# Patient Record
Sex: MALE | Race: BLACK OR AFRICAN AMERICAN | NOT HISPANIC OR LATINO | Employment: UNEMPLOYED | URBAN - METROPOLITAN AREA
[De-identification: names, ages, dates, MRNs, and addresses within clinical notes are randomized per-mention and may not be internally consistent; named-entity substitution may affect disease eponyms.]

---

## 2017-06-09 ENCOUNTER — HOSPITAL ENCOUNTER (EMERGENCY)
Facility: HOSPITAL | Age: 67
Discharge: HOME | End: 2017-06-09
Payer: MEDICARE

## 2017-06-09 VITALS
OXYGEN SATURATION: 92 % | SYSTOLIC BLOOD PRESSURE: 156 MMHG | WEIGHT: 190 LBS | BODY MASS INDEX: 28.79 KG/M2 | TEMPERATURE: 98 F | RESPIRATION RATE: 22 BRPM | DIASTOLIC BLOOD PRESSURE: 90 MMHG | HEART RATE: 78 BPM | HEIGHT: 68 IN

## 2017-06-09 DIAGNOSIS — R51.9 HEADACHE: ICD-10-CM

## 2017-06-09 DIAGNOSIS — M54.2 CERVICAL PAIN (NECK): ICD-10-CM

## 2017-06-09 LAB — POCT GLUCOSE: 103 MG/DL (ref 70–110)

## 2017-06-09 PROCEDURE — 99284 EMERGENCY DEPT VISIT MOD MDM: CPT | Mod: 25

## 2017-06-09 PROCEDURE — 82962 GLUCOSE BLOOD TEST: CPT

## 2017-06-09 PROCEDURE — 25000003 PHARM REV CODE 250: Performed by: PHYSICIAN ASSISTANT

## 2017-06-09 RX ORDER — HYDROCODONE BITARTRATE AND ACETAMINOPHEN 10; 325 MG/1; MG/1
1 TABLET ORAL EVERY 8 HOURS PRN
Qty: 8 TABLET | Refills: 0 | Status: SHIPPED | OUTPATIENT
Start: 2017-06-09 | End: 2017-06-09

## 2017-06-09 RX ORDER — BUTALBITAL, ACETAMINOPHEN AND CAFFEINE 50; 325; 40 MG/1; MG/1; MG/1
1 TABLET ORAL
Status: COMPLETED | OUTPATIENT
Start: 2017-06-09 | End: 2017-06-09

## 2017-06-09 RX ORDER — BUTALBITAL, ACETAMINOPHEN AND CAFFEINE 50; 325; 40 MG/1; MG/1; MG/1
1 TABLET ORAL 2 TIMES DAILY
Qty: 12 TABLET | Refills: 0 | Status: SHIPPED | OUTPATIENT
Start: 2017-06-09 | End: 2017-06-09

## 2017-06-09 RX ORDER — BUTALBITAL, ACETAMINOPHEN AND CAFFEINE 50; 325; 40 MG/1; MG/1; MG/1
1 TABLET ORAL 2 TIMES DAILY
Qty: 12 TABLET | Refills: 0 | Status: SHIPPED | OUTPATIENT
Start: 2017-06-09 | End: 2017-07-09

## 2017-06-09 RX ORDER — HYDROCODONE BITARTRATE AND ACETAMINOPHEN 10; 325 MG/1; MG/1
1 TABLET ORAL EVERY 8 HOURS PRN
Qty: 8 TABLET | Refills: 0 | Status: SHIPPED | OUTPATIENT
Start: 2017-06-09 | End: 2019-10-03

## 2017-06-09 RX ADMIN — BUTALBITAL, ACETAMINOPHEN, AND CAFFEINE 1 TABLET: 50; 325; 40 TABLET ORAL at 05:06

## 2017-06-09 NOTE — ED PROVIDER NOTES
SCRIBE #1 NOTE: I, Ricardo Hill, am scribing for, and in the presence of, MYLA Wolff. I have scribed the entire note.      History      Chief Complaint   Patient presents with    Neck Pain     x2 weeks, with pain radiating to toes, had skin removed to back of neck and since then knot around area with increased pain        Review of patient's allergies indicates:   Allergen Reactions    Cephalexin Anaphylaxis        HPI   HPI    6/9/2017, 4:33 PM   History obtained from the patient and fiance      History of Present Illness: Rea Vail is a 66 y.o. male patient who presents to the Emergency Department for headache which onset gradually 2 weeks ago. Sx are constant and moderate in severity. Sx are described as shooting pains into the neck.  Pt states he had a bump on his neck he scratched the scab off.  There are no mitigating or exacerbating factors noted.  Pt denies any otalgia, photophobia, neck stiffness, fever, N/V, cough, focal weakness or numbness, dizziness,  and all other sx at this time. Fiance states pt has tried Excedrin migraine without relief to sx. Pt denies hx of headaches. Pt also requests cbg.   No further complaints or concerns at this time.           Arrival mode: Personal vehicle     PCP: Estiven Oseguera MD       Past Medical History:  Past Medical History:   Diagnosis Date    Anemia 2013    Asthma     CHF (congestive heart failure)     COPD (chronic obstructive pulmonary disease)     COPD (chronic obstructive pulmonary disease) 2012    Diverticular disease     Gout 2012    Hyperlipidemia     Hypertension     Other and unspecified hyperlipidemia     Stroke        Past Surgical History:  No past surgical history on file.      Family History:  Family History   Problem Relation Age of Onset    Stroke Cousin        Social History:  Social History     Social History Main Topics    Smoking status: Former Smoker     Types: Cigarettes     Quit date: 7/9/2006    Smokeless  tobacco: Not on file    Alcohol use No    Drug use: No    Sexual activity: Not on file       ROS   Review of Systems   Constitutional: Negative for chills and fever.   HENT: Negative for ear pain, sore throat and trouble swallowing.    Eyes: Negative for photophobia.   Respiratory: Negative for cough and shortness of breath.    Cardiovascular: Negative for chest pain.   Gastrointestinal: Negative for abdominal pain, diarrhea, nausea and vomiting.   Genitourinary: Negative for dysuria and hematuria.   Musculoskeletal: Positive for neck pain. Negative for back pain and neck stiffness.   Skin: Negative for rash and wound.   Neurological: Positive for headaches. Negative for dizziness, weakness and numbness.   Hematological: Does not bruise/bleed easily.   All other systems reviewed and are negative.      Physical Exam      Initial Vitals [06/09/17 1617]   BP Pulse Resp Temp SpO2   (!) 158/92 65 16 98.4 °F (36.9 °C) (!) 92 %      Physical Exam  Nursing Notes and Vital Signs Reviewed.  Constitutional: Patient is in no acute distress. Awake and alert. Well-developed and well-nourished.  Head: Atraumatic. Normocephalic.  Eyes: PERRL. EOM intact. Conjunctivae are not pale. No scleral icterus.  Ears: Right TM normal. Left TM normal. No erythema. No bulging. No effusion or air-fluid levels. No perforation.  No mastoid ttp  Nose: Patent nares. Turbinates are normal. No drainage.   Throat: Moist mucous membranes. Posterior oropharynx is symmetric without erythema. No trismus. Normal handling of secretions. No stridor.  Neck: Supple. Full ROM. No lymphadenopathy. No abscess, cellulitis, or erythema.  Cardiovascular: Regular rate. Regular rhythm. No murmurs, rubs, or gallops.    Pulmonary/Chest: No respiratory distress. Clear to auscultation bilaterally. No wheezing, rales, or rhonchi.  Abdominal: Soft and non-distended.  There is no tenderness.  No rebound, guarding, or rigidity.     Musculoskeletal: Moves all extremities. No  "obvious deformities. No edema.    Skin: Warm and dry. Small scabbed papule to posterior neck, no abscess or cellulitis.  Neurological: Awake and alert. Appropriate for age. Cranial nerves II-XII intact. No strength deficit; equal and 5/5 in bilateral upper and lower extremities. Finger-to-nose is normal. No light touch sensory deficit. Normal gait. No acute focal neurological deficits noted.       ED Course    Procedures  ED Vital Signs:  Vitals:    06/09/17 1617 06/09/17 1813   BP: (!) 158/92 (!) 156/90   Pulse: 65 78   Resp: 16 (!) 22   Temp: 98.4 °F (36.9 °C)    TempSrc: Oral    SpO2: (!) 92% (!) 92%   Weight: 86.2 kg (190 lb)    Height: 5' 8" (1.727 m)        Abnormal Lab Results:  Labs Reviewed   POCT GLUCOSE        All Lab Results:  Results for orders placed or performed during the hospital encounter of 06/09/17   POCT glucose   Result Value Ref Range    POCT Glucose 103 70 - 110 mg/dL         Imaging Results:  Imaging Results          CT Head Without Contrast (Final result)  Result time 06/09/17 17:10:53    Final result by Moy Garcia III, MD (06/09/17 17:10:53)                 Impression:        1. Atrophy with bilateral white matter changes which may be related to microvascular disease. No bleed or other acute abnormality is suggested.      Electronically signed by: MOY GARCIA MD  Date:     06/09/17  Time:    17:10              Narrative:    CT of the brain without contrast.    Clinical indication: Headaches.    The scan is degraded by motion artifact.    There are mild changes of atrophy, both supra-and infratentorial. There are scattered changes of low-attenuation in the periventricular and subcortical white matter. No hemorrhage, mass lesion, hydrocephalus, or midline shift. No acute/depressed skull fracture.                             X-Ray Cervical Spine AP And Lateral (Final result)  Result time 06/09/17 17:04:43    Final result by Moy Garcia III, MD (06/09/17 17:04:43)     "             Impression:     As above. Multilevel degenerative change. No gross acute cervical abnormality suggested.      Electronically signed by: EVELIN SIMMONS MD  Date:     06/09/17  Time:    17:04              Narrative:    Cervical spine series, AP and lateral views.    Clinical indication: Neck pain.    There is moderate multilevel arthritic change throughout the cervical spine with interspace narrowing from C3-C7 and moderate arthritic lipping. Minimal degrees of subluxation most likely related to facet arthropathy.    There is no significant prevertebral soft tissue swelling. No lytic or blastic change. No acute cervical fracture or significant subluxation.                                 The Emergency Provider reviewed the vital signs and test results, which are outlined above.    ED Discussion     6:14 PM: Reassessed pt at this time. Awake and alert. NAD. Pt states his condition has improved at this time. Discussed with pt all pertinent ED information and results. Discussed pt dx and plan of tx. Gave pt all f/u and return to the ED instructions. All questions and concerns were addressed at this time. Pt expresses understanding of information and instructions, and is comfortable with plan to discharge. Pt is stable for discharge.      Patient's headache is either consistent with previous headache and/or lacks features concerning for emergent or life threatening condition.  I do not suspect SAH, meningitis, increased IC pressure, infectious, toxic, vascular, CNS, or other EMC.  I have discussed this at length with patient and/or family/caretaker.      ED Medication(s):  Medications   butalbital-acetaminophen-caffeine -40 mg per tablet 1 tablet (1 tablet Oral Given 6/9/17 1757)       Discharge Medication List as of 6/9/2017  6:30 PM      START taking these medications    Details   butalbital-acetaminophen-caffeine -40 mg (FIORICET, ESGIC) -40 mg per tablet Take 1 tablet by mouth 2  (two) times daily. Prn headache, Starting Fri 6/9/2017, Until Sun 7/9/2017, Print             Follow-up Information     Estiven Oseguera MD in 2 days.    Specialty:  Family Medicine  Contact information:  [address] [address] [phone]                      Medical Decision Making    Medical Decision Making:   Clinical Tests:   Lab Tests: Reviewed and Ordered  Radiological Study: Reviewed and Ordered           Scribe Attestation:   Scribe #1: I performed the above scribed service and the documentation accurately describes the services I performed. I attest to the accuracy of the note.    APC:   APC Statement for Scribe #1: I, MYLA Wolff, personally performed the services described in this documentation, as scribed by Ricardo Hill in my presence, and it is both accurate and complete.          Clinical Impression       ICD-10-CM ICD-9-CM   1. Headache R51 784.0   2. Cervical pain (neck) M54.2 723.1               Winter Wyman PA-C  06/09/17 2145

## 2018-02-07 ENCOUNTER — HOSPITAL ENCOUNTER (INPATIENT)
Facility: HOSPITAL | Age: 68
LOS: 3 days | Discharge: HOME HEALTH | DRG: 291 | End: 2018-02-10
Attending: EMERGENCY MEDICINE | Admitting: INTERNAL MEDICINE
Payer: MEDICARE

## 2018-02-07 ENCOUNTER — OFFICE VISIT (OUTPATIENT)
Dept: PULMONOLOGY | Facility: CLINIC | Age: 68
DRG: 291 | End: 2018-02-07
Payer: MEDICARE

## 2018-02-07 VITALS
BODY MASS INDEX: 29.82 KG/M2 | RESPIRATION RATE: 20 BRPM | OXYGEN SATURATION: 93 % | HEIGHT: 68 IN | DIASTOLIC BLOOD PRESSURE: 80 MMHG | HEART RATE: 84 BPM | WEIGHT: 196.75 LBS | SYSTOLIC BLOOD PRESSURE: 138 MMHG

## 2018-02-07 DIAGNOSIS — J96.21 ACUTE ON CHRONIC RESPIRATORY FAILURE WITH HYPOXIA AND HYPERCAPNIA: ICD-10-CM

## 2018-02-07 DIAGNOSIS — I50.33 ACUTE ON CHRONIC DIASTOLIC CONGESTIVE HEART FAILURE: ICD-10-CM

## 2018-02-07 DIAGNOSIS — I48.91 NEW ONSET A-FIB: ICD-10-CM

## 2018-02-07 DIAGNOSIS — J96.22 ACUTE ON CHRONIC RESPIRATORY FAILURE WITH HYPOXIA AND HYPERCAPNIA: ICD-10-CM

## 2018-02-07 DIAGNOSIS — I50.9 CHF (CONGESTIVE HEART FAILURE): ICD-10-CM

## 2018-02-07 DIAGNOSIS — I48.91 A-FIB: ICD-10-CM

## 2018-02-07 DIAGNOSIS — R06.89 DYSPNEA AND RESPIRATORY ABNORMALITIES: ICD-10-CM

## 2018-02-07 DIAGNOSIS — I50.43 ACUTE ON CHRONIC COMBINED SYSTOLIC AND DIASTOLIC CHF (CONGESTIVE HEART FAILURE): ICD-10-CM

## 2018-02-07 DIAGNOSIS — I50.9 ACUTE ON CHRONIC CONGESTIVE HEART FAILURE: ICD-10-CM

## 2018-02-07 DIAGNOSIS — I50.9 ACUTE ON CHRONIC CONGESTIVE HEART FAILURE, UNSPECIFIED CONGESTIVE HEART FAILURE TYPE: Primary | ICD-10-CM

## 2018-02-07 DIAGNOSIS — R09.02 HYPOXEMIA: ICD-10-CM

## 2018-02-07 DIAGNOSIS — R06.00 DYSPNEA AND RESPIRATORY ABNORMALITIES: ICD-10-CM

## 2018-02-07 DIAGNOSIS — J96.21 ACUTE ON CHRONIC RESPIRATORY FAILURE WITH HYPOXIA: Primary | ICD-10-CM

## 2018-02-07 PROBLEM — J96.11 CHRONIC RESPIRATORY FAILURE WITH HYPOXIA: Status: ACTIVE | Noted: 2018-02-07

## 2018-02-07 LAB
ALBUMIN SERPL BCP-MCNC: 2.8 G/DL
ALLENS TEST: ABNORMAL
ALP SERPL-CCNC: 94 U/L
ALT SERPL W/O P-5'-P-CCNC: 26 U/L
ANION GAP SERPL CALC-SCNC: 16 MMOL/L
AST SERPL-CCNC: 38 U/L
BASOPHILS # BLD AUTO: 0.02 K/UL
BASOPHILS NFR BLD: 0.3 %
BILIRUB SERPL-MCNC: 0.4 MG/DL
BILIRUB UR QL STRIP: NEGATIVE
BNP SERPL-MCNC: 380 PG/ML
BUN SERPL-MCNC: 24 MG/DL
CALCIUM SERPL-MCNC: 9.7 MG/DL
CHLORIDE SERPL-SCNC: 92 MMOL/L
CK SERPL-CCNC: 60 U/L
CLARITY UR: CLEAR
CO2 SERPL-SCNC: 31 MMOL/L
COLOR UR: YELLOW
CREAT SERPL-MCNC: 1.3 MG/DL
DELSYS: ABNORMAL
DIFFERENTIAL METHOD: ABNORMAL
EOSINOPHIL # BLD AUTO: 0.1 K/UL
EOSINOPHIL NFR BLD: 1.7 %
ERYTHROCYTE [DISTWIDTH] IN BLOOD BY AUTOMATED COUNT: 15.7 %
EST. GFR  (AFRICAN AMERICAN): >60 ML/MIN/1.73 M^2
EST. GFR  (NON AFRICAN AMERICAN): 56 ML/MIN/1.73 M^2
FIO2: 28
FLOW: 2
GLUCOSE SERPL-MCNC: 90 MG/DL
GLUCOSE UR QL STRIP: NEGATIVE
HCO3 UR-SCNC: 43.6 MMOL/L (ref 24–28)
HCT VFR BLD AUTO: 42.9 %
HGB BLD-MCNC: 12.9 G/DL
HGB UR QL STRIP: NEGATIVE
KETONES UR QL STRIP: NEGATIVE
LEUKOCYTE ESTERASE UR QL STRIP: NEGATIVE
LYMPHOCYTES # BLD AUTO: 1.2 K/UL
LYMPHOCYTES NFR BLD: 16 %
MAGNESIUM SERPL-MCNC: 2.4 MG/DL
MCH RBC QN AUTO: 26.5 PG
MCHC RBC AUTO-ENTMCNC: 30.1 G/DL
MCV RBC AUTO: 88 FL
MODE: ABNORMAL
MONOCYTES # BLD AUTO: 0.5 K/UL
MONOCYTES NFR BLD: 6.8 %
NEUTROPHILS # BLD AUTO: 5.7 K/UL
NEUTROPHILS NFR BLD: 75.2 %
NITRITE UR QL STRIP: NEGATIVE
PCO2 BLDA: 69 MMHG (ref 35–45)
PH SMN: 7.41 [PH] (ref 7.35–7.45)
PH UR STRIP: 7 [PH] (ref 5–8)
PHOSPHATE SERPL-MCNC: 4.5 MG/DL
PLATELET # BLD AUTO: 327 K/UL
PMV BLD AUTO: 10.6 FL
PO2 BLDA: 59 MMHG (ref 80–100)
POC BE: 19 MMOL/L
POC SATURATED O2: 89 % (ref 95–100)
POTASSIUM SERPL-SCNC: 4.9 MMOL/L
PROT SERPL-MCNC: 7.5 G/DL
PROT UR QL STRIP: NEGATIVE
RBC # BLD AUTO: 4.86 M/UL
SAMPLE: ABNORMAL
SITE: ABNORMAL
SODIUM SERPL-SCNC: 139 MMOL/L
SP GR UR STRIP: 1.01 (ref 1–1.03)
TROPONIN I SERPL DL<=0.01 NG/ML-MCNC: 0.02 NG/ML
URN SPEC COLLECT METH UR: NORMAL
UROBILINOGEN UR STRIP-ACNC: NEGATIVE EU/DL
WBC # BLD AUTO: 7.63 K/UL

## 2018-02-07 PROCEDURE — 86140 C-REACTIVE PROTEIN: CPT

## 2018-02-07 PROCEDURE — 85025 COMPLETE CBC W/AUTO DIFF WBC: CPT

## 2018-02-07 PROCEDURE — 84439 ASSAY OF FREE THYROXINE: CPT

## 2018-02-07 PROCEDURE — 80053 COMPREHEN METABOLIC PANEL: CPT

## 2018-02-07 PROCEDURE — 93010 ELECTROCARDIOGRAM REPORT: CPT | Mod: ,,, | Performed by: INTERNAL MEDICINE

## 2018-02-07 PROCEDURE — 99900031 HC PATIENT EDUCATION (STAT)

## 2018-02-07 PROCEDURE — 36600 WITHDRAWAL OF ARTERIAL BLOOD: CPT

## 2018-02-07 PROCEDURE — 94640 AIRWAY INHALATION TREATMENT: CPT

## 2018-02-07 PROCEDURE — 85651 RBC SED RATE NONAUTOMATED: CPT

## 2018-02-07 PROCEDURE — 99999 PR PBB SHADOW E&M-EST. PATIENT-LVL III: CPT | Mod: PBBFAC,,, | Performed by: INTERNAL MEDICINE

## 2018-02-07 PROCEDURE — 81003 URINALYSIS AUTO W/O SCOPE: CPT

## 2018-02-07 PROCEDURE — 63600175 PHARM REV CODE 636 W HCPCS: Performed by: NURSE PRACTITIONER

## 2018-02-07 PROCEDURE — 82550 ASSAY OF CK (CPK): CPT

## 2018-02-07 PROCEDURE — 11000001 HC ACUTE MED/SURG PRIVATE ROOM

## 2018-02-07 PROCEDURE — 99900029 HC O2 SETUP (STAT)

## 2018-02-07 PROCEDURE — 86038 ANTINUCLEAR ANTIBODIES: CPT

## 2018-02-07 PROCEDURE — 83880 ASSAY OF NATRIURETIC PEPTIDE: CPT

## 2018-02-07 PROCEDURE — 96374 THER/PROPH/DIAG INJ IV PUSH: CPT

## 2018-02-07 PROCEDURE — 84443 ASSAY THYROID STIM HORMONE: CPT

## 2018-02-07 PROCEDURE — 25000003 PHARM REV CODE 250: Performed by: NURSE PRACTITIONER

## 2018-02-07 PROCEDURE — 36415 COLL VENOUS BLD VENIPUNCTURE: CPT

## 2018-02-07 PROCEDURE — 84100 ASSAY OF PHOSPHORUS: CPT

## 2018-02-07 PROCEDURE — 63600175 PHARM REV CODE 636 W HCPCS: Performed by: EMERGENCY MEDICINE

## 2018-02-07 PROCEDURE — 84484 ASSAY OF TROPONIN QUANT: CPT

## 2018-02-07 PROCEDURE — 25000242 PHARM REV CODE 250 ALT 637 W/ HCPCS: Performed by: NURSE PRACTITIONER

## 2018-02-07 PROCEDURE — 25000242 PHARM REV CODE 250 ALT 637 W/ HCPCS: Performed by: EMERGENCY MEDICINE

## 2018-02-07 PROCEDURE — 84484 ASSAY OF TROPONIN QUANT: CPT | Mod: 91

## 2018-02-07 PROCEDURE — 1159F MED LIST DOCD IN RCRD: CPT | Mod: ,,, | Performed by: INTERNAL MEDICINE

## 2018-02-07 PROCEDURE — 96375 TX/PRO/DX INJ NEW DRUG ADDON: CPT

## 2018-02-07 PROCEDURE — 99900035 HC TECH TIME PER 15 MIN (STAT)

## 2018-02-07 PROCEDURE — 83735 ASSAY OF MAGNESIUM: CPT

## 2018-02-07 PROCEDURE — 96372 THER/PROPH/DIAG INJ SC/IM: CPT

## 2018-02-07 PROCEDURE — 99205 OFFICE O/P NEW HI 60 MIN: CPT | Mod: S$PBB,,, | Performed by: INTERNAL MEDICINE

## 2018-02-07 PROCEDURE — 99285 EMERGENCY DEPT VISIT HI MDM: CPT | Mod: 25,27

## 2018-02-07 PROCEDURE — 99213 OFFICE O/P EST LOW 20 MIN: CPT | Mod: PBBFAC | Performed by: INTERNAL MEDICINE

## 2018-02-07 RX ORDER — LEVALBUTEROL INHALATION SOLUTION 0.63 MG/3ML
0.63 SOLUTION RESPIRATORY (INHALATION) EVERY 12 HOURS PRN
Status: DISCONTINUED | OUTPATIENT
Start: 2018-02-07 | End: 2018-02-08

## 2018-02-07 RX ORDER — BUDESONIDE 0.5 MG/2ML
0.5 INHALANT ORAL EVERY 12 HOURS
Status: DISCONTINUED | OUTPATIENT
Start: 2018-02-07 | End: 2018-02-08

## 2018-02-07 RX ORDER — GABAPENTIN 100 MG/1
200 CAPSULE ORAL 2 TIMES DAILY
Status: DISCONTINUED | OUTPATIENT
Start: 2018-02-07 | End: 2018-02-10 | Stop reason: HOSPADM

## 2018-02-07 RX ORDER — CLOPIDOGREL BISULFATE 75 MG/1
75 TABLET ORAL DAILY
Status: DISCONTINUED | OUTPATIENT
Start: 2018-02-08 | End: 2018-02-10 | Stop reason: HOSPADM

## 2018-02-07 RX ORDER — NEBIVOLOL 10 MG/1
10 TABLET ORAL 2 TIMES DAILY
Status: DISCONTINUED | OUTPATIENT
Start: 2018-02-07 | End: 2018-02-08

## 2018-02-07 RX ORDER — RAMELTEON 8 MG/1
8 TABLET ORAL NIGHTLY PRN
Status: DISCONTINUED | OUTPATIENT
Start: 2018-02-07 | End: 2018-02-10 | Stop reason: HOSPADM

## 2018-02-07 RX ORDER — FUROSEMIDE 10 MG/ML
40 INJECTION INTRAMUSCULAR; INTRAVENOUS
Status: COMPLETED | OUTPATIENT
Start: 2018-02-07 | End: 2018-02-07

## 2018-02-07 RX ORDER — SIMVASTATIN 20 MG/1
40 TABLET, FILM COATED ORAL NIGHTLY
Status: DISCONTINUED | OUTPATIENT
Start: 2018-02-07 | End: 2018-02-10 | Stop reason: HOSPADM

## 2018-02-07 RX ORDER — ENOXAPARIN SODIUM 100 MG/ML
1 INJECTION SUBCUTANEOUS ONCE
Status: COMPLETED | OUTPATIENT
Start: 2018-02-07 | End: 2018-02-07

## 2018-02-07 RX ORDER — CLOPIDOGREL BISULFATE 75 MG/1
75 TABLET ORAL DAILY
Status: ON HOLD | COMMUNITY
End: 2018-02-10 | Stop reason: HOSPADM

## 2018-02-07 RX ORDER — AMLODIPINE BESYLATE 10 MG/1
10 TABLET ORAL DAILY
Status: DISCONTINUED | OUTPATIENT
Start: 2018-02-08 | End: 2018-02-10 | Stop reason: HOSPADM

## 2018-02-07 RX ORDER — IPRATROPIUM BROMIDE AND ALBUTEROL SULFATE 2.5; .5 MG/3ML; MG/3ML
3 SOLUTION RESPIRATORY (INHALATION)
Status: COMPLETED | OUTPATIENT
Start: 2018-02-07 | End: 2018-02-07

## 2018-02-07 RX ORDER — ALLOPURINOL 300 MG/1
300 TABLET ORAL DAILY
Status: DISCONTINUED | OUTPATIENT
Start: 2018-02-08 | End: 2018-02-10 | Stop reason: HOSPADM

## 2018-02-07 RX ORDER — ASPIRIN 325 MG
325 TABLET ORAL ONCE
Status: COMPLETED | OUTPATIENT
Start: 2018-02-07 | End: 2018-02-07

## 2018-02-07 RX ORDER — METHYLPREDNISOLONE SOD SUCC 125 MG
80 VIAL (EA) INJECTION EVERY 8 HOURS
Status: DISCONTINUED | OUTPATIENT
Start: 2018-02-07 | End: 2018-02-07

## 2018-02-07 RX ORDER — AMLODIPINE AND VALSARTAN 10; 320 MG/1; MG/1
1 TABLET ORAL DAILY
Status: DISCONTINUED | OUTPATIENT
Start: 2018-02-08 | End: 2018-02-07 | Stop reason: CLARIF

## 2018-02-07 RX ORDER — LEFLUNOMIDE 10 MG/1
10 TABLET ORAL DAILY
Status: DISCONTINUED | OUTPATIENT
Start: 2018-02-08 | End: 2018-02-10 | Stop reason: HOSPADM

## 2018-02-07 RX ORDER — PRAMIPEXOLE DIHYDROCHLORIDE 0.5 MG/1
0.5 TABLET ORAL 2 TIMES DAILY
Status: DISCONTINUED | OUTPATIENT
Start: 2018-02-07 | End: 2018-02-10 | Stop reason: HOSPADM

## 2018-02-07 RX ORDER — FUROSEMIDE 10 MG/ML
40 INJECTION INTRAMUSCULAR; INTRAVENOUS 2 TIMES DAILY
Status: COMPLETED | OUTPATIENT
Start: 2018-02-08 | End: 2018-02-08

## 2018-02-07 RX ORDER — VALSARTAN 80 MG/1
320 TABLET ORAL DAILY
Status: DISCONTINUED | OUTPATIENT
Start: 2018-02-08 | End: 2018-02-10 | Stop reason: HOSPADM

## 2018-02-07 RX ORDER — PANTOPRAZOLE SODIUM 40 MG/1
40 TABLET, DELAYED RELEASE ORAL DAILY
Status: DISCONTINUED | OUTPATIENT
Start: 2018-02-08 | End: 2018-02-10 | Stop reason: HOSPADM

## 2018-02-07 RX ORDER — METHYLPREDNISOLONE SOD SUCC 125 MG
125 VIAL (EA) INJECTION
Status: DISCONTINUED | OUTPATIENT
Start: 2018-02-07 | End: 2018-02-07

## 2018-02-07 RX ORDER — IPRATROPIUM BROMIDE 0.5 MG/2.5ML
0.5 SOLUTION RESPIRATORY (INHALATION) EVERY 8 HOURS
Status: DISCONTINUED | OUTPATIENT
Start: 2018-02-07 | End: 2018-02-08

## 2018-02-07 RX ORDER — METHYLPREDNISOLONE SOD SUCC 125 MG
80 VIAL (EA) INJECTION EVERY 8 HOURS
Status: COMPLETED | OUTPATIENT
Start: 2018-02-08 | End: 2018-02-08

## 2018-02-07 RX ORDER — ONDANSETRON 2 MG/ML
4 INJECTION INTRAMUSCULAR; INTRAVENOUS EVERY 8 HOURS PRN
Status: DISCONTINUED | OUTPATIENT
Start: 2018-02-07 | End: 2018-02-10 | Stop reason: HOSPADM

## 2018-02-07 RX ADMIN — RAMELTEON 8 MG: 8 TABLET, FILM COATED ORAL at 11:02

## 2018-02-07 RX ADMIN — NITROGLYCERIN 0.5 INCH: 20 OINTMENT TOPICAL at 10:02

## 2018-02-07 RX ADMIN — FUROSEMIDE 40 MG: 10 INJECTION, SOLUTION INTRAMUSCULAR; INTRAVENOUS at 08:02

## 2018-02-07 RX ADMIN — GABAPENTIN 200 MG: 100 CAPSULE ORAL at 10:02

## 2018-02-07 RX ADMIN — IPRATROPIUM BROMIDE AND ALBUTEROL SULFATE 3 ML: .5; 3 SOLUTION RESPIRATORY (INHALATION) at 08:02

## 2018-02-07 RX ADMIN — ENOXAPARIN SODIUM 90 MG: 100 INJECTION SUBCUTANEOUS at 10:02

## 2018-02-07 RX ADMIN — IPRATROPIUM BROMIDE AND ALBUTEROL SULFATE 3 ML: .5; 3 SOLUTION RESPIRATORY (INHALATION) at 09:02

## 2018-02-07 RX ADMIN — SIMVASTATIN 40 MG: 20 TABLET, FILM COATED ORAL at 10:02

## 2018-02-07 RX ADMIN — NEBIVOLOL HYDROCHLORIDE 10 MG: 10 TABLET ORAL at 11:02

## 2018-02-07 RX ADMIN — METHYLPREDNISOLONE SODIUM SUCCINATE 80 MG: 125 INJECTION, POWDER, FOR SOLUTION INTRAMUSCULAR; INTRAVENOUS at 10:02

## 2018-02-07 RX ADMIN — BACLOFEN 5 MG: 10 TABLET ORAL at 11:02

## 2018-02-07 RX ADMIN — PRAMIPEXOLE DIHYDROCHLORIDE 0.5 MG: 0.5 TABLET ORAL at 11:02

## 2018-02-07 RX ADMIN — BUDESONIDE 0.5 MG: 0.5 SUSPENSION RESPIRATORY (INHALATION) at 11:02

## 2018-02-07 RX ADMIN — IPRATROPIUM BROMIDE 0.5 MG: 0.5 SOLUTION RESPIRATORY (INHALATION) at 11:02

## 2018-02-07 RX ADMIN — ASPIRIN 325 MG ORAL TABLET 325 MG: 325 PILL ORAL at 10:02

## 2018-02-08 LAB
ANION GAP SERPL CALC-SCNC: 13 MMOL/L
BASOPHILS # BLD AUTO: 0 K/UL
BASOPHILS NFR BLD: 0 %
BUN SERPL-MCNC: 25 MG/DL
CALCIUM SERPL-MCNC: 9.6 MG/DL
CHLORIDE SERPL-SCNC: 90 MMOL/L
CHOLEST SERPL-MCNC: 133 MG/DL
CHOLEST/HDLC SERPL: 3.3 {RATIO}
CO2 SERPL-SCNC: 36 MMOL/L
CREAT SERPL-MCNC: 1.3 MG/DL
CRP SERPL-MCNC: 30.2 MG/L
DIASTOLIC DYSFUNCTION: NO
DIFFERENTIAL METHOD: ABNORMAL
EOSINOPHIL # BLD AUTO: 0 K/UL
EOSINOPHIL NFR BLD: 0 %
ERYTHROCYTE [DISTWIDTH] IN BLOOD BY AUTOMATED COUNT: 15.7 %
ERYTHROCYTE [SEDIMENTATION RATE] IN BLOOD BY WESTERGREN METHOD: 30 MM/HR
EST. GFR  (AFRICAN AMERICAN): >60 ML/MIN/1.73 M^2
EST. GFR  (NON AFRICAN AMERICAN): 56 ML/MIN/1.73 M^2
ESTIMATED AVG GLUCOSE: 126 MG/DL
ESTIMATED PA SYSTOLIC PRESSURE: 47.94
GLUCOSE SERPL-MCNC: 184 MG/DL
HBA1C MFR BLD HPLC: 6 %
HCT VFR BLD AUTO: 41.4 %
HDLC SERPL-MCNC: 40 MG/DL
HDLC SERPL: 30.1 %
HGB BLD-MCNC: 12.4 G/DL
LDLC SERPL CALC-MCNC: 79.4 MG/DL
LYMPHOCYTES # BLD AUTO: 0.3 K/UL
LYMPHOCYTES NFR BLD: 4.5 %
MCH RBC QN AUTO: 26.4 PG
MCHC RBC AUTO-ENTMCNC: 30 G/DL
MCV RBC AUTO: 88 FL
MITRAL VALVE REGURGITATION: ABNORMAL
MONOCYTES # BLD AUTO: 0 K/UL
MONOCYTES NFR BLD: 0.4 %
NEUTROPHILS # BLD AUTO: 6.8 K/UL
NEUTROPHILS NFR BLD: 95.1 %
NONHDLC SERPL-MCNC: 93 MG/DL
PLATELET # BLD AUTO: 326 K/UL
PMV BLD AUTO: 9.5 FL
POTASSIUM SERPL-SCNC: 3.8 MMOL/L
RBC # BLD AUTO: 4.69 M/UL
RETIRED EF AND QEF - SEE NOTES: 55 (ref 55–65)
SODIUM SERPL-SCNC: 139 MMOL/L
T4 FREE SERPL-MCNC: 1.01 NG/DL
TRICUSPID VALVE REGURGITATION: ABNORMAL
TRIGL SERPL-MCNC: 68 MG/DL
TROPONIN I SERPL DL<=0.01 NG/ML-MCNC: 0.01 NG/ML
TROPONIN I SERPL DL<=0.01 NG/ML-MCNC: 0.02 NG/ML
TSH SERPL DL<=0.005 MIU/L-ACNC: 1.35 UIU/ML
WBC # BLD AUTO: 7.1 K/UL

## 2018-02-08 PROCEDURE — 93306 TTE W/DOPPLER COMPLETE: CPT | Mod: 26,,, | Performed by: INTERNAL MEDICINE

## 2018-02-08 PROCEDURE — 25000003 PHARM REV CODE 250: Performed by: INTERNAL MEDICINE

## 2018-02-08 PROCEDURE — 93306 TTE W/DOPPLER COMPLETE: CPT

## 2018-02-08 PROCEDURE — 93005 ELECTROCARDIOGRAM TRACING: CPT

## 2018-02-08 PROCEDURE — 96376 TX/PRO/DX INJ SAME DRUG ADON: CPT

## 2018-02-08 PROCEDURE — 84484 ASSAY OF TROPONIN QUANT: CPT

## 2018-02-08 PROCEDURE — 27000190 HC CPAP FULL FACE MASK W/VALVE

## 2018-02-08 PROCEDURE — 25000003 PHARM REV CODE 250: Performed by: NURSE PRACTITIONER

## 2018-02-08 PROCEDURE — 94640 AIRWAY INHALATION TREATMENT: CPT

## 2018-02-08 PROCEDURE — 25000242 PHARM REV CODE 250 ALT 637 W/ HCPCS: Performed by: INTERNAL MEDICINE

## 2018-02-08 PROCEDURE — 80048 BASIC METABOLIC PNL TOTAL CA: CPT

## 2018-02-08 PROCEDURE — 63600175 PHARM REV CODE 636 W HCPCS: Performed by: NURSE PRACTITIONER

## 2018-02-08 PROCEDURE — 94660 CPAP INITIATION&MGMT: CPT

## 2018-02-08 PROCEDURE — 99223 1ST HOSP IP/OBS HIGH 75: CPT | Mod: ,,, | Performed by: INTERNAL MEDICINE

## 2018-02-08 PROCEDURE — 25000003 PHARM REV CODE 250: Performed by: EMERGENCY MEDICINE

## 2018-02-08 PROCEDURE — 93010 ELECTROCARDIOGRAM REPORT: CPT | Mod: ,,, | Performed by: INTERNAL MEDICINE

## 2018-02-08 PROCEDURE — 97802 MEDICAL NUTRITION INDIV IN: CPT

## 2018-02-08 PROCEDURE — 25000242 PHARM REV CODE 250 ALT 637 W/ HCPCS: Performed by: NURSE PRACTITIONER

## 2018-02-08 PROCEDURE — 99900035 HC TECH TIME PER 15 MIN (STAT)

## 2018-02-08 PROCEDURE — 83036 HEMOGLOBIN GLYCOSYLATED A1C: CPT

## 2018-02-08 PROCEDURE — 21400001 HC TELEMETRY ROOM

## 2018-02-08 PROCEDURE — 27000221 HC OXYGEN, UP TO 24 HOURS

## 2018-02-08 PROCEDURE — 85025 COMPLETE CBC W/AUTO DIFF WBC: CPT

## 2018-02-08 PROCEDURE — 80061 LIPID PANEL: CPT

## 2018-02-08 RX ORDER — ARFORMOTEROL TARTRATE 15 UG/2ML
15 SOLUTION RESPIRATORY (INHALATION) EVERY 12 HOURS
Status: DISCONTINUED | OUTPATIENT
Start: 2018-02-08 | End: 2018-02-10 | Stop reason: HOSPADM

## 2018-02-08 RX ORDER — METOPROLOL TARTRATE 1 MG/ML
5 INJECTION, SOLUTION INTRAVENOUS EVERY 4 HOURS PRN
Status: DISCONTINUED | OUTPATIENT
Start: 2018-02-08 | End: 2018-02-10 | Stop reason: HOSPADM

## 2018-02-08 RX ORDER — LEVALBUTEROL INHALATION SOLUTION 0.63 MG/3ML
0.63 SOLUTION RESPIRATORY (INHALATION) EVERY 8 HOURS PRN
Status: DISCONTINUED | OUTPATIENT
Start: 2018-02-08 | End: 2018-02-10 | Stop reason: HOSPADM

## 2018-02-08 RX ORDER — METOPROLOL TARTRATE 25 MG/1
25 TABLET, FILM COATED ORAL 2 TIMES DAILY
Status: DISCONTINUED | OUTPATIENT
Start: 2018-02-08 | End: 2018-02-08

## 2018-02-08 RX ORDER — DILTIAZEM HYDROCHLORIDE 5 MG/ML
0.25 INJECTION INTRAVENOUS ONCE
Status: COMPLETED | OUTPATIENT
Start: 2018-02-08 | End: 2018-02-08

## 2018-02-08 RX ORDER — NEBIVOLOL 10 MG/1
10 TABLET ORAL 2 TIMES DAILY
Status: DISCONTINUED | OUTPATIENT
Start: 2018-02-08 | End: 2018-02-09

## 2018-02-08 RX ADMIN — APIXABAN 5 MG: 2.5 TABLET, FILM COATED ORAL at 11:02

## 2018-02-08 RX ADMIN — PRAMIPEXOLE DIHYDROCHLORIDE 0.5 MG: 0.5 TABLET ORAL at 11:02

## 2018-02-08 RX ADMIN — SIMVASTATIN 40 MG: 20 TABLET, FILM COATED ORAL at 08:02

## 2018-02-08 RX ADMIN — PRAMIPEXOLE DIHYDROCHLORIDE 0.5 MG: 0.5 TABLET ORAL at 08:02

## 2018-02-08 RX ADMIN — NEBIVOLOL HYDROCHLORIDE 10 MG: 10 TABLET ORAL at 11:02

## 2018-02-08 RX ADMIN — VALSARTAN 320 MG: 80 TABLET, FILM COATED ORAL at 09:02

## 2018-02-08 RX ADMIN — CLOPIDOGREL BISULFATE 75 MG: 75 TABLET ORAL at 09:02

## 2018-02-08 RX ADMIN — BACLOFEN 5 MG: 10 TABLET ORAL at 01:02

## 2018-02-08 RX ADMIN — GABAPENTIN 200 MG: 100 CAPSULE ORAL at 09:02

## 2018-02-08 RX ADMIN — FUROSEMIDE 40 MG: 10 INJECTION, SOLUTION INTRAMUSCULAR; INTRAVENOUS at 05:02

## 2018-02-08 RX ADMIN — NITROGLYCERIN 0.5 INCH: 20 OINTMENT TOPICAL at 06:02

## 2018-02-08 RX ADMIN — AMLODIPINE BESYLATE 10 MG: 10 TABLET ORAL at 09:02

## 2018-02-08 RX ADMIN — NEBIVOLOL HYDROCHLORIDE 10 MG: 10 TABLET ORAL at 09:02

## 2018-02-08 RX ADMIN — NEBIVOLOL HYDROCHLORIDE 10 MG: 10 TABLET ORAL at 08:02

## 2018-02-08 RX ADMIN — DILTIAZEM HYDROCHLORIDE 23 MG: 5 INJECTION INTRAVENOUS at 09:02

## 2018-02-08 RX ADMIN — METHYLPREDNISOLONE SODIUM SUCCINATE 80 MG: 125 INJECTION, POWDER, FOR SOLUTION INTRAMUSCULAR; INTRAVENOUS at 06:02

## 2018-02-08 RX ADMIN — METHYLPREDNISOLONE SODIUM SUCCINATE 80 MG: 125 INJECTION, POWDER, FOR SOLUTION INTRAMUSCULAR; INTRAVENOUS at 01:02

## 2018-02-08 RX ADMIN — ARFORMOTEROL TARTRATE 15 MCG: 15 SOLUTION RESPIRATORY (INHALATION) at 07:02

## 2018-02-08 RX ADMIN — ALLOPURINOL 300 MG: 300 TABLET ORAL at 09:02

## 2018-02-08 RX ADMIN — GABAPENTIN 200 MG: 100 CAPSULE ORAL at 08:02

## 2018-02-08 RX ADMIN — BACLOFEN 5 MG: 10 TABLET ORAL at 06:02

## 2018-02-08 RX ADMIN — IPRATROPIUM BROMIDE 0.5 MG: 0.5 SOLUTION RESPIRATORY (INHALATION) at 07:02

## 2018-02-08 RX ADMIN — NITROGLYCERIN 0.5 INCH: 20 OINTMENT TOPICAL at 01:02

## 2018-02-08 RX ADMIN — PANTOPRAZOLE SODIUM 40 MG: 40 TABLET, DELAYED RELEASE ORAL at 09:02

## 2018-02-08 RX ADMIN — APIXABAN 5 MG: 2.5 TABLET, FILM COATED ORAL at 08:02

## 2018-02-08 RX ADMIN — LEVALBUTEROL 0.63 MG: 0.63 SOLUTION RESPIRATORY (INHALATION) at 07:02

## 2018-02-08 RX ADMIN — BACLOFEN 5 MG: 10 TABLET ORAL at 10:02

## 2018-02-08 RX ADMIN — BUDESONIDE 0.5 MG: 0.5 SUSPENSION RESPIRATORY (INHALATION) at 07:02

## 2018-02-08 RX ADMIN — FUROSEMIDE 40 MG: 10 INJECTION, SOLUTION INTRAMUSCULAR; INTRAVENOUS at 09:02

## 2018-02-08 RX ADMIN — LEFLUNOMIDE 10 MG: 10 TABLET ORAL at 11:02

## 2018-02-08 NOTE — ASSESSMENT & PLAN NOTE
-Secondary to decompensated CHF and underlying COPD  -Respiratory status improved since admission  -Continue IV diuresis  -Continue nebs

## 2018-02-08 NOTE — ASSESSMENT & PLAN NOTE
Etiology unclear.  Multifactorial etiology suspected.  Likely contributors to etiology (checked)    [x] Pulmonary airway disease    [x]  Pulmonary parenchymal disease  [x] Pulmonary vascular disease   [] Pleural disease  [] Pulmonary vasculitis  [] Hypoventilation ( chest wall deformity, neuromuscular disease, obesity etc)  [] Anemia  [] Thyroid disease.  [x] Cardiac illess  []         Sleep disorder    EVALUATION:  [x]        Complete PFT with bronchodilator.  []        Bronchial challenge with methacholine.   [x]        Stress test, pulmonary.  [x]        PULM - Arterial Blood Gases  [x]        Chest X Ray  []        CT scan of chest.   [x]        DOT  [x]        Sedimentation rate  [x]        C-reactive protein  [x]        Anti-neutrophilic cytoplasmic antibody          PLAN:  Discussed diagnosis, its evaluation, treatment and usual course.  All questions answered.        Call if shortness of breath worsens, blood in sputum, change in character of cough, development of fever or chills, inability to maintain nutrition and hydration.     Re evaluate in four weeks with results.

## 2018-02-08 NOTE — SUBJECTIVE & OBJECTIVE
Interval History: Pt sitting up in chair, feels a little better, orthopnea, SOB, leg edema all improving. He has put out about 700 cc urine. Pulse ox on RA was still low at 86%. No CP or palpitations. Remains in Afib @109.     Review of Systems   Constitutional: Negative for chills, diaphoresis, fatigue and fever.   HENT: Negative for congestion, sore throat and voice change.    Eyes: Negative for photophobia and visual disturbance.   Respiratory: Positive for shortness of breath. Negative for cough, wheezing and stridor.    Cardiovascular: Positive for leg swelling. Negative for chest pain.   Gastrointestinal: Negative for abdominal distention, abdominal pain, constipation, diarrhea, nausea and vomiting.   Endocrine: Negative for polydipsia, polyphagia and polyuria.   Genitourinary: Negative for difficulty urinating, dysuria, flank pain, testicular pain and urgency.   Musculoskeletal: Negative for back pain, joint swelling, neck pain and neck stiffness.   Skin: Negative for color change and rash.   Allergic/Immunologic: Negative for immunocompromised state.   Neurological: Negative for dizziness, syncope, weakness, numbness and headaches.   Hematological: Does not bruise/bleed easily.   Psychiatric/Behavioral: Negative for agitation, behavioral problems and confusion.     Objective:     Vital Signs (Most Recent):  Temp: 98 °F (36.7 °C) (02/08/18 1606)  Pulse: 90 (02/08/18 1606)  Resp: 18 (02/08/18 1606)  BP: 131/78 (02/08/18 1606)  SpO2: 97 % (02/08/18 1606) Vital Signs (24h Range):  Temp:  [98 °F (36.7 °C)-98.2 °F (36.8 °C)] 98 °F (36.7 °C)  Pulse:  [] 90  Resp:  [12-27] 18  SpO2:  [85 %-99 %] 97 %  BP: (127-161)/() 131/78     Weight: 89.6 kg (197 lb 8.5 oz)  Body mass index is 30.03 kg/m².    Intake/Output Summary (Last 24 hours) at 02/08/18 1733  Last data filed at 02/08/18 1600   Gross per 24 hour   Intake              360 ml   Output             1475 ml   Net            -1115 ml      Physical Exam    Constitutional: He is oriented to person, place, and time. He appears well-developed. No distress.   HENT:   Head: Normocephalic and atraumatic.   Nose: Nose normal.   Eyes: Conjunctivae and EOM are normal. Pupils are equal, round, and reactive to light. No scleral icterus.   Neck: Normal range of motion. Neck supple. No tracheal deviation present.   Cardiovascular: Normal rate, regular rhythm, normal heart sounds and intact distal pulses.    No murmur heard.  Bilateral lower ext swelling +1+2     Pulmonary/Chest: Effort normal. No stridor. No respiratory distress. He has no wheezes. He has no rales.   Decreased aeration and Bilateral lower crackles.    Abdominal: Soft. Bowel sounds are normal. He exhibits no distension. There is no tenderness. There is no guarding.   Genitourinary:   Genitourinary Comments: ua neg     Musculoskeletal: Normal range of motion. He exhibits no edema or deformity.   Neurological: He is alert and oriented to person, place, and time. No cranial nerve deficit. Coordination normal.   Left side weakness, chronic deficit from old stroke  Patient with no acute neurological impairments/findings.    Skin: Skin is warm and dry. Capillary refill takes less than 2 seconds. No rash noted. He is not diaphoretic.   Psychiatric: He has a normal mood and affect. His behavior is normal. Judgment and thought content normal.   Nursing note and vitals reviewed.      Significant Labs:   ABGs:   Recent Labs  Lab 02/07/18 2037   PH 7.409   PCO2 69.0*   HCO3 43.6*   POCSATURATED 89*   BE 19     BMP:   Recent Labs  Lab 02/07/18 1956 02/08/18  0450   GLU 90 184*    139   K 4.9 3.8   CL 92* 90*   CO2 31* 36*   BUN 24* 25*   CREATININE 1.3 1.3   CALCIUM 9.7 9.6   MG 2.4  --      CBC:   Recent Labs  Lab 02/07/18 1956 02/08/18  0450   WBC 7.63 7.10   HGB 12.9* 12.4*   HCT 42.9 41.4    326     All pertinent labs within the past 24 hours have been reviewed.    Significant Imaging: I have reviewed all  pertinent imaging results/findings within the past 24 hours.

## 2018-02-08 NOTE — ASSESSMENT & PLAN NOTE
-Decompensated, in setting of recent URI and atrial fibrillation with RVR  -Continue IV diuresis  -Continue Bystolic, ARB, statin  -Strict I's/O's  -Dietary restrictions discussed

## 2018-02-08 NOTE — ASSESSMENT & PLAN NOTE
Unknown if this is acute on chronic systolic or diastolic CHF at this time, will recheck echo. Last echo 4 years ago reports normal EF.   Diuresis, consider dose of albumin  Cardiology consult  ASA, BB, ACE/ARB, Statin   Hold HCTZ at this time, using IV lasix   Check Lipids, A1C, Electrolytes, serial troponin.     2/8- looks better, but still SOB  Continue IV lasix

## 2018-02-08 NOTE — H&P
[facility] Hospital Medicine  History & Physical    Patient Name: Rea Vail  MRN: [id number]  Admission Date: 2/7/2018  Attending Physician: Carter Davila  Primary Care Provider: Estiven Oseguera MD         Patient information was obtained from patient, past medical records and ER records.     Subjective:     Principal Problem:Acute on chronic congestive heart failure    Chief Complaint:   Chief Complaint   Patient presents with    Shortness of Breath     sent from Dr. Bear. BLE edema.        HPI:   Rea Vail is a 67 y.o. male patient who was sent to the Emergency Department by Dr. Roberts (Pulmonology) for chronic SOB which has been worsening gradually  over the last week. Associated symptoms include leg swelling and non productive cough. Exacerbating factors activity and laying flat.Symptoms are constant and moderate in severity. No mitigating or exacerbating factors reported. No associated sxs reported. Patient denies any fever, chills, diaphoresis, CP, N/V, back pain, neck pain, HA, dizziness, and all other sxs at this time. To note: Pt was seen by his PCP who doubled the pt's lasix with no relief. Additionally Pt has Hx including COPD, HTN, CVA and CHF. Patient is former smoker Pt is not on home O2, but does use breathing Txs. No further complaints or concerns at this time. The patient was evaluated in the Er, patient reports some improvement in symptoms since arrival with ED interventions. Patient CBC stable, CMP with low albumin of 2.8. , Troponin neg. ABGs with Hypercapnia and Hypoxemia. Chest Xray Impression: CHF exacerbation. EKG with New Onset Afib, rate 90-110s on assessment. Patient admitted for CHF and COPD exacerbation with new onset afib.       Office Visit From Dr. Roberts  HPI:  Reports progressive dyspnea, wheezing, bilateral leg edema, orthopnea and PND for the past week. PCP doubled his dose of lasix but this did no help. Datient reports cough and difficulty  breathing and denies vomiting, abdominal pain and diarrhea. Patient denies recent sick contacts.   Patient does not have new pets. Patient does not have a history of asthma. Patient does not have a history of environmental allergens. Patient has not traveled recently. Patient does have a history of smoking. He smoked 3 PPD for 20 years. He quit smoking 13 years ago.  Patient has not had a previous chest x-ray. Patient has not had a PPD done.  PPD was Negative       Past Medical History:   Diagnosis Date    Anemia 2013    Asthma     CHF (congestive heart failure)     COPD (chronic obstructive pulmonary disease)     COPD (chronic obstructive pulmonary disease) 2012    Diverticular disease     Gout 2012    Hyperlipidemia     Hypertension     Other and unspecified hyperlipidemia     Stroke        History reviewed. No pertinent surgical history.    Review of patient's allergies indicates:   Allergen Reactions    Cephalexin Anaphylaxis       No current facility-administered medications on file prior to encounter.      Current Outpatient Prescriptions on File Prior to Encounter   Medication Sig    aclidinium bromide (TUDORZA PRESSAIR) 400 mcg/actuation AePB Inhale 1 puff into the lungs 2 (two) times daily.    albuterol (PROVENTIL/VENTOLIN) 90 mcg/actuation inhaler Inhale 2 puffs into the lungs every 6 (six) hours as needed.    allopurinol (ZYLOPRIM) 300 MG tablet Take 300 mg by mouth once daily.    amlodipine-valsartan (EXFORGE)  mg per tablet Take 1 tablet by mouth once daily.    baclofen (LIORESAL) 10 MG tablet Take by mouth. 1 tablet Oral Every 8 hours    fluticasone-salmeterol 250-50 mcg/dose (ADVAIR) 250-50 mcg/dose diskus inhaler Inhale 1 puff into the lungs 2 (two) times daily.    furosemide (LASIX) 20 MG tablet Take 1 tablet (20 mg total) by mouth once daily. (Patient taking differently: Take 40 mg by mouth once daily. )    gabapentin (NEURONTIN) 300 MG capsule Take 1 capsule (300 mg  total) by mouth 2 (two) times daily. 1 capsule Oral Three times a day    hydrocodone-acetaminophen 10-325mg (NORCO)  mg Tab Take 1 tablet by mouth every 8 (eight) hours as needed for Pain. 1 tablet Oral Twice a day    multivitamin capsule Take 1 capsule by mouth once daily.    nebivolol (BYSTOLIC) 10 MG Tab Take 1 tablet (10 mg total) by mouth 2 (two) times daily. 1 tablet Oral Every day    simvastatin (ZOCOR) 40 MG tablet Take 1 tablet (40 mg total) by mouth every evening. 1 tablet Oral Every day    tizanidine (ZANAFLEX) 4 MG tablet Take 4 mg by mouth nightly as needed.    diazepam (VALIUM) 5 MG tablet Take 5 mg by mouth every 12 (twelve) hours as needed for Anxiety.    hydrochlorothiazide (HYDRODIURIL) 25 MG tablet Take 25 mg by mouth 2 (two) times daily.     iron-vitamin C 65 mg iron- 125 mg TbEC Take by mouth 3 (three) times daily.    leflunomide (ARAVA) 10 MG Tab Take 1 tablet (10 mg total) by mouth once daily.    pramipexole (MIRAPEX) 0.5 MG tablet Take 0.5 mg by mouth 2 (two) times daily.    [DISCONTINUED] clopidogrel (PLAVIX) 75 mg tablet Take 1 tablet (75 mg total) by mouth once daily.     Family History     Problem Relation (Age of Onset)    Stroke Cousin        Social History Main Topics    Smoking status: Former Smoker     Types: Cigarettes     Quit date: 7/9/2006    Smokeless tobacco: Not on file    Alcohol use No    Drug use: No    Sexual activity: Not on file     Review of Systems   Constitutional: Negative for chills, diaphoresis, fatigue and fever.   HENT: Negative for congestion, sore throat and voice change.    Eyes: Negative for photophobia and visual disturbance.   Respiratory: Positive for shortness of breath. Negative for cough, wheezing and stridor.    Cardiovascular: Positive for leg swelling. Negative for chest pain.   Gastrointestinal: Negative for abdominal distention, abdominal pain, constipation, diarrhea, nausea and vomiting.   Endocrine: Negative for polydipsia,  polyphagia and polyuria.   Genitourinary: Negative for difficulty urinating, dysuria, flank pain, testicular pain and urgency.   Musculoskeletal: Negative for back pain, joint swelling, neck pain and neck stiffness.   Skin: Negative for color change and rash.   Allergic/Immunologic: Negative for immunocompromised state.   Neurological: Negative for dizziness, syncope, weakness, numbness and headaches.   Hematological: Does not bruise/bleed easily.   Psychiatric/Behavioral: Negative for agitation, behavioral problems and confusion.     Objective:     Vital Signs (Most Recent):  Temp: 98.2 °F (36.8 °C) (02/1950)  Pulse: (!) 163 (02/07/18 2140)  Resp: (!) 30 (02/07/18 2140)  BP: (!) 161/92 (02/07/18 1952)  SpO2: (!) 87 % (02/07/18 2140) Vital Signs (24h Range):  Temp:  [98.2 °F (36.8 °C)] 98.2 °F (36.8 °C)  Pulse:  [] 163  Resp:  [12-30] 30  SpO2:  [85 %-99 %] 87 %  BP: (138-161)/(80-92) 161/92        Body mass index is 29.92 kg/m².    Physical Exam   Constitutional: He is oriented to person, place, and time. He appears well-developed. No distress.   HENT:   Head: Normocephalic and atraumatic.   Nose: Nose normal.   Eyes: Conjunctivae and EOM are normal. Pupils are equal, round, and reactive to light. No scleral icterus.   Neck: Normal range of motion. Neck supple. No tracheal deviation present.   Cardiovascular: Increased rate 90-110s at this time, irregular rhythm, afib on CM, Murmur present and intact distal pulses.    Bilateral lower ext swelling +1+2     Pulmonary/Chest: Effort normal. No stridor. No respiratory distress. He has wheezes. He has no rales.   Decreased aeration and Bilateral lower crackles.    Abdominal: Soft. Bowel sounds are normal. He exhibits no distension. There is no tenderness. There is no guarding.   Genitourinary:   Genitourinary Comments: ua neg     Musculoskeletal: Normal range of motion. He exhibits no edema or deformity.   Neurological: He is alert and oriented to person,  place, and time. No cranial nerve deficit. Coordination normal.   Left side weakness, chronic deficit from old stroke  Patient with no acute neurological impairments/findings.    Skin: Skin is warm and dry. Capillary refill takes less than 2 seconds. No rash noted. He is not diaphoretic.   Psychiatric: He has a normal mood and affect. His behavior is normal. Judgment and thought content normal.   Nursing note and vitals reviewed.        CRANIAL NERVES     CN III, IV, VI   Pupils are equal, round, and reactive to light.  Extraocular motions are normal.        Significant Labs: All pertinent labs within the past 24 hours have been reviewed.  Results for orders placed or performed during the hospital encounter of 02/07/18   CBC auto differential   Result Value Ref Range    WBC 7.63 3.90 - 12.70 K/uL    RBC 4.86 4.60 - 6.20 M/uL    Hemoglobin 12.9 (L) 14.0 - 18.0 g/dL    Hematocrit 42.9 40.0 - 54.0 %    MCV 88 82 - 98 fL    MCH 26.5 (L) 27.0 - 31.0 pg    MCHC 30.1 (L) 32.0 - 36.0 g/dL    RDW 15.7 (H) 11.5 - 14.5 %    Platelets 327 150 - 350 K/uL    MPV 10.6 9.2 - 12.9 fL    Gran # (ANC) 5.7 1.8 - 7.7 K/uL    Lymph # 1.2 1.0 - 4.8 K/uL    Mono # 0.5 0.3 - 1.0 K/uL    Eos # 0.1 0.0 - 0.5 K/uL    Baso # 0.02 0.00 - 0.20 K/uL    Gran% 75.2 (H) 38.0 - 73.0 %    Lymph% 16.0 (L) 18.0 - 48.0 %    Mono% 6.8 4.0 - 15.0 %    Eosinophil% 1.7 0.0 - 8.0 %    Basophil% 0.3 0.0 - 1.9 %    Differential Method Automated    Comprehensive metabolic panel   Result Value Ref Range    Sodium 139 136 - 145 mmol/L    Potassium 4.9 3.5 - 5.1 mmol/L    Chloride 92 (L) 95 - 110 mmol/L    CO2 31 (H) 23 - 29 mmol/L    Glucose 90 70 - 110 mg/dL    BUN, Bld 24 (H) 8 - 23 mg/dL    Creatinine 1.3 0.5 - 1.4 mg/dL    Calcium 9.7 8.7 - 10.5 mg/dL    Total Protein 7.5 6.0 - 8.4 g/dL    Albumin 2.8 (L) 3.5 - 5.2 g/dL    Total Bilirubin 0.4 0.1 - 1.0 mg/dL    Alkaline Phosphatase 94 55 - 135 U/L    AST 38 10 - 40 U/L    ALT 26 10 - 44 U/L    Anion Gap 16 8 -  16 mmol/L    eGFR if African American >60 >60 mL/min/1.73 m^2    eGFR if non African American 56 (A) >60 mL/min/1.73 m^2   Urinalysis   Result Value Ref Range    Specimen UA Urine, Clean Catch     Color, UA Yellow Yellow, Straw, Ninoska    Appearance, UA Clear Clear    pH, UA 7.0 5.0 - 8.0    Specific Gravity, UA 1.010 1.005 - 1.030    Protein, UA Negative Negative    Glucose, UA Negative Negative    Ketones, UA Negative Negative    Bilirubin (UA) Negative Negative    Occult Blood UA Negative Negative    Nitrite, UA Negative Negative    Urobilinogen, UA Negative <2.0 EU/dL    Leukocytes, UA Negative Negative   Brain natriuretic peptide   Result Value Ref Range     (H) 0 - 99 pg/mL   CK   Result Value Ref Range    CPK 60 20 - 200 U/L   Troponin I   Result Value Ref Range    Troponin I 0.024 0.000 - 0.026 ng/mL   ISTAT PROCEDURE   Result Value Ref Range    POC PH 7.409 7.35 - 7.45    POC PCO2 69.0 (HH) 35 - 45 mmHg    POC PO2 59 (LL) 80 - 100 mmHg    POC HCO3 43.6 (H) 24 - 28 mmol/L    POC BE 19 -2 to 2 mmol/L    POC SATURATED O2 89 (L) 95 - 100 %    Sample ARTERIAL     Site RB     Allens Test Pass     DelSys Nasal Can     Mode SPONT     Flow 2     FiO2 28        Significant Imaging: I have reviewed all pertinent imaging results/findings within the past 24 hours.   Imaging Results          X-Ray Chest AP Portable (Final result)  Result time 02/07/18 20:22:03    Final result by Nayeli Knowles Jr., MD (02/07/18 20:22:03)                 Impression:     CHF exacerbation.      Electronically signed by: NAYELI KNOWLES  Date:     02/07/18  Time:    20:22              Narrative:    Exam: Portable chest radiograph    History:    Shortness of breath    Findings: Cardiomegaly in a setting of central vascular congestion and interstitial pulmonary edema.  No effusion or pneumothorax.  Calcified tortuous thoracic aorta.  Osseous structures intact.                              I have personally reviewed the patients  labs, imaging, ekg (afib) and discussed the patient case in detail with the Er provider    Assessment/Plan:     * Acute on chronic congestive heart failure    Unknown if this is acute on chronic systolic or diastolic CHF at this time, will recheck echo. Last echo 4 years ago reports normal EF.   Diuresis, consider dose of albumin  Cardiology consult  ASA, BB, ACE/ARB, Statin   Hold HCTZ at this time, using IV lasix   Check Lipids, A1C, Electrolytes, serial troponin.         Acute on chronic respiratory failure with hypoxia and hypercapnia    Resp consult for assessment of Cpap/Bipap  Atrovent Nebs, ICS, Xopenex PRN  Solumedrol 80mg q8 x 3   Pulmonary consult             New onset a-fib    ASA 325mg and Lovenox 1mg/kg given now  continue Plavix and BB  Try to avoid Beta Nebs if possible, will use nitro to improve aeration of lungs.  Cardiology consult for continued management            HTN (hypertension)    Continue home meds, may need to adjust   Monitor trends.           VTE Risk Mitigation         Ordered     Medium Risk of VTE  Once      02/07/18 2158     Place DANNA hose  Until discontinued      02/07/18 2158     Place sequential compression device  Until discontinued      02/07/18 2158     Place sequential compression device  Until discontinued     Lovenox 1mg/kg given  02/07/18 2158             Moy Fuller NP  Department of Hospital Medicine   [facility]

## 2018-02-08 NOTE — ASSESSMENT & PLAN NOTE
Agree with cardiology consult.  Optimize CHF regimen.  Preload and afterload reduction.  Daily weighing.  Dietary salt restriction.  Alcohol and tobacco avoidance.    2D  ECHO to evaluate LVF.

## 2018-02-08 NOTE — SUBJECTIVE & OBJECTIVE
Past Medical History:   Diagnosis Date    Anemia 2013    Asthma     CHF (congestive heart failure)     COPD (chronic obstructive pulmonary disease)     COPD (chronic obstructive pulmonary disease) 2012    Diverticular disease     Gout 2012    Hyperlipidemia     Hypertension     Other and unspecified hyperlipidemia     Stroke        History reviewed. No pertinent surgical history.    Review of patient's allergies indicates:   Allergen Reactions    Cephalexin Anaphylaxis       No current facility-administered medications on file prior to encounter.      Current Outpatient Prescriptions on File Prior to Encounter   Medication Sig    aclidinium bromide (TUDORZA PRESSAIR) 400 mcg/actuation AePB Inhale 1 puff into the lungs 2 (two) times daily.    albuterol (PROVENTIL/VENTOLIN) 90 mcg/actuation inhaler Inhale 2 puffs into the lungs every 6 (six) hours as needed.    allopurinol (ZYLOPRIM) 300 MG tablet Take 300 mg by mouth once daily.    amlodipine-valsartan (EXFORGE)  mg per tablet Take 1 tablet by mouth once daily.    baclofen (LIORESAL) 10 MG tablet Take by mouth. 1 tablet Oral Every 8 hours    fluticasone-salmeterol 250-50 mcg/dose (ADVAIR) 250-50 mcg/dose diskus inhaler Inhale 1 puff into the lungs 2 (two) times daily.    furosemide (LASIX) 20 MG tablet Take 1 tablet (20 mg total) by mouth once daily. (Patient taking differently: Take 40 mg by mouth once daily. )    gabapentin (NEURONTIN) 300 MG capsule Take 1 capsule (300 mg total) by mouth 2 (two) times daily. 1 capsule Oral Three times a day    hydrocodone-acetaminophen 10-325mg (NORCO)  mg Tab Take 1 tablet by mouth every 8 (eight) hours as needed for Pain. 1 tablet Oral Twice a day    multivitamin capsule Take 1 capsule by mouth once daily.    nebivolol (BYSTOLIC) 10 MG Tab Take 1 tablet (10 mg total) by mouth 2 (two) times daily. 1 tablet Oral Every day    simvastatin (ZOCOR) 40 MG tablet Take 1 tablet (40 mg total) by mouth  every evening. 1 tablet Oral Every day    tizanidine (ZANAFLEX) 4 MG tablet Take 4 mg by mouth nightly as needed.    diazepam (VALIUM) 5 MG tablet Take 5 mg by mouth every 12 (twelve) hours as needed for Anxiety.    hydrochlorothiazide (HYDRODIURIL) 25 MG tablet Take 25 mg by mouth 2 (two) times daily.     iron-vitamin C 65 mg iron- 125 mg TbEC Take by mouth 3 (three) times daily.    leflunomide (ARAVA) 10 MG Tab Take 1 tablet (10 mg total) by mouth once daily.    pramipexole (MIRAPEX) 0.5 MG tablet Take 0.5 mg by mouth 2 (two) times daily.    [DISCONTINUED] clopidogrel (PLAVIX) 75 mg tablet Take 1 tablet (75 mg total) by mouth once daily.     Family History     Problem Relation (Age of Onset)    Stroke Cousin        Social History Main Topics    Smoking status: Former Smoker     Types: Cigarettes     Quit date: 7/9/2006    Smokeless tobacco: Not on file    Alcohol use No    Drug use: No    Sexual activity: Not on file     Review of Systems   Constitutional: Negative for chills, diaphoresis, fatigue and fever.   HENT: Negative for congestion, sore throat and voice change.    Eyes: Negative for photophobia and visual disturbance.   Respiratory: Positive for shortness of breath. Negative for cough, wheezing and stridor.    Cardiovascular: Positive for leg swelling. Negative for chest pain.   Gastrointestinal: Negative for abdominal distention, abdominal pain, constipation, diarrhea, nausea and vomiting.   Endocrine: Negative for polydipsia, polyphagia and polyuria.   Genitourinary: Negative for difficulty urinating, dysuria, flank pain, testicular pain and urgency.   Musculoskeletal: Negative for back pain, joint swelling, neck pain and neck stiffness.   Skin: Negative for color change and rash.   Allergic/Immunologic: Negative for immunocompromised state.   Neurological: Negative for dizziness, syncope, weakness, numbness and headaches.   Hematological: Does not bruise/bleed easily.    Psychiatric/Behavioral: Negative for agitation, behavioral problems and confusion.     Objective:     Vital Signs (Most Recent):  Temp: 98.2 °F (36.8 °C) (02/1948)  Pulse: (!) 163 (02/07/18 2140)  Resp: (!) 30 (02/07/18 2140)  BP: (!) 161/92 (02/07/18 1952)  SpO2: (!) 87 % (02/07/18 2140) Vital Signs (24h Range):  Temp:  [98.2 °F (36.8 °C)] 98.2 °F (36.8 °C)  Pulse:  [] 163  Resp:  [12-30] 30  SpO2:  [85 %-99 %] 87 %  BP: (138-161)/(80-92) 161/92        Body mass index is 29.92 kg/m².    Physical Exam   Constitutional: He is oriented to person, place, and time. He appears well-developed. No distress.   HENT:   Head: Normocephalic and atraumatic.   Nose: Nose normal.   Eyes: Conjunctivae and EOM are normal. Pupils are equal, round, and reactive to light. No scleral icterus.   Neck: Normal range of motion. Neck supple. No tracheal deviation present.   Cardiovascular: Normal rate, regular rhythm, normal heart sounds and intact distal pulses.    No murmur heard.  Bilateral lower ext swelling +1+2     Pulmonary/Chest: Effort normal. No stridor. No respiratory distress. He has wheezes. He has no rales.   Decreased aeration and Bilateral lower crackles.    Abdominal: Soft. Bowel sounds are normal. He exhibits no distension. There is no tenderness. There is no guarding.   Genitourinary:   Genitourinary Comments: ua neg     Musculoskeletal: Normal range of motion. He exhibits no edema or deformity.   Neurological: He is alert and oriented to person, place, and time. No cranial nerve deficit. Coordination normal.   Left side weakness, chronic deficit from old stroke  Patient with no acute neurological impairments/findings.    Skin: Skin is warm and dry. Capillary refill takes less than 2 seconds. No rash noted. He is not diaphoretic.   Psychiatric: He has a normal mood and affect. His behavior is normal. Judgment and thought content normal.   Nursing note and vitals reviewed.        CRANIAL NERVES     CN III,  IV, VI   Pupils are equal, round, and reactive to light.  Extraocular motions are normal.        Significant Labs: All pertinent labs within the past 24 hours have been reviewed.  Results for orders placed or performed during the hospital encounter of 02/07/18   CBC auto differential   Result Value Ref Range    WBC 7.63 3.90 - 12.70 K/uL    RBC 4.86 4.60 - 6.20 M/uL    Hemoglobin 12.9 (L) 14.0 - 18.0 g/dL    Hematocrit 42.9 40.0 - 54.0 %    MCV 88 82 - 98 fL    MCH 26.5 (L) 27.0 - 31.0 pg    MCHC 30.1 (L) 32.0 - 36.0 g/dL    RDW 15.7 (H) 11.5 - 14.5 %    Platelets 327 150 - 350 K/uL    MPV 10.6 9.2 - 12.9 fL    Gran # (ANC) 5.7 1.8 - 7.7 K/uL    Lymph # 1.2 1.0 - 4.8 K/uL    Mono # 0.5 0.3 - 1.0 K/uL    Eos # 0.1 0.0 - 0.5 K/uL    Baso # 0.02 0.00 - 0.20 K/uL    Gran% 75.2 (H) 38.0 - 73.0 %    Lymph% 16.0 (L) 18.0 - 48.0 %    Mono% 6.8 4.0 - 15.0 %    Eosinophil% 1.7 0.0 - 8.0 %    Basophil% 0.3 0.0 - 1.9 %    Differential Method Automated    Comprehensive metabolic panel   Result Value Ref Range    Sodium 139 136 - 145 mmol/L    Potassium 4.9 3.5 - 5.1 mmol/L    Chloride 92 (L) 95 - 110 mmol/L    CO2 31 (H) 23 - 29 mmol/L    Glucose 90 70 - 110 mg/dL    BUN, Bld 24 (H) 8 - 23 mg/dL    Creatinine 1.3 0.5 - 1.4 mg/dL    Calcium 9.7 8.7 - 10.5 mg/dL    Total Protein 7.5 6.0 - 8.4 g/dL    Albumin 2.8 (L) 3.5 - 5.2 g/dL    Total Bilirubin 0.4 0.1 - 1.0 mg/dL    Alkaline Phosphatase 94 55 - 135 U/L    AST 38 10 - 40 U/L    ALT 26 10 - 44 U/L    Anion Gap 16 8 - 16 mmol/L    eGFR if African American >60 >60 mL/min/1.73 m^2    eGFR if non African American 56 (A) >60 mL/min/1.73 m^2   Urinalysis   Result Value Ref Range    Specimen UA Urine, Clean Catch     Color, UA Yellow Yellow, Straw, Ninoska    Appearance, UA Clear Clear    pH, UA 7.0 5.0 - 8.0    Specific Gravity, UA 1.010 1.005 - 1.030    Protein, UA Negative Negative    Glucose, UA Negative Negative    Ketones, UA Negative Negative    Bilirubin (UA) Negative Negative     Occult Blood UA Negative Negative    Nitrite, UA Negative Negative    Urobilinogen, UA Negative <2.0 EU/dL    Leukocytes, UA Negative Negative   Brain natriuretic peptide   Result Value Ref Range     (H) 0 - 99 pg/mL   CK   Result Value Ref Range    CPK 60 20 - 200 U/L   Troponin I   Result Value Ref Range    Troponin I 0.024 0.000 - 0.026 ng/mL   ISTAT PROCEDURE   Result Value Ref Range    POC PH 7.409 7.35 - 7.45    POC PCO2 69.0 (HH) 35 - 45 mmHg    POC PO2 59 (LL) 80 - 100 mmHg    POC HCO3 43.6 (H) 24 - 28 mmol/L    POC BE 19 -2 to 2 mmol/L    POC SATURATED O2 89 (L) 95 - 100 %    Sample ARTERIAL     Site RB     Allens Test Pass     DelSys Nasal Can     Mode SPONT     Flow 2     FiO2 28        Significant Imaging: I have reviewed all pertinent imaging results/findings within the past 24 hours.   Imaging Results          X-Ray Chest AP Portable (Final result)  Result time 02/07/18 20:22:03    Final result by Nayeli Knowles Jr., MD (02/07/18 20:22:03)                 Impression:     CHF exacerbation.      Electronically signed by: NAYELI KNOWLES  Date:     02/07/18  Time:    20:22              Narrative:    Exam: Portable chest radiograph    History:    Shortness of breath    Findings: Cardiomegaly in a setting of central vascular congestion and interstitial pulmonary edema.  No effusion or pneumothorax.  Calcified tortuous thoracic aorta.  Osseous structures intact.

## 2018-02-08 NOTE — PROGRESS NOTES
Patient arrived from [facility], and received report from Paty.  Cardiac monitor on, VS taken, and Hospital medicine notified.  Patient is in afib, is on 2L [place], and has no complaint of pain.  Continue to monitor.

## 2018-02-08 NOTE — CONSULTS
Nutrition Education Note      Reason for Assessment: Diet Education for CHF  Dx:  1. Acute on chronic congestive heart failure, unspecified congestive heart failure type    2. New onset a-fib    3. Hypoxemia    4. CHF (congestive heart failure)    5. A-fib    6. Acute on chronic congestive heart failure      Medical Hx:  Past Medical History:   Diagnosis Date    Anemia 2013    Asthma     CHF (congestive heart failure)     COPD (chronic obstructive pulmonary disease)     COPD (chronic obstructive pulmonary disease) 2012    Diverticular disease     Gout 2012    Hyperlipidemia     Hypertension     Other and unspecified hyperlipidemia     Stroke      General Info: Good appetite and intake. Patient was not following a diet at home.  Diet/Supplement PTA: Regular    Current Diet:  Cardiac 1200ml Fluid Restriction   % intake of meals: %    Diet Education Provided: MNT for CHF  Learners: Patient  All questions and concerns answered. Dietitian's contact information provided.   Nutrition Discharge Plan: Home on Cardiac diet 1200ml  fluid restriction or other per MD

## 2018-02-08 NOTE — ASSESSMENT & PLAN NOTE
Resp consult for assessment of Cpap/Bipap  Atrovent Nebs, ICS, Xopenex PRN  Solumedrol 80mg q8 x 3   Pulmonary consult

## 2018-02-08 NOTE — ASSESSMENT & PLAN NOTE
ASA 325mg and Lovenox 1mg/kg given now  continue Plavix and BB  Try to avoid Beta Nebs if possible, will use nitro to improve aeration of lungs.  Cardiology consult for continued management

## 2018-02-08 NOTE — PROGRESS NOTES
[facility] Hospital Medicine  Progress Note    Patient Name: Rea Vail  MRN: [id number]  Patient Class: IP- Inpatient   Admission Date: 2/7/2018  Length of Stay: 1 days  Attending Physician: Stephen Fajardo MD  Primary Care Provider: Estiven Oseguera MD        Subjective:     Principal Problem:Acute on chronic congestive heart failure    HPI:  Rea Vail is a 67 y.o. male patient who was sent to the Emergency Department by Dr. Roberts (Pulmonology) for chronic SOB which has been worsening gradually  over the last week. Associated symptoms include leg swelling and non productive cough. Exacerbating factors activity and laying flat.Symptoms are constant and moderate in severity. No mitigating or exacerbating factors reported. No associated sxs reported. Patient denies any fever, chills, diaphoresis, CP, N/V, back pain, neck pain, HA, dizziness, and all other sxs at this time. To note: Pt was seen by his PCP who doubled the pt's lasix with no relief. Additionally Pt has Hx including COPD, HTN, CVA and CHF. Patient is former smoker Pt is not on home O2, but does use breathing Txs. No further complaints or concerns at this time. The patient was evaluated in the Er, patient reports some improvement in symptoms since arrival with ED interventions. Patient CBC stable, CMP with low albumin of 2.8. , Troponin neg. ABGs with Hypercapnia and Hypoxemia. Chest Xray Impression: CHF exacerbation. EKG with New Onset Afib, rate 90-110s on assessment. Patient admitted for CHF and COPD exacerbation with new onset afib.       Office Visit From Dr. Roberts  HPI:  Reports progressive dyspnea, wheezing, bilateral leg edema, orthopnea and PND for the past week. PCP doubled his dose of lasix but this did no help. Datient reports cough and difficulty breathing and denies vomiting, abdominal pain and diarrhea. Patient denies recent sick contacts.   Patient does not have new pets. Patient does not have a history  of asthma. Patient does not have a history of environmental allergens. Patient has not traveled recently. Patient does have a history of smoking. He smoked 3 PPD for 20 years. He quit smoking 13 years ago.  Patient has not had a previous chest x-ray. Patient has not had a PPD done.  PPD was Negative       Hospital Course:  Pt sitting up in chair, feels a little better, orthopnea, SOB, leg edema all improving. He has put out about 700 cc urine. Pulse ox on RA was still low at 86%. No CP or palpitations. Remains in Afib @109.     Interval History: Pt sitting up in chair, feels a little better, orthopnea, SOB, leg edema all improving. He has put out about 700 cc urine. Pulse ox on RA was still low at 86%. No CP or palpitations. Remains in Afib @109.     Review of Systems   Constitutional: Negative for chills, diaphoresis, fatigue and fever.   HENT: Negative for congestion, sore throat and voice change.    Eyes: Negative for photophobia and visual disturbance.   Respiratory: Positive for shortness of breath. Negative for cough, wheezing and stridor.    Cardiovascular: Positive for leg swelling. Negative for chest pain.   Gastrointestinal: Negative for abdominal distention, abdominal pain, constipation, diarrhea, nausea and vomiting.   Endocrine: Negative for polydipsia, polyphagia and polyuria.   Genitourinary: Negative for difficulty urinating, dysuria, flank pain, testicular pain and urgency.   Musculoskeletal: Negative for back pain, joint swelling, neck pain and neck stiffness.   Skin: Negative for color change and rash.   Allergic/Immunologic: Negative for immunocompromised state.   Neurological: Negative for dizziness, syncope, weakness, numbness and headaches.   Hematological: Does not bruise/bleed easily.   Psychiatric/Behavioral: Negative for agitation, behavioral problems and confusion.     Objective:     Vital Signs (Most Recent):  Temp: 98 °F (36.7 °C) (02/08/18 1606)  Pulse: 90 (02/08/18 1606)  Resp: 18  (02/08/18 1606)  BP: 131/78 (02/08/18 1606)  SpO2: 97 % (02/08/18 1606) Vital Signs (24h Range):  Temp:  [98 °F (36.7 °C)-98.2 °F (36.8 °C)] 98 °F (36.7 °C)  Pulse:  [] 90  Resp:  [12-27] 18  SpO2:  [85 %-99 %] 97 %  BP: (127-161)/() 131/78     Weight: 89.6 kg (197 lb 8.5 oz)  Body mass index is 30.03 kg/m².    Intake/Output Summary (Last 24 hours) at 02/08/18 1733  Last data filed at 02/08/18 1600   Gross per 24 hour   Intake              360 ml   Output             1475 ml   Net            -1115 ml      Physical Exam   Constitutional: He is oriented to person, place, and time. He appears well-developed. No distress.   HENT:   Head: Normocephalic and atraumatic.   Nose: Nose normal.   Eyes: Conjunctivae and EOM are normal. Pupils are equal, round, and reactive to light. No scleral icterus.   Neck: Normal range of motion. Neck supple. No tracheal deviation present.   Cardiovascular: Normal rate, regular rhythm, normal heart sounds and intact distal pulses.    No murmur heard.  Bilateral lower ext swelling +1+2     Pulmonary/Chest: Effort normal. No stridor. No respiratory distress. He has no wheezes. He has no rales.   Decreased aeration and Bilateral lower crackles.    Abdominal: Soft. Bowel sounds are normal. He exhibits no distension. There is no tenderness. There is no guarding.   Genitourinary:   Genitourinary Comments: ua neg     Musculoskeletal: Normal range of motion. He exhibits no edema or deformity.   Neurological: He is alert and oriented to person, place, and time. No cranial nerve deficit. Coordination normal.   Left side weakness, chronic deficit from old stroke  Patient with no acute neurological impairments/findings.    Skin: Skin is warm and dry. Capillary refill takes less than 2 seconds. No rash noted. He is not diaphoretic.   Psychiatric: He has a normal mood and affect. His behavior is normal. Judgment and thought content normal.   Nursing note and vitals reviewed.      Significant  Labs:   ABGs:   Recent Labs  Lab 02/07/18 2037   PH 7.409   PCO2 69.0*   HCO3 43.6*   POCSATURATED 89*   BE 19     BMP:   Recent Labs  Lab 02/07/18 1956 02/08/18  0450   GLU 90 184*    139   K 4.9 3.8   CL 92* 90*   CO2 31* 36*   BUN 24* 25*   CREATININE 1.3 1.3   CALCIUM 9.7 9.6   MG 2.4  --      CBC:   Recent Labs  Lab 02/07/18 1956 02/08/18  0450   WBC 7.63 7.10   HGB 12.9* 12.4*   HCT 42.9 41.4    326     All pertinent labs within the past 24 hours have been reviewed.    Significant Imaging: I have reviewed all pertinent imaging results/findings within the past 24 hours.    Assessment/Plan:      * Acute on chronic congestive heart failure    Unknown if this is acute on chronic systolic or diastolic CHF at this time, will recheck echo. Last echo 4 years ago reports normal EF.   Diuresis, consider dose of albumin  Cardiology consult  ASA, BB, ACE/ARB, Statin   Hold HCTZ at this time, using IV lasix   Check Lipids, A1C, Electrolytes, serial troponin.     2/8- looks better, but still SOB  Continue IV lasix          Acute on chronic respiratory failure with hypoxia and hypercapnia    Resp consult for assessment of Cpap/Bipap  Atrovent Nebs, ICS, Xopenex PRN  Solumedrol 80mg q8 x 3   Pulmonary consult     2/8- sec to chf, on lasix  Continue iv lasix  Solumedrol d/bud            New onset a-fib    ASA 325mg and Lovenox 1mg/kg given now  continue Plavix and BB  Try to avoid Beta Nebs if possible, will use nitro to improve aeration of lungs.  Cardiology consult for continued management    Appreciate cards  Pt off Cardizem gtt  Now on Bystolic 10 BID and Lovenox switched to Eliquis          HTN (hypertension)    Continue home meds, may need to adjust   Monitor trends.           VTE Risk Mitigation         Ordered     apixaban tablet 5 mg  2 times daily     Route:  Oral        02/08/18 1139     Medium Risk of VTE  Once      02/07/18 2158     Place DANNA hose  Until discontinued      02/07/18 2158     Place  sequential compression device  Until discontinued      02/07/18 2158     Place sequential compression device  Until discontinued      02/07/18 2158              Stephen Fajardo MD  Department of Hospital Medicine   [facility]

## 2018-02-08 NOTE — ASSESSMENT & PLAN NOTE
Resp consult for assessment of Cpap/Bipap  Atrovent Nebs, ICS, Xopenex PRN  Solumedrol 80mg q8 x 3   Pulmonary consult     2/8- sec to chf, on lasix  Continue iv lasix  Solumedrol d/bud

## 2018-02-08 NOTE — HOSPITAL COURSE
Pt admitted with new onset A-fib with RVR, acute pulmonary edema/decompensated diastolic CHF, abd acute hypoxic respiratory failure. Pt was given IV cardizem and HR was controlled. Care discussed with Cardiology who recommended continuing Bystolic and add Eliquis. Pt was admitted on IV lasix. Pt diuresed 1800ml so far. Pt denies complaints and feels improved but continues to have conversational dyspnea. Will continue diuresis and add Bipap-had hypercapnia on ABGs. Repeat CXR still shows some CHF. Pulse ox on RA was still low at 88%. Will set up home oxygen and home health   Will need OP sleep study   2/10/2018   Patient seen and examined today . Breathing improved . Cardiology and pulmonology cleared for discharge. Patient will need OP sleep study  .discharge on lasix 40 mg,coreg 12.5 bid ,plavix and ace for heart failure . For afib  eliquis and switch Plavix to aspirin by dr Hutchison  . Home health was arranged . Patient will benefit from bipao at night but will need sleep study outpatient

## 2018-02-08 NOTE — SUBJECTIVE & OBJECTIVE
Past Medical History:   Diagnosis Date    Anemia 2013    Asthma     CHF (congestive heart failure)     COPD (chronic obstructive pulmonary disease)     COPD (chronic obstructive pulmonary disease) 2012    Diverticular disease     Gout 2012    Hyperlipidemia     Hypertension     Other and unspecified hyperlipidemia     Stroke        History reviewed. No pertinent surgical history.    Review of patient's allergies indicates:   Allergen Reactions    Cephalexin Anaphylaxis     Scheduled Meds:   allopurinol  300 mg Oral Daily    amLODIPine  10 mg Oral Daily    apixaban  5 mg Oral BID    baclofen  5 mg Oral TID    budesonide  0.5 mg Nebulization Q12H    clopidogrel  75 mg Oral Daily    furosemide  40 mg Intravenous BID    gabapentin  200 mg Oral BID    ipratropium  0.5 mg Nebulization Q8H    leflunomide  10 mg Oral Daily    nebivolol  10 mg Oral BID    pantoprazole  40 mg Oral Daily    pramipexole  0.5 mg Oral BID    simvastatin  40 mg Oral QHS    valsartan  320 mg Oral Daily     Continuous Infusions:  PRN Meds:.levalbuterol, metoprolol, ondansetron, ramelteon    Family History     Problem Relation (Age of Onset)    Stroke Cousin        Social History Main Topics    Smoking status: Former Smoker     Types: Cigarettes     Quit date: 7/9/2006    Smokeless tobacco: Not on file    Alcohol use No    Drug use: No    Sexual activity: Not on file         Review of Systems   Constitutional: Negative for chills, diaphoresis, fatigue and fever.   HENT: Negative for congestion, sore throat and voice change.    Eyes: Negative for photophobia and visual disturbance.   Respiratory: Positive for shortness of breath. Negative for cough, wheezing and stridor.    Cardiovascular: Positive for leg swelling. Negative for chest pain.   Gastrointestinal: Negative for abdominal distention, abdominal pain, constipation, diarrhea, nausea and vomiting.   Endocrine: Negative for polydipsia, polyphagia and polyuria.    Genitourinary: Negative for difficulty urinating, dysuria, flank pain, testicular pain and urgency.   Musculoskeletal: Negative for back pain, joint swelling, neck pain and neck stiffness.   Skin: Negative for color change and rash.   Allergic/Immunologic: Negative for immunocompromised state.   Neurological: Negative for dizziness, syncope, weakness, numbness and headaches.   Hematological: Does not bruise/bleed easily.   Psychiatric/Behavioral: Negative for agitation, behavioral problems and confusion.     Objective:     Vital Signs (Most Recent):  Temp: 98.1 °F (36.7 °C) (02/08/18 1125)  Pulse: 86 (02/08/18 1125)  Resp: 17 (02/08/18 1125)  BP: 129/82 (02/08/18 1125)  SpO2: 97 % (02/08/18 1125) Vital Signs (24h Range):  Temp:  [98.1 °F (36.7 °C)-98.2 °F (36.8 °C)] 98.1 °F (36.7 °C)  Pulse:  [] 86  Resp:  [12-27] 17  SpO2:  [85 %-99 %] 97 %  BP: (127-161)/() 129/82     Weight: 89.6 kg (197 lb 8.5 oz)  Body mass index is 30.03 kg/m².      Intake/Output Summary (Last 24 hours) at 02/08/18 1359  Last data filed at 02/08/18 1300   Gross per 24 hour   Intake              360 ml   Output              900 ml   Net             -540 ml       Physical Exam   Constitutional: He is oriented to person, place, and time. He appears well-developed. No distress.   HENT:   Head: Normocephalic and atraumatic.   Nose: Nose normal.   Eyes: Conjunctivae and EOM are normal. Pupils are equal, round, and reactive to light. No scleral icterus.   Neck: Normal range of motion. Neck supple. No tracheal deviation present.   Cardiovascular: Normal rate, regular rhythm, normal heart sounds and intact distal pulses.    No murmur heard.  Bilateral lower ext swelling +1+2     Pulmonary/Chest: Effort normal. No stridor. No respiratory distress. He has no wheezes. He has no rales.   Decreased aeration and Bilateral lower crackles.    Abdominal: Soft. Bowel sounds are normal. He exhibits no distension. There is no tenderness. There is no  guarding.   Genitourinary:   Genitourinary Comments: ua neg     Musculoskeletal: Normal range of motion. He exhibits no edema or deformity.   Neurological: He is alert and oriented to person, place, and time. No cranial nerve deficit. Coordination normal.   Left side weakness, chronic deficit from old stroke  Patient with no acute neurological impairments/findings.    Skin: Skin is warm and dry. Capillary refill takes less than 2 seconds. No rash noted. He is not diaphoretic.   Psychiatric: He has a normal mood and affect. His behavior is normal. Judgment and thought content normal.   Nursing note and vitals reviewed.      Vents:  Oxygen Concentration (%): 28 (02/08/18 0712)    Lines/Drains/Airways     Peripheral Intravenous Line                 Peripheral IV - Single Lumen 02/07/18 2004 Right Hand less than 1 day                Significant Labs:    CBC/Anemia Profile:    Recent Labs  Lab 02/07/18 1956 02/08/18 0450   WBC 7.63 7.10   HGB 12.9* 12.4*   HCT 42.9 41.4    326   MCV 88 88   RDW 15.7* 15.7*        Chemistries:    Recent Labs  Lab 02/07/18 1956 02/08/18  0450    139   K 4.9 3.8   CL 92* 90*   CO2 31* 36*   BUN 24* 25*   CREATININE 1.3 1.3   CALCIUM 9.7 9.6   ALBUMIN 2.8*  --    PROT 7.5  --    BILITOT 0.4  --    ALKPHOS 94  --    ALT 26  --    AST 38  --    MG 2.4  --    PHOS 4.5  --        ABGs:   Recent Labs  Lab 02/07/18 2037   PH 7.409   PCO2 69.0*   HCO3 43.6*   POCSATURATED 89*   BE 19       Troponin:   Recent Labs  Lab 02/07/18 1956 02/07/18  2335 02/08/18  0450   TROPONINI 0.024 0.012 0.017       Urine Studies:   Recent Labs  Lab 02/07/18 2055   COLORU Yellow   APPEARANCEUA Clear   PHUR 7.0   SPECGRAV 1.010   PROTEINUA Negative   GLUCUA Negative   KETONESU Negative   BILIRUBINUA Negative   OCCULTUA Negative   NITRITE Negative   UROBILINOGEN Negative   LEUKOCYTESUR Negative       Significant Imaging:   CXR: Cardiomegaly in a setting of central vascular congestion and interstitial  pulmonary edema.  No effusion or pneumothorax.  Calcified tortuous thoracic aorta.  Osseous structures intact.

## 2018-02-08 NOTE — CONSULTS
[facility] -   Pulmonology  Consult Note    Patient Name: Rea Vail  MRN: [id number]  Admission Date: 2/7/2018  Hospital Length of Stay: 1 days  Code Status: Full Code  Attending Physician: Stephen Fajardo MD  Primary Care Provider: Estvien Oseguera MD   Principal Problem: Acute on chronic congestive heart failure      Subjective:     HPI:  67 year old male who  has a past medical history of Anemia (2013); Asthma; CHF (congestive heart failure); COPD (chronic obstructive pulmonary disease); COPD (chronic obstructive pulmonary disease) (2012); Diverticular disease; Gout (2012); Hyperlipidemia; Hypertension; Other and unspecified hyperlipidemia; and Stroke admitted with acute hypoxemic respiratory failure secondary to decompensated heart failure / Afib with RVR.     Past Medical History:   Diagnosis Date    Anemia 2013    Asthma     CHF (congestive heart failure)     COPD (chronic obstructive pulmonary disease)     COPD (chronic obstructive pulmonary disease) 2012    Diverticular disease     Gout 2012    Hyperlipidemia     Hypertension     Other and unspecified hyperlipidemia     Stroke        History reviewed. No pertinent surgical history.    Review of patient's allergies indicates:   Allergen Reactions    Cephalexin Anaphylaxis     Scheduled Meds:   allopurinol  300 mg Oral Daily    amLODIPine  10 mg Oral Daily    apixaban  5 mg Oral BID    baclofen  5 mg Oral TID    budesonide  0.5 mg Nebulization Q12H    clopidogrel  75 mg Oral Daily    furosemide  40 mg Intravenous BID    gabapentin  200 mg Oral BID    ipratropium  0.5 mg Nebulization Q8H    leflunomide  10 mg Oral Daily    nebivolol  10 mg Oral BID    pantoprazole  40 mg Oral Daily    pramipexole  0.5 mg Oral BID    simvastatin  40 mg Oral QHS    valsartan  320 mg Oral Daily     Continuous Infusions:  PRN Meds:.levalbuterol, metoprolol, ondansetron, ramelteon    Family History     Problem Relation (Age of Onset)     Stroke Cousin        Social History Main Topics    Smoking status: Former Smoker     Types: Cigarettes     Quit date: 7/9/2006    Smokeless tobacco: Not on file    Alcohol use No    Drug use: No    Sexual activity: Not on file         Review of Systems   Constitutional: Negative for chills, diaphoresis, fatigue and fever.   HENT: Negative for congestion, sore throat and voice change.    Eyes: Negative for photophobia and visual disturbance.   Respiratory: Positive for shortness of breath. Negative for cough, wheezing and stridor.    Cardiovascular: Positive for leg swelling. Negative for chest pain.   Gastrointestinal: Negative for abdominal distention, abdominal pain, constipation, diarrhea, nausea and vomiting.   Endocrine: Negative for polydipsia, polyphagia and polyuria.   Genitourinary: Negative for difficulty urinating, dysuria, flank pain, testicular pain and urgency.   Musculoskeletal: Negative for back pain, joint swelling, neck pain and neck stiffness.   Skin: Negative for color change and rash.   Allergic/Immunologic: Negative for immunocompromised state.   Neurological: Negative for dizziness, syncope, weakness, numbness and headaches.   Hematological: Does not bruise/bleed easily.   Psychiatric/Behavioral: Negative for agitation, behavioral problems and confusion.     Objective:     Vital Signs (Most Recent):  Temp: 98.1 °F (36.7 °C) (02/08/18 1125)  Pulse: 86 (02/08/18 1125)  Resp: 17 (02/08/18 1125)  BP: 129/82 (02/08/18 1125)  SpO2: 97 % (02/08/18 1125) Vital Signs (24h Range):  Temp:  [98.1 °F (36.7 °C)-98.2 °F (36.8 °C)] 98.1 °F (36.7 °C)  Pulse:  [] 86  Resp:  [12-27] 17  SpO2:  [85 %-99 %] 97 %  BP: (127-161)/() 129/82     Weight: 89.6 kg (197 lb 8.5 oz)  Body mass index is 30.03 kg/m².      Intake/Output Summary (Last 24 hours) at 02/08/18 1359  Last data filed at 02/08/18 1300   Gross per 24 hour   Intake              360 ml   Output              900 ml   Net             -540  ml       Physical Exam   Constitutional: He is oriented to person, place, and time. He appears well-developed. No distress.   HENT:   Head: Normocephalic and atraumatic.   Nose: Nose normal.   Eyes: Conjunctivae and EOM are normal. Pupils are equal, round, and reactive to light. No scleral icterus.   Neck: Normal range of motion. Neck supple. No tracheal deviation present.   Cardiovascular: Normal rate, regular rhythm, normal heart sounds and intact distal pulses.    No murmur heard.  Bilateral lower ext swelling +1+2     Pulmonary/Chest: Effort normal. No stridor. No respiratory distress. He has no wheezes. He has no rales.   Decreased aeration and Bilateral lower crackles.    Abdominal: Soft. Bowel sounds are normal. He exhibits no distension. There is no tenderness. There is no guarding.   Genitourinary:   Genitourinary Comments: ua neg     Musculoskeletal: Normal range of motion. He exhibits no edema or deformity.   Neurological: He is alert and oriented to person, place, and time. No cranial nerve deficit. Coordination normal.   Left side weakness, chronic deficit from old stroke  Patient with no acute neurological impairments/findings.    Skin: Skin is warm and dry. Capillary refill takes less than 2 seconds. No rash noted. He is not diaphoretic.   Psychiatric: He has a normal mood and affect. His behavior is normal. Judgment and thought content normal.   Nursing note and vitals reviewed.      Vents:  Oxygen Concentration (%): 28 (02/08/18 0712)    Lines/Drains/Airways     Peripheral Intravenous Line                 Peripheral IV - Single Lumen 02/07/18 2004 Right Hand less than 1 day                Significant Labs:    CBC/Anemia Profile:    Recent Labs  Lab 02/07/18 1956 02/08/18  0450   WBC 7.63 7.10   HGB 12.9* 12.4*   HCT 42.9 41.4    326   MCV 88 88   RDW 15.7* 15.7*        Chemistries:    Recent Labs  Lab 02/07/18 1956 02/08/18  0450    139   K 4.9 3.8   CL 92* 90*   CO2 31* 36*   BUN 24*  25*   CREATININE 1.3 1.3   CALCIUM 9.7 9.6   ALBUMIN 2.8*  --    PROT 7.5  --    BILITOT 0.4  --    ALKPHOS 94  --    ALT 26  --    AST 38  --    MG 2.4  --    PHOS 4.5  --        ABGs:   Recent Labs  Lab 02/07/18 2037   PH 7.409   PCO2 69.0*   HCO3 43.6*   POCSATURATED 89*   BE 19       Troponin:   Recent Labs  Lab 02/07/18  1956 02/07/18  2335 02/08/18  0450   TROPONINI 0.024 0.012 0.017       Urine Studies:   Recent Labs  Lab 02/07/18 2055   COLORU Yellow   APPEARANCEUA Clear   PHUR 7.0   SPECGRAV 1.010   PROTEINUA Negative   GLUCUA Negative   KETONESU Negative   BILIRUBINUA Negative   OCCULTUA Negative   NITRITE Negative   UROBILINOGEN Negative   LEUKOCYTESUR Negative       Significant Imaging:   CXR: Cardiomegaly in a setting of central vascular congestion and interstitial pulmonary edema.  No effusion or pneumothorax.  Calcified tortuous thoracic aorta.  Osseous structures intact.      Assessment/Plan:     New onset a-fib    Rate control.  Anticoagulation.        Acute on chronic respiratory failure with hypoxia and hypercapnia    Supplement oxygenation. Keep SAO2 >= 92%. BIPAP 10/5 QHS and PRN. Bronchodilators per orders        Acute on chronic combined systolic and diastolic congestive heart failure    Agree with cardiology consult.  Optimize CHF regimen.  Preload and afterload reduction.  Daily weighing.  Dietary salt restriction.  Alcohol and tobacco avoidance.    2D  ECHO to evaluate LVF.              Thank you for your consult. I will follow-up with patient. Please contact us if you have any additional questions.     Vinnie Roberts MD  Pulmonology  [facility] -

## 2018-02-08 NOTE — ED PROVIDER NOTES
SCRIBE #1 NOTE: I, Corinne Mack, am scribing for, and in the presence of, Catarino Gaona MD. I have scribed the HPI, ROS, and PEx.     SCRIBE #2 NOTE: I, Jacob Reis, am scribing for, and in the presence of,  Frederic Anand MD. I have scribed the remaining portions of the note not scribed by Scribe #1.     History      Chief Complaint   Patient presents with    Shortness of Breath     sent from Dr. Bear. BLE edema.       Review of patient's allergies indicates:   Allergen Reactions    Cephalexin Anaphylaxis        HPI   HPI    2/7/2018, 7:54 PM   History obtained from the patient      History of Present Illness: Rea Vail is a 67 y.o. male patient who was sent to the Emergency Department by Dr. Roberts (Pulmonology) for chronic SOB which worsened gradually a week ago. Pt reports worsening SOB and leg swelling over the last week. Pt was seen by his PCP who doubled the pt's lasix with no relief. Symptoms are constant and moderate in severity. No mitigating or exacerbating factors reported. No associated sxs reported. Patient denies any fever, chills, diaphoresis, CP, N/V, back pain, neck pain, HA, dizziness, and all other sxs at this time. Pt has Hx of COPD, HTN, and CHF. Pt is not on home O2, but does use breathing Txs. No further complaints or concerns at this time.     Arrival mode: Personal vehicle      PCP: Estiven Oseguera MD       Past Medical History:  Past Medical History:   Diagnosis Date    Anemia 2013    Asthma     CHF (congestive heart failure)     COPD (chronic obstructive pulmonary disease)     COPD (chronic obstructive pulmonary disease) 2012    Diverticular disease     Gout 2012    Hyperlipidemia     Hypertension     Other and unspecified hyperlipidemia     Stroke        Past Surgical History:  History reviewed, no relevant surgical history.    Family History:  Family History   Problem Relation Age of Onset    Stroke Cousin        Social History:  Social History     Social  History Main Topics    Smoking status: Former Smoker     Types: Cigarettes     Quit date: 7/9/2006    Smokeless tobacco: Unknown    Alcohol use No    Drug use: No    Sexual activity: Unknown       ROS   Review of Systems   Constitutional: Negative for chills, diaphoresis and fever.   Respiratory: Positive for shortness of breath. Negative for cough.    Cardiovascular: Positive for leg swelling (BLE). Negative for chest pain and palpitations.   Gastrointestinal: Negative for abdominal pain, diarrhea, nausea and vomiting.   Musculoskeletal: Negative for back pain, neck pain and neck stiffness.   Skin: Negative for rash and wound.   Neurological: Negative for dizziness, light-headedness, numbness and headaches.   All other systems reviewed and are negative.    Physical Exam      Initial Vitals   BP Pulse Resp Temp SpO2   02/07/18 1952 02/07/18 1952 02/07/18 1952 02/1948 02/07/18 1952   (!) 161/92 93 20 98.2 °F (36.8 °C) (!) 89 %      MAP       02/07/18 1952       115          Physical Exam  Nursing Notes and Vital Signs Reviewed.  Constitutional: Patient is in no apparent distress. Well-developed and well-nourished.  Head: Atraumatic. Normocephalic.  Eyes: PERRL. EOM intact. Conjunctivae are not pale. No scleral icterus.  ENT: Mucous membranes are moist. Oropharynx is clear and symmetric.    Neck: Supple. Full ROM. No lymphadenopathy.  Cardiovascular: Regular rate. Regular rhythm. No murmurs, rubs, or gallops. Distal pulses are 2+ and symmetric.  Pulmonary/Chest: No respiratory distress. Mild diffuse wheezes. No rales.  Abdominal: Soft and non-distended.  There is no tenderness.  No rebound, guarding, or rigidity.   Musculoskeletal: Moves all extremities. No obvious deformities. 3+ pitting BLE edema. No calf tenderness.  Skin: Warm and dry.  Neurological:  Alert, awake, and appropriate.  Normal speech.  No acute focal neurological deficits are appreciated.  Psychiatric: Normal affect. Good eye contact.  Appropriate in content.    ED Course    Procedures  ED Vital Signs:  Vitals:    02/08/18 1945 02/1948 02/08/18 1950 02/08/18 2100   BP:       Pulse: 82 82 97 90   Resp: 18 18     Temp:       TempSrc:       SpO2: (!) 92% (!) 92%     Weight:       Height:        02/08/18 2300 02/09/18 0000 02/09/18 0100 02/09/18 0300   BP:  132/68     Pulse: 86 93 98 78   Resp:  20     Temp:  97.9 °F (36.6 °C)     TempSrc:  Axillary     SpO2:  (!) 90%     Weight:       Height:        02/09/18 0349 02/09/18 0500 02/09/18 0704 02/09/18 0712   BP: 139/84   132/87   Pulse: 92 84 74 95   Resp: 20  18 18   Temp: 97.3 °F (36.3 °C)   97.5 °F (36.4 °C)   TempSrc: Axillary   Oral   SpO2: (!) 93%  96% (!) 90%   Weight: 88.3 kg (194 lb 10.7 oz)      Height:        02/09/18 1152 02/09/18 1220 02/09/18 1725   BP: 123/75 128/67 134/83   Pulse: 82 92 74   Resp:  18 18   Temp:  98 °F (36.7 °C) 97.9 °F (36.6 °C)   TempSrc:  Oral Oral   SpO2:  (!) 92% (!) 92%   Weight:      Height:          Abnormal Lab Results:  Labs Reviewed   CBC W/ AUTO DIFFERENTIAL - Abnormal; Notable for the following:        Result Value    Hemoglobin 12.9 (*)     MCH 26.5 (*)     MCHC 30.1 (*)     RDW 15.7 (*)     Gran% 75.2 (*)     Lymph% 16.0 (*)     All other components within normal limits   COMPREHENSIVE METABOLIC PANEL - Abnormal; Notable for the following:     Chloride 92 (*)     CO2 31 (*)     BUN, Bld 24 (*)     Albumin 2.8 (*)     eGFR if non  56 (*)     All other components within normal limits   B-TYPE NATRIURETIC PEPTIDE - Abnormal; Notable for the following:      (*)     All other components within normal limits   C-REACTIVE PROTEIN - Abnormal; Notable for the following:     CRP 30.2 (*)     All other components within normal limits   SEDIMENTATION RATE, MANUAL - Abnormal; Notable for the following:     Sed Rate 30 (*)     All other components within normal limits   CBC W/ AUTO DIFFERENTIAL - Abnormal; Notable for the following:      Hemoglobin 12.4 (*)     MCH 26.4 (*)     MCHC 30.0 (*)     RDW 15.7 (*)     Lymph # 0.3 (*)     Mono # 0.0 (*)     Gran% 95.1 (*)     Lymph% 4.5 (*)     Mono% 0.4 (*)     All other components within normal limits   BASIC METABOLIC PANEL - Abnormal; Notable for the following:     Chloride 90 (*)     CO2 36 (*)     Glucose 184 (*)     BUN, Bld 25 (*)     eGFR if non  56 (*)     All other components within normal limits   ISTAT PROCEDURE - Abnormal; Notable for the following:     POC PCO2 69.0 (*)     POC PO2 59 (*)     POC HCO3 43.6 (*)     POC SATURATED O2 89 (*)     All other components within normal limits   URINALYSIS   CK   TROPONIN I   MAGNESIUM   PHOSPHORUS   TSH   T4, FREE   TROPONIN I   MAGNESIUM   PHOSPHORUS   TSH   T4, FREE   TROPONIN I   ANTI-NEUTROPHILIC CYTOPLASMIC ANTIBODY        All Lab Results:  Results for orders placed or performed during the hospital encounter of 02/07/18   CBC auto differential   Result Value Ref Range    WBC 7.63 3.90 - 12.70 K/uL    RBC 4.86 4.60 - 6.20 M/uL    Hemoglobin 12.9 (L) 14.0 - 18.0 g/dL    Hematocrit 42.9 40.0 - 54.0 %    MCV 88 82 - 98 fL    MCH 26.5 (L) 27.0 - 31.0 pg    MCHC 30.1 (L) 32.0 - 36.0 g/dL    RDW 15.7 (H) 11.5 - 14.5 %    Platelets 327 150 - 350 K/uL    MPV 10.6 9.2 - 12.9 fL    Gran # (ANC) 5.7 1.8 - 7.7 K/uL    Lymph # 1.2 1.0 - 4.8 K/uL    Mono # 0.5 0.3 - 1.0 K/uL    Eos # 0.1 0.0 - 0.5 K/uL    Baso # 0.02 0.00 - 0.20 K/uL    Gran% 75.2 (H) 38.0 - 73.0 %    Lymph% 16.0 (L) 18.0 - 48.0 %    Mono% 6.8 4.0 - 15.0 %    Eosinophil% 1.7 0.0 - 8.0 %    Basophil% 0.3 0.0 - 1.9 %    Differential Method Automated    Comprehensive metabolic panel   Result Value Ref Range    Sodium 139 136 - 145 mmol/L    Potassium 4.9 3.5 - 5.1 mmol/L    Chloride 92 (L) 95 - 110 mmol/L    CO2 31 (H) 23 - 29 mmol/L    Glucose 90 70 - 110 mg/dL    BUN, Bld 24 (H) 8 - 23 mg/dL    Creatinine 1.3 0.5 - 1.4 mg/dL    Calcium 9.7 8.7 - 10.5 mg/dL    Total Protein 7.5  6.0 - 8.4 g/dL    Albumin 2.8 (L) 3.5 - 5.2 g/dL    Total Bilirubin 0.4 0.1 - 1.0 mg/dL    Alkaline Phosphatase 94 55 - 135 U/L    AST 38 10 - 40 U/L    ALT 26 10 - 44 U/L    Anion Gap 16 8 - 16 mmol/L    eGFR if African American >60 >60 mL/min/1.73 m^2    eGFR if non African American 56 (A) >60 mL/min/1.73 m^2   Urinalysis   Result Value Ref Range    Specimen UA Urine, Clean Catch     Color, UA Yellow Yellow, Straw, [place]    Appearance, UA Clear Clear    pH, UA 7.0 5.0 - 8.0    Specific Gravity, UA 1.010 1.005 - 1.030    Protein, UA Negative Negative    Glucose, UA Negative Negative    Ketones, UA Negative Negative    Bilirubin (UA) Negative Negative    Occult Blood UA Negative Negative    Nitrite, UA Negative Negative    Urobilinogen, UA Negative <2.0 EU/dL    Leukocytes, UA Negative Negative   Brain natriuretic peptide   Result Value Ref Range     (H) 0 - 99 pg/mL   CK   Result Value Ref Range    CPK 60 20 - 200 U/L   Troponin I   Result Value Ref Range    Troponin I 0.024 0.000 - 0.026 ng/mL   Troponin I   Result Value Ref Range    Troponin I 0.012 0.000 - 0.026 ng/mL   DOT   Result Value Ref Range    DOT Screen Negative <1:160 Negative <1:160   C-reactive protein   Result Value Ref Range    CRP 30.2 (H) 0.0 - 8.2 mg/L   Sedimentation rate, manual   Result Value Ref Range    Sed Rate 30 (H) 0 - 10 mm/Hr   Magnesium   Result Value Ref Range    Magnesium 2.4 1.6 - 2.6 mg/dL   Phosphorus   Result Value Ref Range    Phosphorus 4.5 2.7 - 4.5 mg/dL   TSH   Result Value Ref Range    TSH 1.350 0.400 - 4.000 uIU/mL   T4, free   Result Value Ref Range    Free T4 1.01 0.71 - 1.51 ng/dL   CBC auto differential   Result Value Ref Range    WBC 7.10 3.90 - 12.70 K/uL    RBC 4.69 4.60 - 6.20 M/uL    Hemoglobin 12.4 (L) 14.0 - 18.0 g/dL    Hematocrit 41.4 40.0 - 54.0 %    MCV 88 82 - 98 fL    MCH 26.4 (L) 27.0 - 31.0 pg    MCHC 30.0 (L) 32.0 - 36.0 g/dL    RDW 15.7 (H) 11.5 - 14.5 %    Platelets 326 150 - 350 K/uL    MPV  9.5 9.2 - 12.9 fL    Gran # (ANC) 6.8 1.8 - 7.7 K/uL    Lymph # 0.3 (L) 1.0 - 4.8 K/uL    Mono # 0.0 (L) 0.3 - 1.0 K/uL    Eos # 0.0 0.0 - 0.5 K/uL    Baso # 0.00 0.00 - 0.20 K/uL    Gran% 95.1 (H) 38.0 - 73.0 %    Lymph% 4.5 (L) 18.0 - 48.0 %    Mono% 0.4 (L) 4.0 - 15.0 %    Eosinophil% 0.0 0.0 - 8.0 %    Basophil% 0.0 0.0 - 1.9 %    Differential Method Automated    Basic metabolic panel    Result Value Ref Range    Sodium 139 136 - 145 mmol/L    Potassium 3.8 3.5 - 5.1 mmol/L    Chloride 90 (L) 95 - 110 mmol/L    CO2 36 (H) 23 - 29 mmol/L    Glucose 184 (H) 70 - 110 mg/dL    BUN, Bld 25 (H) 8 - 23 mg/dL    Creatinine 1.3 0.5 - 1.4 mg/dL    Calcium 9.6 8.7 - 10.5 mg/dL    Anion Gap 13 8 - 16 mmol/L    eGFR if African American >60 >60 mL/min/1.73 m^2    eGFR if non African American 56 (A) >60 mL/min/1.73 m^2   Lipid panel - fasting   Result Value Ref Range    Cholesterol 133 120 - 199 mg/dL    Triglycerides 68 30 - 150 mg/dL    HDL 40 40 - 75 mg/dL    LDL Cholesterol 79.4 63.0 - 159.0 mg/dL    HDL/Chol Ratio 30.1 20.0 - 50.0 %    Total Cholesterol/HDL Ratio 3.3 2.0 - 5.0    Non-HDL Cholesterol 93 mg/dL   Hemoglobin A1c - on admit or day 1   Result Value Ref Range    Hemoglobin A1C 6.0 (H) 4.0 - 5.6 %    Estimated Avg Glucose 126 68 - 131 mg/dL   Troponin I   Result Value Ref Range    Troponin I 0.017 0.000 - 0.026 ng/mL   Basic metabolic panel    Result Value Ref Range    Sodium 140 136 - 145 mmol/L    Potassium 3.8 3.5 - 5.1 mmol/L    Chloride 93 (L) 95 - 110 mmol/L    CO2 37 (H) 23 - 29 mmol/L    Glucose 135 (H) 70 - 110 mg/dL    BUN, Bld 34 (H) 8 - 23 mg/dL    Creatinine 1.1 0.5 - 1.4 mg/dL    Calcium 9.5 8.7 - 10.5 mg/dL    Anion Gap 10 8 - 16 mmol/L    eGFR if African American >60 >60 mL/min/1.73 m^2    eGFR if non African American >60 >60 mL/min/1.73 m^2   2D echo with color flow doppler   Result Value Ref Range    EF 55 55 - 65    Mitral Valve Regurgitation MILD     Diastolic Dysfunction No     Est. PA  Systolic Pressure 47.94 (A)     Tricuspid Valve Regurgitation MILD    ISTAT PROCEDURE   Result Value Ref Range    POC PH 7.409 7.35 - 7.45    POC PCO2 69.0 (HH) 35 - 45 mmHg    POC PO2 59 (LL) 80 - 100 mmHg    POC HCO3 43.6 (H) 24 - 28 mmol/L    POC BE 19 -2 to 2 mmol/L    POC SATURATED O2 89 (L) 95 - 100 %    Sample ARTERIAL     Site RB     Allens Test Pass     DelSys Nasal Can     Mode SPONT     Flow 2     FiO2 28        Imaging Results:  Imaging Results          X-Ray Chest 1 View (Final result)  Result time 02/09/18 11:38:43    Final result by KANCHAN Mirza Sr., MD (02/09/18 11:38:43)                 Impression:      The findings are characteristic of mild cardiomegaly with mild bilateral pulmonary edema. This is consistent with the patient's history..      Electronically signed by: KANCHAN MIRZA MD  Date:     02/09/18  Time:    11:38              Narrative:    One-view chest x-ray    Clinical History: Congestive heart failure    Finding: The size of the heart is prominent. There is a mild amount of interstitial and alveolar opacities seen in the medial aspect of both lungs. There is no pneumothorax.  The costophrenic angles are sharp.                             X-Ray Chest AP Portable (Final result)  Result time 02/07/18 20:22:03    Final result by Nayeli Knowles Jr., MD (02/07/18 20:22:03)                 Impression:     CHF exacerbation.      Electronically signed by: NAYELI KNOWLES  Date:     02/07/18  Time:    20:22              Narrative:    Exam: Portable chest radiograph    History:    Shortness of breath    Findings: Cardiomegaly in a setting of central vascular congestion and interstitial pulmonary edema.  No effusion or pneumothorax.  Calcified tortuous thoracic aorta.  Osseous structures intact.                               The EKG was ordered, reviewed, and independently interpreted by the ED provider.  Interpretation time: 1951  Rate: 88 BPM  Rhythm: atrial fibrillation  Interpretation: No  STEMI.           The Emergency Provider reviewed the vital signs and test results, which are outlined above.    ED Discussion     8:00 PM: Dr. Gaona transfers care of pt to Dr. Anand, pending lab results.    9:23 PM: Discussed case with Dr. Davila ([facility]). Dr. Davila agrees with current care and management of pt and accepts admission.   Admitting Service: [facility] medicine   Admitting Physician: Dr. Davila  Admit to: Telemetry    9:23 PM: Re-evaluated pt. I have discussed test results, shared treatment plan, and the need for admission with patient and family at bedside. Pt and family express understanding at this time and agree with all information. All questions answered. Pt and family have no further questions or concerns at this time. Pt is ready for admit.      ED Medication(s):  Medications   clopidogrel tablet 75 mg (75 mg Oral Given 2/9/18 0853)   pramipexole tablet 0.5 mg (0.5 mg Oral Given 2/9/18 0853)   simvastatin tablet 40 mg (40 mg Oral Given 2/8/18 2053)   allopurinol tablet 300 mg (300 mg Oral Given 2/9/18 0853)   gabapentin capsule 200 mg (200 mg Oral Given 2/9/18 0853)   leflunomide tablet 10 mg (10 mg Oral Given 2/9/18 0854)   pantoprazole EC tablet 40 mg (40 mg Oral Given 2/9/18 0853)   ondansetron injection 4 mg (not administered)   ramelteon tablet 8 mg (8 mg Oral Given 2/7/18 2340)   amLODIPine tablet 10 mg (10 mg Oral Given 2/9/18 0853)   valsartan tablet 320 mg (320 mg Oral Given 2/9/18 0853)   apixaban tablet 5 mg (5 mg Oral Given 2/9/18 0853)   metoprolol injection 5 mg (not administered)   arformoterol nebulizer solution 15 mcg (15 mcg Nebulization Given 2/9/18 0704)   levalbuterol nebulizer solution 0.63 mg (not administered)   baclofen split tablet 5 mg (not administered)   carvedilol tablet 12.5 mg (12.5 mg Oral Given 2/9/18 1345)   albuterol-ipratropium 2.5mg-0.5mg/3mL nebulizer solution 3 mL (3 mLs Nebulization Given 2/7/18 2023)   furosemide injection 40 mg (40 mg  Intravenous Given 2/7/18 2014)   albuterol-ipratropium 2.5mg-0.5mg/3mL nebulizer solution 3 mL (3 mLs Nebulization Given 2/7/18 2128)   enoxaparin injection 90 mg (90 mg Subcutaneous Given 2/7/18 2222)   aspirin tablet 325 mg (325 mg Oral Given 2/7/18 2222)   furosemide injection 40 mg (40 mg Intravenous Given 2/8/18 1729)   nitroGLYCERIN 2% TD oint ointment 0.5 inch (0.5 inches Topical (Top) Given 2/8/18 1352)   methylPREDNISolone sodium succinate injection 80 mg (80 mg Intravenous Given 2/8/18 1351)   diltiaZEM injection 23 mg (23 mg Intravenous Given 2/8/18 0942)       Current Discharge Medication List      START taking these medications    Details   apixaban 5 mg Tab Take 1 tablet (5 mg total) by mouth 2 (two) times daily.  Qty: 60 tablet, Refills: 0             Follow-up Information     [facility].    Specialty:  DME Provider  Why:  DME:  02 concentrator and portable  Contact information:  [address] [facility] [id number]  [phone]                     Medical Decision Making    Medical Decision Making:   Clinical Tests:   Lab Tests: Ordered and Reviewed  Radiological Study: Ordered and Reviewed  Medical Tests: Ordered and Reviewed           Scribe Attestation:   Scribe #1: I performed the above scribed service and the documentation accurately describes the services I performed. I attest to the accuracy of the note.    Attending:   Physician Attestation Statement for Scribe #1: I, Catarino Gaona MD, personally performed the services described in this documentation, as scribed by Corinne Mack, in my presence, and it is both accurate and complete.       Scribe Attestation:   Scribe #2: I performed the above scribed service and the documentation accurately describes the services I performed. I attest to the accuracy of the note.    Attending Attestation:           Physician Attestation for Scribe:    Physician Attestation Statement for Scribe #2: I, Frederic Anand MD,  reviewed documentation, as scribed by Jacob Reis in my presence, and it is both accurate and complete. I also acknowledge and confirm the content of the note done by Scribe #1.          Clinical Impression       ICD-10-CM ICD-9-CM   1. Acute on chronic congestive heart failure, unspecified congestive heart failure type I50.9 428.0   2. New onset a-fib I48.91 427.31   3. Hypoxemia R09.02 799.02   4. CHF (congestive heart failure) I50.9 428.0   5. A-fib I48.91 427.31   6. Acute on chronic congestive heart failure I50.9 428.0   7. Acute on chronic respiratory failure with hypoxia and hypercapnia J96.21 518.84    J96.22 786.09     799.02   8. Acute on chronic diastolic congestive heart failure I50.33 428.33     428.0       Disposition:   Disposition: Admitted  Condition: Burton Anand Jr., MD  02/09/18 2603

## 2018-02-08 NOTE — SUBJECTIVE & OBJECTIVE
Past Medical History:   Diagnosis Date    Anemia 2013    Asthma     CHF (congestive heart failure)     COPD (chronic obstructive pulmonary disease)     COPD (chronic obstructive pulmonary disease) 2012    Diverticular disease     Gout 2012    Hyperlipidemia     Hypertension     Other and unspecified hyperlipidemia     Stroke        History reviewed. No pertinent surgical history.    Review of patient's allergies indicates:   Allergen Reactions    Cephalexin Anaphylaxis       No current facility-administered medications on file prior to encounter.      Current Outpatient Prescriptions on File Prior to Encounter   Medication Sig    aclidinium bromide (TUDORZA PRESSAIR) 400 mcg/actuation AePB Inhale 1 puff into the lungs 2 (two) times daily.    albuterol (PROVENTIL/VENTOLIN) 90 mcg/actuation inhaler Inhale 2 puffs into the lungs every 6 (six) hours as needed.    allopurinol (ZYLOPRIM) 300 MG tablet Take 300 mg by mouth once daily.    amlodipine-valsartan (EXFORGE)  mg per tablet Take 1 tablet by mouth once daily.    baclofen (LIORESAL) 10 MG tablet Take by mouth. 1 tablet Oral Every 8 hours    fluticasone-salmeterol 250-50 mcg/dose (ADVAIR) 250-50 mcg/dose diskus inhaler Inhale 1 puff into the lungs 2 (two) times daily.    furosemide (LASIX) 20 MG tablet Take 1 tablet (20 mg total) by mouth once daily. (Patient taking differently: Take 40 mg by mouth once daily. )    gabapentin (NEURONTIN) 300 MG capsule Take 1 capsule (300 mg total) by mouth 2 (two) times daily. 1 capsule Oral Three times a day    hydrocodone-acetaminophen 10-325mg (NORCO)  mg Tab Take 1 tablet by mouth every 8 (eight) hours as needed for Pain. 1 tablet Oral Twice a day    multivitamin capsule Take 1 capsule by mouth once daily.    nebivolol (BYSTOLIC) 10 MG Tab Take 1 tablet (10 mg total) by mouth 2 (two) times daily. 1 tablet Oral Every day    simvastatin (ZOCOR) 40 MG tablet Take 1 tablet (40 mg total) by mouth  every evening. 1 tablet Oral Every day    tizanidine (ZANAFLEX) 4 MG tablet Take 4 mg by mouth nightly as needed.    diazepam (VALIUM) 5 MG tablet Take 5 mg by mouth every 12 (twelve) hours as needed for Anxiety.    hydrochlorothiazide (HYDRODIURIL) 25 MG tablet Take 25 mg by mouth 2 (two) times daily.     iron-vitamin C 65 mg iron- 125 mg TbEC Take by mouth 3 (three) times daily.    leflunomide (ARAVA) 10 MG Tab Take 1 tablet (10 mg total) by mouth once daily.    pramipexole (MIRAPEX) 0.5 MG tablet Take 0.5 mg by mouth 2 (two) times daily.     Family History     Problem Relation (Age of Onset)    Stroke Cousin        Social History Main Topics    Smoking status: Former Smoker     Types: Cigarettes     Quit date: 7/9/2006    Smokeless tobacco: Not on file    Alcohol use No    Drug use: No    Sexual activity: Not on file     Review of Systems   HENT: Negative.    Eyes: Negative.    Cardiovascular: Positive for dyspnea on exertion, leg swelling, orthopnea and palpitations.   Respiratory: Positive for cough and shortness of breath.    Endocrine: Negative.    Skin: Negative.    Musculoskeletal: Negative.    Gastrointestinal: Negative.    Genitourinary: Negative.    Neurological: Negative.    Psychiatric/Behavioral: Negative.    Allergic/Immunologic: Negative.      Objective:     Vital Signs (Most Recent):  Temp: 98.1 °F (36.7 °C) (02/08/18 1125)  Pulse: 86 (02/08/18 1125)  Resp: 17 (02/08/18 1125)  BP: 129/82 (02/08/18 1125)  SpO2: 97 % (02/08/18 1125) Vital Signs (24h Range):  Temp:  [98.1 °F (36.7 °C)-98.2 °F (36.8 °C)] 98.1 °F (36.7 °C)  Pulse:  [] 86  Resp:  [12-27] 17  SpO2:  [85 %-99 %] 97 %  BP: (127-161)/() 129/82     Weight: 91 kg (200 lb 9.6 oz)  Body mass index is 30.5 kg/m².    SpO2: 97 %  O2 Device (Oxygen Therapy): nasal cannula      Intake/Output Summary (Last 24 hours) at 02/08/18 1146  Last data filed at 02/08/18 1000   Gross per 24 hour   Intake              120 ml   Output               900 ml   Net             -780 ml       Lines/Drains/Airways     Peripheral Intravenous Line                 Peripheral IV - Single Lumen 02/07/18 2004 Right Hand less than 1 day                Physical Exam   Constitutional: He appears well-developed and well-nourished. No distress.   HENT:   Head: Normocephalic and atraumatic.   Eyes: Pupils are equal, round, and reactive to light. Right eye exhibits no discharge. Left eye exhibits no discharge.   Neck: Neck supple. JVD present.   Cardiovascular: S1 normal and S2 normal.  An irregularly irregular rhythm present. Exam reveals no S3 and no S4.    No murmur heard.  Pulses:       Radial pulses are 2+ on the right side, and 2+ on the left side.   Pulmonary/Chest: Effort normal. No respiratory distress. He has no wheezes. He has rales (Bibasilar).   Abdominal: Soft. Bowel sounds are normal. He exhibits no distension. There is no tenderness. There is no rebound.   Musculoskeletal: He exhibits edema (2+ pitting BLE).   Skin: Skin is warm and dry. No rash noted. He is not diaphoretic. No erythema.   Psychiatric: He has a normal mood and affect. His behavior is normal. Thought content normal.   Nursing note and vitals reviewed.      Significant Labs:   CMP   Recent Labs  Lab 02/07/18 1956 02/08/18  0450    139   K 4.9 3.8   CL 92* 90*   CO2 31* 36*   GLU 90 184*   BUN 24* 25*   CREATININE 1.3 1.3   CALCIUM 9.7 9.6   PROT 7.5  --    ALBUMIN 2.8*  --    BILITOT 0.4  --    ALKPHOS 94  --    AST 38  --    ALT 26  --    ANIONGAP 16 13   ESTGFRAFRICA >60 >60   EGFRNONAA 56* 56*   , CBC   Recent Labs  Lab 02/07/18 1956 02/08/18  0450   WBC 7.63 7.10   HGB 12.9* 12.4*   HCT 42.9 41.4    326   , Troponin   Recent Labs  Lab 02/07/18 1956 02/07/18  2335 02/08/18  0450   TROPONINI 0.024 0.012 0.017    and All pertinent lab results from the last 24 hours have been reviewed.    Significant Imaging: Echocardiogram:   2D echo with color flow doppler:   Results for  orders placed or performed in visit on 07/09/13   2D echo with color flow doppler   Result Value Ref Range    EF 60     Mitral Valve Regurgitation trivial     Diastolic Dysfunction No     and X-Ray: CXR: X-Ray Chest 1 View (CXR): No results found for this visit on 02/07/18. and X-Ray Chest PA and Lateral (CXR): No results found for this visit on 02/07/18.

## 2018-02-08 NOTE — HPI
Mr. Vail is a 67 year old male patient with a PMHx of COPD, HTN, diastolic CHF, and previous CVA who was sent to [facility] ED from pulmonary clinic due to decompensated CHF. Patient complained of worsening SOB over the past week. Associated symptoms included lower extremity edema, orthopnea, abdominal bloating, and non-productive cough. Patient denied any associated fever, chills, chest pain, or chest tightness. Initial workup in ED revealed BNP of 380 and hypoxemia and hypercarbia. CXR showed interstitial pulmonary edema. EKG showed new onset afib with RVR and patient subsequently admitted for further evaluation and treatment. Cardiology consulted to assist with management. Patient seen and examined today, resting in bed. Feels better since admission, SOB improving. Remains chest pain free. No other complaints. HR in 80's at time of exam. States he had a cold about 2 weeks ago with wheezing and coughing that was treated with antibiotics and steroids. He  reports compliance with his medications and tries to be mindful of his salt intake. Chart reviewed. Troponin x 3 negative. 2D echo pending.

## 2018-02-08 NOTE — ASSESSMENT & PLAN NOTE
ASA 325mg and Lovenox 1mg/kg given now  continue Plavix and BB  Try to avoid Beta Nebs if possible, will use nitro to improve aeration of lungs.  Cardiology consult for continued management    Appreciate cards  Pt off Cardizem gtt  Now on Bystolic 10 BID and Lovenox switched to Eliquis

## 2018-02-08 NOTE — HOSPITAL COURSE
Admitted to telemetry. Diuresed. Rate control. Anticoagulated.  2/9 - Doing well.   2/10 - Shows significant clinical improvement.

## 2018-02-08 NOTE — PLAN OF CARE
Met with pt to complete discharge planning assessment. Pt reports he lives alone and is independent with ADLs. He does not use any HME and is not on HH. He does not anticiapte and d/c needs at this time.   Report given to STEPHON Bryan LCSW.     02/08/18 1312   Discharge Assessment   Assessment Type Discharge Planning Assessment   Confirmed/corrected address and phone number on facesheet? Yes   Assessment information obtained from? Patient;Caregiver   Prior to hospitilization cognitive status: Alert/Oriented   Prior to hospitalization functional status: Independent   Current cognitive status: Alert/Oriented   Current Functional Status: Independent   Lives With alone   Able to Return to Prior Arrangements yes   Is patient able to care for self after discharge? Yes   Who are your caregiver(s) and their phone number(s)? Jay Negatu, relative [phone]   Readmission Within The Last 30 Days no previous admission in last 30 days   Patient currently receives any other outside agency services? No   Equipment Currently Used at Home none   Does the patient have transportation home? Yes   Transportation Available car;family or friend will provide   Discharge Plan A Home   Patient/Family In Agreement With Plan yes

## 2018-02-08 NOTE — PATIENT INSTRUCTIONS
Lung Anatomy  Your lungs take air in to give your body oxygen, which the body needs to work. Your lungs, like all the tissues in your body, are made up of billions of tiny specialized cells. Old lung cells die and are replaced by new, identical lung cells. This natural process helps ensure healthy lungs.    Date Last Reviewed: 11/1/2016  © 1202-0839 [employer]. [address] [place] [address], [place] [address]. All rights reserved. This information is not intended as a substitute for professional medical care. Always follow your healthcare professional's instructions.

## 2018-02-08 NOTE — PROGRESS NOTES
New patient    Subjective:      Patient ID: Rea Vail is a 67 y.o. male.    Patient Active Problem List   Diagnosis    CVA (cerebral vascular accident)    Acute on chronic combined systolic and diastolic congestive heart failure    HTN (hypertension)    Hyperlipidemia    Chronic gout    Multiple joint pain    Dysesthesia    Coagulation defect    Dyslipidemia    Acute on chronic respiratory failure with hypoxia    Dyspnea and respiratory abnormalities       Problem list has been reviewed.    he has been referred by Self, Aaareferral for evaluation and management for   Chief Complaint   Patient presents with    COPD       Chief Complaint: COPD      HPI:    Reports progressive dyspnea, wheezing, bilateral leg edema, orthopnea and PND for the past week. PCP doubled his dose of lasix but this did no help.     Patient reports cough and difficulty breathing and denies vomiting, abdominal pain and diarrhea. Patient denies recent sick contacts.   Patient does not have new pets. Patient does not have a history of asthma. Patient does not have a history of environmental allergens. Patient has not traveled recently. Patient does have a history of smoking. He smoked 3 PPD for 20 years. He quit smoking 13 years ago.  Patient has not had a previous chest x-ray. Patient has not had a PPD done.  PPD was Negative    Previous Report Reviewed: historical medical records, office notes and radiology reports     Past Medical History: The following portions of the patient's history were reviewed and updated as appropriate:   He  has no past surgical history on file.  His family history includes Stroke in his cousin.  He  reports that he quit smoking about 11 years ago. His smoking use included Cigarettes. He does not have any smokeless tobacco history on file. He reports that he does not drink alcohol or use drugs.  He has a current medication list which includes the following prescription(s): aclidinium bromide,  "albuterol, allopurinol, amlodipine-valsartan, baclofen, clopidogrel, diazepam, fluticasone-salmeterol 250-50 mcg/dose, furosemide, gabapentin, hydrochlorothiazide, hydrocodone-acetaminophen 10-325mg, iron,carbonyl-vitamin c, leflunomide, multivitamin, nebivolol, pramipexole, simvastatin, and tizanidine.  He is allergic to cephalexin..    Review of Systems   Constitutional: Negative for fatigue and night sweats.   HENT: Negative for nosebleeds, postnasal drip and hearing loss.    Respiratory: Positive for wheezing and dyspnea on extertion. Negative for apnea.    Cardiovascular: Positive for leg swelling. Negative for chest pain.   Endocrine: Negative for polydipsia and cold intolerance.    Gastrointestinal: Negative for nausea and abdominal pain.   Neurological: Negative for headaches.   Hematological: Does not bruise/bleed easily.   Psychiatric/Behavioral: The patient is nervous/anxious.    All other systems reviewed and are negative.       Objective:     Vitals:    02/07/18 1742   BP: 138/80   Pulse: 84   Resp: 20   SpO2: (!) 93%   Weight: 89.3 kg (196 lb 12.2 oz)   Height: 5' 8" (1.727 m)     Body mass index is 29.92 kg/m².    Physical Exam   Constitutional: He is oriented to person, place, and time. He appears well-developed and well-nourished.   HENT:   Head: Normocephalic and atraumatic.   Eyes: Conjunctivae are normal. Pupils are equal, round, and reactive to light.   Neck: Normal range of motion. Neck supple.   Cardiovascular: An irregularly irregular rhythm present. Exam reveals gallop.    Murmur heard.  Pulmonary/Chest: He has rales.   Abdominal: Soft. Bowel sounds are normal. He exhibits no mass. There is no tenderness. There is no guarding.   Musculoskeletal: He exhibits edema (4 +).   Neurological: He is alert and oriented to person, place, and time.   Skin: Skin is warm. Capillary refill takes less than 2 seconds.   Psychiatric: He has a normal mood and affect. His behavior is normal.   Nursing note and " vitals reviewed.      Personal Diagnostic Review    EXERCISE OXIMETRY:  Oxygen Saturation on room air at rest :78%  Oxygen Saturation with exercise on 3 L/min to obtain an Oxygen Saturation of 91%.        Assessment /Plan:     Discussed diagnosis, its evaluation, treatment and usual course. All questions answered.    Problem List Items Addressed This Visit        Pulmonary    Acute on chronic respiratory failure with hypoxia - Primary    Current Assessment & Plan     Refer to the ED for further E/M         Relevant Orders    Refer to Emergency Dept.       Cardiac/Vascular    Acute on chronic combined systolic and diastolic congestive heart failure    Current Assessment & Plan     Refer to the ED for further E/ M               Other    Dyspnea and respiratory abnormalities    Current Assessment & Plan     Etiology unclear.  Multifactorial etiology suspected.  Likely contributors to etiology (checked)    [x] Pulmonary airway disease    [x]  Pulmonary parenchymal disease  [x] Pulmonary vascular disease   [] Pleural disease  [] Pulmonary vasculitis  [] Hypoventilation ( chest wall deformity, neuromuscular disease, obesity etc)  [] Anemia  [] Thyroid disease.  [x] Cardiac illess  []         Sleep disorder    EVALUATION:  [x]        Complete PFT with bronchodilator.  []        Bronchial challenge with methacholine.   [x]        Stress test, pulmonary.  [x]        PULM - Arterial Blood Gases  [x]        Chest X Ray  []        CT scan of chest.   [x]        DOT  [x]        Sedimentation rate  [x]        C-reactive protein  [x]        Anti-neutrophilic cytoplasmic antibody          PLAN:  Discussed diagnosis, its evaluation, treatment and usual course.  All questions answered.        Call if shortness of breath worsens, blood in sputum, change in character of cough, development of fever or chills, inability to maintain nutrition and hydration.     Re evaluate in four weeks with results.           Relevant Orders    DOT     Anti-neutrophilic cytoplasmic antibody    C-reactive protein    Sedimentation rate, manual    Complete PFT with bronchodilator    PULM - Arterial Blood Gases--in addition to PFT only    Pulmonary stress test    X-Ray Chest PA And Lateral              TIME SPENT WITH PATIENT: Time spent: 60 minutes in face to face  discussion concerning diagnosis, prognosis, review of lab and test results, benefits of treatment as well as management of disease, counseling of patient and coordination of care between various health  care providers . Greater than half the time spent was used for coordination of care and counseling of patient.     Follow-up in about 1 month (around 3/7/2018) for Chronic Respiratory Failure, Shortness of breath, CHF.

## 2018-02-08 NOTE — PROGRESS NOTES
Clinical Pharmacy: Eliquis Education Note    Patient educated on Eliquis indication, side effects, and administration instructions. Patient given educational handout. Patient expressed understanding and had no further questions.     Thank you for allowing us to participate in this patient's care.     Nohemy Morin 2/8/2018 2:10 PM

## 2018-02-08 NOTE — ASSESSMENT & PLAN NOTE
-Rate-controlled at time of exam  -Continue Bystolic  -May use IV lopressor as needed  -Given elevated CHADSVASC score, anticoagulation indicated. -Risks/benefits discussed in detail. Patient denies any recent bleeding issues. Start Eliquis 5 mg BID  -OP EP f/u to discuss additional management/DCCV  -Sleep study as underling GUMARO contributing to arrhythmia

## 2018-02-08 NOTE — ASSESSMENT & PLAN NOTE
Diuresis, consider dose of albumin  Last Echo 4 years ago normal ef, recheck   Cardiology consult  ASA, BB, ACE/ARB, Statin   Hold HCTZ at this time, using IV lasix   Check Lipids, A1C, Electrolytes, serial troponin.

## 2018-02-08 NOTE — CONSULTS
[facility]  Cardiology  Consult Note    Patient Name: Rea Vail  MRN: [id number]  Admission Date: 2/7/2018  Hospital Length of Stay: 1 days  Code Status: Full Code   Attending Provider: Stephen Fajardo MD   Consulting Provider: Ashly Parr PA-C  Primary Care Physician: Estiven Oseguera MD  Principal Problem:Acute on chronic congestive heart failure    Patient information was obtained from patient, past medical records and ER records.     Inpatient consult to Cardiology  Consult performed by: ASHLY PARR  Consult ordered by: EVELIN ZENG        Subjective:     Chief Complaint:  SOB     HPI:   Mr. Vail is a 67 year old male patient with a PMHx of COPD, HTN, diastolic CHF, and previous CVA who was sent to [facility] ED from pulmonary clinic due to decompensated CHF. Patient complained of worsening SOB over the past week. Associated symptoms included lower extremity edema, orthopnea, abdominal bloating, and non-productive cough. Patient denied any associated fever, chills, chest pain, or chest tightness. Initial workup in ED revealed BNP of 380 and hypoxemia and hypercarbia. CXR showed interstitial pulmonary edema. EKG showed new onset afib with RVR and patient subsequently admitted for further evaluation and treatment. Cardiology consulted to assist with management. Patient seen and examined today, resting in bed. Feels better since admission, SOB improving. Remains chest pain free. No other complaints. HR in 80's at time of exam. States he had a cold about 2 weeks ago with wheezing and coughing that was treated with antibiotics and steroids. He  reports compliance with his medications and tries to be mindful of his salt intake. Chart reviewed. Troponin x 3 negative. 2D echo pending.      Past Medical History:   Diagnosis Date    Anemia 2013    Asthma     CHF (congestive heart failure)     COPD (chronic obstructive pulmonary disease)     COPD (chronic obstructive pulmonary  disease) 2012    Diverticular disease     Gout 2012    Hyperlipidemia     Hypertension     Other and unspecified hyperlipidemia     Stroke        History reviewed. No pertinent surgical history.    Review of patient's allergies indicates:   Allergen Reactions    Cephalexin Anaphylaxis       No current facility-administered medications on file prior to encounter.      Current Outpatient Prescriptions on File Prior to Encounter   Medication Sig    aclidinium bromide (TUDORZA PRESSAIR) 400 mcg/actuation AePB Inhale 1 puff into the lungs 2 (two) times daily.    albuterol (PROVENTIL/VENTOLIN) 90 mcg/actuation inhaler Inhale 2 puffs into the lungs every 6 (six) hours as needed.    allopurinol (ZYLOPRIM) 300 MG tablet Take 300 mg by mouth once daily.    amlodipine-valsartan (EXFORGE)  mg per tablet Take 1 tablet by mouth once daily.    baclofen (LIORESAL) 10 MG tablet Take by mouth. 1 tablet Oral Every 8 hours    fluticasone-salmeterol 250-50 mcg/dose (ADVAIR) 250-50 mcg/dose diskus inhaler Inhale 1 puff into the lungs 2 (two) times daily.    furosemide (LASIX) 20 MG tablet Take 1 tablet (20 mg total) by mouth once daily. (Patient taking differently: Take 40 mg by mouth once daily. )    gabapentin (NEURONTIN) 300 MG capsule Take 1 capsule (300 mg total) by mouth 2 (two) times daily. 1 capsule Oral Three times a day    hydrocodone-acetaminophen 10-325mg (NORCO)  mg Tab Take 1 tablet by mouth every 8 (eight) hours as needed for Pain. 1 tablet Oral Twice a day    multivitamin capsule Take 1 capsule by mouth once daily.    nebivolol (BYSTOLIC) 10 MG Tab Take 1 tablet (10 mg total) by mouth 2 (two) times daily. 1 tablet Oral Every day    simvastatin (ZOCOR) 40 MG tablet Take 1 tablet (40 mg total) by mouth every evening. 1 tablet Oral Every day    tizanidine (ZANAFLEX) 4 MG tablet Take 4 mg by mouth nightly as needed.    diazepam (VALIUM) 5 MG tablet Take 5 mg by mouth every 12 (twelve) hours as  needed for Anxiety.    hydrochlorothiazide (HYDRODIURIL) 25 MG tablet Take 25 mg by mouth 2 (two) times daily.     iron-vitamin C 65 mg iron- 125 mg TbEC Take by mouth 3 (three) times daily.    leflunomide (ARAVA) 10 MG Tab Take 1 tablet (10 mg total) by mouth once daily.    pramipexole (MIRAPEX) 0.5 MG tablet Take 0.5 mg by mouth 2 (two) times daily.     Family History     Problem Relation (Age of Onset)    Stroke Cousin        Social History Main Topics    Smoking status: Former Smoker     Types: Cigarettes     Quit date: 7/9/2006    Smokeless tobacco: Not on file    Alcohol use No    Drug use: No    Sexual activity: Not on file     Review of Systems   HENT: Negative.    Eyes: Negative.    Cardiovascular: Positive for dyspnea on exertion, leg swelling, orthopnea and palpitations.   Respiratory: Positive for cough and shortness of breath.    Endocrine: Negative.    Skin: Negative.    Musculoskeletal: Negative.    Gastrointestinal: Negative.    Genitourinary: Negative.    Neurological: Negative.    Psychiatric/Behavioral: Negative.    Allergic/Immunologic: Negative.      Objective:     Vital Signs (Most Recent):  Temp: 98.1 °F (36.7 °C) (02/08/18 1125)  Pulse: 86 (02/08/18 1125)  Resp: 17 (02/08/18 1125)  BP: 129/82 (02/08/18 1125)  SpO2: 97 % (02/08/18 1125) Vital Signs (24h Range):  Temp:  [98.1 °F (36.7 °C)-98.2 °F (36.8 °C)] 98.1 °F (36.7 °C)  Pulse:  [] 86  Resp:  [12-27] 17  SpO2:  [85 %-99 %] 97 %  BP: (127-161)/() 129/82     Weight: 91 kg (200 lb 9.6 oz)  Body mass index is 30.5 kg/m².    SpO2: 97 %  O2 Device (Oxygen Therapy): nasal cannula      Intake/Output Summary (Last 24 hours) at 02/08/18 1146  Last data filed at 02/08/18 1000   Gross per 24 hour   Intake              120 ml   Output              900 ml   Net             -780 ml       Lines/Drains/Airways     Peripheral Intravenous Line                 Peripheral IV - Single Lumen 02/07/18 2004 Right Hand less than 1 day                 Physical Exam   Constitutional: He appears well-developed and well-nourished. No distress.   HENT:   Head: Normocephalic and atraumatic.   Eyes: Pupils are equal, round, and reactive to light. Right eye exhibits no discharge. Left eye exhibits no discharge.   Neck: Neck supple. JVD present.   Cardiovascular: S1 normal and S2 normal.  An irregularly irregular rhythm present. Exam reveals no S3 and no S4.    No murmur heard.  Pulses:       Radial pulses are 2+ on the right side, and 2+ on the left side.   Pulmonary/Chest: Effort normal. No respiratory distress. He has no wheezes. He has rales (Bibasilar).   Abdominal: Soft. Bowel sounds are normal. He exhibits no distension. There is no tenderness. There is no rebound.   Musculoskeletal: He exhibits edema (2+ pitting BLE).   Skin: Skin is warm and dry. No rash noted. He is not diaphoretic. No erythema.   Psychiatric: He has a normal mood and affect. His behavior is normal. Thought content normal.   Nursing note and vitals reviewed.      Significant Labs:   CMP   Recent Labs  Lab 02/07/18 1956 02/08/18  0450    139   K 4.9 3.8   CL 92* 90*   CO2 31* 36*   GLU 90 184*   BUN 24* 25*   CREATININE 1.3 1.3   CALCIUM 9.7 9.6   PROT 7.5  --    ALBUMIN 2.8*  --    BILITOT 0.4  --    ALKPHOS 94  --    AST 38  --    ALT 26  --    ANIONGAP 16 13   ESTGFRAFRICA >60 >60   EGFRNONAA 56* 56*   , CBC   Recent Labs  Lab 02/07/18 1956 02/08/18  0450   WBC 7.63 7.10   HGB 12.9* 12.4*   HCT 42.9 41.4    326   , Troponin   Recent Labs  Lab 02/07/18 1956 02/07/18  2335 02/08/18  0450   TROPONINI 0.024 0.012 0.017    and All pertinent lab results from the last 24 hours have been reviewed.    Significant Imaging: Echocardiogram:   2D echo with color flow doppler:   Results for orders placed or performed in visit on 07/09/13   2D echo with color flow doppler   Result Value Ref Range    EF 60     Mitral Valve Regurgitation trivial     Diastolic Dysfunction No     and  X-Ray: CXR: X-Ray Chest 1 View (CXR): No results found for this visit on 02/07/18. and X-Ray Chest PA and Lateral (CXR): No results found for this visit on 02/07/18.    Assessment and Plan:   Patient who presents with SOB secondary to decompensated CHF, new onset atrial fibrillation, and underlying COPD. Needs IV diuresis and rate control. Check 2D echo. Eliquis started for CVA prophylaxis given elevated CHADsVASC score.     * Acute on chronic congestive heart failure    -Decompensated, in setting of recent URI and atrial fibrillation with RVR  -Continue IV diuresis  -Continue Bystolic, ARB, statin  -Strict I's/O's  -Dietary restrictions discussed        New onset a-fib    -Rate-controlled at time of exam  -Continue Bystolic  -May use IV lopressor as needed  -Given elevated CHADSVASC score, anticoagulation indicated. -Risks/benefits discussed in detail. Patient denies any recent bleeding issues. Start Eliquis 5 mg BID  -OP EP f/u to discuss additional management/DCCV  -Sleep study as underling GUMARO contributing to arrhythmia         Acute on chronic respiratory failure with hypoxia and hypercapnia    -Secondary to decompensated CHF and underlying COPD  -Respiratory status improved since admission  -Continue IV diuresis  -Continue nebs        Dyslipidemia    -Continue statin        HTN (hypertension)    -Continue current meds            VTE Risk Mitigation         Ordered     apixaban tablet 5 mg  2 times daily     Route:  Oral        02/08/18 1139     Medium Risk of VTE  Once      02/07/18 2158     Place DANNA hose  Until discontinued      02/07/18 2158     Place sequential compression device  Until discontinued      02/07/18 2158     Place sequential compression device  Until discontinued      02/07/18 2158          Thank you for your consult. I will follow-up with patient. Please contact us if you have any additional questions.    Ashly Watlon PA-C  Cardiology   [facility]    Chart reviewed.   Edmundo examined patient and agrees with  plan as outlined above.

## 2018-02-09 DIAGNOSIS — J44.9 COPD (CHRONIC OBSTRUCTIVE PULMONARY DISEASE): Primary | ICD-10-CM

## 2018-02-09 PROBLEM — J44.1 CHRONIC OBSTRUCTIVE PULMONARY DISEASE WITH ACUTE EXACERBATION: Status: ACTIVE | Noted: 2018-02-09

## 2018-02-09 PROBLEM — I50.33 ACUTE ON CHRONIC DIASTOLIC CONGESTIVE HEART FAILURE: Status: ACTIVE | Noted: 2018-02-07

## 2018-02-09 PROBLEM — R09.02 HYPOXEMIA: Status: RESOLVED | Noted: 2018-02-07 | Resolved: 2018-02-09

## 2018-02-09 LAB
ANA SER QL IF: NORMAL
ANION GAP SERPL CALC-SCNC: 10 MMOL/L
BUN SERPL-MCNC: 34 MG/DL
CALCIUM SERPL-MCNC: 9.5 MG/DL
CHLORIDE SERPL-SCNC: 93 MMOL/L
CO2 SERPL-SCNC: 37 MMOL/L
CREAT SERPL-MCNC: 1.1 MG/DL
EST. GFR  (AFRICAN AMERICAN): >60 ML/MIN/1.73 M^2
EST. GFR  (NON AFRICAN AMERICAN): >60 ML/MIN/1.73 M^2
GLUCOSE SERPL-MCNC: 135 MG/DL
POTASSIUM SERPL-SCNC: 3.8 MMOL/L
SODIUM SERPL-SCNC: 140 MMOL/L

## 2018-02-09 PROCEDURE — 94640 AIRWAY INHALATION TREATMENT: CPT

## 2018-02-09 PROCEDURE — 27000221 HC OXYGEN, UP TO 24 HOURS

## 2018-02-09 PROCEDURE — 25000003 PHARM REV CODE 250: Performed by: PHYSICIAN ASSISTANT

## 2018-02-09 PROCEDURE — 99900035 HC TECH TIME PER 15 MIN (STAT)

## 2018-02-09 PROCEDURE — 25000003 PHARM REV CODE 250: Performed by: INTERNAL MEDICINE

## 2018-02-09 PROCEDURE — 93005 ELECTROCARDIOGRAM TRACING: CPT

## 2018-02-09 PROCEDURE — 25000003 PHARM REV CODE 250: Performed by: NURSE PRACTITIONER

## 2018-02-09 PROCEDURE — 99232 SBSQ HOSP IP/OBS MODERATE 35: CPT | Mod: ,,, | Performed by: INTERNAL MEDICINE

## 2018-02-09 PROCEDURE — 93010 ELECTROCARDIOGRAM REPORT: CPT | Mod: ,,, | Performed by: INTERNAL MEDICINE

## 2018-02-09 PROCEDURE — 99233 SBSQ HOSP IP/OBS HIGH 50: CPT | Mod: ,,, | Performed by: INTERNAL MEDICINE

## 2018-02-09 PROCEDURE — 94660 CPAP INITIATION&MGMT: CPT

## 2018-02-09 PROCEDURE — 80048 BASIC METABOLIC PNL TOTAL CA: CPT

## 2018-02-09 PROCEDURE — 27000190 HC CPAP FULL FACE MASK W/VALVE

## 2018-02-09 PROCEDURE — 25000242 PHARM REV CODE 250 ALT 637 W/ HCPCS: Performed by: INTERNAL MEDICINE

## 2018-02-09 PROCEDURE — 21400001 HC TELEMETRY ROOM

## 2018-02-09 PROCEDURE — 25000003 PHARM REV CODE 250: Performed by: EMERGENCY MEDICINE

## 2018-02-09 PROCEDURE — 36415 COLL VENOUS BLD VENIPUNCTURE: CPT

## 2018-02-09 RX ORDER — CARVEDILOL 12.5 MG/1
12.5 TABLET ORAL 2 TIMES DAILY
Status: DISCONTINUED | OUTPATIENT
Start: 2018-02-09 | End: 2018-02-10 | Stop reason: HOSPADM

## 2018-02-09 RX ADMIN — ARFORMOTEROL TARTRATE 15 MCG: 15 SOLUTION RESPIRATORY (INHALATION) at 07:02

## 2018-02-09 RX ADMIN — APIXABAN 5 MG: 2.5 TABLET, FILM COATED ORAL at 08:02

## 2018-02-09 RX ADMIN — CARVEDILOL 12.5 MG: 12.5 TABLET, FILM COATED ORAL at 01:02

## 2018-02-09 RX ADMIN — VALSARTAN 320 MG: 80 TABLET, FILM COATED ORAL at 08:02

## 2018-02-09 RX ADMIN — ALLOPURINOL 300 MG: 300 TABLET ORAL at 08:02

## 2018-02-09 RX ADMIN — PRAMIPEXOLE DIHYDROCHLORIDE 0.5 MG: 0.5 TABLET ORAL at 08:02

## 2018-02-09 RX ADMIN — GABAPENTIN 200 MG: 100 CAPSULE ORAL at 08:02

## 2018-02-09 RX ADMIN — CARVEDILOL 12.5 MG: 12.5 TABLET, FILM COATED ORAL at 10:02

## 2018-02-09 RX ADMIN — NEBIVOLOL HYDROCHLORIDE 10 MG: 10 TABLET ORAL at 08:02

## 2018-02-09 RX ADMIN — GABAPENTIN 200 MG: 100 CAPSULE ORAL at 10:02

## 2018-02-09 RX ADMIN — AMLODIPINE BESYLATE 10 MG: 10 TABLET ORAL at 08:02

## 2018-02-09 RX ADMIN — BACLOFEN 5 MG: 10 TABLET ORAL at 05:02

## 2018-02-09 RX ADMIN — CLOPIDOGREL BISULFATE 75 MG: 75 TABLET ORAL at 08:02

## 2018-02-09 RX ADMIN — SIMVASTATIN 40 MG: 20 TABLET, FILM COATED ORAL at 10:02

## 2018-02-09 RX ADMIN — PRAMIPEXOLE DIHYDROCHLORIDE 0.5 MG: 0.5 TABLET ORAL at 10:02

## 2018-02-09 RX ADMIN — APIXABAN 5 MG: 2.5 TABLET, FILM COATED ORAL at 10:02

## 2018-02-09 RX ADMIN — PANTOPRAZOLE SODIUM 40 MG: 40 TABLET, DELAYED RELEASE ORAL at 08:02

## 2018-02-09 RX ADMIN — LEFLUNOMIDE 10 MG: 10 TABLET ORAL at 08:02

## 2018-02-09 NOTE — ASSESSMENT & PLAN NOTE
With Acute pulmonary edema   Cont IV lasix, I/O, daily weights  Bipap  Care discussed with Dr. Roberts

## 2018-02-09 NOTE — PROGRESS NOTES
Pulmonary Disease Management Inpatient Evaluation    Admitting Diagnosis: COPD    Current Pulmonologist: Dr. Roberts    Pulse:   85 bpm    SpO2:   96 %    Breath Sounds: coarse    Inpatient Respiratory Treatment:Smoking History: former      Education on Respiratory Issues:    The patient was visited and informed of Pulmonary Disease Management Program. He and attending family member are interested in attending Pulmonary Disease Management Program. An active  patient of Dr Roberts's,  Mr. Vail has been placed on the  Pulmonary Disease Management Referral Order. Patient will be scheduled into program once discharged from hospital.

## 2018-02-09 NOTE — SUBJECTIVE & OBJECTIVE
Review of Systems   Constitution: Negative.   HENT: Negative.    Cardiovascular: Negative.    Respiratory: Positive for shortness of breath.    Endocrine: Negative.    Hematologic/Lymphatic: Negative.    Skin: Negative.    Musculoskeletal: Negative.    Gastrointestinal: Negative.    Genitourinary: Negative.    Neurological: Negative.    Psychiatric/Behavioral: Negative.    Allergic/Immunologic: Negative.      Objective:     Vital Signs (Most Recent):  Temp: 98 °F (36.7 °C) (02/09/18 1220)  Pulse: 92 (02/09/18 1220)  Resp: 18 (02/09/18 1220)  BP: 128/67 (02/09/18 1220)  SpO2: (!) 92 % (02/09/18 1220) Vital Signs (24h Range):  Temp:  [97.3 °F (36.3 °C)-98.1 °F (36.7 °C)] 98 °F (36.7 °C)  Pulse:  [74-98] 92  Resp:  [16-20] 18  SpO2:  [90 %-97 %] 92 %  BP: (123-139)/(67-87) 128/67     Weight: 88.3 kg (194 lb 10.7 oz)  Body mass index is 29.6 kg/m².     SpO2: (!) 92 %  O2 Device (Oxygen Therapy): nasal cannula      Intake/Output Summary (Last 24 hours) at 02/09/18 1515  Last data filed at 02/09/18 0144   Gross per 24 hour   Intake              120 ml   Output             1200 ml   Net            -1080 ml       Lines/Drains/Airways     Peripheral Intravenous Line                 Peripheral IV - Single Lumen 02/07/18 2004 Right Hand 1 day                Physical Exam   Constitutional: He is oriented to person, place, and time. He appears well-developed and well-nourished. No distress.   HENT:   Head: Normocephalic and atraumatic.   Eyes: Pupils are equal, round, and reactive to light. Right eye exhibits no discharge. Left eye exhibits no discharge.   Neck: Neck supple. No JVD present.   Cardiovascular: S1 normal and S2 normal.  An irregularly irregular rhythm present. Tachycardia present.    Pulses:       Radial pulses are 2+ on the right side, and 2+ on the left side.   Pulmonary/Chest: Effort normal and breath sounds normal. No respiratory distress. He has no wheezes.   Decreased BS at bases   Abdominal: Soft. Bowel  sounds are normal. He exhibits no distension. There is no tenderness. There is no rebound.   Musculoskeletal: He exhibits no edema.   Neurological: He is alert and oriented to person, place, and time.   Skin: Skin is warm and dry. He is not diaphoretic.   Psychiatric: He has a normal mood and affect. His behavior is normal. Thought content normal.   Nursing note and vitals reviewed.      Significant Labs:   CMP   Recent Labs  Lab 02/07/18 1956 02/08/18  0450 02/09/18  0550    139 140   K 4.9 3.8 3.8   CL 92* 90* 93*   CO2 31* 36* 37*   GLU 90 184* 135*   BUN 24* 25* 34*   CREATININE 1.3 1.3 1.1   CALCIUM 9.7 9.6 9.5   PROT 7.5  --   --    ALBUMIN 2.8*  --   --    BILITOT 0.4  --   --    ALKPHOS 94  --   --    AST 38  --   --    ALT 26  --   --    ANIONGAP 16 13 10   ESTGFRAFRICA >60 >60 >60   EGFRNONAA 56* 56* >60   , CBC   Recent Labs  Lab 02/07/18 1956 02/08/18  0450   WBC 7.63 7.10   HGB 12.9* 12.4*   HCT 42.9 41.4    326   , Troponin   Recent Labs  Lab 02/07/18 1956 02/07/18  2335 02/08/18  0450   TROPONINI 0.024 0.012 0.017    and All pertinent lab results from the last 24 hours have been reviewed.    Significant Imaging: Echocardiogram:   2D echo with color flow doppler:   Results for orders placed or performed during the hospital encounter of 02/07/18   2D echo with color flow doppler   Result Value Ref Range    EF 55 55 - 65    Mitral Valve Regurgitation MILD     Diastolic Dysfunction No     Est. PA Systolic Pressure 47.94 (A)     Tricuspid Valve Regurgitation MILD     and X-Ray: CXR: X-Ray Chest 1 View (CXR):   Results for orders placed or performed during the hospital encounter of 02/07/18   X-Ray Chest 1 View    Narrative    One-view chest x-ray    Clinical History: Congestive heart failure    Finding: The size of the heart is prominent. There is a mild amount of interstitial and alveolar opacities seen in the medial aspect of both lungs. There is no pneumothorax.  The costophrenic angles  are sharp.    Impression      The findings are characteristic of mild cardiomegaly with mild bilateral pulmonary edema. This is consistent with the patient's history..      Electronically signed by: KANCHAN BERNARD MD  Date:     02/09/18  Time:    11:38     and X-Ray Chest PA and Lateral (CXR): No results found for this visit on 02/07/18.

## 2018-02-09 NOTE — CONSULTS
Cultural food preferences noted. Patient does not eat Pork. Reviewed with Food and Nutrition staff.

## 2018-02-09 NOTE — PROGRESS NOTES
[facility]   Pulmonology  Progress Note    Patient Name: Rea Vail  MRN: [id number]  Admission Date: 2/7/2018  Hospital Length of Stay: 2 days  Code Status: Full Code  Attending Provider: Stephen Fajardo MD  Primary Care Provider: Estiven Oseguera MD   Principal Problem: Acute on chronic congestive heart failure    Subjective:     Interval History: Seen and examined at bedside. Hospital chart reviewed. No acute interval detrimental events noted. He reports that he has significantly improved.      Review of Systems   Constitutional: Negative for chills and weight loss.   HENT: Negative for ear discharge.    Eyes: Negative for double vision and photophobia.   Respiratory: Negative for cough and hemoptysis.    Cardiovascular: Negative for palpitations and orthopnea.   Gastrointestinal: Negative for nausea and vomiting.   Genitourinary: Negative for frequency and urgency.   Musculoskeletal: Negative for back pain and neck pain.   Skin: Negative for rash.   Neurological: Negative for tingling and headaches.   Psychiatric/Behavioral: Negative for substance abuse and suicidal ideas.     Objective:     Vital Signs (Most Recent):  Temp: 98 °F (36.7 °C) (02/09/18 1220)  Pulse: 92 (02/09/18 1220)  Resp: 18 (02/09/18 1220)  BP: 128/67 (02/09/18 1220)  SpO2: (!) 92 % (02/09/18 1220) Vital Signs (24h Range):  Temp:  [97.3 °F (36.3 °C)-98.1 °F (36.7 °C)] 98 °F (36.7 °C)  Pulse:  [74-98] 92  Resp:  [16-20] 18  SpO2:  [90 %-97 %] 92 %  BP: (123-139)/(67-87) 128/67     Weight: 88.3 kg (194 lb 10.7 oz)  Body mass index is 29.6 kg/m².      Intake/Output Summary (Last 24 hours) at 02/09/18 1257  Last data filed at 02/09/18 0144   Gross per 24 hour   Intake              360 ml   Output             1400 ml   Net            -1040 ml       Physical Exam   Constitutional: He is oriented to person, place, and time. He appears well-developed. No distress.   HENT:   Head: Normocephalic and atraumatic.   Nose: Nose normal.    Eyes: Conjunctivae and EOM are normal. Pupils are equal, round, and reactive to light. No scleral icterus.   Neck: Normal range of motion. Neck supple. No tracheal deviation present.   Cardiovascular: Normal rate, regular rhythm, normal heart sounds and intact distal pulses.    No murmur heard.  Bilateral lower ext swelling +1+2     Pulmonary/Chest: Effort normal. No stridor. No respiratory distress. He has no wheezes. He has no rales.   Decreased aeration and Bilateral lower crackles.    Abdominal: Soft. Bowel sounds are normal. He exhibits no distension. There is no tenderness. There is no guarding.   Genitourinary:   Genitourinary Comments: ua neg     Musculoskeletal: Normal range of motion. He exhibits no edema or deformity.   Neurological: He is alert and oriented to person, place, and time. No cranial nerve deficit. Coordination normal.   Left side weakness, chronic deficit from old stroke  Patient with no acute neurological impairments/findings.    Skin: Skin is warm and dry. Capillary refill takes less than 2 seconds. No rash noted. He is not diaphoretic.   Psychiatric: He has a normal mood and affect. His behavior is normal. Judgment and thought content normal.   Nursing note and vitals reviewed.      Vents:  Oxygen Concentration (%): 28 (02/1950)    Lines/Drains/Airways     Peripheral Intravenous Line                 Peripheral IV - Single Lumen 02/07/18 2004 Right Hand 1 day                Significant Labs:      Laboratory Data:  Reviewed recent labs. Appear stable other than abnormalities addressed in plan.     Radiology:  Reviewed recent imaging studies. Appear stable other than abnormalities addressed in plan.     Assessment/Plan:     New onset a-fib    Rate control.  Anticoagulation.        Acute on chronic respiratory failure with hypoxia and hypercapnia    Supplement oxygenation. Keep SAO2 >= 92%. BIPAP 10/5 QHS and PRN. Bronchodilators per orders        Acute on chronic combined systolic  and diastolic congestive heart failure    Agree with cardiology consult.  Optimize CHF regimen.  Preload and afterload reduction.  Daily weighing.  Dietary salt restriction.  Alcohol and tobacco avoidance.                   Vinnie Roberts MD  Pulmonology  [facility]

## 2018-02-09 NOTE — HOSPITAL COURSE
2/9/18-Patient seen and examined today, resting in bed. States he feels better SOB improved. Remains in afib, HR still elevated. Labs reviewed. Creatinine 1.1.     2/10/18-Patient seen and examined today, lying in bed. Continues to feel better. SOB much improved. No chest pain. HR controlled with med adjustments. Labs reviewed, creatinine stable.

## 2018-02-09 NOTE — SUBJECTIVE & OBJECTIVE
Interval History: Seen and examined at bedside. Hospital chart reviewed. No acute interval detrimental events noted. He reports that he has significantly improved.      Review of Systems   Constitutional: Negative for chills and weight loss.   HENT: Negative for ear discharge.    Eyes: Negative for double vision and photophobia.   Respiratory: Negative for cough and hemoptysis.    Cardiovascular: Negative for palpitations and orthopnea.   Gastrointestinal: Negative for nausea and vomiting.   Genitourinary: Negative for frequency and urgency.   Musculoskeletal: Negative for back pain and neck pain.   Skin: Negative for rash.   Neurological: Negative for tingling and headaches.   Psychiatric/Behavioral: Negative for substance abuse and suicidal ideas.     Objective:     Vital Signs (Most Recent):  Temp: 98 °F (36.7 °C) (02/09/18 1220)  Pulse: 92 (02/09/18 1220)  Resp: 18 (02/09/18 1220)  BP: 128/67 (02/09/18 1220)  SpO2: (!) 92 % (02/09/18 1220) Vital Signs (24h Range):  Temp:  [97.3 °F (36.3 °C)-98.1 °F (36.7 °C)] 98 °F (36.7 °C)  Pulse:  [74-98] 92  Resp:  [16-20] 18  SpO2:  [90 %-97 %] 92 %  BP: (123-139)/(67-87) 128/67     Weight: 88.3 kg (194 lb 10.7 oz)  Body mass index is 29.6 kg/m².      Intake/Output Summary (Last 24 hours) at 02/09/18 1257  Last data filed at 02/09/18 0144   Gross per 24 hour   Intake              360 ml   Output             1400 ml   Net            -1040 ml       Physical Exam   Constitutional: He is oriented to person, place, and time. He appears well-developed. No distress.   HENT:   Head: Normocephalic and atraumatic.   Nose: Nose normal.   Eyes: Conjunctivae and EOM are normal. Pupils are equal, round, and reactive to light. No scleral icterus.   Neck: Normal range of motion. Neck supple. No tracheal deviation present.   Cardiovascular: Normal rate, regular rhythm, normal heart sounds and intact distal pulses.    No murmur heard.  Bilateral lower ext swelling +1+2     Pulmonary/Chest:  Effort normal. No stridor. No respiratory distress. He has no wheezes. He has no rales.   Decreased aeration and Bilateral lower crackles.    Abdominal: Soft. Bowel sounds are normal. He exhibits no distension. There is no tenderness. There is no guarding.   Genitourinary:   Genitourinary Comments: ua neg     Musculoskeletal: Normal range of motion. He exhibits no edema or deformity.   Neurological: He is alert and oriented to person, place, and time. No cranial nerve deficit. Coordination normal.   Left side weakness, chronic deficit from old stroke  Patient with no acute neurological impairments/findings.    Skin: Skin is warm and dry. Capillary refill takes less than 2 seconds. No rash noted. He is not diaphoretic.   Psychiatric: He has a normal mood and affect. His behavior is normal. Judgment and thought content normal.   Nursing note and vitals reviewed.      Vents:  Oxygen Concentration (%): 28 (02/1950)    Lines/Drains/Airways     Peripheral Intravenous Line                 Peripheral IV - Single Lumen 02/07/18 2004 Right Hand 1 day                Significant Labs:      Laboratory Data:  Reviewed recent labs. Appear stable other than abnormalities addressed in plan.     Radiology:  Reviewed recent imaging studies. Appear stable other than abnormalities addressed in plan.

## 2018-02-09 NOTE — ASSESSMENT & PLAN NOTE
-Secondary to decompensated CHF and underlying COPD  -Respiratory status continues to improve  -Continue IV diuresis  -Continue nebs

## 2018-02-09 NOTE — SUBJECTIVE & OBJECTIVE
Interval History: Pt sitting up in chair, feels a little better, orthopnea, SOB, leg edema all improving. He has put out about 700 cc urine. Pulse ox on RA was still low at 86%. No CP or palpitations. Remains in Afib @109.     Review of Systems   Constitutional: Negative for chills, diaphoresis, fatigue and fever.   HENT: Negative for congestion, sore throat and voice change.    Eyes: Negative for photophobia and visual disturbance.   Respiratory: Positive for shortness of breath. Negative for cough, wheezing and stridor.    Cardiovascular: Positive for leg swelling. Negative for chest pain.   Gastrointestinal: Negative for abdominal distention, abdominal pain, constipation, diarrhea, nausea and vomiting.   Endocrine: Negative for polydipsia, polyphagia and polyuria.   Genitourinary: Negative for difficulty urinating, dysuria, flank pain, testicular pain and urgency.   Musculoskeletal: Negative for back pain, joint swelling, neck pain and neck stiffness.   Skin: Negative for color change and rash.   Allergic/Immunologic: Negative for immunocompromised state.   Neurological: Positive for speech difficulty. Negative for dizziness, syncope, weakness, numbness and headaches.   Hematological: Does not bruise/bleed easily.   Psychiatric/Behavioral: Negative for agitation, behavioral problems and confusion.     Objective:     Vital Signs (Most Recent):  Temp: 98 °F (36.7 °C) (02/09/18 1220)  Pulse: 92 (02/09/18 1220)  Resp: 18 (02/09/18 1220)  BP: 128/67 (02/09/18 1220)  SpO2: (!) 92 % (02/09/18 1220) Vital Signs (24h Range):  Temp:  [97.3 °F (36.3 °C)-98.1 °F (36.7 °C)] 98 °F (36.7 °C)  Pulse:  [74-98] 92  Resp:  [16-20] 18  SpO2:  [90 %-97 %] 92 %  BP: (123-139)/(67-87) 128/67     Weight: 88.3 kg (194 lb 10.7 oz)  Body mass index is 29.6 kg/m².    Intake/Output Summary (Last 24 hours) at 02/09/18 1300  Last data filed at 02/09/18 0144   Gross per 24 hour   Intake              120 ml   Output             1200 ml   Net             -1080 ml      Physical Exam   Constitutional: He is oriented to person, place, and time. He appears well-developed. No distress.   HENT:   Head: Normocephalic and atraumatic.   Nose: Nose normal.   Eyes: Conjunctivae and EOM are normal. Pupils are equal, round, and reactive to light. No scleral icterus.   Neck: Normal range of motion. Neck supple. No tracheal deviation present.   Cardiovascular: Normal rate, regular rhythm, normal heart sounds and intact distal pulses.    No murmur heard.  Bilateral lower ext swelling +1+2     Pulmonary/Chest: Effort normal. No stridor. No respiratory distress. He has no wheezes. He has no rales.   Decreased aeration and Bilateral lower crackles.    Abdominal: Soft. Bowel sounds are normal. He exhibits no distension. There is no tenderness. There is no guarding.   Genitourinary:   Genitourinary Comments: ua neg     Musculoskeletal: Normal range of motion. He exhibits no edema or deformity.   Neurological: He is alert and oriented to person, place, and time. No cranial nerve deficit. Coordination normal.   Left side weakness, chronic deficit from old stroke  Patient with no acute neurological impairments/findings.    Skin: Skin is warm and dry. Capillary refill takes less than 2 seconds. No rash noted. He is not diaphoretic.   Psychiatric: He has a normal mood and affect. His behavior is normal. Judgment and thought content normal.   Nursing note and vitals reviewed.      Significant Labs:   ABGs:     Recent Labs  Lab 02/07/18 2037   PH 7.409   PCO2 69.0*   HCO3 43.6*   POCSATURATED 89*   BE 19     BMP:   Recent Labs  Lab 02/07/18 1956 02/09/18  0550   GLU 90  < > 135*     < > 140   K 4.9  < > 3.8   CL 92*  < > 93*   CO2 31*  < > 37*   BUN 24*  < > 34*   CREATININE 1.3  < > 1.1   CALCIUM 9.7  < > 9.5   MG 2.4  --   --    < > = values in this interval not displayed.  CBC:     Recent Labs  Lab 02/07/18 1956 02/08/18  0450   WBC 7.63 7.10   HGB 12.9* 12.4*   HCT 42.9  41.4    326     All pertinent labs within the past 24 hours have been reviewed.    Significant Imaging: I have reviewed all pertinent imaging results/findings within the past 24 hours.

## 2018-02-09 NOTE — PLAN OF CARE
Consult to arrange Home 02 portable and concentrator noted.  Contacted Danette at [facility] and faxed Home 02 order, Home 02 Eval, and other needed patient information to [facility] ([phone]) to complete Home 02 referral.  Danette stating that portable 02 tank will be delivered to patient's hospital room this evening and they will coordinate with patient delivery of 02 concentrator to patient's home.

## 2018-02-09 NOTE — ASSESSMENT & PLAN NOTE
-HR still needs better control  -Discontinue Bystolic  -Start Coreg 12.5 mg BID  -May use IV lopressor as needed  -Continue Eliquis 5 mg BID  -OP EP f/u to discuss additional management/DCCV  -Sleep study as underling GUMARO contributing to arrhythmia

## 2018-02-09 NOTE — PLAN OF CARE
Problem: Patient Care Overview  Goal: Plan of Care Review  Outcome: Ongoing (interventions implemented as appropriate)  Plan of care discussed with patient, and patient verbalized understanding.  Patient remained Aox4, 2L Nc, and a-fib on the monitor.  Patient had a Cardizem bolus this Am.  Patient has had no complaint of pain. Family has remained at the bedside.   Patient remained free of falls, accidents, and trama during the day shift.  Bed is in the lowest position and call light is within reach - Continue to monitor.

## 2018-02-09 NOTE — PROGRESS NOTES
[facility]  Cardiology  Progress Note    Patient Name: Rea Vail  MRN: [id number]  Admission Date: 2/7/2018  Hospital Length of Stay: 2 days  Code Status: Full Code   Attending Physician: Stephen Fajardo MD   Primary Care Physician: Estiven Oseguera MD  Expected Discharge Date:   Principal Problem:Acute on chronic diastolic congestive heart failure    Subjective:   HPI:  Mr. Vail is a 67 year old male patient with a PMHx of COPD, HTN, diastolic CHF, and previous CVA who was sent to [facility] ED from pulmonary clinic due to decompensated CHF. Patient complained of worsening SOB over the past week. Associated symptoms included lower extremity edema, orthopnea, abdominal bloating, and non-productive cough. Patient denied any associated fever, chills, chest pain, or chest tightness. Initial workup in ED revealed BNP of 380 and hypoxemia and hypercarbia. CXR showed interstitial pulmonary edema. EKG showed new onset afib with RVR and patient subsequently admitted for further evaluation and treatment. Cardiology consulted to assist with management. Patient seen and examined today, resting in bed. Feels better since admission, SOB improving. Remains chest pain free. No other complaints. HR in 80's at time of exam. States he had a cold about 2 weeks ago with wheezing and coughing that was treated with antibiotics and steroids. He  reports compliance with his medications and tries to be mindful of his salt intake. Chart reviewed. Troponin x 3 negative. 2D echo pending.        Hospital Course:   2/9/18-Patient seen and examined today, resting in bed. States he feels better SOB improved. Remains in afib, HR still elevated. Labs reviewed. Creatinine 1.1.         Review of Systems   Constitution: Negative.   HENT: Negative.    Cardiovascular: Negative.    Respiratory: Positive for shortness of breath.    Endocrine: Negative.    Hematologic/Lymphatic: Negative.    Skin: Negative.    Musculoskeletal: Negative.     Gastrointestinal: Negative.    Genitourinary: Negative.    Neurological: Negative.    Psychiatric/Behavioral: Negative.    Allergic/Immunologic: Negative.      Objective:     Vital Signs (Most Recent):  Temp: 98 °F (36.7 °C) (02/09/18 1220)  Pulse: 92 (02/09/18 1220)  Resp: 18 (02/09/18 1220)  BP: 128/67 (02/09/18 1220)  SpO2: (!) 92 % (02/09/18 1220) Vital Signs (24h Range):  Temp:  [97.3 °F (36.3 °C)-98.1 °F (36.7 °C)] 98 °F (36.7 °C)  Pulse:  [74-98] 92  Resp:  [16-20] 18  SpO2:  [90 %-97 %] 92 %  BP: (123-139)/(67-87) 128/67     Weight: 88.3 kg (194 lb 10.7 oz)  Body mass index is 29.6 kg/m².     SpO2: (!) 92 %  O2 Device (Oxygen Therapy): nasal cannula      Intake/Output Summary (Last 24 hours) at 02/09/18 1515  Last data filed at 02/09/18 0144   Gross per 24 hour   Intake              120 ml   Output             1200 ml   Net            -1080 ml       Lines/Drains/Airways     Peripheral Intravenous Line                 Peripheral IV - Single Lumen 02/07/18 2004 Right Hand 1 day                Physical Exam   Constitutional: He is oriented to person, place, and time. He appears well-developed and well-nourished. No distress.   HENT:   Head: Normocephalic and atraumatic.   Eyes: Pupils are equal, round, and reactive to light. Right eye exhibits no discharge. Left eye exhibits no discharge.   Neck: Neck supple. No JVD present.   Cardiovascular: S1 normal and S2 normal.  An irregularly irregular rhythm present. Tachycardia present.    Pulses:       Radial pulses are 2+ on the right side, and 2+ on the left side.   Pulmonary/Chest: Effort normal and breath sounds normal. No respiratory distress. He has no wheezes.   Decreased BS at bases   Abdominal: Soft. Bowel sounds are normal. He exhibits no distension. There is no tenderness. There is no rebound.   Musculoskeletal: He exhibits no edema.   Neurological: He is alert and oriented to person, place, and time.   Skin: Skin is warm and dry. He is not diaphoretic.    Psychiatric: He has a normal mood and affect. His behavior is normal. Thought content normal.   Nursing note and vitals reviewed.      Significant Labs:   CMP   Recent Labs  Lab 02/07/18 1956 02/08/18  0450 02/09/18  0550    139 140   K 4.9 3.8 3.8   CL 92* 90* 93*   CO2 31* 36* 37*   GLU 90 184* 135*   BUN 24* 25* 34*   CREATININE 1.3 1.3 1.1   CALCIUM 9.7 9.6 9.5   PROT 7.5  --   --    ALBUMIN 2.8*  --   --    BILITOT 0.4  --   --    ALKPHOS 94  --   --    AST 38  --   --    ALT 26  --   --    ANIONGAP 16 13 10   ESTGFRAFRICA >60 >60 >60   EGFRNONAA 56* 56* >60   , CBC   Recent Labs  Lab 02/07/18 1956 02/08/18  0450   WBC 7.63 7.10   HGB 12.9* 12.4*   HCT 42.9 41.4    326   , Troponin   Recent Labs  Lab 02/07/18 1956 02/07/18  2335 02/08/18  0450   TROPONINI 0.024 0.012 0.017    and All pertinent lab results from the last 24 hours have been reviewed.    Significant Imaging: Echocardiogram:   2D echo with color flow doppler:   Results for orders placed or performed during the hospital encounter of 02/07/18   2D echo with color flow doppler   Result Value Ref Range    EF 55 55 - 65    Mitral Valve Regurgitation MILD     Diastolic Dysfunction No     Est. PA Systolic Pressure 47.94 (A)     Tricuspid Valve Regurgitation MILD     and X-Ray: CXR: X-Ray Chest 1 View (CXR):   Results for orders placed or performed during the hospital encounter of 02/07/18   X-Ray Chest 1 View    Narrative    One-view chest x-ray    Clinical History: Congestive heart failure    Finding: The size of the heart is prominent. There is a mild amount of interstitial and alveolar opacities seen in the medial aspect of both lungs. There is no pneumothorax.  The costophrenic angles are sharp.    Impression      The findings are characteristic of mild cardiomegaly with mild bilateral pulmonary edema. This is consistent with the patient's history..      Electronically signed by: KANCHAN BERNARD MD  Date:     02/09/18  Time:    11:38      and X-Ray Chest PA and Lateral (CXR): No results found for this visit on 02/07/18.    Assessment and Plan:   Patient who presents with SOB secondary to decompensated CHF and underlying COPD. Volume status improving, continue IV diuresis. Remains in afib, needs better rate control. Switch Bystolic to Coreg. Continue other meds, including Eliquis.     * Acute on chronic diastolic congestive heart failure    -Decompensated, in setting of recent URI and atrial fibrillation with RVR  -Continue IV diuresis, volume status improving  -D/C bystolic and switch to Coreg for better rate control  -Continue other meds  -Strict I's/O's  -Dietary restrictions discussed        Chronic obstructive pulmonary disease with acute exacerbation    -Mgmt as per primary team        New onset a-fib    -HR still needs better control  -Discontinue Bystolic  -Start Coreg 12.5 mg BID  -May use IV lopressor as needed  -Continue Eliquis 5 mg BID  -OP EP f/u to discuss additional management/DCCV  -Sleep study as underling GUMARO contributing to arrhythmia         Acute on chronic respiratory failure with hypoxia and hypercapnia    -Secondary to decompensated CHF and underlying COPD  -Respiratory status continues to improve  -Continue IV diuresis  -Continue nebs        Dyslipidemia    -Continue statin        HTN (hypertension)    -Continue current meds            VTE Risk Mitigation         Ordered     apixaban tablet 5 mg  2 times daily     Route:  Oral        02/08/18 1139     Medium Risk of VTE  Once      02/07/18 2158     Place DANNA hose  Until discontinued      02/07/18 2158     Place sequential compression device  Until discontinued      02/07/18 2158     Place sequential compression device  Until discontinued      02/07/18 2158          Ashly Walton PA-C  Cardiology  [facility]     Chart reviewed. Dr. Wyatt examined patient and agrees with plan as outlined above.

## 2018-02-09 NOTE — PLAN OF CARE
Problem: Patient Care Overview  Goal: Plan of Care Review  Outcome: Ongoing (interventions implemented as appropriate)  Patient awake and alert denies any c/o pain at this time. Plan of care reviewed with patient. Verbalize understanding, Afib  on tele monitor. Fall risk precaution maintained. Bed in the lowest position with bed alarm on. Call light within reach. Patient educated on calling for assistance before transferring from bed. Patient verbalize understanding. Will continue to monitor.

## 2018-02-09 NOTE — ASSESSMENT & PLAN NOTE
-Decompensated, in setting of recent URI and atrial fibrillation with RVR  -Continue IV diuresis, volume status improving  -D/C bystolic and switch to Coreg for better rate control  -Continue other meds  -Strict I's/O's  -Dietary restrictions discussed

## 2018-02-09 NOTE — PLAN OF CARE
02/09/18 0955   Medicare Message   Important Message from Medicare regarding Discharge Appeal Rights Given to patient/caregiver;Explained to patient/caregiver;Signed/date by patient/caregiver   Date IMM was signed 02/09/18   Time IMM was signed 0955

## 2018-02-09 NOTE — ASSESSMENT & PLAN NOTE
Agree with cardiology consult.  Optimize CHF regimen.  Preload and afterload reduction.  Daily weighing.  Dietary salt restriction.  Alcohol and tobacco avoidance.

## 2018-02-09 NOTE — PROGRESS NOTES
[facility] Hospital Medicine  Progress Note    Patient Name: Rea Vail  MRN: [id number]  Patient Class: IP- Inpatient   Admission Date: 2/7/2018  Length of Stay: 2 days  Attending Physician: Stephen Fajardo MD  Primary Care Provider: Estiven Oseguera MD        Subjective:     Principal Problem:Acute on chronic diastolic congestive heart failure    HPI:  Rea Vail is a 67 y.o. male patient who was sent to the Emergency Department by Dr. Roberts (Pulmonology) for chronic SOB which has been worsening gradually  over the last week. Associated symptoms include leg swelling and non productive cough. Exacerbating factors activity and laying flat.Symptoms are constant and moderate in severity. No mitigating or exacerbating factors reported. No associated sxs reported. Patient denies any fever, chills, diaphoresis, CP, N/V, back pain, neck pain, HA, dizziness, and all other sxs at this time. To note: Pt was seen by his PCP who doubled the pt's lasix with no relief. Additionally Pt has Hx including COPD, HTN, CVA and CHF. Patient is former smoker Pt is not on home O2, but does use breathing Txs. No further complaints or concerns at this time. The patient was evaluated in the Er, patient reports some improvement in symptoms since arrival with ED interventions. Patient CBC stable, CMP with low albumin of 2.8. , Troponin neg. ABGs with Hypercapnia and Hypoxemia. Chest Xray Impression: CHF exacerbation. EKG with New Onset Afib, rate 90-110s on assessment. Patient admitted for CHF and COPD exacerbation with new onset afib.         Hospital Course:  Pt admitted with new onset A-fib with RVR, acute pulmonary edema/decompensated diastolic CHF, abd acute hypoxic respiratory failure. Pt was given IV cardizem and HR was controlled. Care discussed with Cardiology who recommended continuing Bystolic and add Eliquis. Pt was admitted on IV lasix. Pt diuresed 1800ml so far. Pt denies complaints and feels  improved but continues to have conversational dyspnea. Will continue diuresis and add Bipap-had hypercapnia on ABGs. Repeat CXR still shows some CHF. Pulse ox on RA was still low at 88%. Will set up home oxygen and home health   Will need OP sleep study       Review of Systems   Constitutional: Negative for chills, diaphoresis, fatigue and fever.   HENT: Negative for congestion, sore throat and voice change.    Eyes: Negative for photophobia and visual disturbance.   Respiratory: Positive for shortness of breath. Negative for cough, wheezing and stridor.    Cardiovascular: Positive for leg swelling. Negative for chest pain.   Gastrointestinal: Negative for abdominal distention, abdominal pain, constipation, diarrhea, nausea and vomiting.   Endocrine: Negative for polydipsia, polyphagia and polyuria.   Genitourinary: Negative for difficulty urinating, dysuria, flank pain, testicular pain and urgency.   Musculoskeletal: Negative for back pain, joint swelling, neck pain and neck stiffness.   Skin: Negative for color change and rash.   Allergic/Immunologic: Negative for immunocompromised state.   Neurological: Positive for speech difficulty. Negative for dizziness, syncope, weakness, numbness and headaches.   Hematological: Does not bruise/bleed easily.   Psychiatric/Behavioral: Negative for agitation, behavioral problems and confusion.     Objective:     Vital Signs (Most Recent):  Temp: 98 °F (36.7 °C) (02/09/18 1220)  Pulse: 92 (02/09/18 1220)  Resp: 18 (02/09/18 1220)  BP: 128/67 (02/09/18 1220)  SpO2: (!) 92 % (02/09/18 1220) Vital Signs (24h Range):  Temp:  [97.3 °F (36.3 °C)-98.1 °F (36.7 °C)] 98 °F (36.7 °C)  Pulse:  [74-98] 92  Resp:  [16-20] 18  SpO2:  [90 %-97 %] 92 %  BP: (123-139)/(67-87) 128/67     Weight: 88.3 kg (194 lb 10.7 oz)  Body mass index is 29.6 kg/m².    Intake/Output Summary (Last 24 hours) at 02/09/18 1300  Last data filed at 02/09/18 0144   Gross per 24 hour   Intake              120 ml    Output             1200 ml   Net            -1080 ml      Physical Exam   Constitutional: He is oriented to person, place, and time. He appears well-developed. No distress.   HENT:   Head: Normocephalic and atraumatic.   Nose: Nose normal.   Eyes: Conjunctivae and EOM are normal. Pupils are equal, round, and reactive to light. No scleral icterus.   Neck: Normal range of motion. Neck supple. No tracheal deviation present.   Cardiovascular: Normal rate, regular rhythm, normal heart sounds and intact distal pulses.    No murmur heard.  Bilateral lower ext swelling +1+2     Pulmonary/Chest: Effort normal. No stridor. No respiratory distress. He has no wheezes. He has no rales.   Decreased aeration and Bilateral lower crackles.    Abdominal: Soft. Bowel sounds are normal. He exhibits no distension. There is no tenderness. There is no guarding.   Genitourinary:   Genitourinary Comments: ua neg     Musculoskeletal: Normal range of motion. He exhibits no edema or deformity.   Neurological: He is alert and oriented to person, place, and time. No cranial nerve deficit. Coordination normal.   Left side weakness, chronic deficit from old stroke  Patient with no acute neurological impairments/findings.    Skin: Skin is warm and dry. Capillary refill takes less than 2 seconds. No rash noted. He is not diaphoretic.   Psychiatric: He has a normal mood and affect. His behavior is normal. Judgment and thought content normal.   Nursing note and vitals reviewed.      Significant Labs:   ABGs:     Recent Labs  Lab 02/07/18 2037   PH 7.409   PCO2 69.0*   HCO3 43.6*   POCSATURATED 89*   BE 19     BMP:   Recent Labs  Lab 02/07/18 1956 02/09/18  0550   GLU 90  < > 135*     < > 140   K 4.9  < > 3.8   CL 92*  < > 93*   CO2 31*  < > 37*   BUN 24*  < > 34*   CREATININE 1.3  < > 1.1   CALCIUM 9.7  < > 9.5   MG 2.4  --   --    < > = values in this interval not displayed.  CBC:     Recent Labs  Lab 02/07/18 1956 02/08/18  0450   WBC  7.63 7.10   HGB 12.9* 12.4*   HCT 42.9 41.4    326     All pertinent labs within the past 24 hours have been reviewed.    Significant Imaging: I have reviewed all pertinent imaging results/findings within the past 24 hours.    Assessment/Plan:      * Acute on chronic diastolic congestive heart failure    With Acute pulmonary edema   Cont IV lasix, I/O, daily weights  Bipap  Care discussed with Dr. Roberts         Acute on chronic respiratory failure with hypoxia and hypercapnia    Bipap  Arrange Home oxygen  OP sleep study         New onset a-fib    Care discussed with cardiology  Cont Bystolic and Eliquis  Rate controlled         Chronic obstructive pulmonary disease with acute exacerbation    Cont neb txs          Dyslipidemia    Cont statin           HTN (hypertension)    Cont Valsartan, Coreg, amlodipine, lasix         CVA (cerebral vascular accident)    Cont Eliquis , plavix, Statin             VTE Risk Mitigation         Ordered     apixaban tablet 5 mg  2 times daily     Route:  Oral        02/08/18 1139     Medium Risk of VTE  Once      02/07/18 2158     Place DANNA hose  Until discontinued      02/07/18 2158     Place sequential compression device  Until discontinued      02/07/18 2158     Place sequential compression device  Until discontinued      02/07/18 2158              Margarita Shane NP  Department of Hospital Medicine   [facility]

## 2018-02-09 NOTE — PROGRESS NOTES
Home Oxygen Evaluation    Date Performed: 2/9/2018    1) Patient's Home O2 Sat on room air, while at rest: 87        If O2 sats on room air at rest are 88% or below, patient qualifies. No additional testing needed. Document N/A in steps 2 and 3. If 89% or above, complete steps 2.      2) Patient's O2 Sat on room air while exercising: na      If O2 sats on room air while exercising remain 89% or above patient does not qualify, no further testing needed Document N/A in step 3. If O2 sats on room air while exercising are 88% or below, continue to step 3.      3) Patient's O2 Sat while exercising on O2: na at  na LPM         (Must show improvement from #2 for patients to qualify)    If O2 sats improve on oxygen, patient qualifies for portable oxygen. If not, the patient does not qualify.

## 2018-02-10 VITALS
WEIGHT: 193.81 LBS | TEMPERATURE: 98 F | DIASTOLIC BLOOD PRESSURE: 89 MMHG | OXYGEN SATURATION: 91 % | BODY MASS INDEX: 29.37 KG/M2 | HEIGHT: 68 IN | HEART RATE: 83 BPM | SYSTOLIC BLOOD PRESSURE: 137 MMHG | RESPIRATION RATE: 18 BRPM

## 2018-02-10 LAB
ANION GAP SERPL CALC-SCNC: 10 MMOL/L
BASOPHILS # BLD AUTO: 0.01 K/UL
BASOPHILS NFR BLD: 0.1 %
BUN SERPL-MCNC: 33 MG/DL
CALCIUM SERPL-MCNC: 9.2 MG/DL
CHLORIDE SERPL-SCNC: 95 MMOL/L
CO2 SERPL-SCNC: 36 MMOL/L
CREAT SERPL-MCNC: 1.1 MG/DL
DIFFERENTIAL METHOD: ABNORMAL
EOSINOPHIL # BLD AUTO: 0.1 K/UL
EOSINOPHIL NFR BLD: 1.3 %
ERYTHROCYTE [DISTWIDTH] IN BLOOD BY AUTOMATED COUNT: 15.6 %
EST. GFR  (AFRICAN AMERICAN): >60 ML/MIN/1.73 M^2
EST. GFR  (NON AFRICAN AMERICAN): >60 ML/MIN/1.73 M^2
GLUCOSE SERPL-MCNC: 141 MG/DL
HCT VFR BLD AUTO: 41.2 %
HGB BLD-MCNC: 11.8 G/DL
LYMPHOCYTES # BLD AUTO: 0.8 K/UL
LYMPHOCYTES NFR BLD: 10.2 %
MCH RBC QN AUTO: 26 PG
MCHC RBC AUTO-ENTMCNC: 28.6 G/DL
MCV RBC AUTO: 91 FL
MONOCYTES # BLD AUTO: 0.5 K/UL
MONOCYTES NFR BLD: 5.8 %
NEUTROPHILS # BLD AUTO: 6.8 K/UL
NEUTROPHILS NFR BLD: 82.6 %
PLATELET # BLD AUTO: 327 K/UL
PMV BLD AUTO: 9.5 FL
POTASSIUM SERPL-SCNC: 3.7 MMOL/L
RBC # BLD AUTO: 4.54 M/UL
SODIUM SERPL-SCNC: 141 MMOL/L
WBC # BLD AUTO: 8.25 K/UL

## 2018-02-10 PROCEDURE — 25000003 PHARM REV CODE 250: Performed by: PHYSICIAN ASSISTANT

## 2018-02-10 PROCEDURE — 27000221 HC OXYGEN, UP TO 24 HOURS

## 2018-02-10 PROCEDURE — 25000003 PHARM REV CODE 250: Performed by: INTERNAL MEDICINE

## 2018-02-10 PROCEDURE — 85025 COMPLETE CBC W/AUTO DIFF WBC: CPT

## 2018-02-10 PROCEDURE — 25000003 PHARM REV CODE 250: Performed by: NURSE PRACTITIONER

## 2018-02-10 PROCEDURE — 94640 AIRWAY INHALATION TREATMENT: CPT

## 2018-02-10 PROCEDURE — 25000003 PHARM REV CODE 250: Performed by: EMERGENCY MEDICINE

## 2018-02-10 PROCEDURE — 94761 N-INVAS EAR/PLS OXIMETRY MLT: CPT

## 2018-02-10 PROCEDURE — 99233 SBSQ HOSP IP/OBS HIGH 50: CPT | Mod: ,,, | Performed by: INTERNAL MEDICINE

## 2018-02-10 PROCEDURE — 36415 COLL VENOUS BLD VENIPUNCTURE: CPT

## 2018-02-10 PROCEDURE — 25000242 PHARM REV CODE 250 ALT 637 W/ HCPCS: Performed by: INTERNAL MEDICINE

## 2018-02-10 PROCEDURE — 99232 SBSQ HOSP IP/OBS MODERATE 35: CPT | Mod: ,,, | Performed by: INTERNAL MEDICINE

## 2018-02-10 PROCEDURE — 80048 BASIC METABOLIC PNL TOTAL CA: CPT

## 2018-02-10 RX ORDER — FUROSEMIDE 40 MG/1
40 TABLET ORAL DAILY
Qty: 30 TABLET | Refills: 11 | Status: ON HOLD | OUTPATIENT
Start: 2018-02-10 | End: 2018-02-14 | Stop reason: HOSPADM

## 2018-02-10 RX ORDER — CARVEDILOL 12.5 MG/1
12.5 TABLET ORAL 2 TIMES DAILY
Qty: 60 TABLET | Refills: 1 | Status: SHIPPED | OUTPATIENT
Start: 2018-02-10 | End: 2018-03-16 | Stop reason: SDUPTHER

## 2018-02-10 RX ORDER — ASPIRIN 81 MG/1
81 TABLET ORAL DAILY
Status: DISCONTINUED | OUTPATIENT
Start: 2018-02-10 | End: 2018-02-10 | Stop reason: HOSPADM

## 2018-02-10 RX ORDER — POTASSIUM CHLORIDE 20 MEQ/1
20 TABLET, EXTENDED RELEASE ORAL ONCE
Status: DISCONTINUED | OUTPATIENT
Start: 2018-02-10 | End: 2018-02-10

## 2018-02-10 RX ORDER — FUROSEMIDE 10 MG/ML
40 INJECTION INTRAMUSCULAR; INTRAVENOUS ONCE
Status: DISCONTINUED | OUTPATIENT
Start: 2018-02-10 | End: 2018-02-10

## 2018-02-10 RX ORDER — FUROSEMIDE 40 MG/1
40 TABLET ORAL DAILY
Status: DISCONTINUED | OUTPATIENT
Start: 2018-02-10 | End: 2018-02-10 | Stop reason: HOSPADM

## 2018-02-10 RX ADMIN — ASPIRIN 81 MG: 81 TABLET, COATED ORAL at 03:02

## 2018-02-10 RX ADMIN — PRAMIPEXOLE DIHYDROCHLORIDE 0.5 MG: 0.5 TABLET ORAL at 10:02

## 2018-02-10 RX ADMIN — AMLODIPINE BESYLATE 10 MG: 10 TABLET ORAL at 10:02

## 2018-02-10 RX ADMIN — ARFORMOTEROL TARTRATE 15 MCG: 15 SOLUTION RESPIRATORY (INHALATION) at 07:02

## 2018-02-10 RX ADMIN — LEFLUNOMIDE 10 MG: 10 TABLET ORAL at 10:02

## 2018-02-10 RX ADMIN — CLOPIDOGREL BISULFATE 75 MG: 75 TABLET ORAL at 10:02

## 2018-02-10 RX ADMIN — APIXABAN 5 MG: 2.5 TABLET, FILM COATED ORAL at 10:02

## 2018-02-10 RX ADMIN — ALLOPURINOL 300 MG: 300 TABLET ORAL at 10:02

## 2018-02-10 RX ADMIN — GABAPENTIN 200 MG: 100 CAPSULE ORAL at 10:02

## 2018-02-10 RX ADMIN — VALSARTAN 320 MG: 80 TABLET, FILM COATED ORAL at 10:02

## 2018-02-10 RX ADMIN — FUROSEMIDE 40 MG: 40 TABLET ORAL at 12:02

## 2018-02-10 RX ADMIN — PANTOPRAZOLE SODIUM 40 MG: 40 TABLET, DELAYED RELEASE ORAL at 10:02

## 2018-02-10 RX ADMIN — CARVEDILOL 12.5 MG: 12.5 TABLET, FILM COATED ORAL at 10:02

## 2018-02-10 NOTE — PLAN OF CARE
02/10/18 1214   Final Note   Assessment Type Final Discharge Note   Discharge Disposition Home-Health   Discharge plans and expectations educations in teach back method with documentation complete? Yes   Right Care Referral Info   Post Acute Recommendation Home-care

## 2018-02-10 NOTE — PROGRESS NOTES
[facility] -   Pulmonology  Progress Note    Patient Name: Rea Vail  MRN: [id number]  Admission Date: 2/7/2018  Hospital Length of Stay: 3 days  Code Status: Full Code  Attending Provider: Kai Escamilla MD  Primary Care Provider: Estiven Oseguera MD   Principal Problem: Acute on chronic diastolic congestive heart failure    Subjective:     Interval History: Seen and examined at bedside. Hospital chart reviewed. No acute interval detrimental events noted. He reports that he has significantly improved.      Review of Systems   Constitutional: Negative for chills and weight loss.   HENT: Negative for ear discharge.    Eyes: Negative for double vision and photophobia.   Respiratory: Negative for cough and hemoptysis.    Cardiovascular: Negative for palpitations and orthopnea.   Gastrointestinal: Negative for nausea and vomiting.   Genitourinary: Negative for frequency and urgency.   Musculoskeletal: Negative for back pain and neck pain.   Skin: Negative for rash.   Neurological: Negative for tingling and headaches.   Psychiatric/Behavioral: Negative for substance abuse and suicidal ideas.     Objective:     Vital Signs (Most Recent):  Temp: 97.9 °F (36.6 °C) (02/10/18 1112)  Pulse: 85 (02/10/18 1112)  Resp: 18 (02/10/18 1112)  BP: (!) 164/93 (02/10/18 1112)  SpO2: (!) 94 % (02/10/18 1112) Vital Signs (24h Range):  Temp:  [96.2 °F (35.7 °C)-98 °F (36.7 °C)] 97.9 °F (36.6 °C)  Pulse:  [74-92] 85  Resp:  [18-20] 18  SpO2:  [90 %-95 %] 94 %  BP: (128-164)/(67-93) 164/93     Weight: 87.9 kg (193 lb 12.7 oz)  Body mass index is 29.47 kg/m².      Intake/Output Summary (Last 24 hours) at 02/10/18 1203  Last data filed at 02/10/18 0300   Gross per 24 hour   Intake              483 ml   Output              825 ml   Net             -342 ml       Physical Exam   Constitutional: He is oriented to person, place, and time. He appears well-developed. No distress.   HENT:   Head: Normocephalic and  atraumatic.   Nose: Nose normal.   Eyes: Conjunctivae and EOM are normal. Pupils are equal, round, and reactive to light. No scleral icterus.   Neck: Normal range of motion. Neck supple. No tracheal deviation present.   Cardiovascular: Normal rate and regular rhythm.    Pulmonary/Chest: Effort normal. No stridor. No respiratory distress. He has no wheezes. He has no rales.   Abdominal: Soft. Bowel sounds are normal. He exhibits no distension. There is no tenderness. There is no guarding.   Musculoskeletal: Normal range of motion. He exhibits no edema or deformity.   Neurological: He is alert and oriented to person, place, and time. No cranial nerve deficit. Coordination normal.   Skin: Skin is warm and dry. Capillary refill takes less than 2 seconds. No rash noted. He is not diaphoretic.   Psychiatric: He has a normal mood and affect. His behavior is normal. Judgment and thought content normal.   Nursing note and vitals reviewed.      Vents:  Oxygen Concentration (%): 32 (02/10/18 0718)    Lines/Drains/Airways     Peripheral Intravenous Line                 Peripheral IV - Single Lumen 02/09/18 2100 Right Forearm less than 1 day                Significant Labs:      Laboratory Data:  Reviewed recent labs. Appear stable other than abnormalities addressed in plan.     Radiology:  Reviewed recent imaging studies. Appear stable other than abnormalities addressed in plan.     Assessment/Plan:     Chronic obstructive pulmonary disease with acute exacerbation                            Continue with LABA, SAUNDRA and ICS.        Continue nighttime BiPAP 16/8.                                                   New onset a-fib    Rate control.  Anticoagulation.        Acute on chronic respiratory failure with hypoxia and hypercapnia    Supplement oxygenation. Keep SAO2 >= 92%. BIPAP 16/8 QHS and PRN. Bronchodilators per orders. Home O2 Eval => Start home O2 supplementation on discharge.        Acute on chronic combined systolic  and diastolic congestive heart failure    E/M per cardiology  Preload and afterload reduction.  Daily weighing.  Dietary salt restriction.                   OK to discharge from pulmonary stand point. Out patient pulmonary follow up. Will sign off for now. Do not hesitate to re consult if further pulmonary issues arise.      Vinnie Roberts MD  Pulmonology  [facility] -

## 2018-02-10 NOTE — ASSESSMENT & PLAN NOTE
-Decompensated, in setting of recent URI and atrial fibrillation with RVR  -Ok to switch to po Lasix  -Continue other meds  -Strict I's/O's  -Dietary restrictions discussed

## 2018-02-10 NOTE — SUBJECTIVE & OBJECTIVE
Review of Systems   Constitution: Negative.   HENT: Negative.    Eyes: Negative.    Cardiovascular: Positive for leg swelling (improved).   Respiratory: Positive for shortness of breath (improved).    Endocrine: Negative.    Hematologic/Lymphatic: Negative.    Skin: Negative.    Musculoskeletal: Negative.    Gastrointestinal: Negative.    Genitourinary: Negative.    Neurological: Negative.    Psychiatric/Behavioral: Negative.    Allergic/Immunologic: Negative.      Objective:     Vital Signs (Most Recent):  Temp: 97.9 °F (36.6 °C) (02/10/18 1112)  Pulse: 85 (02/10/18 1112)  Resp: 18 (02/10/18 1112)  BP: (!) 164/93 (02/10/18 1112)  SpO2: (!) 94 % (02/10/18 1112) Vital Signs (24h Range):  Temp:  [96.2 °F (35.7 °C)-98 °F (36.7 °C)] 97.9 °F (36.6 °C)  Pulse:  [74-92] 85  Resp:  [18-20] 18  SpO2:  [90 %-95 %] 94 %  BP: (123-164)/(67-93) 164/93     Weight: 87.9 kg (193 lb 12.7 oz)  Body mass index is 29.47 kg/m².     SpO2: (!) 94 %  O2 Device (Oxygen Therapy): room air      Intake/Output Summary (Last 24 hours) at 02/10/18 1118  Last data filed at 02/10/18 0300   Gross per 24 hour   Intake              483 ml   Output              825 ml   Net             -342 ml       Lines/Drains/Airways     Peripheral Intravenous Line                 Peripheral IV - Single Lumen 02/09/18 2100 Right Forearm less than 1 day                Physical Exam   Constitutional: He is oriented to person, place, and time. He appears well-developed and well-nourished. No distress.   HENT:   Head: Normocephalic and atraumatic.   Eyes: Pupils are equal, round, and reactive to light. Right eye exhibits no discharge. Left eye exhibits no discharge.   Neck: Neck supple. No JVD present. No thyromegaly present.   Cardiovascular: Normal rate, S1 normal, S2 normal and normal heart sounds.  An irregularly irregular rhythm present. PMI is not displaced.  Exam reveals no gallop, no S3, no S4 and no friction rub.    No murmur heard.  Pulmonary/Chest: Effort  normal and breath sounds normal. No respiratory distress. He has no wheezes. He has no rales.   Abdominal: Soft. He exhibits no distension. There is no tenderness. There is no rebound.   Musculoskeletal: He exhibits edema (Ble (much improved)).   Neurological: He is alert and oriented to person, place, and time.   Skin: Skin is warm and dry. He is not diaphoretic. No erythema.   Psychiatric: He has a normal mood and affect. His behavior is normal. Thought content normal.   Nursing note and vitals reviewed.      Significant Labs:   BMP:   Recent Labs  Lab 02/09/18  0550 02/10/18  0627   * 141*    141   K 3.8 3.7   CL 93* 95   CO2 37* 36*   BUN 34* 33*   CREATININE 1.1 1.1   CALCIUM 9.5 9.2   , CMP   Recent Labs  Lab 02/09/18  0550 02/10/18  0627    141   K 3.8 3.7   CL 93* 95   CO2 37* 36*   * 141*   BUN 34* 33*   CREATININE 1.1 1.1   CALCIUM 9.5 9.2   ANIONGAP 10 10   ESTGFRAFRICA >60 >60   EGFRNONAA >60 >60   , Troponin No results for input(s): TROPONINI in the last 48 hours. and All pertinent lab results from the last 24 hours have been reviewed.    Significant Imaging: Echocardiogram:   2D echo with color flow doppler:   Results for orders placed or performed during the hospital encounter of 02/07/18   2D echo with color flow doppler   Result Value Ref Range    EF 55 55 - 65    Mitral Valve Regurgitation MILD     Diastolic Dysfunction No     Est. PA Systolic Pressure 47.94 (A)     Tricuspid Valve Regurgitation MILD

## 2018-02-10 NOTE — PLAN OF CARE
Problem: Patient Care Overview  Goal: Plan of Care Review  Outcome: Ongoing (interventions implemented as appropriate)  Discharge. Remained free from injury. Discharge instructions given. Verbalized understanding. IV removed. Cath tip intact. Off floor via wheelchair accompanied by staff and family.

## 2018-02-10 NOTE — PROGRESS NOTES
Pt not tolerating bipap, and refusing to wear it. Moy Fuller, ASHLEY, notified. Pt now on 2L NC. Pt in no apparent distress and is now resting. Will continue to monitor.

## 2018-02-10 NOTE — ASSESSMENT & PLAN NOTE
Supplement oxygenation. Keep SAO2 >= 92%. BIPAP 16/8 QHS and PRN. Bronchodilators per orders. Home O2 Eval => Start home O2 supplementation on discharge.

## 2018-02-10 NOTE — ASSESSMENT & PLAN NOTE
E/M per cardiology  Preload and afterload reduction.  Daily weighing.  Dietary salt restriction.

## 2018-02-10 NOTE — ASSESSMENT & PLAN NOTE
-HR controlled on Coreg  -Continue Eliquis 5 mg BID for CVA prophylaxis  -OP EP f/u to discuss additional management/DCCV  -Sleep study as underling GUMARO contributing to arrhythmia

## 2018-02-10 NOTE — PROGRESS NOTES
Pt did not tolerate bipap. Pt refused to wear patient stated he felt like he couldn't breathe. rn lizbeth vega notified. Placed patient back on 2lnc. No distress noted

## 2018-02-10 NOTE — PLAN OF CARE
Problem: Patient Care Overview  Goal: Plan of Care Review  Outcome: Ongoing (interventions implemented as appropriate)  Pt refused Bipap during shift. VSS. A-fib on the monitor in the 70's and 80's during shift. Family at bedside. Bed alarm activated.   Fall precautions in place. Side rails up. Bed locked and low in position. Call light and personal items within reach. 24 hour order chart check completed. Pt educated on medication's side effects. Pt verbalized understanding.   Will continue to monitor.

## 2018-02-10 NOTE — PLAN OF CARE
Problem: Patient Care Overview  Goal: Plan of Care Review  Outcome: Ongoing (interventions implemented as appropriate)  Plan of care discussed with patient, and patient verbalized understanding.  Patient remained Aox4, 2L Nc, and a-fib on the monitor. Patient's edema in BLE (trace) has improved.  Patient has had no pain during they day shift.   Patient remained free of falls, accidents, and trama during the day shift.  Bed is in the lowest position and call light is within reach - Continue to monitor.

## 2018-02-10 NOTE — ASSESSMENT & PLAN NOTE
Continue with LABA, SAUNDRA and ICS.        Continue nighttime BiPAP 16/8.

## 2018-02-10 NOTE — ASSESSMENT & PLAN NOTE
-Secondary to decompensated CHF and underlying COPD  -Respiratory status continues to improve  -Ok to switch to po Lasix  -Continue nebs

## 2018-02-10 NOTE — SUBJECTIVE & OBJECTIVE
Interval History: Seen and examined at bedside. Hospital chart reviewed. No acute interval detrimental events noted. He reports that he has significantly improved.      Review of Systems   Constitutional: Negative for chills and weight loss.   HENT: Negative for ear discharge.    Eyes: Negative for double vision and photophobia.   Respiratory: Negative for cough and hemoptysis.    Cardiovascular: Negative for palpitations and orthopnea.   Gastrointestinal: Negative for nausea and vomiting.   Genitourinary: Negative for frequency and urgency.   Musculoskeletal: Negative for back pain and neck pain.   Skin: Negative for rash.   Neurological: Negative for tingling and headaches.   Psychiatric/Behavioral: Negative for substance abuse and suicidal ideas.     Objective:     Vital Signs (Most Recent):  Temp: 97.9 °F (36.6 °C) (02/10/18 1112)  Pulse: 85 (02/10/18 1112)  Resp: 18 (02/10/18 1112)  BP: (!) 164/93 (02/10/18 1112)  SpO2: (!) 94 % (02/10/18 1112) Vital Signs (24h Range):  Temp:  [96.2 °F (35.7 °C)-98 °F (36.7 °C)] 97.9 °F (36.6 °C)  Pulse:  [74-92] 85  Resp:  [18-20] 18  SpO2:  [90 %-95 %] 94 %  BP: (128-164)/(67-93) 164/93     Weight: 87.9 kg (193 lb 12.7 oz)  Body mass index is 29.47 kg/m².      Intake/Output Summary (Last 24 hours) at 02/10/18 1203  Last data filed at 02/10/18 0300   Gross per 24 hour   Intake              483 ml   Output              825 ml   Net             -342 ml       Physical Exam   Constitutional: He is oriented to person, place, and time. He appears well-developed. No distress.   HENT:   Head: Normocephalic and atraumatic.   Nose: Nose normal.   Eyes: Conjunctivae and EOM are normal. Pupils are equal, round, and reactive to light. No scleral icterus.   Neck: Normal range of motion. Neck supple. No tracheal deviation present.   Cardiovascular: Normal rate and regular rhythm.    Pulmonary/Chest: Effort normal. No stridor. No respiratory distress. He has no wheezes. He has no rales.    Abdominal: Soft. Bowel sounds are normal. He exhibits no distension. There is no tenderness. There is no guarding.   Musculoskeletal: Normal range of motion. He exhibits no edema or deformity.   Neurological: He is alert and oriented to person, place, and time. No cranial nerve deficit. Coordination normal.   Skin: Skin is warm and dry. Capillary refill takes less than 2 seconds. No rash noted. He is not diaphoretic.   Psychiatric: He has a normal mood and affect. His behavior is normal. Judgment and thought content normal.   Nursing note and vitals reviewed.      Vents:  Oxygen Concentration (%): 32 (02/10/18 0718)    Lines/Drains/Airways     Peripheral Intravenous Line                 Peripheral IV - Single Lumen 02/09/18 2100 Right Forearm less than 1 day                Significant Labs:      Laboratory Data:  Reviewed recent labs. Appear stable other than abnormalities addressed in plan.     Radiology:  Reviewed recent imaging studies. Appear stable other than abnormalities addressed in plan.

## 2018-02-10 NOTE — DISCHARGE SUMMARY
[facility] Hospital Medicine  Discharge Summary      Patient Name: Rea Vail  MRN: [id number]  Admission Date: 2/7/2018  Hospital Length of Stay: 3 days  Discharge Date and Time:  02/10/2018 12:50 PM  Attending Physician: Kai Escamilla MD   Discharging Provider: Kai Escamilla MD  Primary Care Provider: Estiven Oseguera MD      HPI:   Rea Vail is a 67 y.o. male patient who was sent to the Emergency Department by Dr. Roberts (Pulmonology) for chronic SOB which has been worsening gradually  over the last week. Associated symptoms include leg swelling and non productive cough. Exacerbating factors activity and laying flat.Symptoms are constant and moderate in severity. No mitigating or exacerbating factors reported. No associated sxs reported. Patient denies any fever, chills, diaphoresis, CP, N/V, back pain, neck pain, HA, dizziness, and all other sxs at this time. To note: Pt was seen by his PCP who doubled the pt's lasix with no relief. Additionally Pt has Hx including COPD, HTN, CVA and CHF. Patient is former smoker Pt is not on home O2, but does use breathing Txs. No further complaints or concerns at this time. The patient was evaluated in the Er, patient reports some improvement in symptoms since arrival with ED interventions. Patient CBC stable, CMP with low albumin of 2.8. , Troponin neg. ABGs with Hypercapnia and Hypoxemia. Chest Xray Impression: CHF exacerbation. EKG with New Onset Afib, rate 90-110s on assessment. Patient admitted for CHF and COPD exacerbation with new onset afib.       Office Visit From Dr. Roberts  HPI:  Reports progressive dyspnea, wheezing, bilateral leg edema, orthopnea and PND for the past week. PCP doubled his dose of lasix but this did no help. Datient reports cough and difficulty breathing and denies vomiting, abdominal pain and diarrhea. Patient denies recent sick contacts.   Patient does not have new pets. Patient does not have a  history of asthma. Patient does not have a history of environmental allergens. Patient has not traveled recently. Patient does have a history of smoking. He smoked 3 PPD for 20 years. He quit smoking 13 years ago.  Patient has not had a previous chest x-ray. Patient has not had a PPD done.  PPD was Negative       * No surgery found *      Hospital Course:   Pt admitted with new onset A-fib with RVR, acute pulmonary edema/decompensated diastolic CHF, abd acute hypoxic respiratory failure. Pt was given IV cardizem and HR was controlled. Care discussed with Cardiology who recommended continuing Bystolic and add Eliquis. Pt was admitted on IV lasix. Pt diuresed 1800ml so far. Pt denies complaints and feels improved but continues to have conversational dyspnea. Will continue diuresis and add Bipap-had hypercapnia on ABGs. Repeat CXR still shows some CHF. Pulse ox on RA was still low at 88%. Will set up home oxygen and home health   Will need OP sleep study   2/10/2018   Patient seen and examined today . Breathing improved . Cardiology and pulmonology cleared for discharge. Patient will need OP sleep study  .discharge on lasix 40 mg,coreg 12.5 bid ,plavix and ace for heart failure . For afib eliquis was started by cardiology recommendation . Home health was arranged . Patient will benefit from bipao at night but will need sleep study outpatient      Consults:   Consults         Status Ordering Provider     Inpatient consult to Cardiology  Once     Provider:  Ramiro Wyatt MD    Completed EVELIN ZENG     Inpatient consult to Dietary  Once     Provider:  (Not yet assigned)    Completed EVELIN ZENG     Inpatient consult to Pulmonology  Once     Provider:  Vinnie Roberts MD    Acknowledged EVELIN ZENG     Inpatient consult to Registered Dietitian/Nutritionist  Once     Provider:  (Not yet assigned)    Completed EVELIN ZENG     Inpatient consult to Respiratory Care  Once     Provider:  (Not yet assigned)     Acknowledged EVELIN ZENG     Inpatient consult to Social Work  Once     Provider:  (Not yet assigned)    Completed EVELIN ZENG     Inpatient consult to Social Work  Once     Provider:  (Not yet assigned)    Completed KATYA TRUJILLO     Inpatient consult to Social Work  Once     Provider:  (Not yet assigned)    Completed KATYA TRUJILLO          No new Assessment & Plan notes have been filed under this hospital service since the last note was generated.  Service: Hospital Medicine    Final Active Diagnoses:    Diagnosis Date Noted POA    PRINCIPAL PROBLEM:  Acute on chronic diastolic congestive heart failure [I50.33] 02/07/2018 Yes    Chronic obstructive pulmonary disease with acute exacerbation [J44.1] 02/09/2018 Yes    New onset a-fib [I48.91] 02/07/2018 Yes    Acute on chronic respiratory failure with hypoxia and hypercapnia [J96.21, J96.22] 02/07/2018 Yes    Dyslipidemia [E78.5] 10/31/2013 Yes    HTN (hypertension) [I10] 07/09/2013 Yes    CVA (cerebral vascular accident) [I63.9] 07/09/2013 Yes      Problems Resolved During this Admission:    Diagnosis Date Noted Date Resolved POA       Discharged Condition: stable    Disposition: Home-Health Care Svc    Follow Up:  Follow-up Information     [facility].    Specialty:  DME Provider  Why:  DME:  02 concentrator and portable  Contact information:  [address] [facility]  [place] [id number] [phone]             Estiven Oseguera MD In 1 week.    Specialty:  Family Medicine  Contact information:  [address] [place] [address]  [phone]             Jazmin Wyatt MD In 1 week.    Specialty:  Cardiology  Contact information:  [address] [facility] [address] [facility] [place] [id number] [phone]             Simba Pineda MD In 1 week.    Specialty:  Pulmonary Disease  Contact information:  [address]  [place] [address] [phone]                 Patient Instructions:     OXYGEN FOR HOME USE   Order Specific  "Question Answer Comments   Liter Flow 2    Duration Continuous    Qualifying SpO2: 88    Testing done at: Rest    Device home concentrator with portable unit    Height: 5' 8" (1.727 m)    Weight: 88.3 kg (194 lb 10.7 oz)    Does patient have medical equipment at home? none    Alternative treatment measures have been tried or considered and deemed clinically ineffective. Yes          Significant Diagnostic Studies: Labs:   BMP:   Recent Labs  Lab 02/09/18  0550 02/10/18  0627   * 141*    141   K 3.8 3.7   CL 93* 95   CO2 37* 36*   BUN 34* 33*   CREATININE 1.1 1.1   CALCIUM 9.5 9.2   , CMP   Recent Labs  Lab 02/09/18  0550 02/10/18  0627    141   K 3.8 3.7   CL 93* 95   CO2 37* 36*   * 141*   BUN 34* 33*   CREATININE 1.1 1.1   CALCIUM 9.5 9.2   ANIONGAP 10 10   ESTGFRAFRICA >60 >60   EGFRNONAA >60 >60    and CBC   Recent Labs  Lab 02/10/18  0627   WBC 8.25   HGB 11.8*   HCT 41.2        Radiology: X-Ray: CXR: X-Ray Chest 1 View (CXR):   Results for orders placed or performed during the hospital encounter of 02/07/18   X-Ray Chest 1 View    Narrative    One-view chest x-ray    Clinical History: Congestive heart failure    Finding: The size of the heart is prominent. There is a mild amount of interstitial and alveolar opacities seen in the medial aspect of both lungs. There is no pneumothorax.  The costophrenic angles are sharp.    Impression      The findings are characteristic of mild cardiomegaly with mild bilateral pulmonary edema. This is consistent with the patient's history..      Electronically signed by: KANCHAN BERNARD MD  Date:     02/09/18  Time:    11:38        Pending Diagnostic Studies:     Procedure Component Value Units Date/Time    Anti-neutrophilic cytoplasmic antibody [[id number]] Collected:  02/07/18 9760    Order Status:  Sent Lab Status:  In process Updated:  02/07/18 2338    Specimen:  Blood from Blood          Medications:  Reconciled Home Medications:   Current " Discharge Medication List      START taking these medications    Details   apixaban 5 mg Tab Take 1 tablet (5 mg total) by mouth 2 (two) times daily.  Qty: 60 tablet, Refills: 0      carvedilol (COREG) 12.5 MG tablet Take 1 tablet (12.5 mg total) by mouth 2 (two) times daily.  Qty: 60 tablet, Refills: 1         CONTINUE these medications which have CHANGED    Details   furosemide (LASIX) 40 MG tablet Take 1 tablet (40 mg total) by mouth once daily.  Qty: 30 tablet, Refills: 11         CONTINUE these medications which have NOT CHANGED    Details   aclidinium bromide (TUDORZA PRESSAIR) 400 mcg/actuation AePB Inhale 1 puff into the lungs 2 (two) times daily.      albuterol (PROVENTIL/VENTOLIN) 90 mcg/actuation inhaler Inhale 2 puffs into the lungs every 6 (six) hours as needed.      allopurinol (ZYLOPRIM) 300 MG tablet Take 300 mg by mouth once daily.      amlodipine-valsartan (EXFORGE)  mg per tablet Take 1 tablet by mouth once daily.      baclofen (LIORESAL) 10 MG tablet Take by mouth. 1 tablet Oral Every 8 hours      clopidogrel (PLAVIX) 75 mg tablet Take 75 mg by mouth once daily.      fluticasone-salmeterol 250-50 mcg/dose (ADVAIR) 250-50 mcg/dose diskus inhaler Inhale 1 puff into the lungs 2 (two) times daily.      gabapentin (NEURONTIN) 300 MG capsule Take 1 capsule (300 mg total) by mouth 2 (two) times daily. 1 capsule Oral Three times a day  Qty: 60 capsule, Refills: 5    Associated Diagnoses: Coagulation defect; Dyslipidemia      hydrocodone-acetaminophen 10-325mg (NORCO)  mg Tab Take 1 tablet by mouth every 8 (eight) hours as needed for Pain. 1 tablet Oral Twice a day  Qty: 8 tablet, Refills: 0      multivitamin capsule Take 1 capsule by mouth once daily.      simvastatin (ZOCOR) 40 MG tablet Take 1 tablet (40 mg total) by mouth every evening. 1 tablet Oral Every day  Qty: 90 tablet, Refills: 4      tizanidine (ZANAFLEX) 4 MG tablet Take 4 mg by mouth nightly as needed.      diazepam (VALIUM) 5  MG tablet Take 5 mg by mouth every 12 (twelve) hours as needed for Anxiety.      hydrochlorothiazide (HYDRODIURIL) 25 MG tablet Take 25 mg by mouth 2 (two) times daily.       iron-vitamin C 65 mg iron- 125 mg TbEC Take by mouth 3 (three) times daily.      leflunomide (ARAVA) 10 MG Tab Take 1 tablet (10 mg total) by mouth once daily.  Qty: 30 tablet, Refills: 4      pramipexole (MIRAPEX) 0.5 MG tablet Take 0.5 mg by mouth 2 (two) times daily.         STOP taking these medications       nebivolol (BYSTOLIC) 10 MG Tab Comments:   Reason for Stopping:               Indwelling Lines/Drains at time of discharge:   Lines/Drains/Airways          No matching active lines, drains, or airways          Time spent on the discharge of patient: 40 minutes  Patient was seen and examined on the date of discharge and determined to be suitable for discharge.         Kai Escamilla MD  Department of Hospital Medicine  [facility]

## 2018-02-10 NOTE — PLAN OF CARE
Per patient request and preference form signed for [facility], copy given to the patient and the original to the blue folder.  CM place referral in [facility] . Home O2 delivered per Sol CARDOZO and CM gave info and 30 day free trail for [employer].      02/10/18 1210   Discharge Reassessment   Assessment Type Final Discharge Note   Provided patient/caregiver education on the expected discharge date and the discharge plan Yes  (TODAY)   Do you have any problems affording any of your prescribed medications? No   Discharge Plan A Home with family;Home Health   Discharge Plan B Home with family;Home Health   Patient choice form signed by patient/caregiver Yes   Can the patient answer the patient profile reliably? Yes, cognitively intact   How does the patient rate their overall health at the present time? Good   Describe the patient's ability to walk at the present time. No restrictions   How often would a person be available to care for the patient? Whenever needed   Number of comorbid conditions (as recorded on the chart) Two   During the past month, has the patient often been bothered by feeling down, depressed or hopeless? No   During the past month, has the patient often been bothered by little interest or pleasure in doing things? No

## 2018-02-11 NOTE — NURSING
Patient AAOx4, denies any complaints.Patient given discharge instruction, explained in detail to patient and brother. Patient verbalized understanding. All belongs given to patient. Patient brought to awaiting car via wheelchair.

## 2018-02-12 ENCOUNTER — HOSPITAL ENCOUNTER (INPATIENT)
Facility: HOSPITAL | Age: 68
LOS: 2 days | Discharge: NURSING, REHAB OR LONG-TERM CARE | DRG: 189 | End: 2018-02-14
Attending: EMERGENCY MEDICINE | Admitting: INTERNAL MEDICINE
Payer: MEDICARE

## 2018-02-12 DIAGNOSIS — J18.9 PNEUMONIA OF RIGHT LOWER LOBE DUE TO INFECTIOUS ORGANISM: ICD-10-CM

## 2018-02-12 DIAGNOSIS — R41.0 CONFUSION: ICD-10-CM

## 2018-02-12 DIAGNOSIS — J44.1 COPD EXACERBATION: ICD-10-CM

## 2018-02-12 DIAGNOSIS — E78.5 DYSLIPIDEMIA: ICD-10-CM

## 2018-02-12 DIAGNOSIS — R41.82 ALTERED MENTAL STATE: ICD-10-CM

## 2018-02-12 DIAGNOSIS — R06.89 HYPERCAPNIA: Primary | ICD-10-CM

## 2018-02-12 DIAGNOSIS — J96.22 ACUTE ON CHRONIC RESPIRATORY FAILURE WITH HYPOXIA AND HYPERCAPNIA: ICD-10-CM

## 2018-02-12 DIAGNOSIS — R06.02 SHORTNESS OF BREATH: ICD-10-CM

## 2018-02-12 DIAGNOSIS — J96.01 ACUTE RESPIRATORY FAILURE WITH HYPOXIA: Primary | ICD-10-CM

## 2018-02-12 DIAGNOSIS — J96.02 ACUTE HYPERCAPNIC RESPIRATORY FAILURE: ICD-10-CM

## 2018-02-12 DIAGNOSIS — J96.21 ACUTE ON CHRONIC RESPIRATORY FAILURE WITH HYPOXIA AND HYPERCAPNIA: ICD-10-CM

## 2018-02-12 DIAGNOSIS — D68.9 COAGULATION DEFECT: ICD-10-CM

## 2018-02-12 PROBLEM — G93.41 ENCEPHALOPATHY, METABOLIC: Status: ACTIVE | Noted: 2018-02-12

## 2018-02-12 PROBLEM — N17.9 AKI (ACUTE KIDNEY INJURY): Status: ACTIVE | Noted: 2018-02-12

## 2018-02-12 PROBLEM — R93.89 ABNORMAL CXR: Status: ACTIVE | Noted: 2018-02-12

## 2018-02-12 LAB
ALBUMIN SERPL BCP-MCNC: 2.6 G/DL
ALLENS TEST: ABNORMAL
ALP SERPL-CCNC: 80 U/L
ALT SERPL W/O P-5'-P-CCNC: 28 U/L
ANION GAP SERPL CALC-SCNC: 11 MMOL/L
AST SERPL-CCNC: 21 U/L
BASOPHILS # BLD AUTO: 0.01 K/UL
BASOPHILS NFR BLD: 0.2 %
BILIRUB SERPL-MCNC: 0.6 MG/DL
BNP SERPL-MCNC: 230 PG/ML
BUN SERPL-MCNC: 41 MG/DL
CALCIUM SERPL-MCNC: 8.9 MG/DL
CHLORIDE SERPL-SCNC: 96 MMOL/L
CO2 SERPL-SCNC: 34 MMOL/L
CREAT SERPL-MCNC: 1.6 MG/DL
DELSYS: ABNORMAL
DIFFERENTIAL METHOD: ABNORMAL
EOSINOPHIL # BLD AUTO: 0.1 K/UL
EOSINOPHIL NFR BLD: 2.3 %
ERYTHROCYTE [DISTWIDTH] IN BLOOD BY AUTOMATED COUNT: 15.4 %
EST. GFR  (AFRICAN AMERICAN): 50 ML/MIN/1.73 M^2
EST. GFR  (NON AFRICAN AMERICAN): 43 ML/MIN/1.73 M^2
FIO2: 28
GLUCOSE SERPL-MCNC: 106 MG/DL
HCO3 UR-SCNC: 42.8 MMOL/L (ref 24–28)
HCT VFR BLD AUTO: 44.5 %
HGB BLD-MCNC: 12.8 G/DL
INR PPP: 1
LACTATE SERPL-SCNC: 0.9 MMOL/L
LYMPHOCYTES # BLD AUTO: 1 K/UL
LYMPHOCYTES NFR BLD: 15.4 %
MCH RBC QN AUTO: 25.8 PG
MCHC RBC AUTO-ENTMCNC: 28.8 G/DL
MCV RBC AUTO: 90 FL
MODE: ABNORMAL
MONOCYTES # BLD AUTO: 0.5 K/UL
MONOCYTES NFR BLD: 7.8 %
NEUTROPHILS # BLD AUTO: 4.6 K/UL
NEUTROPHILS NFR BLD: 74.5 %
PCO2 BLDA: 70.8 MMHG (ref 35–45)
PH SMN: 7.39 [PH] (ref 7.35–7.45)
PLATELET # BLD AUTO: 284 K/UL
PMV BLD AUTO: 9.1 FL
PO2 BLDA: 84 MMHG (ref 80–100)
POC BE: 18 MMOL/L
POC SATURATED O2: 95 % (ref 95–100)
POTASSIUM SERPL-SCNC: 3.8 MMOL/L
PROT SERPL-MCNC: 6 G/DL
PROTHROMBIN TIME: 10.8 SEC
RBC # BLD AUTO: 4.96 M/UL
SAMPLE: ABNORMAL
SITE: ABNORMAL
SODIUM SERPL-SCNC: 141 MMOL/L
TROPONIN I SERPL DL<=0.01 NG/ML-MCNC: 0.02 NG/ML
WBC # BLD AUTO: 6.18 K/UL

## 2018-02-12 PROCEDURE — 20000000 HC ICU ROOM

## 2018-02-12 PROCEDURE — 94640 AIRWAY INHALATION TREATMENT: CPT

## 2018-02-12 PROCEDURE — 96365 THER/PROPH/DIAG IV INF INIT: CPT

## 2018-02-12 PROCEDURE — 85610 PROTHROMBIN TIME: CPT

## 2018-02-12 PROCEDURE — 25000003 PHARM REV CODE 250: Performed by: INTERNAL MEDICINE

## 2018-02-12 PROCEDURE — 63600175 PHARM REV CODE 636 W HCPCS: Performed by: INTERNAL MEDICINE

## 2018-02-12 PROCEDURE — 96375 TX/PRO/DX INJ NEW DRUG ADDON: CPT

## 2018-02-12 PROCEDURE — 99285 EMERGENCY DEPT VISIT HI MDM: CPT | Mod: 25

## 2018-02-12 PROCEDURE — 80053 COMPREHEN METABOLIC PANEL: CPT

## 2018-02-12 PROCEDURE — 63600175 PHARM REV CODE 636 W HCPCS: Performed by: EMERGENCY MEDICINE

## 2018-02-12 PROCEDURE — 87040 BLOOD CULTURE FOR BACTERIA: CPT

## 2018-02-12 PROCEDURE — 94660 CPAP INITIATION&MGMT: CPT

## 2018-02-12 PROCEDURE — 25000242 PHARM REV CODE 250 ALT 637 W/ HCPCS: Performed by: INTERNAL MEDICINE

## 2018-02-12 PROCEDURE — 93005 ELECTROCARDIOGRAM TRACING: CPT

## 2018-02-12 PROCEDURE — 82803 BLOOD GASES ANY COMBINATION: CPT

## 2018-02-12 PROCEDURE — 83605 ASSAY OF LACTIC ACID: CPT

## 2018-02-12 PROCEDURE — 94761 N-INVAS EAR/PLS OXIMETRY MLT: CPT

## 2018-02-12 PROCEDURE — 93010 ELECTROCARDIOGRAM REPORT: CPT | Mod: ,,, | Performed by: INTERNAL MEDICINE

## 2018-02-12 PROCEDURE — 99291 CRITICAL CARE FIRST HOUR: CPT | Mod: ,,, | Performed by: INTERNAL MEDICINE

## 2018-02-12 PROCEDURE — 27000190 HC CPAP FULL FACE MASK W/VALVE

## 2018-02-12 PROCEDURE — 84484 ASSAY OF TROPONIN QUANT: CPT

## 2018-02-12 PROCEDURE — 99900035 HC TECH TIME PER 15 MIN (STAT)

## 2018-02-12 PROCEDURE — 99292 CRITICAL CARE ADDL 30 MIN: CPT | Mod: ,,, | Performed by: INTERNAL MEDICINE

## 2018-02-12 PROCEDURE — 25000003 PHARM REV CODE 250: Performed by: NURSE PRACTITIONER

## 2018-02-12 PROCEDURE — 96361 HYDRATE IV INFUSION ADD-ON: CPT

## 2018-02-12 PROCEDURE — 83880 ASSAY OF NATRIURETIC PEPTIDE: CPT

## 2018-02-12 PROCEDURE — 36600 WITHDRAWAL OF ARTERIAL BLOOD: CPT

## 2018-02-12 PROCEDURE — 85025 COMPLETE CBC W/AUTO DIFF WBC: CPT

## 2018-02-12 RX ORDER — SIMVASTATIN 20 MG/1
40 TABLET, FILM COATED ORAL NIGHTLY
Status: DISCONTINUED | OUTPATIENT
Start: 2018-02-12 | End: 2018-02-14 | Stop reason: HOSPADM

## 2018-02-12 RX ORDER — SODIUM CHLORIDE 9 MG/ML
INJECTION, SOLUTION INTRAVENOUS CONTINUOUS
Status: DISCONTINUED | OUTPATIENT
Start: 2018-02-12 | End: 2018-02-13

## 2018-02-12 RX ORDER — CARVEDILOL 12.5 MG/1
12.5 TABLET ORAL 2 TIMES DAILY
Status: DISCONTINUED | OUTPATIENT
Start: 2018-02-12 | End: 2018-02-14 | Stop reason: HOSPADM

## 2018-02-12 RX ORDER — IPRATROPIUM BROMIDE AND ALBUTEROL SULFATE 2.5; .5 MG/3ML; MG/3ML
3 SOLUTION RESPIRATORY (INHALATION) EVERY 6 HOURS
Status: DISCONTINUED | OUTPATIENT
Start: 2018-02-12 | End: 2018-02-14 | Stop reason: HOSPADM

## 2018-02-12 RX ORDER — NAPROXEN SODIUM 220 MG/1
81 TABLET, FILM COATED ORAL DAILY
Status: DISCONTINUED | OUTPATIENT
Start: 2018-02-12 | End: 2018-02-14 | Stop reason: HOSPADM

## 2018-02-12 RX ADMIN — METHYLPREDNISOLONE SODIUM SUCCINATE 80 MG: 40 INJECTION, POWDER, FOR SOLUTION INTRAMUSCULAR; INTRAVENOUS at 06:02

## 2018-02-12 RX ADMIN — CARVEDILOL 12.5 MG: 12.5 TABLET, FILM COATED ORAL at 09:02

## 2018-02-12 RX ADMIN — MOXIFLOXACIN HYDROCHLORIDE 400 MG: 400 INJECTION, SOLUTION INTRAVENOUS at 04:02

## 2018-02-12 RX ADMIN — SIMVASTATIN 40 MG: 20 TABLET, FILM COATED ORAL at 09:02

## 2018-02-12 RX ADMIN — APIXABAN 5 MG: 2.5 TABLET, FILM COATED ORAL at 09:02

## 2018-02-12 RX ADMIN — ASPIRIN 81 MG 81 MG: 81 TABLET ORAL at 06:02

## 2018-02-12 RX ADMIN — SODIUM CHLORIDE: 0.9 INJECTION, SOLUTION INTRAVENOUS at 07:02

## 2018-02-12 RX ADMIN — IPRATROPIUM BROMIDE AND ALBUTEROL SULFATE 3 ML: .5; 3 SOLUTION RESPIRATORY (INHALATION) at 07:02

## 2018-02-12 NOTE — HOSPITAL COURSE
2/12 Confused and disorientated in Emergency Room with elevated pCO2  2/13 Oriented and appropriate today

## 2018-02-12 NOTE — H&P
[facility] Hospital Medicine  History & Physical    Patient Name: Rea Vail  MRN: [id number]  Admission Date: 2/12/2018  Attending Physician: Kai Escamilla MD   Primary Care Provider: Estiven Oseguera MD         Patient information was obtained from caregiver / friend and ER records.     Subjective:     Principal Problem:Acute hypercapnic respiratory failure    Chief Complaint:   Chief Complaint   Patient presents with    Fatigue     near syncope        HPI: Mr Vail is a 67 year old male with PMHx multiple  CVA, A Fib on eliquis, HTN, former smoker and HLD. He presented to [facility] Emergency with complaints of increase fatigue. Family member reports finding him about 11 am this morning, confused and not wearing oxygen. He was recently discharged from [facility] on [facility]/10/2018 after being admitted with new onset A-fib with RVR, acute pulmonary edema/decompensated diastolic CHF. Cardiology and Pulmonary were consulted. He was diuresis well and started on Eliquis. He did not wear his Bipap during that admission. Patient was recommend for sleep study at discharge.  PCO2 70.8. Creatinine elevated 1.6. CT of head showed CT evidence of acute intracranial abnormality.    Past Medical History:   Diagnosis Date    Anemia 2013    Asthma     Atrial fibrillation     CHF (congestive heart failure)     COPD (chronic obstructive pulmonary disease)     COPD (chronic obstructive pulmonary disease) 2012    Diverticular disease     Gout 2012    Hyperlipidemia     Hypertension     Other and unspecified hyperlipidemia     Stroke        History reviewed. No pertinent surgical history.    Review of patient's allergies indicates:   Allergen Reactions    Cephalexin Anaphylaxis       No current facility-administered medications on file prior to encounter.      Current Outpatient Prescriptions on File Prior to Encounter   Medication Sig    aclidinium bromide (TUDORZA PRESSAIR) 400 mcg/actuation AePB  Inhale 1 puff into the lungs 2 (two) times daily.    albuterol (PROVENTIL/VENTOLIN) 90 mcg/actuation inhaler Inhale 2 puffs into the lungs every 6 (six) hours as needed.    allopurinol (ZYLOPRIM) 300 MG tablet Take 300 mg by mouth once daily.    amlodipine-valsartan (EXFORGE)  mg per tablet Take 1 tablet by mouth once daily.    apixaban 5 mg Tab Take 1 tablet (5 mg total) by mouth 2 (two) times daily.    baclofen (LIORESAL) 10 MG tablet Take by mouth. 1 tablet Oral Every 8 hours    carvedilol (COREG) 12.5 MG tablet Take 1 tablet (12.5 mg total) by mouth 2 (two) times daily.    diazepam (VALIUM) 5 MG tablet Take 5 mg by mouth every 12 (twelve) hours as needed for Anxiety.    furosemide (LASIX) 40 MG tablet Take 1 tablet (40 mg total) by mouth once daily.    gabapentin (NEURONTIN) 300 MG capsule Take 1 capsule (300 mg total) by mouth 2 (two) times daily. 1 capsule Oral Three times a day    hydrochlorothiazide (HYDRODIURIL) 25 MG tablet Take 25 mg by mouth 2 (two) times daily.     hydrocodone-acetaminophen 10-325mg (NORCO)  mg Tab Take 1 tablet by mouth every 8 (eight) hours as needed for Pain. 1 tablet Oral Twice a day    iron-vitamin C 65 mg iron- 125 mg TbEC Take by mouth 3 (three) times daily.    leflunomide (ARAVA) 10 MG Tab Take 1 tablet (10 mg total) by mouth once daily.    multivitamin capsule Take 1 capsule by mouth once daily.    pramipexole (MIRAPEX) 0.5 MG tablet Take 0.5 mg by mouth 2 (two) times daily.    simvastatin (ZOCOR) 40 MG tablet Take 1 tablet (40 mg total) by mouth every evening. 1 tablet Oral Every day    tizanidine (ZANAFLEX) 4 MG tablet Take 4 mg by mouth nightly as needed.    fluticasone-salmeterol 250-50 mcg/dose (ADVAIR) 250-50 mcg/dose diskus inhaler Inhale 1 puff into the lungs 2 (two) times daily.     Family History     Problem Relation (Age of Onset)    Stroke Cousin        Social History Main Topics    Smoking status: Former Smoker     Types: Cigarettes      Quit date: 7/9/2006    Smokeless tobacco: Not on file    Alcohol use No    Drug use: No    Sexual activity: Not on file     Review of Systems   Constitutional: Positive for fatigue. Negative for chills, diaphoresis and fever.   HENT: Negative for congestion, ear discharge, rhinorrhea, sinus pressure, sore throat and trouble swallowing.    Eyes: Negative for discharge and visual disturbance.   Respiratory: Negative for apnea, cough, choking, chest tightness, shortness of breath, wheezing and stridor.    Cardiovascular: Negative for chest pain, palpitations and leg swelling.   Gastrointestinal: Negative for abdominal distention, abdominal pain, diarrhea, nausea and vomiting.   Endocrine: Negative for cold intolerance and heat intolerance.   Genitourinary: Negative for dysuria, frequency and hematuria.   Musculoskeletal: Negative for arthralgias, back pain, myalgias and neck pain.   Skin: Negative for pallor and rash.   Neurological: Positive for weakness. Negative for dizziness, seizures, syncope and headaches.   Psychiatric/Behavioral: Negative for agitation, confusion and sleep disturbance.     Objective:     Vital Signs (Most Recent):  Temp: 97.5 °F (36.4 °C) (02/12/18 1454)  Pulse: 81 (02/12/18 1541)  Resp: 18 (02/12/18 1501)  BP: 115/75 (02/12/18 1541)  SpO2: 96 % (02/12/18 1501) Vital Signs (24h Range):  Temp:  [97.5 °F (36.4 °C)] 97.5 °F (36.4 °C)  Pulse:  [77-81] 81  Resp:  [16-18] 18  SpO2:  [95 %-97 %] 96 %  BP: (110-129)/(68-82) 115/75        There is no height or weight on file to calculate BMI.    Physical Exam   Constitutional: He appears distressed.   HENT:   Head: Normocephalic and atraumatic.   Eyes: Right eye exhibits no discharge. Left eye exhibits no discharge.   Neck: No JVD present.   Cardiovascular: An irregular rhythm present. Exam reveals no gallop and no friction rub.    No murmur heard.  Pulmonary/Chest: No respiratory distress. He has no wheezes. He has no rales. He exhibits no  tenderness.   Abdominal: Soft. Bowel sounds are normal. He exhibits no distension and no mass. There is no tenderness. There is no rebound and no guarding. No hernia.   Musculoskeletal: He exhibits no edema or deformity.   Neurological: He is alert.   Skin: Skin is warm and dry. Capillary refill takes 2 to 3 seconds. He is not diaphoretic.   Nursing note and vitals reviewed.          Significant Labs:   CBC:   Recent Labs  Lab 02/12/18  1441   WBC 6.18   HGB 12.8*   HCT 44.5        CMP:   Recent Labs  Lab 02/12/18  1441      K 3.8   CL 96   CO2 34*      BUN 41*   CREATININE 1.6*   CALCIUM 8.9   PROT 6.0   ALBUMIN 2.6*   BILITOT 0.6   ALKPHOS 80   AST 21   ALT 28   ANIONGAP 11   EGFRNONAA 43*     Cardiac Markers:   Recent Labs  Lab 02/12/18  1441   *     Troponin:   Recent Labs  Lab 02/12/18  1441   TROPONINI 0.020       Significant Imaging:     Imaging Results          CT Head Without Contrast (Final result)  Result time 02/12/18 15:34:16    Final result by Sohail Velázquez MD (02/12/18 15:34:16)                 Impression:         No CT evidence of acute intracranial abnormality.        All CT scans at this facility use dose modulation, iterative reconstruction and/or weight based dosing when appropriate to reduce radiation dose to as low as reasonably achievable.      Electronically signed by: SOHAIL VELÁZQUEZ MD  Date:     02/12/18  Time:    15:34              Narrative:    Exam: CT scan of the head without contrast    Clinical History:  Decreased alertness . Altered mental status     Technique: Axial CT imaging was performed through the head without intravenous contrast.     Comparison: Head CT, 6/9/17    Findings:    No hydrocephalus, midline shift, mass effect, or acute intracranial hemorrhage. There is mild chronic small vessel ischemic change of alignment. There is mild cerebral atrophy. The visualized paranasal sinuses and mastoid air cells are clear. The skull is intact.                              X-Ray Chest AP Portable (Final result)  Result time 02/12/18 14:11:49    Final result by KANCHAN Mirza Sr., MD (02/12/18 14:11:49)                 Impression:        1. There has been interval development of a mild amount of alveolar consolidation in the base of the right lung. This is characteristic of atelectasis or subtle pneumonia.  2. The size of the heart is prominent. This may be secondary to magnification.      Electronically signed by: KANCHAN MIRZA MD  Date:     02/12/18  Time:    14:11              Narrative:    One-view chest x-ray    Clinical History: Congestive heart failure    Finding: Comparison was made to prior examination performed on 2/9/2018. The size of the heart is prominent. There has been interval development of a mild amount of alveolar consolidation in the base of the right lung. The left lung is clear. There is no pneumothorax.  The costophrenic angles are sharp.                            Assessment/Plan:     * Acute hypercapnic respiratory failure    Consult Pulmonary   BiPaP 10/5  Avelox 400 mg daily  Duo nebs   IV steroids   CT of chest without contrast             ANTHONY (acute kidney injury)      Creatinine 1.6 today, up from previous of 1.1  Gentle hydration   Normal saline 75 ml/hr   Hold Lasix and HCTZ  Hold Exforge         Atrial fibrillation    A Fib rate well controled   Continue Eliquis 5 mg BiD   ASA 81 mg daily           CVA (cerebral vascular accident)      History of CVA   Continue Eliquis and ASA  CT of head negative for acute findings           HTN (hypertension)    Continue Coreg   Hold Exforage for now due to ANTHONY   Monitor             Hyperlipidemia    Continue Statin            VTE Risk Mitigation         Ordered     apixaban tablet 5 mg  2 times daily     Route:  Oral        02/12/18 1630             Catrachito Botello NP  Department of Hospital Medicine   [facility]

## 2018-02-12 NOTE — ED NOTES
Pt noted to keep falling asleep, kerline at bedside reports pt has sleep apnea and is tired and sleepy during the day.

## 2018-02-12 NOTE — HPI
Mr Vail is a 67 year old male with PMHx multiple  CVA, A Fib on eliquis, HTN, former smoker and HLD. He presented to [facility] Emergency with complaints of increase fatigue. Family member reports finding him about 11 am this morning, confused and not wearing oxygen. He was recently discharged from [facility] on 2/10/2018 after being admitted with new onset A-fib with RVR, acute pulmonary edema/decompensated diastolic CHF. Cardiology and Pulmonary were consulted. He was diuresis well and started on Eliquis. He did not wear his Bipap during that admission. Patient was recommend for sleep study at discharge.  PCO2 70.8. Creatinine elevated 1.6. CT of head showed CT evidence of acute intracranial abnormality.

## 2018-02-12 NOTE — ED PROVIDER NOTES
SCRIBE #1 NOTE: I, Jacob Reis, am scribing for, and in the presence of, Zaid Park Jr., MD. I have scribed the entire note.      History      Chief Complaint   Patient presents with    Fatigue     near syncope       Review of patient's allergies indicates:   Allergen Reactions    Cephalexin Anaphylaxis        HPI   HPI    2/12/2018, 12:52 PM   History obtained from the patient    History of Present Illness: Rea Vail is a 67 y.o. male patient with a hx of COPD and past CVA presents to the Emergency Department for an evaluation of fatigue which onset gradually today. Pt was reportedly brought in for an evaluation after he was found slightly confused and weak by his family. Pt was reportedly recently discharged from the hospital for fluid retention. Upon being discharged pt's lasix were increased to 40mg and he was put on apixaban 5mg and COREG as well as instructed to continue his Exforge. Pt denies any pain stating he is just feeling weak. Symptoms are constant and moderate in severity. Exacerbated by nothing and relieved by nothing. Associated sxs include hypotension, increased confusion, and generalized weakness. Patient denies any fever, chills, N/V, head trauma, cough, congestion, CP, abd pain, SOB, and all other sxs at this time. Pt denies tx PTA No further complaints or concerns at this time.         Arrival mode: EMS     PCP: Estiven Oseguera MD       Past Medical History:  Past Medical History:   Diagnosis Date    Anemia 2013    Asthma     CHF (congestive heart failure)     COPD (chronic obstructive pulmonary disease)     COPD (chronic obstructive pulmonary disease) 2012    Diverticular disease     Gout 2012    Hyperlipidemia     Hypertension     Other and unspecified hyperlipidemia     Stroke        Past Surgical History:  History reviewed, no relevant surgical history.,     Family History:  Family History   Problem Relation Age of Onset    Stroke Cousin        Social  History:  Social History     Social History Main Topics    Smoking status: Former Smoker     Types: Cigarettes     Quit date: 7/9/2006    Smokeless tobacco: Unknown    Alcohol use No    Drug use: No    Sexual activity: Unknown       ROS   Review of Systems   Constitutional: Positive for fatigue. Negative for appetite change, chills and fever.        (+) Hypotension  (+) Generalized weakness  (-) Fall  (-) Head trauma   HENT: Negative for congestion, sore throat and trouble swallowing.    Respiratory: Negative for cough and shortness of breath.    Cardiovascular: Negative for chest pain.   Gastrointestinal: Negative for abdominal pain, diarrhea, nausea and vomiting.   Genitourinary: Negative for dysuria and hematuria.   Musculoskeletal: Negative for back pain.   Skin: Negative for rash.   Neurological: Negative for dizziness, weakness, light-headedness, numbness and headaches.   Hematological: Does not bruise/bleed easily.   Psychiatric/Behavioral: Positive for confusion. Negative for hallucinations and sleep disturbance.     Physical Exam      Initial Vitals [02/12/18 1247]   BP Pulse Resp Temp SpO2   110/68 80 18 -- 95 %      MAP       82          Physical Exam  Nursing Notes and Vital Signs Reviewed.  Constitutional: Patient is in no acute distress. Well-developed and well-nourished.  Head: Atraumatic. Normocephalic.  Eyes: PERRL. EOM intact. Conjunctivae are not pale. No scleral icterus.  ENT: Mucous membranes are moist. Oropharynx is clear and symmetric.    Neck: Supple. Full ROM. No lymphadenopathy.  Cardiovascular: Regular rate. Regular rhythm.  Pulmonary/Chest: No respiratory distress. Clear to auscultation bilaterally. No wheezing or rales.  Abdominal: Soft and non-distended.  There is no tenderness.  Musculoskeletal: Moves all extremities. No obvious deformities.  Skin: Warm and dry.  Neurological: Patient is alert and oriented to person, place and time. Pupils ERRL and EOM normal. Light touch sense is  intact. Speech is clear and normal. Noticeable left sided facial droop and weakness secondary to past CVA noted. Pt is at baseline as per family in ED during physical examination.   Psychiatric: Normal affect. Good eye contact. Appropriate in content.    ED Course    Procedures  ED Vital Signs:  Vitals:    02/12/18 1247 02/12/18 1441 02/12/18 1454 02/12/18 1501   BP: 110/68 126/82  115/71   Pulse: 80 77  77   Resp: 18 16 18   Temp:   97.5 °F (36.4 °C)    TempSrc:   Oral    SpO2: 95% 97%  96%    02/12/18 1540 02/12/18 1541   BP: 129/78 115/75   Pulse: 77 81   Resp:     Temp:     TempSrc:     SpO2:         Abnormal Lab Results:  Labs Reviewed   CBC W/ AUTO DIFFERENTIAL - Abnormal; Notable for the following:        Result Value    Hemoglobin 12.8 (*)     MCH 25.8 (*)     MCHC 28.8 (*)     RDW 15.4 (*)     MPV 9.1 (*)     Gran% 74.5 (*)     Lymph% 15.4 (*)     All other components within normal limits   COMPREHENSIVE METABOLIC PANEL - Abnormal; Notable for the following:     CO2 34 (*)     BUN, Bld 41 (*)     Creatinine 1.6 (*)     Albumin 2.6 (*)     eGFR if  50 (*)     eGFR if non  43 (*)     All other components within normal limits   B-TYPE NATRIURETIC PEPTIDE - Abnormal; Notable for the following:      (*)     All other components within normal limits   ISTAT PROCEDURE - Abnormal; Notable for the following:     POC PCO2 70.8 (*)     POC HCO3 42.8 (*)     All other components within normal limits   CULTURE, BLOOD   CULTURE, BLOOD   TROPONIN I   PROTIME-INR   LACTIC ACID, PLASMA        All Lab Results:  Results for orders placed or performed during the hospital encounter of 02/12/18   CBC auto differential   Result Value Ref Range    WBC 6.18 3.90 - 12.70 K/uL    RBC 4.96 4.60 - 6.20 M/uL    Hemoglobin 12.8 (L) 14.0 - 18.0 g/dL    Hematocrit 44.5 40.0 - 54.0 %    MCV 90 82 - 98 fL    MCH 25.8 (L) 27.0 - 31.0 pg    MCHC 28.8 (L) 32.0 - 36.0 g/dL    RDW 15.4 (H) 11.5 - 14.5 %     Platelets 284 150 - 350 K/uL    MPV 9.1 (L) 9.2 - 12.9 fL    Gran # (ANC) 4.6 1.8 - 7.7 K/uL    Lymph # 1.0 1.0 - 4.8 K/uL    Mono # 0.5 0.3 - 1.0 K/uL    Eos # 0.1 0.0 - 0.5 K/uL    Baso # 0.01 0.00 - 0.20 K/uL    Gran% 74.5 (H) 38.0 - 73.0 %    Lymph% 15.4 (L) 18.0 - 48.0 %    Mono% 7.8 4.0 - 15.0 %    Eosinophil% 2.3 0.0 - 8.0 %    Basophil% 0.2 0.0 - 1.9 %    Differential Method Automated    Comprehensive metabolic panel   Result Value Ref Range    Sodium 141 136 - 145 mmol/L    Potassium 3.8 3.5 - 5.1 mmol/L    Chloride 96 95 - 110 mmol/L    CO2 34 (H) 23 - 29 mmol/L    Glucose 106 70 - 110 mg/dL    BUN, Bld 41 (H) 8 - 23 mg/dL    Creatinine 1.6 (H) 0.5 - 1.4 mg/dL    Calcium 8.9 8.7 - 10.5 mg/dL    Total Protein 6.0 6.0 - 8.4 g/dL    Albumin 2.6 (L) 3.5 - 5.2 g/dL    Total Bilirubin 0.6 0.1 - 1.0 mg/dL    Alkaline Phosphatase 80 55 - 135 U/L    AST 21 10 - 40 U/L    ALT 28 10 - 44 U/L    Anion Gap 11 8 - 16 mmol/L    eGFR if African American 50 (A) >60 mL/min/1.73 m^2    eGFR if non African American 43 (A) >60 mL/min/1.73 m^2   Troponin I   Result Value Ref Range    Troponin I 0.020 0.000 - 0.026 ng/mL   Brain natriuretic peptide   Result Value Ref Range     (H) 0 - 99 pg/mL   Protime-INR   Result Value Ref Range    Prothrombin Time 10.8 9.0 - 12.5 sec    INR 1.0 0.8 - 1.2   ISTAT PROCEDURE   Result Value Ref Range    POC PH 7.389 7.35 - 7.45    POC PCO2 70.8 (HH) 35 - 45 mmHg    POC PO2 84 80 - 100 mmHg    POC HCO3 42.8 (H) 24 - 28 mmol/L    POC BE 18 -2 to 2 mmol/L    POC SATURATED O2 95 95 - 100 %    Sample ARTERIAL     Site LR     Allens Test Pass     DelSys Nasal Can     Mode SPONT     FiO2 28          Imaging Results:  Imaging Results          CT Head Without Contrast (Final result)  Result time 02/12/18 15:34:16    Final result by Sohail Velázquez MD (02/12/18 15:34:16)                 Impression:         No CT evidence of acute intracranial abnormality.        All CT scans at this facility use  dose modulation, iterative reconstruction and/or weight based dosing when appropriate to reduce radiation dose to as low as reasonably achievable.      Electronically signed by: MARIELLE KNIGHT MD  Date:     02/12/18  Time:    15:34              Narrative:    Exam: CT scan of the head without contrast    Clinical History:  Decreased alertness . Altered mental status     Technique: Axial CT imaging was performed through the head without intravenous contrast.     Comparison: Head CT, 6/9/17    Findings:    No hydrocephalus, midline shift, mass effect, or acute intracranial hemorrhage. There is mild chronic small vessel ischemic change of alignment. There is mild cerebral atrophy. The visualized paranasal sinuses and mastoid air cells are clear. The skull is intact.                             X-Ray Chest AP Portable (Final result)  Result time 02/12/18 14:11:49    Final result by KANCHAN Mirza Sr., MD (02/12/18 14:11:49)                 Impression:        1. There has been interval development of a mild amount of alveolar consolidation in the base of the right lung. This is characteristic of atelectasis or subtle pneumonia.  2. The size of the heart is prominent. This may be secondary to magnification.      Electronically signed by: KANCHAN MIRZA MD  Date:     02/12/18  Time:    14:11              Narrative:    One-view chest x-ray    Clinical History: Congestive heart failure    Finding: Comparison was made to prior examination performed on 2/9/2018. The size of the heart is prominent. There has been interval development of a mild amount of alveolar consolidation in the base of the right lung. The left lung is clear. There is no pneumothorax.  The costophrenic angles are sharp.                                  The EKG was ordered, reviewed, and independently interpreted by the ED provider.  Interpretation time: 13:17  Rate: 87 BPM  Rhythm: atrial fibrillation  Interpretation: Prolonged QT. No STEMI.        The Emergency  Provider reviewed the vital signs and test results, which are outlined above.    ED Discussion     3:37 PM: Discussed case with ASHLEY Colindres ([facility]). Dr. Escamilla agrees with current care and management of pt and accepts admission.   Admitting Service: Hospital medicine   Admitting Physician: Dr. Escamilla  Admit to: Observation    3:37 PM: Re-evaluated pt. I have discussed test results, shared treatment plan, and the need for admission with patient and family at bedside. Pt and family express understanding at this time and agree with all information. All questions answered. Pt and family have no further questions or concerns at this time. Pt is ready for admit.      ED Medication(s):  Medications   moxifloxacin 400 mg/250 mL IVPB 400 mg (not administered)       New Prescriptions    No medications on file             Medical Decision Making    Medical Decision Making:   Clinical Tests:   Lab Tests: Ordered and Reviewed  Radiological Study: Ordered and Reviewed  Medical Tests: Ordered and Reviewed           Scribe Attestation:   Scribe #1: I performed the above scribed service and the documentation accurately describes the services I performed. I attest to the accuracy of the note.    Attending:   Physician Attestation Statement for Scribe #1: I, Zaid Park Jr., MD, personally performed the services described in this documentation, as scribed by Jacob Reis, in my presence, and it is both accurate and complete.          Clinical Impression       ICD-10-CM ICD-9-CM   1. Hypercapnia R06.89 786.09   2. Shortness of breath R06.02 786.05   3. Altered mental state R41.82 780.97   4. Pneumonia of right lower lobe due to infectious organism J18.1 486   5. Confusion R41.0 298.9       Disposition:   Disposition: Placed in Observation  Condition: Stable         Zaid Park Jr., MD  02/12/18 2652

## 2018-02-12 NOTE — ASSESSMENT & PLAN NOTE
Creatinine 1.6 today, up from previous of 1.1  Gentle hydration   Normal saline 75 ml/hr   Hold Lasix and HCTZ  Hold Exforge

## 2018-02-12 NOTE — SUBJECTIVE & OBJECTIVE
Past Medical History:   Diagnosis Date    Anemia 2013    Asthma     CHF (congestive heart failure)     COPD (chronic obstructive pulmonary disease)     COPD (chronic obstructive pulmonary disease) 2012    Diverticular disease     Gout 2012    Hyperlipidemia     Hypertension     Other and unspecified hyperlipidemia     Stroke        No past surgical history on file.    Review of patient's allergies indicates:   Allergen Reactions    Cephalexin Anaphylaxis       Family History     Problem Relation (Age of Onset)    Stroke Cousin        Social History Main Topics    Smoking status: Former Smoker     Types: Cigarettes     Quit date: 7/9/2006    Smokeless tobacco: Not on file    Alcohol use No    Drug use: No    Sexual activity: Not on file         Review of Systems   Constitutional: Positive for diaphoresis and fatigue. Negative for fever and unexpected weight change.   HENT: Positive for congestion. Negative for postnasal drip, rhinorrhea, sinus pressure and sneezing.    Eyes: Negative.    Respiratory: Positive for cough, choking, shortness of breath, wheezing and stridor.    Cardiovascular: Positive for palpitations and leg swelling. Negative for chest pain.   Gastrointestinal: Positive for nausea. Negative for abdominal pain.   Endocrine: Negative.    Genitourinary: Negative.    Musculoskeletal: Positive for arthralgias. Negative for back pain.   Skin: Negative for rash.   Allergic/Immunologic: Negative.    Neurological: Positive for dizziness, weakness and light-headedness. Negative for syncope.   Hematological: Negative.  Negative for adenopathy. Does not bruise/bleed easily.   Psychiatric/Behavioral: Positive for confusion. Negative for dysphoric mood and sleep disturbance. The patient is not nervous/anxious.      Objective:     Vital Signs (Most Recent):  Temp: 97.5 °F (36.4 °C) (02/12/18 1454)  Pulse: 81 (02/12/18 1541)  Resp: 18 (02/12/18 1501)  BP: 115/75 (02/12/18 1541)  SpO2: 96 % (02/12/18  1501) Vital Signs (24h Range):  Temp:  [97.5 °F (36.4 °C)] 97.5 °F (36.4 °C)  Pulse:  [77-81] 81  Resp:  [16-18] 18  SpO2:  [95 %-97 %] 96 %  BP: (110-129)/(68-82) 115/75        There is no height or weight on file to calculate BMI.    No intake or output data in the 24 hours ending 02/12/18 1613    Physical Exam   Constitutional: He appears well-developed and well-nourished.   HENT:   Head: Normocephalic and atraumatic.   Mouth/Throat: Oropharyngeal exudate present.   Eyes: Conjunctivae are normal. Pupils are equal, round, and reactive to light.   Neck: Neck supple. No JVD present. No tracheal deviation present. No thyromegaly present.   Cardiovascular: Normal rate, regular rhythm and normal heart sounds.    No murmur heard.  Pulmonary/Chest: Effort normal. He has decreased breath sounds. He has wheezes in the right lower field and the left lower field. He has no rhonchi. He has no rales.   Abdominal: Soft. Bowel sounds are normal.   Musculoskeletal: Normal range of motion. He exhibits no edema or tenderness.   Lymphadenopathy:     He has no cervical adenopathy.   Neurological: He displays abnormal reflex. A sensory deficit is present. He exhibits abnormal muscle tone.   Right hemiparesis   Skin: Skin is warm and dry.   Nursing note and vitals reviewed.      Vents:       Lines/Drains/Airways     Peripheral Intravenous Line                 Peripheral IV - Single Lumen 02/09/18 2100 Right Forearm 2 days         Peripheral IV - Single Lumen 02/12/18 1355 Left Forearm less than 1 day                Significant Labs:    CBC/Anemia Profile:    Recent Labs  Lab 02/12/18  1441   WBC 6.18   HGB 12.8*   HCT 44.5      MCV 90   RDW 15.4*        Chemistries:    Recent Labs  Lab 02/12/18  1441      K 3.8   CL 96   CO2 34*   BUN 41*   CREATININE 1.6*   CALCIUM 8.9   ALBUMIN 2.6*   PROT 6.0   BILITOT 0.6   ALKPHOS 80   ALT 28   AST 21       BMP:   Recent Labs  Lab 02/12/18  1441         K 3.8   CL 96    CO2 34*   BUN 41*   CREATININE 1.6*   CALCIUM 8.9     CMP:   Recent Labs  Lab 02/12/18  1441      K 3.8   CL 96   CO2 34*      BUN 41*   CREATININE 1.6*   CALCIUM 8.9   PROT 6.0   ALBUMIN 2.6*   BILITOT 0.6   ALKPHOS 80   AST 21   ALT 28   ANIONGAP 11   EGFRNONAA 43*     All pertinent labs within the past 24 hours have been reviewed.    Significant Imaging:   CXR: I have reviewed all pertinent results/findings within the past 24 hours and my personal findings are:  RLL infitlrate, COPD

## 2018-02-12 NOTE — ASSESSMENT & PLAN NOTE
Consult Pulmonary   BiPaP 10/5  Avelox 400 mg daily  Duo nebs   IV steroids   CT of chest without contrast

## 2018-02-12 NOTE — PROGRESS NOTES
Pulmonary / Critical Care Progress Note    Called to patient bedside for evaluation of BiPAP  Not tolerating very well  Will switch to AVAPS  Patient evaluated at the bedside. Chart reviewed and patient examined. Progress  discussed with critical care nurse and nursing update given.  While at the patient's bedside hemodynamic effects of medications monitored. .   Family updated.    Critical care time was exclusive of separately billable procedures and treating other patients.     Critical care was time spent personally by me on the following activities: development of treatment plan with patient or surrogate, discussions with consultants, evaluation of patient's response to treatment, examination of patient, obtaining history from patient or surrogate, ordering and performing treatments and interventions, ordering and review of laboratory studies, ordering and personal review of radiographic studies, pulse oximetry, Cardiac rhythm strips reviewed, re-evaluation of patient's condition, review of old charts    Critical Care time spent at bedside: 30 min    Bob Ziegler MD  Pulmonary / Critical Care Medicine

## 2018-02-12 NOTE — SUBJECTIVE & OBJECTIVE
Past Medical History:   Diagnosis Date    Anemia 2013    Asthma     Atrial fibrillation     CHF (congestive heart failure)     COPD (chronic obstructive pulmonary disease)     COPD (chronic obstructive pulmonary disease) 2012    Diverticular disease     Gout 2012    Hyperlipidemia     Hypertension     Other and unspecified hyperlipidemia     Stroke        History reviewed. No pertinent surgical history.    Review of patient's allergies indicates:   Allergen Reactions    Cephalexin Anaphylaxis       No current facility-administered medications on file prior to encounter.      Current Outpatient Prescriptions on File Prior to Encounter   Medication Sig    aclidinium bromide (TUDORZA PRESSAIR) 400 mcg/actuation AePB Inhale 1 puff into the lungs 2 (two) times daily.    albuterol (PROVENTIL/VENTOLIN) 90 mcg/actuation inhaler Inhale 2 puffs into the lungs every 6 (six) hours as needed.    allopurinol (ZYLOPRIM) 300 MG tablet Take 300 mg by mouth once daily.    amlodipine-valsartan (EXFORGE)  mg per tablet Take 1 tablet by mouth once daily.    apixaban 5 mg Tab Take 1 tablet (5 mg total) by mouth 2 (two) times daily.    baclofen (LIORESAL) 10 MG tablet Take by mouth. 1 tablet Oral Every 8 hours    carvedilol (COREG) 12.5 MG tablet Take 1 tablet (12.5 mg total) by mouth 2 (two) times daily.    diazepam (VALIUM) 5 MG tablet Take 5 mg by mouth every 12 (twelve) hours as needed for Anxiety.    furosemide (LASIX) 40 MG tablet Take 1 tablet (40 mg total) by mouth once daily.    gabapentin (NEURONTIN) 300 MG capsule Take 1 capsule (300 mg total) by mouth 2 (two) times daily. 1 capsule Oral Three times a day    hydrochlorothiazide (HYDRODIURIL) 25 MG tablet Take 25 mg by mouth 2 (two) times daily.     hydrocodone-acetaminophen 10-325mg (NORCO)  mg Tab Take 1 tablet by mouth every 8 (eight) hours as needed for Pain. 1 tablet Oral Twice a day    iron-vitamin C 65 mg iron- 125 mg TbEC Take by  mouth 3 (three) times daily.    leflunomide (ARAVA) 10 MG Tab Take 1 tablet (10 mg total) by mouth once daily.    multivitamin capsule Take 1 capsule by mouth once daily.    pramipexole (MIRAPEX) 0.5 MG tablet Take 0.5 mg by mouth 2 (two) times daily.    simvastatin (ZOCOR) 40 MG tablet Take 1 tablet (40 mg total) by mouth every evening. 1 tablet Oral Every day    tizanidine (ZANAFLEX) 4 MG tablet Take 4 mg by mouth nightly as needed.    fluticasone-salmeterol 250-50 mcg/dose (ADVAIR) 250-50 mcg/dose diskus inhaler Inhale 1 puff into the lungs 2 (two) times daily.     Family History     Problem Relation (Age of Onset)    Stroke Cousin        Social History Main Topics    Smoking status: Former Smoker     Types: Cigarettes     Quit date: 7/9/2006    Smokeless tobacco: Not on file    Alcohol use No    Drug use: No    Sexual activity: Not on file     Review of Systems   Constitutional: Positive for fatigue. Negative for chills, diaphoresis and fever.   HENT: Negative for congestion, ear discharge, rhinorrhea, sinus pressure, sore throat and trouble swallowing.    Eyes: Negative for discharge and visual disturbance.   Respiratory: Negative for apnea, cough, choking, chest tightness, shortness of breath, wheezing and stridor.    Cardiovascular: Negative for chest pain, palpitations and leg swelling.   Gastrointestinal: Negative for abdominal distention, abdominal pain, diarrhea, nausea and vomiting.   Endocrine: Negative for cold intolerance and heat intolerance.   Genitourinary: Negative for dysuria, frequency and hematuria.   Musculoskeletal: Negative for arthralgias, back pain, myalgias and neck pain.   Skin: Negative for pallor and rash.   Neurological: Positive for weakness. Negative for dizziness, seizures, syncope and headaches.   Psychiatric/Behavioral: Negative for agitation, confusion and sleep disturbance.     Objective:     Vital Signs (Most Recent):  Temp: 97.5 °F (36.4 °C) (02/12/18 1454)  Pulse:  81 (02/12/18 1541)  Resp: 18 (02/12/18 1501)  BP: 115/75 (02/12/18 1541)  SpO2: 96 % (02/12/18 1501) Vital Signs (24h Range):  Temp:  [97.5 °F (36.4 °C)] 97.5 °F (36.4 °C)  Pulse:  [77-81] 81  Resp:  [16-18] 18  SpO2:  [95 %-97 %] 96 %  BP: (110-129)/(68-82) 115/75        There is no height or weight on file to calculate BMI.    Physical Exam   Constitutional: He appears distressed.   HENT:   Head: Normocephalic and atraumatic.   Eyes: Right eye exhibits no discharge. Left eye exhibits no discharge.   Neck: No JVD present.   Cardiovascular: An irregular rhythm present. Exam reveals no gallop and no friction rub.    No murmur heard.  Pulmonary/Chest: No respiratory distress. He has no wheezes. He has no rales. He exhibits no tenderness.   Abdominal: Soft. Bowel sounds are normal. He exhibits no distension and no mass. There is no tenderness. There is no rebound and no guarding. No hernia.   Musculoskeletal: He exhibits no edema or deformity.   Neurological: He is alert.   Skin: Skin is warm and dry. Capillary refill takes 2 to 3 seconds. He is not diaphoretic.   Nursing note and vitals reviewed.          Significant Labs:   CBC:   Recent Labs  Lab 02/12/18  1441   WBC 6.18   HGB 12.8*   HCT 44.5        CMP:   Recent Labs  Lab 02/12/18  1441      K 3.8   CL 96   CO2 34*      BUN 41*   CREATININE 1.6*   CALCIUM 8.9   PROT 6.0   ALBUMIN 2.6*   BILITOT 0.6   ALKPHOS 80   AST 21   ALT 28   ANIONGAP 11   EGFRNONAA 43*     Cardiac Markers:   Recent Labs  Lab 02/12/18  1441   *     Troponin:   Recent Labs  Lab 02/12/18  1441   TROPONINI 0.020       Significant Imaging:     Imaging Results          CT Head Without Contrast (Final result)  Result time 02/12/18 15:34:16    Final result by Sohail Velázquez MD (02/12/18 15:34:16)                 Impression:         No CT evidence of acute intracranial abnormality.        All CT scans at this facility use dose modulation, iterative reconstruction and/or  weight based dosing when appropriate to reduce radiation dose to as low as reasonably achievable.      Electronically signed by: MARIELLE KNIGHT MD  Date:     02/12/18  Time:    15:34              Narrative:    Exam: CT scan of the head without contrast    Clinical History:  Decreased alertness . Altered mental status     Technique: Axial CT imaging was performed through the head without intravenous contrast.     Comparison: Head CT, 6/9/17    Findings:    No hydrocephalus, midline shift, mass effect, or acute intracranial hemorrhage. There is mild chronic small vessel ischemic change of alignment. There is mild cerebral atrophy. The visualized paranasal sinuses and mastoid air cells are clear. The skull is intact.                             X-Ray Chest AP Portable (Final result)  Result time 02/12/18 14:11:49    Final result by KANCHAN Mirza Sr., MD (02/12/18 14:11:49)                 Impression:        1. There has been interval development of a mild amount of alveolar consolidation in the base of the right lung. This is characteristic of atelectasis or subtle pneumonia.  2. The size of the heart is prominent. This may be secondary to magnification.      Electronically signed by: KANCHAN MIRZA MD  Date:     02/12/18  Time:    14:11              Narrative:    One-view chest x-ray    Clinical History: Congestive heart failure    Finding: Comparison was made to prior examination performed on 2/9/2018. The size of the heart is prominent. There has been interval development of a mild amount of alveolar consolidation in the base of the right lung. The left lung is clear. There is no pneumothorax.  The costophrenic angles are sharp.

## 2018-02-12 NOTE — ED NOTES
Pt had near syncope episode this morning. Denies pain, feels much better now. Pt family stated pt found slouched over, disoriented, blood pressure was low 70/55. When EMS arrived BP was elevated. Pt has hx of 2 strokes in the past and left side weakness.

## 2018-02-12 NOTE — HPI
68 y/o hospitalized from 2/7-2/9/2018 for Hypercarbic respiratory failure and new onset Atrial Fibrillation  presnets to Emergency Room with Altered Mental Status x 1 day. He reports progressive dyspnea, wheezing, bilateral leg edema, orthopnea and PND for the past two weeks.Patient reports cough and difficulty breathing and denies vomiting, abdominal pain and diarrhea. Patient denies recent sick contacts.   Patient does not have new pets. Patient does not have a history of asthma. Patient does not have a history of environmental allergens. Patient has not traveled recently. Patient does have a history of smoking. He smoked 3 PPD for 20 years ( 60 pack years). He quit smoking 13 years ago.  PPD was Negative. Brought in by brother

## 2018-02-13 PROBLEM — J44.1 COPD EXACERBATION: Status: ACTIVE | Noted: 2018-02-12

## 2018-02-13 LAB
ALBUMIN SERPL BCP-MCNC: 2.5 G/DL
ALP SERPL-CCNC: 89 U/L
ALT SERPL W/O P-5'-P-CCNC: 25 U/L
ANION GAP SERPL CALC-SCNC: 11 MMOL/L
AST SERPL-CCNC: 19 U/L
BACTERIA #/AREA URNS HPF: ABNORMAL /HPF
BASOPHILS # BLD AUTO: 0 K/UL
BASOPHILS NFR BLD: 0 %
BILIRUB SERPL-MCNC: 0.6 MG/DL
BILIRUB UR QL STRIP: NEGATIVE
BUN SERPL-MCNC: 39 MG/DL
CALCIUM SERPL-MCNC: 9.2 MG/DL
CHLORIDE SERPL-SCNC: 97 MMOL/L
CLARITY UR: CLEAR
CO2 SERPL-SCNC: 34 MMOL/L
COLOR UR: YELLOW
CREAT SERPL-MCNC: 1.4 MG/DL
DIFFERENTIAL METHOD: ABNORMAL
EOSINOPHIL # BLD AUTO: 0 K/UL
EOSINOPHIL NFR BLD: 0 %
ERYTHROCYTE [DISTWIDTH] IN BLOOD BY AUTOMATED COUNT: 15.2 %
EST. GFR  (AFRICAN AMERICAN): 59 ML/MIN/1.73 M^2
EST. GFR  (NON AFRICAN AMERICAN): 51 ML/MIN/1.73 M^2
GLUCOSE SERPL-MCNC: 138 MG/DL
GLUCOSE UR QL STRIP: NEGATIVE
HCT VFR BLD AUTO: 46.8 %
HGB BLD-MCNC: 13.5 G/DL
HGB UR QL STRIP: ABNORMAL
HYALINE CASTS #/AREA URNS LPF: 1 /LPF
KETONES UR QL STRIP: NEGATIVE
LEUKOCYTE ESTERASE UR QL STRIP: NEGATIVE
LYMPHOCYTES # BLD AUTO: 0.5 K/UL
LYMPHOCYTES NFR BLD: 10.2 %
MCH RBC QN AUTO: 26.2 PG
MCHC RBC AUTO-ENTMCNC: 28.8 G/DL
MCV RBC AUTO: 91 FL
MICROSCOPIC COMMENT: ABNORMAL
MONOCYTES # BLD AUTO: 0 K/UL
MONOCYTES NFR BLD: 0.9 %
NEUTROPHILS # BLD AUTO: 4 K/UL
NEUTROPHILS NFR BLD: 89.3 %
NITRITE UR QL STRIP: NEGATIVE
PH UR STRIP: 6 [PH] (ref 5–8)
PLATELET # BLD AUTO: 332 K/UL
PMV BLD AUTO: 9.8 FL
POTASSIUM SERPL-SCNC: 4.9 MMOL/L
PROT SERPL-MCNC: 6.2 G/DL
PROT UR QL STRIP: NEGATIVE
RBC # BLD AUTO: 5.16 M/UL
RBC #/AREA URNS HPF: 75 /HPF (ref 0–4)
SODIUM SERPL-SCNC: 142 MMOL/L
SP GR UR STRIP: 1.02 (ref 1–1.03)
SQUAMOUS #/AREA URNS HPF: 1 /HPF
URN SPEC COLLECT METH UR: ABNORMAL
UROBILINOGEN UR STRIP-ACNC: NEGATIVE EU/DL
WBC # BLD AUTO: 4.5 K/UL
WBC #/AREA URNS HPF: 1 /HPF (ref 0–5)

## 2018-02-13 PROCEDURE — G8979 MOBILITY GOAL STATUS: HCPCS | Mod: CJ

## 2018-02-13 PROCEDURE — G8978 MOBILITY CURRENT STATUS: HCPCS | Mod: CK

## 2018-02-13 PROCEDURE — 25000003 PHARM REV CODE 250: Performed by: INTERNAL MEDICINE

## 2018-02-13 PROCEDURE — 36415 COLL VENOUS BLD VENIPUNCTURE: CPT

## 2018-02-13 PROCEDURE — 94660 CPAP INITIATION&MGMT: CPT

## 2018-02-13 PROCEDURE — 63600175 PHARM REV CODE 636 W HCPCS: Performed by: NURSE PRACTITIONER

## 2018-02-13 PROCEDURE — 81000 URINALYSIS NONAUTO W/SCOPE: CPT

## 2018-02-13 PROCEDURE — 99900035 HC TECH TIME PER 15 MIN (STAT)

## 2018-02-13 PROCEDURE — 25000003 PHARM REV CODE 250: Performed by: NURSE PRACTITIONER

## 2018-02-13 PROCEDURE — 51702 INSERT TEMP BLADDER CATH: CPT

## 2018-02-13 PROCEDURE — 97530 THERAPEUTIC ACTIVITIES: CPT

## 2018-02-13 PROCEDURE — 97162 PT EVAL MOD COMPLEX 30 MIN: CPT

## 2018-02-13 PROCEDURE — 97166 OT EVAL MOD COMPLEX 45 MIN: CPT

## 2018-02-13 PROCEDURE — 21400001 HC TELEMETRY ROOM

## 2018-02-13 PROCEDURE — 27000221 HC OXYGEN, UP TO 24 HOURS

## 2018-02-13 PROCEDURE — 80053 COMPREHEN METABOLIC PANEL: CPT

## 2018-02-13 PROCEDURE — G8987 SELF CARE CURRENT STATUS: HCPCS | Mod: CL

## 2018-02-13 PROCEDURE — 25000242 PHARM REV CODE 250 ALT 637 W/ HCPCS: Performed by: INTERNAL MEDICINE

## 2018-02-13 PROCEDURE — 99291 CRITICAL CARE FIRST HOUR: CPT | Mod: ,,, | Performed by: INTERNAL MEDICINE

## 2018-02-13 PROCEDURE — G8988 SELF CARE GOAL STATUS: HCPCS | Mod: CJ

## 2018-02-13 PROCEDURE — 94640 AIRWAY INHALATION TREATMENT: CPT

## 2018-02-13 PROCEDURE — 97535 SELF CARE MNGMENT TRAINING: CPT

## 2018-02-13 PROCEDURE — 85025 COMPLETE CBC W/AUTO DIFF WBC: CPT

## 2018-02-13 PROCEDURE — 63600175 PHARM REV CODE 636 W HCPCS: Performed by: INTERNAL MEDICINE

## 2018-02-13 PROCEDURE — G8980 MOBILITY D/C STATUS: HCPCS | Mod: CI

## 2018-02-13 RX ORDER — MOXIFLOXACIN HYDROCHLORIDE 400 MG/1
400 TABLET ORAL DAILY
Status: DISCONTINUED | OUTPATIENT
Start: 2018-02-13 | End: 2018-02-14 | Stop reason: HOSPADM

## 2018-02-13 RX ORDER — FAMOTIDINE 20 MG/1
20 TABLET, FILM COATED ORAL 2 TIMES DAILY
Status: DISCONTINUED | OUTPATIENT
Start: 2018-02-13 | End: 2018-02-14 | Stop reason: HOSPADM

## 2018-02-13 RX ADMIN — IPRATROPIUM BROMIDE AND ALBUTEROL SULFATE 3 ML: .5; 3 SOLUTION RESPIRATORY (INHALATION) at 01:02

## 2018-02-13 RX ADMIN — APIXABAN 5 MG: 2.5 TABLET, FILM COATED ORAL at 10:02

## 2018-02-13 RX ADMIN — IPRATROPIUM BROMIDE AND ALBUTEROL SULFATE 3 ML: .5; 3 SOLUTION RESPIRATORY (INHALATION) at 07:02

## 2018-02-13 RX ADMIN — METHYLPREDNISOLONE SODIUM SUCCINATE 80 MG: 40 INJECTION, POWDER, FOR SOLUTION INTRAMUSCULAR; INTRAVENOUS at 06:02

## 2018-02-13 RX ADMIN — ASPIRIN 81 MG 81 MG: 81 TABLET ORAL at 08:02

## 2018-02-13 RX ADMIN — APIXABAN 5 MG: 2.5 TABLET, FILM COATED ORAL at 08:02

## 2018-02-13 RX ADMIN — FAMOTIDINE 20 MG: 20 TABLET, FILM COATED ORAL at 11:02

## 2018-02-13 RX ADMIN — MOXIFLOXACIN HYDROCHLORIDE 400 MG: 400 TABLET, FILM COATED ORAL at 11:02

## 2018-02-13 RX ADMIN — FAMOTIDINE 20 MG: 20 TABLET, FILM COATED ORAL at 10:02

## 2018-02-13 RX ADMIN — METHYLPREDNISOLONE SODIUM SUCCINATE 40 MG: 40 INJECTION, POWDER, FOR SOLUTION INTRAMUSCULAR; INTRAVENOUS at 11:02

## 2018-02-13 RX ADMIN — SIMVASTATIN 40 MG: 20 TABLET, FILM COATED ORAL at 10:02

## 2018-02-13 RX ADMIN — CARVEDILOL 12.5 MG: 12.5 TABLET, FILM COATED ORAL at 08:02

## 2018-02-13 RX ADMIN — CARVEDILOL 12.5 MG: 12.5 TABLET, FILM COATED ORAL at 10:02

## 2018-02-13 RX ADMIN — SODIUM CHLORIDE: 0.9 INJECTION, SOLUTION INTRAVENOUS at 07:02

## 2018-02-13 RX ADMIN — METHYLPREDNISOLONE SODIUM SUCCINATE 40 MG: 40 INJECTION, POWDER, FOR SOLUTION INTRAMUSCULAR; INTRAVENOUS at 05:02

## 2018-02-13 NOTE — ED NOTES
No change in pt condition. Remains sleepy, CM in place. Side rails up x 2, bed in low position, call bell in reach.

## 2018-02-13 NOTE — NURSING TRANSFER
Nursing Transfer Note      2/13/2018     Transfer To: 237    Transfer via wheelchair    Transfer with NC to 4L O2 and cardiac monitor    Transported by AILIN Nation RN    Medicines sent: none    Chart send with patient: Yes    Notified: kerline at bedside    Report given to Vesta at bedside.

## 2018-02-13 NOTE — PT/OT/SLP EVAL
Occupational Therapy   Evaluation    Name: Rea Vail  MRN: [id number]  Admitting Diagnosis:  COPD exacerbation      Recommendations:     Discharge Recommendations:    Discharge Equipment Recommendations:  none  Barriers to discharge:  Decreased caregiver support    History:     Occupational Profile:  Living Environment: lives alone in 1 stor  Previous level of function: (i) with adl and functional mobility and t/f's  Roles and Routines: occupational therapy  Equipment Owned:  none  Assistance upon Discharge:     Past Medical History:   Diagnosis Date    Anemia 2013    Asthma     Atrial fibrillation     CHF (congestive heart failure)     COPD (chronic obstructive pulmonary disease)     COPD (chronic obstructive pulmonary disease) 2012    Diverticular disease     Gout 2012    Hyperlipidemia     Hypertension     Other and unspecified hyperlipidemia     Stroke        History reviewed. No pertinent surgical history.    Subjective     Chief Complaint: debility and generalized weakness  Patient/Family stated goals:  Communicated with: nurse Hart and epic chart review prior to session.  Pain/Comfort:  · Pain Rating 1: 0/10    Patients cultural, spiritual, Jainism conflicts given the current situation:      Objective:     Patient found with: oxygen, telemetry, peripheral IV, blood pressure cuff    General Precautions: Standard, fall   Orthopedic Precautions:N/A   Braces: N/A     Occupational Performance:    Bed Mobility:    · Patient completed Rolling/Turning to Right with minimum assistance  · Patient completed Scooting/Bridging with minimum assistance  · Patient completed Supine to Sit with minimum assistance    Functional Mobility/Transfers:  · Patient completed Sit <> Stand Transfer with minimum assistance  with  no assistive device and hand held assist   · Patient completed Bed <> Chair Transfer using Stand Pivot technique with minimum assistance with rolling walker  · Functional Mobility: MIN A  "WITH FUNCTIONAL MOBIKITY    Activities of Daily Living:  · Feeding:  supervision X1  · UB Dressing: minimum assistance x1  · LB Dressing: moderate assistance x1  · Toileting: maximal assistance x1, montoya placement         Cognitive/Visual Perceptual:  Cognitive/Psychosocial Skills:     -       Oriented to: Person, Place, Time and Situation   -       Follows Commands/attention:Follows multistep  commands  -       Communication: clear/fluent  -       Memory: No Deficits noted  -       Safety awareness/insight to disability: intact   Visual/Perceptual:      -Intact    Physical Exam:  Upper Extremity Range of Motion:     -       Right Upper Extremity: WFL  -       Left Upper Extremity: WFL  Upper Extremity Strength:    -       Right Upper Extremity: Deficits: mmt: 3/5 grossly  -       Left Upper Extremity: Deficits: mmt: 3/5 grossly   Strength:    -       Right Upper Extremity: mmt: 3/5 grossly  -       Left Upper Extremity: mmt: 3/5 grossly    Patient left up in chair with all lines intact, call button in reach and nurse notified    AMPA 6 Click:  AMPAC Total Score: 17    Treatment & Education:    Education:    Assessment:     Rea Vail is a 67 y.o. male with a medical diagnosis of COPD exacerbation.  He presents with Performance deficits affecting function are weakness, impaired functional mobilty, decreased safety awareness, impaired endurance, gait instability, impaired balance, impaired self care skills.      Rehab Prognosis:  Fair+; patient would benefit from acute skilled OT services to address these deficits and reach maximum level of function.         Clinical Decision Makin.  OT Mod:  "Pt evaluation falls under moderate complexity for evaluation coding due to identification of 3-5 performance deficits noted as stated above. Eval required Min/Mod assistance to complete on this date and detailed assessment(s) were utilized. Moreover, an expanded review of history and occupational profile " "obtained with additional review of cognitive, physical and psychosocial hx."     Plan:     Patient to be seen 3 x/week to address the above listed problems via self-care/home management, therapeutic exercises, therapeutic activities  · Plan of Care Expires:    · Plan of Care Reviewed with: patient    This Plan of care has been discussed with the patient who was involved in its development and understands and is in agreement with the identified goals and treatment plan    GOALS:    Occupational Therapy Goals        Problem: Occupational Therapy Goal    Goal Priority Disciplines Outcome Interventions   Occupational Therapy Goal     OT, PT/OT     Description:  ot goals to be met 2-20-18  1. Pt will tolerATE 1 SET X 15 REPS B UE ROM EXERCISE WITH MIN RESISTANCE  2. S WITH UE DRESSING  3. S WITH LE DRESSING  4. S WITH TOILET T/F'S                      Time Tracking:     OT Date of Treatment: 02/13/18  OT Start Time: 1230  OT Stop Time: 1300  OT Total Time (min): 30 min    Billable Minutes:Evaluation 15 minutes  Self Care/Home Management 15 minutes    Jolene Toney OT  2/13/2018    "

## 2018-02-13 NOTE — SUBJECTIVE & OBJECTIVE
Review of Systems   Constitutional: Negative for diaphoresis, fatigue, fever and unexpected weight change.   HENT: Negative for congestion, postnasal drip, rhinorrhea, sinus pressure and sneezing.    Eyes: Negative.    Respiratory: Negative for cough, choking, shortness of breath, wheezing and stridor.    Cardiovascular: Negative for chest pain, palpitations and leg swelling.   Gastrointestinal: Negative for abdominal pain and nausea.   Endocrine: Negative.    Genitourinary: Negative.    Musculoskeletal: Negative for arthralgias and back pain.   Skin: Negative for rash.   Allergic/Immunologic: Negative.    Neurological: Positive for dizziness. Negative for syncope, weakness and light-headedness.   Hematological: Negative.  Negative for adenopathy. Does not bruise/bleed easily.   Psychiatric/Behavioral: Negative for dysphoric mood and sleep disturbance. The patient is not nervous/anxious.      Objective:     Vital Signs (Most Recent):  Temp: 98.7 °F (37.1 °C) (02/13/18 1145)  Pulse: 69 (02/13/18 1145)  Resp: 18 (02/13/18 1145)  BP: 128/78 (02/13/18 1145)  SpO2: 97 % (02/13/18 1145) Vital Signs (24h Range):  Temp:  [97.5 °F (36.4 °C)-98.9 °F (37.2 °C)] 98.7 °F (37.1 °C)  Pulse:  [] 69  Resp:  [12-31] 18  SpO2:  [73 %-99 %] 97 %  BP: ()/(65-98) 128/78     Weight: 84.6 kg (186 lb 8.2 oz)  Body mass index is 28.36 kg/m².    Intake/Output Summary (Last 24 hours) at 02/13/18 1249  Last data filed at 02/13/18 1146   Gross per 24 hour   Intake             1170 ml   Output              715 ml   Net              455 ml      Physical Exam   Constitutional: He is oriented to person, place, and time. He appears well-developed and well-nourished.   HENT:   Head: Normocephalic and atraumatic.   Mouth/Throat: No oropharyngeal exudate.   Eyes: Conjunctivae are normal. Pupils are equal, round, and reactive to light.   Neck: Neck supple. No JVD present. No tracheal deviation present. No thyromegaly present.    Cardiovascular: Normal rate, regular rhythm and normal heart sounds.    No murmur heard.  Pulmonary/Chest: Effort normal. He has decreased breath sounds. He has wheezes. He has no rhonchi. He has no rales.   Abdominal: Soft. Bowel sounds are normal.   Musculoskeletal: Normal range of motion. He exhibits no edema or tenderness.   Lymphadenopathy:     He has no cervical adenopathy.   Neurological: He is alert and oriented to person, place, and time. He displays normal reflexes. No sensory deficit. He exhibits normal muscle tone.   Right hemiparesis   Skin: Skin is warm and dry.   Nursing note and vitals reviewed.      Significant Labs: All pertinent labs within the past 24 hours have been reviewed.    Significant Imaging: I have reviewed all pertinent imaging results/findings within the past 24 hours.

## 2018-02-13 NOTE — PLAN OF CARE
Assessment completed. Met with the patient. Patient acknwoledges and verbalized Advance Directives Living Will, pamphlet on d/c planning begins on admission and pharm bedside delivery. Patient stated he just became SOB and he came back to hospital. Patient lives alone but has nieces to assist in his care. CM discussed Skilled care. He said he was in agreement with Skilled care for d/c plan.  CM to f/u with PT/OT recommendations      02/13/18 8793   Discharge Assessment   Assessment Type Discharge Planning Assessment   Confirmed/corrected address and phone number on facesheet? Yes   Assessment information obtained from? Patient;Medical Record   Expected Length of Stay (days) (TBD)   Communicated expected length of stay with patient/caregiver no   Prior to hospitilization cognitive status: Alert/Oriented   Prior to hospitalization functional status: Independent   Current cognitive status: Alert/Oriented   Current Functional Status: Independent   Lives With alone   Patient's perception of discharge disposition home or selfcare   Readmission Within The Last 30 Days no previous admission in last 30 days   Patient currently being followed by outpatient case management? No   Patient currently receives any other outside agency services? No   Equipment Currently Used at Home none   Do you have any problems affording any of your prescribed medications? No   Is the patient taking medications as prescribed? yes   Does the patient have transportation home? Yes   Transportation Available family or friend will provide;car   Discharge Plan B Skilled Nursing Facility   Patient/Family In Agreement With Plan yes

## 2018-02-13 NOTE — NURSING
Patient transferred from ICU. VS stable, cardiac monitor in place. Bedside report received from GIANLUCA Hart. Plan of care reviewed with patient, communication board updated. Patient has no questions or complaints at this time. Will continue to monitor.

## 2018-02-13 NOTE — PROGRESS NOTES
[facility]  Critical Care Medicine  Progress Note    Patient Name: Rea Vail  MRN: [id number]  Admission Date: 2/12/2018  Hospital Length of Stay: 1 days  Code Status: Full Code  Attending Provider: Kai Escamilla MD  Primary Care Provider: Estiven Oseguera MD   Principal Problem: Acute hypercapnic respiratory failure    Subjective:     HPI:  66 y/o hospitalized from 2/7-2/9/2018 for Hypercarbic respiratory failure and new onset Atrial Fibrillation  presnets to Emergency Room with Altered Mental Status x 1 day. He reports progressive dyspnea, wheezing, bilateral leg edema, orthopnea and PND for the past two weeks.Patient reports cough and difficulty breathing and denies vomiting, abdominal pain and diarrhea. Patient denies recent sick contacts.   Patient does not have new pets. Patient does not have a history of asthma. Patient does not have a history of environmental allergens. Patient has not traveled recently. Patient does have a history of smoking. He smoked 3 PPD for 20 years ( 60 pack years). He quit smoking 13 years ago.  PPD was Negative. Brought in by [facility]/ICU Course:  2/12 Confused and disorientated in Emergency Room with elevated pCO2  2/13 Oriented and appropriate today    Past Medical History:   Diagnosis Date    Anemia 2013    Asthma     Atrial fibrillation     CHF (congestive heart failure)     COPD (chronic obstructive pulmonary disease)     COPD (chronic obstructive pulmonary disease) 2012    Diverticular disease     Gout 2012    Hyperlipidemia     Hypertension     Other and unspecified hyperlipidemia     Stroke        History reviewed. No pertinent surgical history.    Review of patient's allergies indicates:   Allergen Reactions    Cephalexin Anaphylaxis       Family History     Problem Relation (Age of Onset)    Stroke Cousin        Social History Main Topics    Smoking status: Former Smoker     Types: Cigarettes     Quit date: 7/9/2006     Smokeless tobacco: Not on file    Alcohol use No    Drug use: No    Sexual activity: Not on file         Review of Systems   Constitutional: Positive for diaphoresis and fatigue. Negative for fever and unexpected weight change.   HENT: Positive for congestion. Negative for postnasal drip, rhinorrhea, sinus pressure and sneezing.    Eyes: Negative.    Respiratory: Positive for cough, choking, shortness of breath, wheezing and stridor.    Cardiovascular: Positive for palpitations and leg swelling. Negative for chest pain.   Gastrointestinal: Positive for nausea. Negative for abdominal pain.   Endocrine: Negative.    Genitourinary: Negative.    Musculoskeletal: Positive for arthralgias. Negative for back pain.   Skin: Negative for rash.   Allergic/Immunologic: Negative.    Neurological: Positive for dizziness, weakness and light-headedness. Negative for syncope.   Hematological: Negative.  Negative for adenopathy. Does not bruise/bleed easily.   Psychiatric/Behavioral: Negative for dysphoric mood and sleep disturbance. The patient is not nervous/anxious.      Objective:     Vital Signs (Most Recent):  Temp: 97.7 °F (36.5 °C) (02/13/18 0730)  Pulse: (!) 148 (02/13/18 0900)  Resp: (!) 21 (02/13/18 0900)  BP: 125/75 (02/13/18 0900)  SpO2: (!) 94 % (02/13/18 0914) Vital Signs (24h Range):  Temp:  [97.5 °F (36.4 °C)-98.9 °F (37.2 °C)] 97.7 °F (36.5 °C)  Pulse:  [] 148  Resp:  [12-31] 21  SpO2:  [73 %-99 %] 94 %  BP: ()/(65-98) 125/75     Weight: 84.6 kg (186 lb 8.2 oz)  Body mass index is 28.36 kg/m².      Intake/Output Summary (Last 24 hours) at 02/13/18 1055  Last data filed at 02/13/18 0904   Gross per 24 hour   Intake             1035 ml   Output              670 ml   Net              365 ml       Physical Exam   Constitutional: He is oriented to person, place, and time. He appears well-developed and well-nourished.   HENT:   Head: Normocephalic and atraumatic.   Mouth/Throat: Oropharyngeal exudate  present.   Eyes: Conjunctivae are normal. Pupils are equal, round, and reactive to light.   Neck: Neck supple. No JVD present. No tracheal deviation present. No thyromegaly present.   Cardiovascular: Normal rate, regular rhythm and normal heart sounds.    No murmur heard.  Pulmonary/Chest: Effort normal. He has decreased breath sounds. He has wheezes in the right lower field and the left lower field. He has no rhonchi. He has no rales.   Abdominal: Soft. Bowel sounds are normal.   Musculoskeletal: Normal range of motion. He exhibits no edema or tenderness.   Lymphadenopathy:     He has no cervical adenopathy.   Neurological: He is alert and oriented to person, place, and time. He displays abnormal reflex. A sensory deficit is present. He exhibits abnormal muscle tone.   Right hemiparesis   Skin: Skin is warm and dry.   Nursing note and vitals reviewed.      Vents:  Oxygen Concentration (%): (S) 36 (02/13/18 0914)    Lines/Drains/Airways     Drain                 Urethral Catheter 02/13/18 0305 Latex less than 1 day          Peripheral Intravenous Line                 Peripheral IV - Single Lumen 02/12/18 1355 Left Forearm less than 1 day                Significant Labs:    CBC/Anemia Profile:    Recent Labs  Lab 02/12/18  1441 02/13/18  0445   WBC 6.18 4.50   HGB 12.8* 13.5*   HCT 44.5 46.8    332   MCV 90 91   RDW 15.4* 15.2*        Chemistries:    Recent Labs  Lab 02/12/18  1441 02/13/18  0445    142   K 3.8 4.9   CL 96 97   CO2 34* 34*   BUN 41* 39*   CREATININE 1.6* 1.4   CALCIUM 8.9 9.2   ALBUMIN 2.6* 2.5*   PROT 6.0 6.2   BILITOT 0.6 0.6   ALKPHOS 80 89   ALT 28 25   AST 21 19       BMP:     Recent Labs  Lab 02/13/18  0445   *      K 4.9   CL 97   CO2 34*   BUN 39*   CREATININE 1.4   CALCIUM 9.2     CMP:     Recent Labs  Lab 02/12/18  1441 02/13/18  0445    142   K 3.8 4.9   CL 96 97   CO2 34* 34*    138*   BUN 41* 39*   CREATININE 1.6* 1.4   CALCIUM 8.9 9.2   PROT 6.0  6.2   ALBUMIN 2.6* 2.5*   BILITOT 0.6 0.6   ALKPHOS 80 89   AST 21 19   ALT 28 25   ANIONGAP 11 11   EGFRNONAA 43* 51*     All pertinent labs within the past 24 hours have been reviewed.    Significant Imaging:   CXR: I have reviewed all pertinent results/findings within the past 24 hours and my personal findings are:  RLL infitlrate, COPD     Assessment/Plan:     Neuro   Encephalopathy, metabolic - hypercarbia    2/13 improved        Pulmonary   Acute on chronic respiratory failure with hypoxia and hypercapnia    Bronchodilators, low flow oxygen , BiPAP  2/13 improved , BiPAP at night        Renal/   ANTHONY (acute kidney injury)    2/13 improved        Other   Abnormal CXR    CT of chest           Critical Care Daily Checklist:    A: Awake: RASS Goal/Actual Goal:    Actual: Ceron Agitation Sedation Scale (RASS): Alert and calm   B: Spontaneous Breathing Trial Performed?     C: SAT & SBT Coordinated?  na                      D: Delirium: CAM-ICU Overall CAM-ICU: Negative   E: Early Mobility Performed? Yes   F: Feeding Goal:    Status:     Current Diet Order   Procedures    Diet Cardiac Fluid - 1500mL     Order Specific Question:   Fluid restriction:     Answer:   Fluid - 1500mL      AS: Analgesia/Sedation adequate   T: Thromboembolic Prophylaxis Yea    H: HOB > 300 Yes   U: Stress Ulcer Prophylaxis (if needed) yes   G: Glucose Control adequate   B: Bowel Function Stool Occurrence: 0   I: Indwelling Catheter (Lines & [place]) Necessity Will d/c   D: De-escalation of Antimicrobials/Pharmacotherapies na    Plan for the day/ETD transfer    Code Status:  Family/Goals of Care: Full Code  discuseed     Critical Care Time: 40 minutes  Critical secondary to Patient has a condition that poses threat to life and bodily function: Severe Respiratory Distress      Critical care was time spent personally by me on the following activities: development of treatment plan with patient or surrogate and bedside caregivers, discussions  with consultants, evaluation of patient's response to treatment, examination of patient, ordering and performing treatments and interventions, ordering and review of laboratory studies, ordering and review of radiographic studies, pulse oximetry, re-evaluation of patient's condition. This critical care time did not overlap with that of any other provider or involve time for any procedures.     Bob Ziegler MD  Critical Care Medicine  [facility]

## 2018-02-13 NOTE — ASSESSMENT & PLAN NOTE
Copd/michael in setting hypoventilation syndrome   Will need bipap or trilogy on discharge to use at night

## 2018-02-13 NOTE — PLAN OF CARE
02/13/18 1325   Readmission Questionnaire   At the time of your discharge, did someone talk to you about what your health problems were? Yes   At the time of discharge, did someone talk to you about what to watch out for regarding worsening of your health problem? Yes   At the time of discharge, did someone talk to you about what to do if you experienced worsening of your health problem? Yes   At the time of discharge, did someone talk to you about which medication to take when you left the hospital and which ones to stop taking? Yes   At the time of discharge, did someone talk to you about when and where to follow up with a doctor after you left the hospital? Yes   How often do you need to have someone help you when you read instructions, pamphlets, or other written material from your doctor or pharmacy? Often   Do you have problems taking your medications as prescribed? No   Do you have any problems affording any of  your prescribed medications? No   Do you have problems obtaining/receiving your medications? No   Does the patient have transportation to healthcare appointments? Yes   Living Arrangements house   Does the patient have family/friends to help with healtcare needs after discharge? yes   Who are your caregiver(s) and their phone number(s)? Jay Zheng [phone]   Does your caregiver provide all the help you need? Yes   Are you currently feeling confused? No   Are you currently having problems thinking? No   Are you currently having memory problems? No   Have you felt down, depressed, or hopeless? 0   Have you felt little interest or pleasure in doing things? 0   In the last 7 days, my sleep quality was: fair

## 2018-02-13 NOTE — PT/OT/SLP EVAL
Physical Therapy Evaluation    Patient Name:  Rea Vail   MRN:  [id number]    Recommendations:     Discharge Recommendations:  nursing facility, skilled, home health PT (SNF VS HHPT)   Discharge Equipment Recommendations: walker, rolling (BUT WILL FURTHER ASSESS RW NEED)   Barriers to discharge: None    Assessment:     Rea Vail is a 67 y.o. male admitted with a medical diagnosis of COPD exacerbation.  He presents with the following impairments/functional limitations:  weakness, impaired endurance, gait instability, impaired functional mobilty, impaired balance, impaired cardiopulmonary response to activity, decreased safety awareness .    Rehab Prognosis:  GOOD; patient would benefit from acute skilled PT services to address these deficits and reach maximum level of function.      Recent Surgery: * No surgery found *      Plan:     During this hospitalization, patient to be seen 5 x/week to address the above listed problems via gait training, therapeutic activities, therapeutic exercises  · Plan of Care Expires:  02/20/18   Plan of Care Reviewed with: patient    Subjective     Communicated with MERLYN AND FIFI prior to session.  Patient found IN SUPINE upon PT entry to room, agreeable to evaluation.      Chief Complaint: NONE  Patient comments/goals: TO GET BETTER  Pain/Comfort:  · Pain Rating 1: 0/10    Patients cultural, spiritual, Nondenominational conflicts given the current situation:      Living Environment:  PT LIVES ALONE IN A SINGLE DONG HOME, AND WAS DRIVING TOO  Prior to admission, patients level of function was INDEPENDENT.  Patient has the following equipment: none.  DME owned (not currently used): none.  Upon discharge, patient will have assistance from FAMILY.    Objective:     Patient found with: telemetry, montoya catheter, pulse ox (continuous), oxygen, blood pressure cuff     General Precautions: Standard, fall   Orthopedic Precautions:N/A   Braces: N/A     Exams:  · Cognitive Exam:   Patient is oriented to Person, Place, Time and Situation and follows 100% of COMPLEX commands   · RLE ROM: WFL  · RLE Strength: WFL  · LLE ROM: WFL  · LLE Strength: WFL    Functional Mobility:  · Bed Mobility:     · Supine to Sit: minimum assistance  · Transfers:     · Sit to Stand:  minimum assistance with no AD  · Bed to Chair: minimum assistance with  no AD  using  Step Transfer  · Gait: PT AMBULATED 5 FEET HHA LUISA WITH NO LOB    AM-PAC 6 CLICK MOBILITY  Total Score:16       Therapeutic Activities and Exercises:   PT EDUCATION AND SAFETY INSTRUCTION, SAT ON SIDE OF BED 6 MINS    Patient left up in chair with all lines intact, call button in reach and NURSE notified.    GOALS:    Physical Therapy Goals        Problem: Physical Therapy Goal    Goal Priority Disciplines Outcome Goal Variances Interventions   Physical Therapy Goal     PT/OT, PT      Description:  Goals to be met by: 18     Patient will increase functional independence with mobility by performin. Supine to sit with Stand-by Assistance  2. Bed to chair transfer with Stand-by Assistance using Rolling Walker  3. Gait  x 150 feet with Stand-by Assistance using Rolling Walker.   4. Lower extremity exercise program x20 reps per handout, with supervision                      History:     Past Medical History:   Diagnosis Date    Anemia     Asthma     Atrial fibrillation     CHF (congestive heart failure)     COPD (chronic obstructive pulmonary disease)     COPD (chronic obstructive pulmonary disease) 2012    Diverticular disease     Gout 2012    Hyperlipidemia     Hypertension     Other and unspecified hyperlipidemia     Stroke        History reviewed. No pertinent surgical history.    Clinical Decision Making:     History  Co-morbidities and personal factors that may impact the plan of care Examination  Body Structures and Functions, activity limitations and participation restrictions that may impact the plan of care Clinical  Presentation   Decision Making/ Complexity Score   Co-morbidities:   [] Time since onset of injury / illness / exacerbation  [] Status of current condition  []Patient's cognitive status and safety concerns    [] Multiple Medical Problems (see med hx)  Personal Factors:   [] Patient's age  [] Prior Level of function   [] Patient's home situation (environment and family support)  [] Patient's level of motivation  [] Expected progression of patient      HISTORY:(criteria)    [] 54025 - no personal factors/history    [] 20568 - has 1-2 personal factor/comorbidity     [x] 84707 - has >3 personal factor/comorbidity     Body Regions:  [] Objective examination findings  [] Head     []  Neck  [] Trunk   [] Upper Extremity  [] Lower Extremity    Body Systems:  [] For communication ability, affect, cognition, language, and learning style: the assessment of the ability to make needs known, consciousness, orientation (person, place, and time), expected emotional /behavioral responses, and learning preferences (eg, learning barriers, education  needs)  [] For the neuromuscular system: a general assessment of gross coordinated movement (eg, balance, gait, locomotion, transfers, and transitions) and motor function  (motor control and motor learning)  [] For the musculoskeletal system: the assessment of gross symmetry, gross range of motion, gross strength, height, and weight  [] For the integumentary system: the assessment of pliability(texture), presence of scar formation, skin color, and skin integrity  [] For cardiovascular/pulmonary system: the assessment of heart rate, respiratory rate, blood pressure, and edema     Activity limitations:    [] Patient's cognitive status and saf ety concerns          [] Status of current condition      [] Weight bearing restriction  [] Cardiopulmunary Restriction    Participation Restrictions:   [x] Goals and goal agreement with the patient     [x] Rehab potential (prognosis) and probable  outcome      Examination of Body System: (criteria)    [] 89130 - addressing 1-2 elements    [x] 11150 - addressing a total of 3 or more elements     [] 86816 -  Addressing a total of 4 or more elements         Clinical Presentation: (criteria)  Stable - 30157     On examination of body system using standardized tests and measures patient presents with 3 or more elements from any of the following: body structures and functions, activity limitations, and/or participation restrictions.  Leading to a clinical presentation that is considered stable and/or uncomplicated                              Clinical Decision Making  (Eval Complexity):  Choose One     Time Tracking:     PT Received On: 02/13/18  PT Start Time: 1230     PT Stop Time: 1300  PT Total Time (min): 30 min     Billable Minutes: Evaluation 15 and Therapeutic Activity 15      Florencia Morrison PT  02/13/2018

## 2018-02-13 NOTE — ED NOTES
Pt condition remains unchanged. Pt continues to sleep. Wakes to loud verbal and answers questions appropriately. Pt's brother speaking to pt in native language and pt answering him. Pt's brother reports pt is hungry and asking for food. Skin warm and dry, respirations even non-labored, diminished breath sounds, 02 sat ranges from 90 - 96. Nasal cannula in place at 3 lpm. CM in place with A-fib, rate of 82. Side rails up x 2, call bell in reach, bed in low position. Brother at bedside.

## 2018-02-13 NOTE — PLAN OF CARE
Problem: Patient Care Overview  Goal: Plan of Care Review  Pt arrived from ER last hs for AVAPs therapy and close monitoring, VSS.  Pt tolerating AVAPS therapy, sats mid 90s on 30%, sats dropped to 83% off AVAP therapy.  POC discussed, all questions answered.

## 2018-02-13 NOTE — SUBJECTIVE & OBJECTIVE
Past Medical History:   Diagnosis Date    Anemia 2013    Asthma     Atrial fibrillation     CHF (congestive heart failure)     COPD (chronic obstructive pulmonary disease)     COPD (chronic obstructive pulmonary disease) 2012    Diverticular disease     Gout 2012    Hyperlipidemia     Hypertension     Other and unspecified hyperlipidemia     Stroke        History reviewed. No pertinent surgical history.    Review of patient's allergies indicates:   Allergen Reactions    Cephalexin Anaphylaxis       Family History     Problem Relation (Age of Onset)    Stroke Cousin        Social History Main Topics    Smoking status: Former Smoker     Types: Cigarettes     Quit date: 7/9/2006    Smokeless tobacco: Not on file    Alcohol use No    Drug use: No    Sexual activity: Not on file         Review of Systems   Constitutional: Positive for diaphoresis and fatigue. Negative for fever and unexpected weight change.   HENT: Positive for congestion. Negative for postnasal drip, rhinorrhea, sinus pressure and sneezing.    Eyes: Negative.    Respiratory: Positive for cough, choking, shortness of breath, wheezing and stridor.    Cardiovascular: Positive for palpitations and leg swelling. Negative for chest pain.   Gastrointestinal: Positive for nausea. Negative for abdominal pain.   Endocrine: Negative.    Genitourinary: Negative.    Musculoskeletal: Positive for arthralgias. Negative for back pain.   Skin: Negative for rash.   Allergic/Immunologic: Negative.    Neurological: Positive for dizziness, weakness and light-headedness. Negative for syncope.   Hematological: Negative.  Negative for adenopathy. Does not bruise/bleed easily.   Psychiatric/Behavioral: Negative for dysphoric mood and sleep disturbance. The patient is not nervous/anxious.      Objective:     Vital Signs (Most Recent):  Temp: 97.7 °F (36.5 °C) (02/13/18 0730)  Pulse: (!) 148 (02/13/18 0900)  Resp: (!) 21 (02/13/18 0900)  BP: 125/75 (02/13/18  0900)  SpO2: (!) 94 % (02/13/18 0914) Vital Signs (24h Range):  Temp:  [97.5 °F (36.4 °C)-98.9 °F (37.2 °C)] 97.7 °F (36.5 °C)  Pulse:  [] 148  Resp:  [12-31] 21  SpO2:  [73 %-99 %] 94 %  BP: ()/(65-98) 125/75     Weight: 84.6 kg (186 lb 8.2 oz)  Body mass index is 28.36 kg/m².      Intake/Output Summary (Last 24 hours) at 02/13/18 1055  Last data filed at 02/13/18 0904   Gross per 24 hour   Intake             1035 ml   Output              670 ml   Net              365 ml       Physical Exam   Constitutional: He is oriented to person, place, and time. He appears well-developed and well-nourished.   HENT:   Head: Normocephalic and atraumatic.   Mouth/Throat: Oropharyngeal exudate present.   Eyes: Conjunctivae are normal. Pupils are equal, round, and reactive to light.   Neck: Neck supple. No JVD present. No tracheal deviation present. No thyromegaly present.   Cardiovascular: Normal rate, regular rhythm and normal heart sounds.    No murmur heard.  Pulmonary/Chest: Effort normal. He has decreased breath sounds. He has wheezes in the right lower field and the left lower field. He has no rhonchi. He has no rales.   Abdominal: Soft. Bowel sounds are normal.   Musculoskeletal: Normal range of motion. He exhibits no edema or tenderness.   Lymphadenopathy:     He has no cervical adenopathy.   Neurological: He is alert and oriented to person, place, and time. He displays abnormal reflex. A sensory deficit is present. He exhibits abnormal muscle tone.   Right hemiparesis   Skin: Skin is warm and dry.   Nursing note and vitals reviewed.      Vents:  Oxygen Concentration (%): (S) 36 (02/13/18 0914)    Lines/Drains/Airways     Drain                 Urethral Catheter 02/13/18 0305 Latex less than 1 day          Peripheral Intravenous Line                 Peripheral IV - Single Lumen 02/12/18 1355 Left Forearm less than 1 day                Significant Labs:    CBC/Anemia Profile:    Recent Labs  Lab 02/12/18  1441  02/13/18  0445   WBC 6.18 4.50   HGB 12.8* 13.5*   HCT 44.5 46.8    332   MCV 90 91   RDW 15.4* 15.2*        Chemistries:    Recent Labs  Lab 02/12/18  1441 02/13/18  0445    142   K 3.8 4.9   CL 96 97   CO2 34* 34*   BUN 41* 39*   CREATININE 1.6* 1.4   CALCIUM 8.9 9.2   ALBUMIN 2.6* 2.5*   PROT 6.0 6.2   BILITOT 0.6 0.6   ALKPHOS 80 89   ALT 28 25   AST 21 19       BMP:     Recent Labs  Lab 02/13/18  0445   *      K 4.9   CL 97   CO2 34*   BUN 39*   CREATININE 1.4   CALCIUM 9.2     CMP:     Recent Labs  Lab 02/12/18  1441 02/13/18  0445    142   K 3.8 4.9   CL 96 97   CO2 34* 34*    138*   BUN 41* 39*   CREATININE 1.6* 1.4   CALCIUM 8.9 9.2   PROT 6.0 6.2   ALBUMIN 2.6* 2.5*   BILITOT 0.6 0.6   ALKPHOS 80 89   AST 21 19   ALT 28 25   ANIONGAP 11 11   EGFRNONAA 43* 51*     All pertinent labs within the past 24 hours have been reviewed.    Significant Imaging:   CXR: I have reviewed all pertinent results/findings within the past 24 hours and my personal findings are:  RLL infitlrate, COPD

## 2018-02-13 NOTE — ASSESSMENT & PLAN NOTE
Consult Pulmonary   avaps   Avelox 400 mg daily  Duo nebs   IV steroids   CT of chest without contrast

## 2018-02-13 NOTE — PROGRESS NOTES
[facility] Hospital Medicine  Progress Note    Patient Name: Rea Vail  MRN: [id number]  Patient Class: IP- Inpatient   Admission Date: 2/12/2018  Length of Stay: 1 days  Attending Physician: Kai Escamilla MD  Primary Care Provider: Estiven Oseguera MD        Subjective:     Principal Problem:COPD exacerbation    HPI:  Mr Vail is a 67 year old male with PMHx multiple  CVA, A Fib on eliquis, HTN, former smoker and HLD. He presented to [facility] with complaints of increase fatigue. Family member reports finding him about 11 am this morning, confused and not wearing oxygen. He was recently discharged from [facility] on 2/10/2018 after being admitted with new onset A-fib with RVR, acute pulmonary edema/decompensated diastolic CHF. Cardiology and Pulmonary were consulted. He was diuresis well and started on Eliquis. He did not wear his Bipap during that admission. Patient was recommend for sleep study at discharge.  PCO2 70.8. Creatinine elevated 1.6. CT of head showed CT evidence of acute intracranial abnormality.    Hospital Course:  2/13/2018   Patient more alert and oriented .with gentle hydration kidney function improved . Overnight avaps helped with lower down Co2. Pt/ot and slp for snf placement .           Review of Systems   Constitutional: Negative for diaphoresis, fatigue, fever and unexpected weight change.   HENT: Negative for congestion, postnasal drip, rhinorrhea, sinus pressure and sneezing.    Eyes: Negative.    Respiratory: Negative for cough, choking, shortness of breath, wheezing and stridor.    Cardiovascular: Negative for chest pain, palpitations and leg swelling.   Gastrointestinal: Negative for abdominal pain and nausea.   Endocrine: Negative.    Genitourinary: Negative.    Musculoskeletal: Negative for arthralgias and back pain.   Skin: Negative for rash.   Allergic/Immunologic: Negative.    Neurological: Positive for dizziness. Negative for syncope, weakness  and light-headedness.   Hematological: Negative.  Negative for adenopathy. Does not bruise/bleed easily.   Psychiatric/Behavioral: Negative for dysphoric mood and sleep disturbance. The patient is not nervous/anxious.      Objective:     Vital Signs (Most Recent):  Temp: 98.7 °F (37.1 °C) (02/13/18 1145)  Pulse: 69 (02/13/18 1145)  Resp: 18 (02/13/18 1145)  BP: 128/78 (02/13/18 1145)  SpO2: 97 % (02/13/18 1145) Vital Signs (24h Range):  Temp:  [97.5 °F (36.4 °C)-98.9 °F (37.2 °C)] 98.7 °F (37.1 °C)  Pulse:  [] 69  Resp:  [12-31] 18  SpO2:  [73 %-99 %] 97 %  BP: ()/(65-98) 128/78     Weight: 84.6 kg (186 lb 8.2 oz)  Body mass index is 28.36 kg/m².    Intake/Output Summary (Last 24 hours) at 02/13/18 1249  Last data filed at 02/13/18 1146   Gross per 24 hour   Intake             1170 ml   Output              715 ml   Net              455 ml      Physical Exam   Constitutional: He is oriented to person, place, and time. He appears well-developed and well-nourished.   HENT:   Head: Normocephalic and atraumatic.   Mouth/Throat: No oropharyngeal exudate.   Eyes: Conjunctivae are normal. Pupils are equal, round, and reactive to light.   Neck: Neck supple. No JVD present. No tracheal deviation present. No thyromegaly present.   Cardiovascular: Normal rate, regular rhythm and normal heart sounds.    No murmur heard.  Pulmonary/Chest: Effort normal. He has decreased breath sounds. He has wheezes. He has no rhonchi. He has no rales.   Abdominal: Soft. Bowel sounds are normal.   Musculoskeletal: Normal range of motion. He exhibits no edema or tenderness.   Lymphadenopathy:     He has no cervical adenopathy.   Neurological: He is alert and oriented to person, place, and time. He displays normal reflexes. No sensory deficit. He exhibits normal muscle tone.   Right hemiparesis   Skin: Skin is warm and dry.   Nursing note and vitals reviewed.      Significant Labs: All pertinent labs within the past 24 hours have been  reviewed.    Significant Imaging: I have reviewed all pertinent imaging results/findings within the past 24 hours.    Assessment/Plan:      * COPD exacerbation    Consult Pulmonary   avaps   Avelox 400 mg daily  Duo nebs   IV steroids   CT of chest without contrast             ANTHONY (acute kidney injury)      Creatinine 1.6 today, up from previous of 1.1  Gentle hydration   Normal saline 75 ml/hr   Hold Lasix and HCTZ  Hold Exforge         Encephalopathy, metabolic - hypercarbia    Hypercapnia and dehydration   Improved with fluids         Atrial fibrillation    A Fib rate well controled   Continue Eliquis 5 mg BiD   ASA 81 mg daily           Acute on chronic respiratory failure with hypoxia and hypercapnia    Copd/michael in setting hypoventilation syndrome   Will need bipap or trilogy on discharge to use at night         Hyperlipidemia    Continue Statin          HTN (hypertension)    Continue Coreg   Hold Exforage for now due to ANTHONY   Monitor             CVA (cerebral vascular accident)    History of CVA   Continue Eliquis and ASA  CT of head negative for acute findings             VTE Risk Mitigation         Ordered     apixaban tablet 5 mg  2 times daily     Route:  Oral        02/12/18 8195              Kai Escamilla MD  Department of Hospital Medicine   [facility]

## 2018-02-13 NOTE — ED NOTES
Pt remains on Bipap, RT at bedside, pt awake, alert, denies any complaints except hunger. Family members at bedside. Continue to monitor.

## 2018-02-13 NOTE — HOSPITAL COURSE
"2/13/2018   Patient was discharge home last time  10 feb after he refused bipap and triology at night . Comes back in two with co2 retention and change in mental status. He is now agreeable to wear oxygen and avaps at night . He mention "he realized he needs it to live after this episode ."   Patient more alert and oriented .with gentle hydration kidney function improved . Overnight avaps helped with lower down Co2. Pt/ot and slp for ltac  placement .   2/14/2018   Patient was seen and examined . Family agreed on ltac . [facility] . Patient will need avaps at night and also as needed . Steroid taper for copd exacerbation , breathing treatment . Monitor for afib rate . Hold lasix due to kidney damage . Continue moxifloxcin for 5 days for copd exacerbation . He will also need  Follow up with pulmonology to assess for severity of copd and sleep apnea .   "

## 2018-02-14 VITALS
RESPIRATION RATE: 20 BRPM | HEART RATE: 104 BPM | TEMPERATURE: 98 F | HEIGHT: 68 IN | OXYGEN SATURATION: 96 % | WEIGHT: 190.94 LBS | SYSTOLIC BLOOD PRESSURE: 139 MMHG | DIASTOLIC BLOOD PRESSURE: 80 MMHG | BODY MASS INDEX: 28.94 KG/M2

## 2018-02-14 PROBLEM — G93.41 ENCEPHALOPATHY, METABOLIC: Status: RESOLVED | Noted: 2018-02-12 | Resolved: 2018-02-14

## 2018-02-14 LAB
ALBUMIN SERPL BCP-MCNC: 2.9 G/DL
ALLENS TEST: ABNORMAL
ALP SERPL-CCNC: 87 U/L
ALT SERPL W/O P-5'-P-CCNC: 23 U/L
ANION GAP SERPL CALC-SCNC: 12 MMOL/L
AST SERPL-CCNC: 19 U/L
BASOPHILS # BLD AUTO: 0 K/UL
BASOPHILS NFR BLD: 0 %
BILIRUB SERPL-MCNC: 0.5 MG/DL
BUN SERPL-MCNC: 57 MG/DL
CALCIUM SERPL-MCNC: 10 MG/DL
CHLORIDE SERPL-SCNC: 96 MMOL/L
CO2 SERPL-SCNC: 34 MMOL/L
CREAT SERPL-MCNC: 1.5 MG/DL
DELSYS: ABNORMAL
DIFFERENTIAL METHOD: ABNORMAL
EOSINOPHIL # BLD AUTO: 0 K/UL
EOSINOPHIL NFR BLD: 0 %
ERYTHROCYTE [DISTWIDTH] IN BLOOD BY AUTOMATED COUNT: 15.2 %
EST. GFR  (AFRICAN AMERICAN): 54 ML/MIN/1.73 M^2
EST. GFR  (NON AFRICAN AMERICAN): 47 ML/MIN/1.73 M^2
FIO2: 36
FLOW: 4
GLUCOSE SERPL-MCNC: 142 MG/DL
HCO3 UR-SCNC: 34.2 MMOL/L (ref 24–28)
HCT VFR BLD AUTO: 45.6 %
HGB BLD-MCNC: 13.7 G/DL
LYMPHOCYTES # BLD AUTO: 0.5 K/UL
LYMPHOCYTES NFR BLD: 5.1 %
MCH RBC QN AUTO: 26.4 PG
MCHC RBC AUTO-ENTMCNC: 30 G/DL
MCV RBC AUTO: 88 FL
MODE: ABNORMAL
MONOCYTES # BLD AUTO: 0.1 K/UL
MONOCYTES NFR BLD: 0.9 %
NEUTROPHILS # BLD AUTO: 9 K/UL
NEUTROPHILS NFR BLD: 94 %
PCO2 BLDA: 45.3 MMHG (ref 35–45)
PH SMN: 7.49 [PH] (ref 7.35–7.45)
PLATELET # BLD AUTO: 371 K/UL
PMV BLD AUTO: 10.3 FL
PO2 BLDA: 70 MMHG (ref 80–100)
POC BE: 11 MMOL/L
POC SATURATED O2: 95 % (ref 95–100)
POTASSIUM SERPL-SCNC: 4.4 MMOL/L
PROT SERPL-MCNC: 7 G/DL
RBC # BLD AUTO: 5.18 M/UL
SAMPLE: ABNORMAL
SITE: ABNORMAL
SODIUM SERPL-SCNC: 142 MMOL/L
WBC # BLD AUTO: 9.53 K/UL

## 2018-02-14 PROCEDURE — 80053 COMPREHEN METABOLIC PANEL: CPT

## 2018-02-14 PROCEDURE — 97110 THERAPEUTIC EXERCISES: CPT

## 2018-02-14 PROCEDURE — 25000242 PHARM REV CODE 250 ALT 637 W/ HCPCS: Performed by: INTERNAL MEDICINE

## 2018-02-14 PROCEDURE — 82803 BLOOD GASES ANY COMBINATION: CPT

## 2018-02-14 PROCEDURE — 99900035 HC TECH TIME PER 15 MIN (STAT)

## 2018-02-14 PROCEDURE — 25000003 PHARM REV CODE 250: Performed by: NURSE PRACTITIONER

## 2018-02-14 PROCEDURE — 94640 AIRWAY INHALATION TREATMENT: CPT

## 2018-02-14 PROCEDURE — 94660 CPAP INITIATION&MGMT: CPT

## 2018-02-14 PROCEDURE — 85025 COMPLETE CBC W/AUTO DIFF WBC: CPT

## 2018-02-14 PROCEDURE — 94761 N-INVAS EAR/PLS OXIMETRY MLT: CPT

## 2018-02-14 PROCEDURE — 63600175 PHARM REV CODE 636 W HCPCS: Performed by: NURSE PRACTITIONER

## 2018-02-14 PROCEDURE — 25000003 PHARM REV CODE 250: Performed by: INTERNAL MEDICINE

## 2018-02-14 PROCEDURE — 97530 THERAPEUTIC ACTIVITIES: CPT

## 2018-02-14 PROCEDURE — 36600 WITHDRAWAL OF ARTERIAL BLOOD: CPT

## 2018-02-14 PROCEDURE — 27000221 HC OXYGEN, UP TO 24 HOURS

## 2018-02-14 PROCEDURE — 36415 COLL VENOUS BLD VENIPUNCTURE: CPT

## 2018-02-14 PROCEDURE — 97535 SELF CARE MNGMENT TRAINING: CPT

## 2018-02-14 PROCEDURE — 97116 GAIT TRAINING THERAPY: CPT

## 2018-02-14 RX ORDER — FAMOTIDINE 20 MG/1
20 TABLET, FILM COATED ORAL 2 TIMES DAILY
Qty: 60 TABLET | Refills: 11
Start: 2018-02-14 | End: 2018-03-16

## 2018-02-14 RX ORDER — PREDNISONE 20 MG/1
20 TABLET ORAL DAILY
Qty: 10 TABLET | Refills: 0
Start: 2018-02-14 | End: 2018-02-24

## 2018-02-14 RX ORDER — NAPROXEN SODIUM 220 MG/1
81 TABLET, FILM COATED ORAL DAILY
Refills: 0
Start: 2018-02-15 | End: 2019-10-03

## 2018-02-14 RX ORDER — MOXIFLOXACIN HYDROCHLORIDE 400 MG/1
400 TABLET ORAL DAILY
Start: 2018-02-15 | End: 2018-02-20

## 2018-02-14 RX ADMIN — IPRATROPIUM BROMIDE AND ALBUTEROL SULFATE 3 ML: .5; 3 SOLUTION RESPIRATORY (INHALATION) at 08:02

## 2018-02-14 RX ADMIN — METHYLPREDNISOLONE SODIUM SUCCINATE 40 MG: 40 INJECTION, POWDER, FOR SOLUTION INTRAMUSCULAR; INTRAVENOUS at 11:02

## 2018-02-14 RX ADMIN — IPRATROPIUM BROMIDE AND ALBUTEROL SULFATE 3 ML: .5; 3 SOLUTION RESPIRATORY (INHALATION) at 01:02

## 2018-02-14 RX ADMIN — MOXIFLOXACIN HYDROCHLORIDE 400 MG: 400 TABLET, FILM COATED ORAL at 08:02

## 2018-02-14 RX ADMIN — ASPIRIN 81 MG 81 MG: 81 TABLET ORAL at 08:02

## 2018-02-14 RX ADMIN — FAMOTIDINE 20 MG: 20 TABLET, FILM COATED ORAL at 08:02

## 2018-02-14 RX ADMIN — CARVEDILOL 12.5 MG: 12.5 TABLET, FILM COATED ORAL at 08:02

## 2018-02-14 RX ADMIN — APIXABAN 5 MG: 2.5 TABLET, FILM COATED ORAL at 08:02

## 2018-02-14 RX ADMIN — IPRATROPIUM BROMIDE AND ALBUTEROL SULFATE 3 ML: .5; 3 SOLUTION RESPIRATORY (INHALATION) at 12:02

## 2018-02-14 RX ADMIN — METHYLPREDNISOLONE SODIUM SUCCINATE 40 MG: 40 INJECTION, POWDER, FOR SOLUTION INTRAMUSCULAR; INTRAVENOUS at 06:02

## 2018-02-14 NOTE — DISCHARGE SUMMARY
"[facility]  Hospital Medicine  Discharge Summary      Patient Name: Rea Vail  MRN: [id number]  Admission Date: 2/12/2018  Hospital Length of Stay: 2 days  Discharge Date and Time:  02/14/2018 2:45 PM  Attending Physician: Kai Escamilla MD   Discharging Provider: Kai Escamilla MD  Primary Care Provider: Estiven Oseguera MD      HPI:   Mr Vail is a 67 year old male with PMHx multiple  CVA, A Fib on eliquis, HTN, former smoker and HLD. He presented to [facility] with complaints of increase fatigue. Family member reports finding him about 11 am this morning, confused and not wearing oxygen. He was recently discharged from [facility] on 2/10/2018 after being admitted with new onset A-fib with RVR, acute pulmonary edema/decompensated diastolic CHF. Cardiology and Pulmonary were consulted. He was diuresis well and started on Eliquis. He did not wear his Bipap during that admission. Patient was recommend for sleep study at discharge.  PCO2 70.8. Creatinine elevated 1.6. CT of head showed CT evidence of acute intracranial abnormality.    * No surgery found *      Hospital Course:   2/13/2018   Patient was discharge home last time  10 feb after he refused bipap and triology at night . Comes back in two with co2 retention and change in mental status. He is now agreeable to wear oxygen and avaps at night . He mention "he realized he needs it to live after this episode ."   Patient more alert and oriented .with gentle hydration kidney function improved . Overnight avaps helped with lower down Co2. Pt/ot and slp for ltac  placement .   2/14/2018   Patient was seen and examined . Family agreed on ltac . [facility] . Patient will need avaps at night and also as needed . Steroid taper for copd exacerbation , breathing treatment . Monitor for afib rate . Hold lasix due to kidney damage . Continue moxifloxcin for 5 days for copd exacerbation . He will also need  Follow up with " pulmonology to assess for severity of copd and sleep apnea .      Consults:   Consults         Status Ordering Provider     Inpatient consult to Pulmonology  Once     Provider:  (Not yet assigned)    Completed DANIA BIANCHI     Inpatient consult to Social Work  Once     Provider:  (Not yet assigned)    Completed ALAN PRINCE     Inpatient consult to Social Work  Once     Provider:  (Not yet assigned)    Completed DANIA BIANCHI     Inpatient consult to Social Work  Once     Provider:  (Not yet assigned)    Ordered DANIA BIANCHI.          No new Assessment & Plan notes have been filed under this hospital service since the last note was generated.  Service: Hospital Medicine    Final Active Diagnoses:    Diagnosis Date Noted POA    PRINCIPAL PROBLEM:  COPD exacerbation [J44.1] 02/12/2018 Yes    Abnormal CXR [R93.8] 02/12/2018 Yes    ANTHONY (acute kidney injury) [N17.9] 02/12/2018 Yes    Hypercapnia [R06.89] 02/12/2018 Yes    Acute on chronic respiratory failure with hypoxia and hypercapnia [J96.21, J96.22] 02/07/2018 Yes    Atrial fibrillation [I48.91] 02/07/2018 Yes    CVA (cerebral vascular accident) [I63.9] 07/09/2013 Yes    Hyperlipidemia [E78.5] 07/09/2013 Yes    HTN (hypertension) [I10] 07/09/2013 Yes      Problems Resolved During this Admission:    Diagnosis Date Noted Date Resolved POA    Encephalopathy, metabolic - hypercarbia [G93.41] 02/12/2018 02/14/2018 Yes       Discharged Condition: stable    Disposition: Long Term Care    Follow Up:    Patient Instructions:     Diet Cardiac   Scheduling Instructions: 1500 ml fluid restriction     Vital signs per facility protocol     Intake and output per facility protocol     Skin assessment every shift      Notify Physician   Order Specific Question Answer Comments   Temperature (F) greater than 101    Systolic Blood Pressure SBP greater than or equal to 160    Systolic Blood Pressure SBP less than or equal to 90    Diastolic Blood  Pressure DBP greater than or equal to 110    Diastolic Blood Pressure DBP less than or equal to 60    Pulse greater than or equal to 120    Pulse less than or equal to 50    Respirations Rate RR greater than or equal to 30    Respirations Rate RR less than or equal to 6    Urine output less than 60 over 2 hours   SPO2% less than 90      Basic metabolic panel   Standing Status: Future  Standing Exp. Date: 03/14/18     Pulse Oximetry     Oxygen   Order Comments: 2-3 litres  spo2 greater  Than 89     Mechanical ventilation   Standing Status: Future  Standing Exp. Date: 04/15/19   Scheduling Instructions: avaps at night and as needed  Setting already faxed to [facility]   Order Specific Question Answer Comments   PEEP 5.0 CM/H2O          Significant Diagnostic Studies: Labs:   BMP:   Recent Labs  Lab 02/13/18  0445 02/14/18  0514   * 142*    142   K 4.9 4.4   CL 97 96   CO2 34* 34*   BUN 39* 57*   CREATININE 1.4 1.5*   CALCIUM 9.2 10.0    and CMP   Recent Labs  Lab 02/13/18  0445 02/14/18  0514    142   K 4.9 4.4   CL 97 96   CO2 34* 34*   * 142*   BUN 39* 57*   CREATININE 1.4 1.5*   CALCIUM 9.2 10.0   PROT 6.2 7.0   ALBUMIN 2.5* 2.9*   BILITOT 0.6 0.5   ALKPHOS 89 87   AST 19 19   ALT 25 23   ANIONGAP 11 12   ESTGFRAFRICA 59* 54*   EGFRNONAA 51* 47*     Microbiology:   Blood Culture   Lab Results   Component Value Date    LABBLOO No Growth to date 02/12/2018    LABBLOO No Growth to date 02/12/2018    and Sputum Culture No results found for: GSRESP, RESPIRATORYC    Pending Diagnostic Studies:     None         Medications:  Reconciled Home Medications:   Current Discharge Medication List      START taking these medications    Details   aspirin 81 MG Chew Take 1 tablet (81 mg total) by mouth once daily.  Refills: 0      famotidine (PEPCID) 20 MG tablet Take 1 tablet (20 mg total) by mouth 2 (two) times daily.  Qty: 60 tablet, Refills: 11      moxifloxacin (AVELOX) 400 mg tablet Take 1 tablet (400 mg  total) by mouth once daily.      predniSONE (DELTASONE) 20 MG tablet Take 1 tablet (20 mg total) by mouth once daily. 60 mg for 4 days, 40 mg for 3 days, 20 mg for 2 days .  Qty: 10 tablet, Refills: 0         CONTINUE these medications which have NOT CHANGED    Details   aclidinium bromide (TUDORZA PRESSAIR) 400 mcg/actuation AePB Inhale 1 puff into the lungs 2 (two) times daily.      albuterol (PROVENTIL/VENTOLIN) 90 mcg/actuation inhaler Inhale 2 puffs into the lungs every 6 (six) hours as needed.      allopurinol (ZYLOPRIM) 300 MG tablet Take 300 mg by mouth once daily.      apixaban 5 mg Tab Take 1 tablet (5 mg total) by mouth 2 (two) times daily.  Qty: 60 tablet, Refills: 0      carvedilol (COREG) 12.5 MG tablet Take 1 tablet (12.5 mg total) by mouth 2 (two) times daily.  Qty: 60 tablet, Refills: 1      gabapentin (NEURONTIN) 300 MG capsule Take 1 capsule (300 mg total) by mouth 2 (two) times daily. 1 capsule Oral Three times a day  Qty: 60 capsule, Refills: 5    Associated Diagnoses: Coagulation defect; Dyslipidemia      hydrocodone-acetaminophen 10-325mg (NORCO)  mg Tab Take 1 tablet by mouth every 8 (eight) hours as needed for Pain. 1 tablet Oral Twice a day  Qty: 8 tablet, Refills: 0      iron-vitamin C 65 mg iron- 125 mg TbEC Take by mouth 3 (three) times daily.      leflunomide (ARAVA) 10 MG Tab Take 1 tablet (10 mg total) by mouth once daily.  Qty: 30 tablet, Refills: 4      multivitamin capsule Take 1 capsule by mouth once daily.      pramipexole (MIRAPEX) 0.5 MG tablet Take 0.5 mg by mouth 2 (two) times daily.      simvastatin (ZOCOR) 40 MG tablet Take 1 tablet (40 mg total) by mouth every evening. 1 tablet Oral Every day  Qty: 90 tablet, Refills: 4      tizanidine (ZANAFLEX) 4 MG tablet Take 4 mg by mouth nightly as needed.      fluticasone-salmeterol 250-50 mcg/dose (ADVAIR) 250-50 mcg/dose diskus inhaler Inhale 1 puff into the lungs 2 (two) times daily.         STOP taking these medications        amlodipine-valsartan (EXFORGE)  mg per tablet Comments:   Reason for Stopping:         baclofen (LIORESAL) 10 MG tablet Comments:   Reason for Stopping:         diazepam (VALIUM) 5 MG tablet Comments:   Reason for Stopping:         furosemide (LASIX) 40 MG tablet Comments:   Reason for Stopping:         hydrochlorothiazide (HYDRODIURIL) 25 MG tablet Comments:   Reason for Stopping:               Indwelling Lines/Drains at time of discharge:   Lines/Drains/Airways          No matching active lines, drains, or airways          Time spent on the discharge of patient: 40 minutes  Patient was seen and examined on the date of discharge and determined to be suitable for discharge.         Kai Escamilla MD  Department of Hospital Medicine  [facility]

## 2018-02-14 NOTE — PT/OT/SLP PROGRESS
Physical Therapy  Treatment    Rea Vail   MRN: [id number]   Admitting Diagnosis: COPD exacerbation    PT Received On: 02/14/18  PT Start Time: 0955     PT Stop Time: 1020    PT Total Time (min): 25 min       Billable Minutes:  Gait Training 15 and Therapeutic Activity 10    Treatment Type: Treatment  PT/PTA: PT             General Precautions: Standard, fall  Orthopedic Precautions: N/A   Braces: N/A         Subjective:  Communicated with EPIC AND NURSE ANNELIESE prior to session.  PT AGREES TO THERAPY    Pain/Comfort  Pain Rating 1: 0/10    Objective:   Patient found with: telemetry, oxygen    Functional Mobility:  Bed Mobility: CGA     Transfers: LUISA FROM BED TO RW    Gait: PT AMBULATED 2 X 55 FEET WITH RW, SLOW PACE, ENCOURAGEMENT FOR DBE'S, SLIGHTLY UNSTEADY, NO LOB    Therapeutic Activities and Exercises:  PT EDUCATION AND SAFETY INSTRUCTION, AND PT TOLERATED 20 REPS OF AROM BLE'S IN SITTING WITH REST BREAKS.     AM-PAC 6 CLICK MOBILITY  How much help from another person does this patient currently need?   1 = Unable, Total/Dependent Assistance  2 = A lot, Maximum/Moderate Assistance  3 = A little, Minimum/Contact Guard/Supervision  4 = None, Modified [place]/Independent    Turning over in bed (including adjusting bedclothes, sheets and blankets)?: 4  Sitting down on and standing up from a chair with arms (e.g., wheelchair, bedside commode, etc.): 3  Moving from lying on back to sitting on the side of the bed?: 3  Moving to and from a bed to a chair (including a wheelchair)?: 3  Need to walk in hospital room?: 3  Climbing 3-5 steps with a railing?: 1  Total Score: 17    AM-PAC Raw Score CMS G-Code Modifier Level of Impairment Assistance   6 % Total / Unable   7 - 9 CM 80 - 100% Maximal Assist   10 - 14 CL 60 - 80% Moderate Assist   15 - 19 CK 40 - 60% Moderate Assist   20 - 22 CJ 20 - 40% Minimal Assist   23 CI 1-20% SBA / CGA   24 CH 0% Independent/ Mod I     Patient left up in chair with all  lines intact, call button in reach and NURSE notified.    Assessment:  Rea Vail is a 67 y.o. male with a medical diagnosis of COPD exacerbation and presents with IMPAIRED MOBILITY AND WILL NEED CONT P.T.    Rehab identified problem list/impairments: Rehab identified problem list/impairments: weakness, impaired endurance, impaired functional mobilty, gait instability, impaired balance, impaired coordination, impaired cardiopulmonary response to activity, decreased safety awareness    Rehab potential is good.    Activity tolerance: Good    Discharge recommendations: Discharge Facility/Level Of Care Needs: nursing facility, skilled, home health PT (PT PREFERS TO GO HOME)     Barriers to discharge:  NONE    Equipment recommendations: Equipment Needed After Discharge: walker, rolling     GOALS:    Physical Therapy Goals        Problem: Physical Therapy Goal    Goal Priority Disciplines Outcome Goal Variances Interventions   Physical Therapy Goal     PT/OT, PT Ongoing (interventions implemented as appropriate)     Description:  Goals to be met by: 18     Patient will increase functional independence with mobility by performin. Supine to sit with Stand-by Assistance  2. Bed to chair transfer with Stand-by Assistance using Rolling Walker  3. Gait  x 150 feet with Stand-by Assistance using Rolling Walker.   4. Lower extremity exercise program x20 reps per handout, with supervision                      PLAN:    Patient to be seen 5 x/week  to address the above listed problems via gait training, therapeutic activities, therapeutic exercises  Plan of Care expires: 18  Plan of Care reviewed with: patient    PT G-Codes  Score: 17    Florencia Morrison, PT  2018

## 2018-02-14 NOTE — PLAN OF CARE
Problem: Occupational Therapy Goal  Goal: Occupational Therapy Goal  ot goals to be met 2-20-18  1. Pt will tolerATE 1 SET X 15 REPS B UE ROM EXERCISE WITH MIN RESISTANCE  2. S WITH UE DRESSING  3. S WITH LE DRESSING  4. S WITH TOILET T/F'S     Outcome: Ongoing (interventions implemented as appropriate)  Pt has currently a s with all adl's  And ue exercises. Pt discharged from skilled OT and placed on people 's program

## 2018-02-14 NOTE — PLAN OF CARE
"Problem: Patient Care Overview  Goal: Plan of Care Review  Outcome: Ongoing (interventions implemented as appropriate)  POC reviewed with patient. Indications for medications and possible side effects explained. Pt verbalized understanding. Pt states he "feels fine" and would like "to go home soon." No SOB reported; lung fields clear but diminished. Methylprednisolone given per order. A-fib/NSR on monitor. VS stable. BiPap in place. Will continue to monitor.       "

## 2018-02-14 NOTE — PLAN OF CARE
Problem: Patient Care Overview  Goal: Plan of Care Review  Outcome: Outcome(s) achieved Date Met: 02/14/18  Pt remains free of falls and free of injury. Moves about freely in bed and in chair so as to protect skin. Antibiotics administered as directed. Pt being discharged this afternoon to SNF; verbalizes readiness to be discharged.   Bed in low position, side rails up x2, call light in reach.

## 2018-02-14 NOTE — PT/OT/SLP PROGRESS
Occupational Therapy  Treatment    Rea Vail   MRN: [id number]   Admitting Diagnosis: COPD exacerbation    OT Date of Treatment: 02/14/18   OT Start Time: 1000  OT Stop Time: 1040  OT Total Time (min): 40 min    Billable Minutes:  Self Care/Home Management 15 minutes, Therapeutic Activity 15 minutes and Therapeutic Exercise 10 minutes    General Precautions: Standard, fall  Orthopedic Precautions: N/A  Braces: N/A         Subjective:  Communicated with nurse Matthias and epic chart review prior to session.    Pain/Comfort  Pain Rating 1: 0/10    Objective:  Patient found with: telemetry, oxygen     Functional Mobility:  Bed Mobility:       Transfers:        Functional Ambulation: pt ambulated 50 feet x 2     Activities of Daily Living:     Feeding adaptive equipment: na     UE adaptive equipment: s     LE adaptive equipment: s                    Bathing adaptive equipment: na    Balance:   Static Sit: GOOD: Takes MODERATE challenges from all directions  Dynamic Sit: GOOD: Maintains balance through MODERATE excursions of active trunk movement  Static Stand: FAIR+: Takes MINIMAL challenges from all directions  Dynamic stand: FAIR+: Needs CLOSE SUPERVISION during gait and is able to right self with minor LOB    Therapeutic Activities and Exercises:  Pt performed 2 set x 10 reps b ue rom exercise seated in bedside chair in all available planes and ranges with 1.5 dowel    AM-PAC 6 CLICK ADL   How much help from another person does this patient currently need?   1 = Unable, Total/Dependent Assistance  2 = A lot, Maximum/Moderate Assistance  3 = A little, Minimum/Contact Guard/Supervision  4 = None, Modified [place]/Independent    Putting on and taking off regular lower body clothing? : 4  Bathing (including washing, rinsing, drying)?: 4  Toileting, which includes using toilet, bedpan, or urinal? : 4  Putting on and taking off regular upper body clothing?: 4  Taking care of personal grooming such as brushing teeth?:  "4  Eating meals?: 4  Total Score: 24     AM-PAC Raw Score CMS "G-Code Modifier Level of Impairment Assistance   6 % Total / Unable   7 - 8 CM 80 - 100% Maximal Assist   9-13 CL 60 - 80% Moderate Assist   14 - 19 CK 40 - 60% Moderate Assist   20 - 22 CJ 20 - 40% Minimal Assist   23 CI 1-20% SBA / CGA   24 CH 0% Independent/ Mod I       Patient left up in chair with all lines intact, call button in reach and nurse notified    ASSESSMENT:  Rea Vail is a 67 y.o. male with a medical diagnosis of COPD exacerbation and presents with s with adl's, functional t/f's amd cga/s with functional mobility. Pt discharged from skilled o.t and placed on people 's program, pt referred to P.T. For continue gait training.    Rehab identified problem list/impairments: Rehab identified problem list/impairments: weakness, impaired functional mobilty, decreased safety awareness, impaired endurance, gait instability, impaired balance, impaired self care skills    Rehab potential is good.    Activity tolerance: Good    Discharge recommendations: Discharge Facility/Level Of Care Needs: home health OT     Barriers to discharge: Barriers to Discharge: Decreased caregiver support    Equipment recommendations: walker, rolling     GOALS:    Occupational Therapy Goals        Problem: Occupational Therapy Goal    Goal Priority Disciplines Outcome Interventions   Occupational Therapy Goal     OT, PT/OT Ongoing (interventions implemented as appropriate)    Description:  ot goals to be met 2-20-18  1. Pt will tolerATE 1 SET X 15 REPS B UE ROM EXERCISE WITH MIN RESISTANCE  2. S WITH UE DRESSING  3. S WITH LE DRESSING  4. S WITH TOILET T/F'S                      Plan:  Patient to be seen 3 x/week to address the above listed problems via self-care/home management, therapeutic exercises, therapeutic activities  Plan of Care expires: 02/14/18  Plan of Care reviewed with: patient         Jolene Toney, OT  02/14/2018  "

## 2018-02-14 NOTE — PLAN OF CARE
Problem: Physical Therapy Goal  Goal: Physical Therapy Goal  Goals to be met by: 18     Patient will increase functional independence with mobility by performin. Supine to sit with Stand-by Assistance  2. Bed to chair transfer with Stand-by Assistance using Rolling Walker  3. Gait  x 150 feet with Stand-by Assistance using Rolling Walker.   4. Lower extremity exercise program x20 reps per handout, with supervision     Outcome: Ongoing (interventions implemented as appropriate)  PT AMBULATED 2 X 55 FEET WITH RW IN HALLWAY AT CGA X 1

## 2018-02-14 NOTE — NURSING
Right FA saline lock discontinued with canula intact; tolerated well.  Discharge instructions, new home medication list, and post discharge follow up appointments discussed with verbalized understanding.   Discharged to family for transport to SNF; all personal belongings taken with pt and family. Encouraged continued compliance with MD directives.

## 2018-02-14 NOTE — PROGRESS NOTES
RT called for a breathing tx on patient. Patient had a scheduled one this morning. They are ordered Q6H. No PRN txs ordered. Spoke to patient and family member. Patient states he does not need a tx now. He said he can just take his scheduled one around [id number]. Informed them to call if they need anything else.

## 2018-02-14 NOTE — PLAN OF CARE
CM spoke to patient and niece regarding d/c planning to LTAC. Patient and niece wanted to talk ABOUT IT.   1430 -  PATIENT SIGNED PREFERENCE FORM FOR [facility]. ORIGINAL FILE TO BLUE FOLDER AND THE COPY GIVEN TO THE PATIENT. CM UPLOADED D/C PAPERWORK IN [email or web address] AND PROMISED RECEIVED IT. NIECE WANTS TO TRANSPORT THE PATIENT TO [facility]. CM WAS TOLD SHE CAN BRING THE PATIENT TO [facility] BETWEEN 4-5P.

## 2018-02-15 NOTE — PLAN OF CARE
02/15/18 1324   Final Note   Assessment Type Final Discharge Note   Discharge Disposition LTAC   Hospital Follow Up  Appt(s) scheduled? No   Right Care Referral Info   Post Acute Recommendation Other

## 2018-02-17 LAB — BACTERIA BLD CULT: NORMAL

## 2018-02-26 NOTE — PHYSICIAN QUERY
"PT Name: Rea Vail  MR #: [id number]    Physician Query Form - Heart  Condition Clarification     CDS/: Starr Ji               Contact information: [email or web address]    This form is a permanent document in the medical record.     Query Date: February 26, 2018    By submitting this query, we are merely seeking further clarification of documentation. Please utilize your independent clinical judgment when addressing the question(s) below.    The medical record contains the following   Indicators     Supporting Clinical Findings Location in Medical Record    -230 Labs 2/7-2/12    EF EF 55-60% 2 D Echo 2/8    Radiology findings      Echo Results   1 - Concentric remodeling.     2 - No wall motion abnormalities.     3 - Normal left ventricular systolic function (EF 55-60%).     4 - Normal left ventricular diastolic function.     5 - Normal right ventricular systolic function .     6 - Pulmonary hypertension. The estimated PA systolic pressure is 48 mmHg.     7 - Mild mitral regurgitation.     8 - Mild tricuspid regurgitation.     9 - Intermediate central venous pressure.  2 D Echo 2/8    "Ascites" documented      "SOB" or "TORO" documented      "Hypoxia" documented      Heart Failure documented decompensated diastolic CHF  Acute on chronic diastolic congestive heart failure    Acute on chronic combined systolic and diastolic congestive heart failure     Discharge summary   Discharge summary     Pulmonary progress note       "Edema" documented      Diuretics/Meds      Treatment:      Other:          Provider, please specify diagnosis or diagnoses associated with above clinical findings.                                 [ * ] Acute on Chronic Diastolic Heart Failure( EF > 40)*  [  ] Acute on Chronic Combined Systolic and Diastolic Heart Failure  [  ] Other Type of Heart Failure (please specify type): _________________________  [  ] Heart Failure Ruled Out  [  ] Other (please specify): " ___________________________________  [  ] Clinically Undetermined            *American Heart Association                                                                                                          Please document in your progress notes daily for the duration of treatment until resolved and include in your discharge summary.

## 2018-02-26 NOTE — PHYSICIAN QUERY
PT Name: Rea Vail  MR #: [id number]    Physician Query Form - Atrial Fibrillation Specificity     CDS/: Starr Ji               Contact information: [email or web address]       This form is a permanent document in the medical record.     Query Date: February 26, 2018    By submitting this query, we are merely seeking further clarification of documentation. Please utilize your independent clinical judgment when addressing the question(s) below.    The medical record contains the following:   Indicators     Supporting Clinical Findings Location in Medical Record   X Atrial Fibrillation   New onset a-fib    -HR controlled on Coreg  -Continue Eliquis 5 mg BID for CVA prophylaxis  -OP EP f/u to discuss additional management/DCCV  -Sleep study as underling GUMARO contributing to arrhythmia        Cardiology progress note 2/10   X EKG results Atrial fibrillation with rapid ventricular response  Low voltage QRS  Septal infarct ,age undetermined EKG 2/8   X Medication diltiaZEM injection 23 mg  Dose: 0.25 mg/kg  Weight Dosing Info: 91 kg  Freq: Once Route: IV  Start: 02/08/18 0925 End: 02/08/18 0942    amLODIPine tablet 10 mg  Dose: 10 mg  Freq: Daily Route: Oral  Start: 02/08/18 0900 End: 02/10/18 2007 MAR             MAR     Treatment      Other         Provider, please further specify the Atrial Fibrillation diagnosis.    [  ] Chronic  [  *] Paroxysmal  [  ] Permanent  [  ] Persistent  [  ] Other (please specify): ____________________________  [  ] Clinically Undetermined    Please document in your progress notes daily for the duration of treatment until resolved, and include in your discharge summary.

## 2018-03-15 ENCOUNTER — TELEPHONE (OUTPATIENT)
Dept: CARDIOLOGY | Facility: CLINIC | Age: 68
End: 2018-03-15

## 2018-03-15 NOTE — TELEPHONE ENCOUNTER
Notified patient's daughter that we received patient's sleep study.  Verified patient's appts for echo, EKG, and appt with Dr. Wyatt.  She voiced understanding.

## 2018-03-15 NOTE — TELEPHONE ENCOUNTER
----- Message from Phil Austin sent at 3/15/2018  2:12 PM CDT -----  Contact: Stephenie, pt's niece  She's calling in regard to the pt's scheduled appt, and if report from [facility] has been received, pls advise, [phone] (home)

## 2018-03-16 ENCOUNTER — DOCUMENTATION ONLY (OUTPATIENT)
Dept: CARDIOLOGY | Facility: CLINIC | Age: 68
End: 2018-03-16

## 2018-03-16 ENCOUNTER — OFFICE VISIT (OUTPATIENT)
Dept: CARDIOLOGY | Facility: CLINIC | Age: 68
End: 2018-03-16
Payer: MEDICARE

## 2018-03-16 ENCOUNTER — CLINICAL SUPPORT (OUTPATIENT)
Dept: CARDIOLOGY | Facility: CLINIC | Age: 68
End: 2018-03-16
Attending: INTERNAL MEDICINE
Payer: MEDICARE

## 2018-03-16 ENCOUNTER — TELEPHONE (OUTPATIENT)
Dept: CARDIOLOGY | Facility: CLINIC | Age: 68
End: 2018-03-16

## 2018-03-16 VITALS
WEIGHT: 185.44 LBS | SYSTOLIC BLOOD PRESSURE: 116 MMHG | HEIGHT: 68 IN | BODY MASS INDEX: 28.1 KG/M2 | HEART RATE: 78 BPM | DIASTOLIC BLOOD PRESSURE: 70 MMHG

## 2018-03-16 DIAGNOSIS — I50.33 ACUTE ON CHRONIC DIASTOLIC CONGESTIVE HEART FAILURE: Primary | ICD-10-CM

## 2018-03-16 DIAGNOSIS — E78.5 DYSLIPIDEMIA: ICD-10-CM

## 2018-03-16 DIAGNOSIS — R06.00 DYSPNEA AND RESPIRATORY ABNORMALITIES: ICD-10-CM

## 2018-03-16 DIAGNOSIS — I48.91 ATRIAL FIBRILLATION, UNSPECIFIED TYPE: ICD-10-CM

## 2018-03-16 DIAGNOSIS — R06.89 HYPERCAPNIA: ICD-10-CM

## 2018-03-16 DIAGNOSIS — I50.43 ACUTE ON CHRONIC COMBINED SYSTOLIC AND DIASTOLIC CONGESTIVE HEART FAILURE: ICD-10-CM

## 2018-03-16 DIAGNOSIS — I10 ESSENTIAL HYPERTENSION: ICD-10-CM

## 2018-03-16 DIAGNOSIS — J44.1 CHRONIC OBSTRUCTIVE PULMONARY DISEASE WITH ACUTE EXACERBATION: ICD-10-CM

## 2018-03-16 DIAGNOSIS — I50.43 ACUTE ON CHRONIC COMBINED SYSTOLIC AND DIASTOLIC CHF (CONGESTIVE HEART FAILURE): ICD-10-CM

## 2018-03-16 DIAGNOSIS — R06.89 DYSPNEA AND RESPIRATORY ABNORMALITIES: ICD-10-CM

## 2018-03-16 DIAGNOSIS — J44.1 COPD EXACERBATION: ICD-10-CM

## 2018-03-16 LAB
ESTIMATED PA SYSTOLIC PRESSURE: 50.05
MITRAL VALVE MOBILITY: NORMAL
RETIRED EF AND QEF - SEE NOTES: 60 (ref 55–65)
TRICUSPID VALVE REGURGITATION: ABNORMAL

## 2018-03-16 PROCEDURE — 99999 PR PBB SHADOW E&M-EST. PATIENT-LVL III: CPT | Mod: PBBFAC,,, | Performed by: INTERNAL MEDICINE

## 2018-03-16 PROCEDURE — 99213 OFFICE O/P EST LOW 20 MIN: CPT | Mod: PBBFAC,PO | Performed by: INTERNAL MEDICINE

## 2018-03-16 PROCEDURE — 93306 TTE W/DOPPLER COMPLETE: CPT | Mod: PBBFAC,PO | Performed by: INTERNAL MEDICINE

## 2018-03-16 PROCEDURE — 99214 OFFICE O/P EST MOD 30 MIN: CPT | Mod: S$PBB,,, | Performed by: INTERNAL MEDICINE

## 2018-03-16 RX ORDER — FUROSEMIDE 20 MG/1
TABLET ORAL
COMMUNITY
Start: 2018-02-01 | End: 2018-03-16

## 2018-03-16 RX ORDER — FUROSEMIDE 40 MG/1
40 TABLET ORAL DAILY
COMMUNITY
Start: 2018-02-01 | End: 2018-04-27 | Stop reason: SDUPTHER

## 2018-03-16 RX ORDER — GABAPENTIN 400 MG/1
CAPSULE ORAL
COMMUNITY
Start: 2018-02-16 | End: 2018-03-16

## 2018-03-16 RX ORDER — LOSARTAN POTASSIUM 25 MG/1
25 TABLET ORAL 2 TIMES DAILY
COMMUNITY
Start: 2018-03-05 | End: 2018-04-18 | Stop reason: ALTCHOICE

## 2018-03-16 RX ORDER — BACLOFEN 10 MG/1
TABLET ORAL
COMMUNITY
Start: 2018-02-16 | End: 2018-03-16

## 2018-03-16 RX ORDER — OMEPRAZOLE 40 MG/1
40 CAPSULE, DELAYED RELEASE ORAL EVERY OTHER DAY
COMMUNITY
End: 2018-03-16

## 2018-03-16 RX ORDER — BUDESONIDE AND FORMOTEROL FUMARATE DIHYDRATE 160; 4.5 UG/1; UG/1
AEROSOL RESPIRATORY (INHALATION)
COMMUNITY
Start: 2017-12-01 | End: 2018-03-16

## 2018-03-16 RX ORDER — PANTOPRAZOLE SODIUM 40 MG/1
40 TABLET, DELAYED RELEASE ORAL EVERY OTHER DAY
COMMUNITY
End: 2018-11-01

## 2018-03-16 RX ORDER — CARVEDILOL 12.5 MG/1
12.5 TABLET ORAL 2 TIMES DAILY
Qty: 60 TABLET | Refills: 6 | Status: SHIPPED | OUTPATIENT
Start: 2018-03-16 | End: 2018-05-04

## 2018-03-16 RX ORDER — AMLODIPINE BESYLATE 5 MG/1
TABLET ORAL
COMMUNITY
Start: 2018-02-16 | End: 2018-03-16

## 2018-03-16 RX ORDER — THEOPHYLLINE ANHYDROUS 200 MG/1
CAPSULE, EXTENDED RELEASE ORAL
COMMUNITY
Start: 2018-02-08 | End: 2018-03-16

## 2018-03-16 RX ORDER — ALBUTEROL SULFATE 0.83 MG/ML
2.5 SOLUTION RESPIRATORY (INHALATION) 2 TIMES DAILY
COMMUNITY
End: 2018-12-03 | Stop reason: SDUPTHER

## 2018-03-16 RX ORDER — INHALER, ASSIST DEVICES
SPACER (EA) MISCELLANEOUS
COMMUNITY
Start: 2018-01-25 | End: 2018-04-27

## 2018-03-16 RX ORDER — ACETAMINOPHEN 500 MG
1 TABLET ORAL DAILY
COMMUNITY
Start: 2016-12-01 | End: 2020-11-19

## 2018-03-16 RX ORDER — BUDESONIDE AND FORMOTEROL FUMARATE DIHYDRATE 160; 4.5 UG/1; UG/1
2 AEROSOL RESPIRATORY (INHALATION) EVERY 12 HOURS
COMMUNITY
Start: 2018-01-21 | End: 2018-11-01

## 2018-03-16 NOTE — TELEPHONE ENCOUNTER
----- Message from Katie Wallace sent at 3/16/2018 11:59 AM CDT -----  Contact: pt niece  Caller is requesting a call back from nurse in regards to pt med list for his appt today.          [phone] (home)

## 2018-03-16 NOTE — PROGRESS NOTES
Pt presented for an echocardiogram with bubble study per Dr. Roberts.  Procedure was explained to the patient, He verbalized understanding.  IV, 24ga x 1 attempt, was started in the LT arm using aseptic technique.  Patient tolerated the procedure well.  IV discontinued, pressure dressing applied.

## 2018-03-16 NOTE — PROGRESS NOTES
Subjective:   Patient ID:  Rea Vail is a 67 y.o. male who presents for follow up of Establish Care and Results      HPI  A 68 yo male with h/o copd diastolic chf htn hlp is here for f/u he has felt much better has no leg swelling his shortness of breath is improved. Has been sleeping on 2 pillows at nite. Has no pnd no chest pain. His weight is steady he is going to bathroom at nite three to four times. He had a cva has residual left sided weakness and has still numbness  He has unsteady gait  He has knee pain and arthritis has gout . He si anticoagulated.  Past Medical History:   Diagnosis Date    Anemia 2013    Asthma     Atrial fibrillation     CHF (congestive heart failure)     COPD (chronic obstructive pulmonary disease)     COPD (chronic obstructive pulmonary disease) 2012    Diverticular disease     Gout 2012    Hyperlipidemia     Hypertension     Other and unspecified hyperlipidemia     Stroke        History reviewed. No pertinent surgical history.    Social History   Substance Use Topics    Smoking status: Former Smoker     Types: Cigarettes     Quit date: 7/9/2006    Smokeless tobacco: Not on file    Alcohol use No       Family History   Problem Relation Age of Onset    Stroke Cousin        Current Outpatient Prescriptions   Medication Sig    AEROCHAMBER PLUS FLOW-VU     albuterol (PROVENTIL/VENTOLIN) 90 mcg/actuation inhaler Inhale 2 puffs into the lungs once daily.     allopurinol (ZYLOPRIM) 300 MG tablet Take 300 mg by mouth once daily.    apixaban 5 mg Tab Take 1 tablet (5 mg total) by mouth 2 (two) times daily.    aspirin 81 MG Chew Take 1 tablet (81 mg total) by mouth once daily.    carvedilol (COREG) 12.5 MG tablet Take 1 tablet (12.5 mg total) by mouth 2 (two) times daily.    cholecalciferol, vitamin D3, (VITAMIN D3) 2,000 unit Cap     furosemide (LASIX) 40 MG tablet Take 40 mg by mouth every other day.    gabapentin (NEURONTIN) 300 MG capsule Take 1 capsule (300 mg  total) by mouth 2 (two) times daily. 1 capsule Oral Three times a day    hydrocodone-acetaminophen 10-325mg (NORCO)  mg Tab Take 1 tablet by mouth every 8 (eight) hours as needed for Pain. 1 tablet Oral Twice a day    losartan (COZAAR) 25 MG tablet Take 25 mg by mouth 2 (two) times daily.    multivitamin capsule Take 1 capsule by mouth once daily.    simvastatin (ZOCOR) 40 MG tablet Take 1 tablet (40 mg total) by mouth every evening. 1 tablet Oral Every day    SYMBICORT 160-4.5 mcg/actuation HFAA     tizanidine (ZANAFLEX) 4 MG tablet Take 4 mg by mouth 2 (two) times daily.     albuterol (PROVENTIL) 2.5 mg /3 mL (0.083 %) nebulizer solution Take 2.5 mg by nebulization 2 (two) times daily. Rescue    Lactobac 41-B.bifid,lactis- mg (25 billion cell) Cap Take by mouth.    OXYGEN-AIR DELIVERY SYSTEMS MISC by [facility].(Non-Drug; Combo Route) route.    pantoprazole (PROTONIX) 40 MG tablet Take 40 mg by mouth every other day.     No current facility-administered medications for this visit.      Current Outpatient Prescriptions on File Prior to Visit   Medication Sig    albuterol (PROVENTIL/VENTOLIN) 90 mcg/actuation inhaler Inhale 2 puffs into the lungs once daily.     allopurinol (ZYLOPRIM) 300 MG tablet Take 300 mg by mouth once daily.    apixaban 5 mg Tab Take 1 tablet (5 mg total) by mouth 2 (two) times daily.    aspirin 81 MG Chew Take 1 tablet (81 mg total) by mouth once daily.    carvedilol (COREG) 12.5 MG tablet Take 1 tablet (12.5 mg total) by mouth 2 (two) times daily.    gabapentin (NEURONTIN) 300 MG capsule Take 1 capsule (300 mg total) by mouth 2 (two) times daily. 1 capsule Oral Three times a day    hydrocodone-acetaminophen 10-325mg (NORCO)  mg Tab Take 1 tablet by mouth every 8 (eight) hours as needed for Pain. 1 tablet Oral Twice a day    multivitamin capsule Take 1 capsule by mouth once daily.    simvastatin (ZOCOR) 40 MG tablet Take 1 tablet (40 mg total) by mouth every  evening. 1 tablet Oral Every day    tizanidine (ZANAFLEX) 4 MG tablet Take 4 mg by mouth 2 (two) times daily.     [DISCONTINUED] aclidinium bromide (TUDORZA PRESSAIR) 400 mcg/actuation AePB Inhale 1 puff into the lungs 2 (two) times daily.    [DISCONTINUED] famotidine (PEPCID) 20 MG tablet Take 1 tablet (20 mg total) by mouth 2 (two) times daily.    [DISCONTINUED] fluticasone-salmeterol 250-50 mcg/dose (ADVAIR) 250-50 mcg/dose diskus inhaler Inhale 1 puff into the lungs 2 (two) times daily.    [DISCONTINUED] iron-vitamin C 65 mg iron- 125 mg TbEC Take by mouth 3 (three) times daily.    [DISCONTINUED] leflunomide (ARAVA) 10 MG Tab Take 1 tablet (10 mg total) by mouth once daily.    [DISCONTINUED] pramipexole (MIRAPEX) 0.5 MG tablet Take 0.5 mg by mouth 2 (two) times daily.     No current facility-administered medications on file prior to visit.        Review of Systems   Constitution: Negative for weakness and malaise/fatigue.   Eyes: Negative for blurred vision.   Cardiovascular: Positive for dyspnea on exertion and irregular heartbeat. Negative for chest pain, claudication, cyanosis, leg swelling, near-syncope, orthopnea, palpitations and paroxysmal nocturnal dyspnea.   Respiratory: Positive for shortness of breath and snoring. Negative for cough and hemoptysis.    Hematologic/Lymphatic: Negative for bleeding problem. Does not bruise/bleed easily.   Skin: Negative for dry skin and itching.   Musculoskeletal: Positive for arthritis and joint pain. Negative for falls, muscle weakness and myalgias.   Gastrointestinal: Negative for abdominal pain, diarrhea, heartburn, hematemesis, hematochezia and melena.   Genitourinary: Negative for flank pain and hematuria.   Neurological: Positive for disturbances in coordination, focal weakness and loss of balance. Negative for dizziness, headaches, light-headedness, numbness, paresthesias and seizures.   Psychiatric/Behavioral: Negative for altered mental status and memory  loss. The patient is not nervous/anxious.    Allergic/Immunologic: Negative for hives.       Objective:   Physical Exam   Constitutional: He is oriented to person, place, and time. He appears well-developed and well-nourished. No distress.   HENT:   Head: Normocephalic and atraumatic.   Eyes: EOM are normal. Pupils are equal, round, and reactive to light. Right eye exhibits no discharge. Left eye exhibits no discharge.   Neck: Neck supple. JVD present. No thyromegaly present.   Cardiovascular: Normal rate and intact distal pulses.  An irregularly irregular rhythm present. Exam reveals no gallop and no friction rub.    Murmur heard.   Medium-pitched mid to late systolic murmur is present  at the lower left sternal border  Pulses:       Carotid pulses are 2+ on the right side, and 2+ on the left side.       Radial pulses are 2+ on the right side, and 2+ on the left side.        Femoral pulses are 2+ on the right side, and 2+ on the left side.       Popliteal pulses are 2+ on the right side, and 2+ on the left side.        Dorsalis pedis pulses are 2+ on the right side, and 2+ on the left side.        Posterior tibial pulses are 2+ on the right side, and 2+ on the left side.   Pulmonary/Chest: Effort normal and breath sounds normal. No respiratory distress. He has no wheezes. He has no rales. He exhibits no tenderness.   Abdominal: Soft. Bowel sounds are normal. He exhibits no distension. There is no tenderness.   obese   Musculoskeletal: Normal range of motion. He exhibits no edema.   Neurological: He is alert and oriented to person, place, and time. No cranial nerve deficit.   Skin: Skin is warm and dry. No rash noted. He is not diaphoretic. No erythema.   Psychiatric: He has a normal mood and affect. His behavior is normal.   Nursing note and vitals reviewed.    Vitals:    03/16/18 1449 03/16/18 1453   BP: 120/70 116/70   BP Location: Right arm Left arm   Patient Position: Sitting Sitting   BP Method: Medium  "(Manual) Medium (Manual)   Pulse: 78    Weight: 84.1 kg (185 lb 6.5 oz)    Height: 5' 8" (1.727 m)      Lab Results   Component Value Date    CHOL 133 02/08/2018    CHOL 177 01/15/2015    CHOL 151 10/22/2013     Lab Results   Component Value Date    HDL 40 02/08/2018    HDL 34 (L) 01/15/2015    HDL 32 (L) 10/22/2013     Lab Results   Component Value Date    LDLCALC 79.4 02/08/2018    LDLCALC 98.8 01/15/2015    LDLCALC 87.8 10/22/2013     Lab Results   Component Value Date    TRIG 68 02/08/2018    TRIG 221 (H) 01/15/2015    TRIG 156 (H) 10/22/2013     Lab Results   Component Value Date    CHOLHDL 30.1 02/08/2018    CHOLHDL 19.2 (L) 01/15/2015    CHOLHDL 21.2 10/22/2013       Chemistry        Component Value Date/Time     02/14/2018 0514    K 4.4 02/14/2018 0514    CL 96 02/14/2018 0514    CO2 34 (H) 02/14/2018 0514    BUN 57 (H) 02/14/2018 0514    CREATININE 1.5 (H) 02/14/2018 0514     (H) 02/14/2018 0514        Component Value Date/Time    CALCIUM 10.0 02/14/2018 0514    ALKPHOS 87 02/14/2018 0514    AST 19 02/14/2018 0514    ALT 23 02/14/2018 0514    BILITOT 0.5 02/14/2018 0514    ESTGFRAFRICA 54 (A) 02/14/2018 0514    EGFRNONAA 47 (A) 02/14/2018 0514        Lab Results   Component Value Date    HGBA1C 6.0 (H) 02/08/2018       Lab Results   Component Value Date    TSH 1.350 02/07/2018     Lab Results   Component Value Date    INR 1.0 02/12/2018     Lab Results   Component Value Date    WBC 9.53 02/14/2018    HGB 13.7 (L) 02/14/2018    HCT 45.6 02/14/2018    MCV 88 02/14/2018     (H) 02/14/2018     BMP  Sodium   Date Value Ref Range Status   02/14/2018 142 136 - 145 mmol/L Final     Potassium   Date Value Ref Range Status   02/14/2018 4.4 3.5 - 5.1 mmol/L Final     Chloride   Date Value Ref Range Status   02/14/2018 96 95 - 110 mmol/L Final     CO2   Date Value Ref Range Status   02/14/2018 34 (H) 23 - 29 mmol/L Final     BUN, Bld   Date Value Ref Range Status   02/14/2018 57 (H) 8 - 23 mg/dL " Final     Creatinine   Date Value Ref Range Status   02/14/2018 1.5 (H) 0.5 - 1.4 mg/dL Final     Calcium   Date Value Ref Range Status   02/14/2018 10.0 8.7 - 10.5 mg/dL Final     Anion Gap   Date Value Ref Range Status   02/14/2018 12 8 - 16 mmol/L Final     eGFR if    Date Value Ref Range Status   02/14/2018 54 (A) >60 mL/min/1.73 m^2 Final     eGFR if non    Date Value Ref Range Status   02/14/2018 47 (A) >60 mL/min/1.73 m^2 Final     Comment:     Calculation used to obtain the estimated glomerular filtration  rate (eGFR) is the CKD-EPI equation.        CrCl cannot be calculated (Patient's most recent lab result is older than the maximum 7 days allowed.).    Assessment:     1. Acute on chronic diastolic congestive heart failure    2. Dyspnea and respiratory abnormalities    3. Atrial fibrillation, unspecified type    4. Chronic obstructive pulmonary disease with acute exacerbation    5. COPD exacerbation    6. Hypercapnia    7. Acute on chronic combined systolic and diastolic congestive heart failure    8. Essential hypertension    9. Dyslipidemia      Appears euvolemic . Instructed aboutt compliance with diet exercise weight loss. counseled about fluid intake  And restriction to 1.5 liter daily weight f/u daily avoid salt intake.  Plan:   Continue current therapy  Cardiac low salt diet.  Risk factor modification and excercise program.  F/u in 6 months with lipid cmp   F/u with angrielle in chf in 1 month.

## 2018-03-19 ENCOUNTER — OFFICE VISIT (OUTPATIENT)
Dept: PULMONOLOGY | Facility: CLINIC | Age: 68
End: 2018-03-19
Payer: MEDICARE

## 2018-03-19 VITALS
BODY MASS INDEX: 28.44 KG/M2 | DIASTOLIC BLOOD PRESSURE: 62 MMHG | OXYGEN SATURATION: 92 % | HEART RATE: 60 BPM | WEIGHT: 187.63 LBS | SYSTOLIC BLOOD PRESSURE: 126 MMHG | HEIGHT: 68 IN | RESPIRATION RATE: 18 BRPM

## 2018-03-19 DIAGNOSIS — J44.9 COPD, SEVERITY TO BE DETERMINED: Primary | Chronic | ICD-10-CM

## 2018-03-19 DIAGNOSIS — I48.20 CHRONIC ATRIAL FIBRILLATION: Chronic | ICD-10-CM

## 2018-03-19 DIAGNOSIS — J96.12 CHRONIC RESPIRATORY FAILURE WITH HYPOXIA AND HYPERCAPNIA: Chronic | ICD-10-CM

## 2018-03-19 DIAGNOSIS — I50.32 CHRONIC DIASTOLIC CONGESTIVE HEART FAILURE: Chronic | ICD-10-CM

## 2018-03-19 DIAGNOSIS — J96.11 CHRONIC RESPIRATORY FAILURE WITH HYPOXIA AND HYPERCAPNIA: Chronic | ICD-10-CM

## 2018-03-19 PROBLEM — R06.00 DYSPNEA AND RESPIRATORY ABNORMALITIES: Status: RESOLVED | Noted: 2018-02-07 | Resolved: 2018-03-19

## 2018-03-19 PROBLEM — J44.1 CHRONIC OBSTRUCTIVE PULMONARY DISEASE WITH ACUTE EXACERBATION: Status: RESOLVED | Noted: 2018-02-09 | Resolved: 2018-03-19

## 2018-03-19 PROBLEM — R06.89 DYSPNEA AND RESPIRATORY ABNORMALITIES: Status: RESOLVED | Noted: 2018-02-07 | Resolved: 2018-03-19

## 2018-03-19 PROCEDURE — 99215 OFFICE O/P EST HI 40 MIN: CPT | Mod: S$PBB,,, | Performed by: INTERNAL MEDICINE

## 2018-03-19 PROCEDURE — 99999 PR PBB SHADOW E&M-EST. PATIENT-LVL III: CPT | Mod: PBBFAC,,, | Performed by: INTERNAL MEDICINE

## 2018-03-19 PROCEDURE — 99213 OFFICE O/P EST LOW 20 MIN: CPT | Mod: PBBFAC | Performed by: INTERNAL MEDICINE

## 2018-03-19 NOTE — PROGRESS NOTES
Subjective:      Patient ID: Rea Vail is a 67 y.o. male.  Patient Active Problem List   Diagnosis    CVA (cerebral vascular accident)    HTN (hypertension)    Hyperlipidemia    Chronic gout    Multiple joint pain    Dysesthesia    Coagulation defect    Dyslipidemia    Chronic respiratory failure with hypoxia and hypercapnia    Atrial fibrillation    Chronic diastolic congestive heart failure    Hypercapnia    Abnormal CXR    COPD, severity to be determined    ANTHONY (acute kidney injury)     Problem list has been reviewed.    Chief Complaint: Dyspnea and respiratory abnormalities    HPI        Patients reports NYHA II dyspnea    The patient does not have currently have symptoms / an exacerbation.       No cough, sputum, or hemoptysis, No wheezing and No recent change in breathing.       COPD QUESTIONNAIRE  How often do you cough?: A little of the time  How often do you have phlegm (mucus) in your chest?: A little of the time  How often does your chest feel tight?: Never  When you walk up a hill or one flight of stairs, how often are you breathless?: Some of the time  How often are you limited doing any activities at home?: Some of the time  How often are you confident leaving the house despite your lung condition?: All of the time  How often do you sleep soundly?: All of the time  How often do you have energy?: Some of the time  Total score: 12     His current respiratory therapy regimen is PROVENTIL, SYMBICORT, PROVENTIL HFA which provides relief. He is adherent with his regimen.      He is on TRIOLOGY NIV.    He is on oxygen supplementation at 2 L /min    A full  review of systems, past , family  and social histories was performed except as mentioned in the note above, these are non contributory to the main issues discussed today.       Previous Report Reviewed: office notes and radiology reports     The following portions of the patient's history were reviewed and updated as appropriate: He   "has a past medical history of Anemia (2013); Asthma; Atrial fibrillation; CHF (congestive heart failure); COPD (chronic obstructive pulmonary disease); COPD (chronic obstructive pulmonary disease) (2012); Diverticular disease; Gout (2012); Hyperlipidemia; Hypertension; Other and unspecified hyperlipidemia; and Stroke.  He  has no past surgical history on file.  His family history includes Stroke in his cousin.  He  reports that he quit smoking about 11 years ago. His smoking use included Cigarettes. He does not have any smokeless tobacco history on file. He reports that he does not drink alcohol or use drugs.  He has a current medication list which includes the following prescription(s): aerochamber plus flow-vu, albuterol, albuterol, allopurinol, apixaban, carvedilol, cholecalciferol (vitamin d3), furosemide, gabapentin, hydrocodone-acetaminophen 10-325mg, lactobac 41-b.bifid,lactis-fos, losartan, multivitamin, oxygen-air delivery systems, pantoprazole, simvastatin, symbicort, tizanidine, and aspirin.  He is allergic to cephalexin..    Review of Systems   Constitutional: Negative for fatigue and night sweats.   HENT: Negative for nosebleeds, postnasal drip and hearing loss.    Respiratory: Positive for dyspnea on extertion. Negative for apnea.    Cardiovascular: Positive for leg swelling. Negative for chest pain.   Endocrine: Negative for polydipsia and cold intolerance.    Gastrointestinal: Negative for nausea and abdominal pain.   Neurological: Negative for headaches.   Hematological: Does not bruise/bleed easily.   Psychiatric/Behavioral: The patient is nervous/anxious.    All other systems reviewed and are negative.       Objective:   /62   Pulse 60   Resp 18   Ht 5' 8" (1.727 m)   Wt 85.1 kg (187 lb 9.8 oz)   SpO2 (!) 92% Comment: 1.5  at night  BMI 28.53 kg/m²   Body mass index is 28.53 kg/m².    Physical Exam   Constitutional: He is oriented to person, place, and time. He appears well-developed " and well-nourished.   HENT:   Head: Normocephalic and atraumatic.   Eyes: Conjunctivae are normal. Pupils are equal, round, and reactive to light.   Neck: Normal range of motion. Neck supple.   Cardiovascular: An irregularly irregular rhythm present. Exam reveals gallop.    Murmur heard.  Pulmonary/Chest: He has rales.   Abdominal: Soft. Bowel sounds are normal. He exhibits no mass. There is no tenderness. There is no guarding.   Musculoskeletal: He exhibits edema (4 +).   Neurological: He is alert and oriented to person, place, and time.   Skin: Skin is warm. Capillary refill takes less than 2 seconds.   Psychiatric: He has a normal mood and affect. His behavior is normal.   Nursing note and vitals reviewed.      Personal Diagnostic Review  Chest x-ray: 02/14/18: Mild cardiomegaly.  Aortic atherosclerotic type calcification.  Calcified AP window lymph nodes. Mild bibasilar atelectasis.    Assessment / plan:         Discussed diagnosis, its evaluation, treatment and usual course. All questions answered.    Problem List Items Addressed This Visit        Pulmonary    COPD, severity to be determined - Primary (Chronic)    Current Assessment & Plan     COPD ROS: taking medications as instructed, no medication side effects noted, no significant ongoing wheezing or shortness of breath, using bronchodilator MDI less than twice a week.   New concerns: NONE .   Exam: appears well, vitals normal, no respiratory distress, acyanotic, normal RR.   Assessment:  COPD stable.   Plan: SYMBICORT, PROVENTIL, PROVENTIL HFA. Current treatment plan is effective, no change in therapy. PFT, 6MWT, ABG, CXR in 1 month.          Chronic respiratory failure with hypoxia and hypercapnia    Current Assessment & Plan     Continue TRIOLOGY NIPPV.   Continue oxygen supplementation at 2 L / min.            Cardiac/Vascular    Atrial fibrillation    Current Assessment & Plan     Continue cardiology follow up.   Rate controlled.  Continue Eliquis 5mg  PO BID.           Chronic diastolic congestive heart failure    Current Assessment & Plan     Recommend cardiology follow up as requested..   Preload and afterload reduction.  Daily weighing.  Dietary salt restriction.  Alcohol and tobacco avoidance.               TIME SPENT WITH PATIENT: Time spent: 40 minutes in face to face  discussion concerning diagnosis, prognosis, review of lab and test results, benefits of treatment as well as management of disease, counseling of patient and coordination of care between various health  care providers . Greater than half the time spent was used for coordination of care and counseling of patient.     Follow-up in about 1 month (around 4/19/2018) for COPD, Chronic Respiratory Failure, AFIB, , CHF.

## 2018-03-19 NOTE — ASSESSMENT & PLAN NOTE
COPD ROS: taking medications as instructed, no medication side effects noted, no significant ongoing wheezing or shortness of breath, using bronchodilator MDI less than twice a week.   New concerns: NONE .   Exam: appears well, vitals normal, no respiratory distress, acyanotic, normal RR.   Assessment:  COPD stable.   Plan: SYMBICORT, PROVENTIL, PROVENTIL HFA. Current treatment plan is effective, no change in therapy. PFT, 6MWT, ABG, CXR in 1 month.

## 2018-03-19 NOTE — ASSESSMENT & PLAN NOTE
Recommend cardiology follow up as requested..   Preload and afterload reduction.  Daily weighing.  Dietary salt restriction.  Alcohol and tobacco avoidance.

## 2018-03-19 NOTE — PATIENT INSTRUCTIONS
Lung Anatomy  Your lungs take air in to give your body oxygen, which the body needs to work. Your lungs, like all the tissues in your body, are made up of billions of tiny specialized cells. Old lung cells die and are replaced by new, identical lung cells. This natural process helps ensure healthy lungs.    Date Last Reviewed: 11/1/2016  © 1135-8187 [employer] [address] [place] [address], [place] [address]. All rights reserved. This information is not intended as a substitute for professional medical care. Always follow your healthcare professional's instructions.

## 2018-03-20 ENCOUNTER — TELEPHONE (OUTPATIENT)
Dept: PULMONOLOGY | Facility: CLINIC | Age: 68
End: 2018-03-20

## 2018-03-20 NOTE — TELEPHONE ENCOUNTER
----- Message from Finesse Barrios sent at 3/20/2018  9:35 AM CDT -----  Contact: Pt-daughter    Pt-daughter called to verify if a fax was received regarding pt sleep study..[phone] ([place])

## 2018-03-21 ENCOUNTER — LAB VISIT (OUTPATIENT)
Dept: LAB | Facility: HOSPITAL | Age: 68
End: 2018-03-21
Attending: PSYCHIATRY & NEUROLOGY
Payer: MEDICARE

## 2018-03-21 DIAGNOSIS — M60.80 OTHER MYOSITIS, UNSPECIFIED SITE: ICD-10-CM

## 2018-03-21 DIAGNOSIS — E83.10 DISORDER OF IRON METABOLISM: ICD-10-CM

## 2018-03-21 DIAGNOSIS — I63.9 CEREBROVASCULAR ACCIDENT (CVA), UNSPECIFIED MECHANISM: ICD-10-CM

## 2018-03-21 LAB
25(OH)D3+25(OH)D2 SERPL-MCNC: 40 NG/ML
ALBUMIN SERPL BCP-MCNC: 3.2 G/DL
ALP SERPL-CCNC: 100 U/L
ALT SERPL W/O P-5'-P-CCNC: 13 U/L
ANION GAP SERPL CALC-SCNC: 7 MMOL/L
APTT BLDCRRT: 33.8 SEC
AST SERPL-CCNC: 18 U/L
BASOPHILS # BLD AUTO: 0.01 K/UL
BASOPHILS NFR BLD: 0.2 %
BILIRUB SERPL-MCNC: 0.5 MG/DL
BUN SERPL-MCNC: 12 MG/DL
CALCIUM SERPL-MCNC: 9.2 MG/DL
CHLORIDE SERPL-SCNC: 100 MMOL/L
CHOLEST SERPL-MCNC: 149 MG/DL
CHOLEST/HDLC SERPL: 4.3 {RATIO}
CO2 SERPL-SCNC: 33 MMOL/L
CREAT SERPL-MCNC: 1.1 MG/DL
D DIMER PPP IA.FEU-MCNC: 0.59 MG/L FEU
DIFFERENTIAL METHOD: ABNORMAL
EOSINOPHIL # BLD AUTO: 0.1 K/UL
EOSINOPHIL NFR BLD: 2.9 %
ERYTHROCYTE [DISTWIDTH] IN BLOOD BY AUTOMATED COUNT: 16.4 %
EST. GFR  (AFRICAN AMERICAN): >60 ML/MIN/1.73 M^2
EST. GFR  (NON AFRICAN AMERICAN): >60 ML/MIN/1.73 M^2
ESTIMATED AVG GLUCOSE: 126 MG/DL
FIBRINOGEN PPP-MCNC: 448 MG/DL
GLUCOSE SERPL-MCNC: 97 MG/DL
HBA1C MFR BLD HPLC: 6 %
HCT VFR BLD AUTO: 38.7 %
HDLC SERPL-MCNC: 35 MG/DL
HDLC SERPL: 23.5 %
HGB BLD-MCNC: 11.4 G/DL
INR PPP: 1.1
LDLC SERPL CALC-MCNC: 91 MG/DL
LYMPHOCYTES # BLD AUTO: 1.2 K/UL
LYMPHOCYTES NFR BLD: 25.3 %
MCH RBC QN AUTO: 25.8 PG
MCHC RBC AUTO-ENTMCNC: 29.5 G/DL
MCV RBC AUTO: 88 FL
MONOCYTES # BLD AUTO: 0.4 K/UL
MONOCYTES NFR BLD: 8.6 %
NEUTROPHILS # BLD AUTO: 3.1 K/UL
NEUTROPHILS NFR BLD: 63 %
NONHDLC SERPL-MCNC: 114 MG/DL
PLATELET # BLD AUTO: 163 K/UL
PMV BLD AUTO: 9.5 FL
POTASSIUM SERPL-SCNC: 4.1 MMOL/L
PROT SERPL-MCNC: 6.4 G/DL
PROTHROMBIN TIME: 11.4 SEC
RBC # BLD AUTO: 4.42 M/UL
SODIUM SERPL-SCNC: 140 MMOL/L
TRIGL SERPL-MCNC: 115 MG/DL
WBC # BLD AUTO: 4.9 K/UL

## 2018-03-21 PROCEDURE — 80053 COMPREHEN METABOLIC PANEL: CPT

## 2018-03-21 PROCEDURE — 85730 THROMBOPLASTIN TIME PARTIAL: CPT

## 2018-03-21 PROCEDURE — 85025 COMPLETE CBC W/AUTO DIFF WBC: CPT

## 2018-03-21 PROCEDURE — 80061 LIPID PANEL: CPT

## 2018-03-21 PROCEDURE — 85610 PROTHROMBIN TIME: CPT

## 2018-03-21 PROCEDURE — 85384 FIBRINOGEN ACTIVITY: CPT

## 2018-03-21 PROCEDURE — 82306 VITAMIN D 25 HYDROXY: CPT

## 2018-03-21 PROCEDURE — 36415 COLL VENOUS BLD VENIPUNCTURE: CPT

## 2018-03-21 PROCEDURE — 85379 FIBRIN DEGRADATION QUANT: CPT

## 2018-03-21 PROCEDURE — 83036 HEMOGLOBIN GLYCOSYLATED A1C: CPT

## 2018-03-27 ENCOUNTER — TELEPHONE (OUTPATIENT)
Dept: PULMONOLOGY | Facility: CLINIC | Age: 68
End: 2018-03-27

## 2018-04-26 ENCOUNTER — TELEPHONE (OUTPATIENT)
Dept: PULMONOLOGY | Facility: CLINIC | Age: 68
End: 2018-04-26

## 2018-04-26 NOTE — TELEPHONE ENCOUNTER
----- Message from Ary Douglas sent at 4/26/2018  3:41 PM CDT -----  Contact: james quezada ([facility])  Calling in  Regards with questions [phone]

## 2018-04-27 ENCOUNTER — OFFICE VISIT (OUTPATIENT)
Dept: PULMONOLOGY | Facility: CLINIC | Age: 68
End: 2018-04-27
Payer: MEDICARE

## 2018-04-27 ENCOUNTER — PROCEDURE VISIT (OUTPATIENT)
Dept: PULMONOLOGY | Facility: CLINIC | Age: 68
End: 2018-04-27
Payer: MEDICARE

## 2018-04-27 ENCOUNTER — HOSPITAL ENCOUNTER (OUTPATIENT)
Dept: RADIOLOGY | Facility: HOSPITAL | Age: 68
Discharge: HOME | End: 2018-04-27
Attending: INTERNAL MEDICINE
Payer: MEDICARE

## 2018-04-27 VITALS
DIASTOLIC BLOOD PRESSURE: 66 MMHG | HEIGHT: 68 IN | WEIGHT: 184 LBS | RESPIRATION RATE: 18 BRPM | HEART RATE: 66 BPM | BODY MASS INDEX: 27.89 KG/M2 | OXYGEN SATURATION: 93 % | SYSTOLIC BLOOD PRESSURE: 120 MMHG

## 2018-04-27 VITALS — HEIGHT: 68 IN | BODY MASS INDEX: 27.9 KG/M2 | WEIGHT: 184.06 LBS

## 2018-04-27 VITALS
HEIGHT: 68 IN | RESPIRATION RATE: 18 BRPM | OXYGEN SATURATION: 95 % | BODY MASS INDEX: 27.9 KG/M2 | HEART RATE: 68 BPM | WEIGHT: 184.06 LBS

## 2018-04-27 DIAGNOSIS — J44.9 COPD (CHRONIC OBSTRUCTIVE PULMONARY DISEASE): ICD-10-CM

## 2018-04-27 DIAGNOSIS — J96.11 CHRONIC RESPIRATORY FAILURE WITH HYPOXIA AND HYPERCAPNIA: Chronic | ICD-10-CM

## 2018-04-27 DIAGNOSIS — J96.12 CHRONIC RESPIRATORY FAILURE WITH HYPOXIA AND HYPERCAPNIA: Chronic | ICD-10-CM

## 2018-04-27 DIAGNOSIS — R06.00 DYSPNEA AND RESPIRATORY ABNORMALITIES: ICD-10-CM

## 2018-04-27 DIAGNOSIS — R06.89 DYSPNEA AND RESPIRATORY ABNORMALITIES: ICD-10-CM

## 2018-04-27 DIAGNOSIS — J44.9 COPD, SEVERE: Primary | ICD-10-CM

## 2018-04-27 DIAGNOSIS — G47.33 OSA (OBSTRUCTIVE SLEEP APNEA): Chronic | ICD-10-CM

## 2018-04-27 DIAGNOSIS — I50.32 CHRONIC DIASTOLIC CONGESTIVE HEART FAILURE: Chronic | ICD-10-CM

## 2018-04-27 DIAGNOSIS — J44.9 COPD, SEVERE: Primary | Chronic | ICD-10-CM

## 2018-04-27 LAB
ALLENS TEST: ABNORMAL
DELSYS: ABNORMAL
HCO3 UR-SCNC: 33.3 MMOL/L (ref 24–28)
PCO2 BLDA: 55 MMHG (ref 35–45)
PH SMN: 7.39 [PH] (ref 7.35–7.45)
PO2 BLDA: 60 MMHG (ref 80–100)
POC BE: 8 MMOL/L
POC SATURATED O2: 90 % (ref 95–100)
PRE DLCO: 11.63 ML/MMHG/MIN (ref 16.81–25.1)
PRE ERV: 0.13 L
PRE FEF 25 75: 0.45 L/S (ref 1.51–3.2)
PRE FET 100: 9.41 S
PRE FEV1 FVC: 61 %
PRE FEV1: 1.03 L (ref 2.3–3.09)
PRE FIF 50: 1.5 L/S
PRE FRC PL: 2.55 L (ref 2.76–3.97)
PRE FVC: 1.68 L (ref 3.13–4.02)
PRE KROGHS K: 4.11 1/MIN
PRE PEF: 3.64 L/S (ref 6.33–8.85)
PRE RV: 2.47 L (ref 1.99–2.78)
PRE SVC: 1.85 L
PRE TLC: 4.32 L (ref 5.55–6.55)
PREDICTED DLCO: 20.96 ML/MMHG/MIN (ref 16.81–25.1)
PREDICTED FEV1 FVC: 76.63 % (ref 71.42–81.83)
PREDICTED FEV1: 2.7 L (ref 2.3–3.09)
PREDICTED FRC N2: 3.36 L (ref 2.76–3.97)
PREDICTED FRC PL: 3.36 L (ref 2.76–3.97)
PREDICTED FVC: 3.57 L (ref 3.13–4.02)
PREDICTED RV: 2.39 L (ref 1.99–2.78)
PREDICTED SVC: 3.76 L
PREDICTED TLC: 6.05 L (ref 5.55–6.55)
PROVOCATION PROTOCOL: ABNORMAL
SAMPLE: ABNORMAL
SITE: ABNORMAL

## 2018-04-27 PROCEDURE — 94618 PULMONARY STRESS TESTING: CPT | Mod: 26,S$PBB,, | Performed by: INTERNAL MEDICINE

## 2018-04-27 PROCEDURE — 36600 WITHDRAWAL OF ARTERIAL BLOOD: CPT | Mod: PBBFAC

## 2018-04-27 PROCEDURE — 94726 PLETHYSMOGRAPHY LUNG VOLUMES: CPT | Mod: 26,S$PBB,, | Performed by: INTERNAL MEDICINE

## 2018-04-27 PROCEDURE — 94729 DIFFUSING CAPACITY: CPT | Mod: PBBFAC

## 2018-04-27 PROCEDURE — 71046 X-RAY EXAM CHEST 2 VIEWS: CPT | Mod: 26,,, | Performed by: RADIOLOGY

## 2018-04-27 PROCEDURE — 94060 EVALUATION OF WHEEZING: CPT | Mod: PBBFAC

## 2018-04-27 PROCEDURE — 36600 WITHDRAWAL OF ARTERIAL BLOOD: CPT | Mod: 59,S$PBB,, | Performed by: INTERNAL MEDICINE

## 2018-04-27 PROCEDURE — 94729 DIFFUSING CAPACITY: CPT | Mod: 26,S$PBB,, | Performed by: INTERNAL MEDICINE

## 2018-04-27 PROCEDURE — 99213 OFFICE O/P EST LOW 20 MIN: CPT | Mod: PBBFAC,25 | Performed by: INTERNAL MEDICINE

## 2018-04-27 PROCEDURE — 94726 PLETHYSMOGRAPHY LUNG VOLUMES: CPT | Mod: PBBFAC

## 2018-04-27 PROCEDURE — 94618 PULMONARY STRESS TESTING: CPT | Mod: PBBFAC

## 2018-04-27 PROCEDURE — 94664 DEMO&/EVAL PT USE INHALER: CPT | Mod: PBBFAC

## 2018-04-27 PROCEDURE — 94010 BREATHING CAPACITY TEST: CPT | Mod: 26,59,S$PBB, | Performed by: INTERNAL MEDICINE

## 2018-04-27 PROCEDURE — 71046 X-RAY EXAM CHEST 2 VIEWS: CPT | Mod: TC

## 2018-04-27 PROCEDURE — 99215 OFFICE O/P EST HI 40 MIN: CPT | Mod: 25,S$PBB,, | Performed by: INTERNAL MEDICINE

## 2018-04-27 PROCEDURE — 82803 BLOOD GASES ANY COMBINATION: CPT | Mod: PBBFAC

## 2018-04-27 PROCEDURE — 94010 BREATHING CAPACITY TEST: CPT | Mod: PBBFAC

## 2018-04-27 PROCEDURE — 99999 PR PBB SHADOW E&M-EST. PATIENT-LVL III: CPT | Mod: PBBFAC,,, | Performed by: INTERNAL MEDICINE

## 2018-04-27 RX ORDER — POTASSIUM CHLORIDE 20 MEQ/1
20 TABLET, EXTENDED RELEASE ORAL DAILY
Qty: 30 TABLET | Refills: 3 | Status: SHIPPED | OUTPATIENT
Start: 2018-04-27 | End: 2018-05-04 | Stop reason: SDUPTHER

## 2018-04-27 RX ORDER — METOLAZONE 2.5 MG/1
2.5 TABLET ORAL DAILY
Qty: 30 TABLET | Refills: 3 | Status: SHIPPED | OUTPATIENT
Start: 2018-04-27 | End: 2018-05-04

## 2018-04-27 RX ORDER — FUROSEMIDE 40 MG/1
40 TABLET ORAL DAILY
Qty: 30 TABLET | Refills: 3 | Status: SHIPPED | OUTPATIENT
Start: 2018-04-27 | End: 2018-05-04 | Stop reason: SDUPTHER

## 2018-04-27 RX ORDER — BACLOFEN 10 MG/1
TABLET ORAL
COMMUNITY
End: 2018-05-04

## 2018-04-27 NOTE — PATIENT INSTRUCTIONS
Heart Failure: Dealing with Sleep Problems     An airflow device may be needed if you have sleep apnea.   If you have heart failure and youre not sleeping well, there are many possible reasons. Many people with heart failure also have sleep apnea. This is a condition that causes snoring and brief periods of not breathing. Age, certain medicines, and not getting enough exercise can also affect sleep. Be sure to tell your healthcare provider if youre having sleep problems.  Tips for sleeping better  If shortness of breath keeps you awake, your healthcare team needs to know. Tell them if you cant lie flat or need to sleep propped up on pillows. Or tell them if you can only sleep sitting up in a chair or recliner. If nighttime shortness of breath gets worse, bring this to the teams attention. You may be accumulating fluid and need more diuretic medicine. If you have other sleep problems not related to shortness of breath, these tips may help:  · Do deep breathing in bed. This will relax you and help you fall asleep.  · Dont drink caffeine any later than noon.  · Try to go to sleep and wake up around the same time every day. This helps your body set up a sleep cycle.  · Avoid napping. This can affect your sleep cycle.  · Pull window shades down. If the room isnt dark enough, get blackout shades.  · Keep pets out of the bedroom if they bother you at night.  · Wear comfortable, loose pajamas. Pajamas that fit tightly may make you feel like it's harder to breathe.  · If you take medicines at bedtime, talk with your healthcare provider about changing this. Certain medicines may be keeping you awake. Or they may cause you to wake up often to use the bathroom.  · Talk with your healthcare provider about taking over-the-counter sleep aids. Some OTC medicines can interact with your prescribed medicines. Or they may have salts in them that cause you to retain fluids.  Do you have sleep apnea?  You may not know you have  sleep apnea unless someone notices that you have irregular breathing, gasping at night, or snoring while you sleep. You should get checked for this condition. If you have it, treatment can improve your health. Treatment can also ease the stress on your heart and body.  Your healthcare provider may prescribe a CPAP?(continuous positive airway pressure) or BiPap?(bilevel positive airway pressure) device. The machine sends a gentle flow of air through a nasal mask while you sleep. This air goes through your nose and into your lungs, keeping airways open.  Tips for using CPAP and BiPap  · If your mask doesnt fit or feel right, talk with your healthcare provider or the vendor about adjusting it or trying a new one.  · If you have allergies or other problems that block your nose, get those treated. These devices work best if your nose is clear.  · If the device doesnt feel good or work well at first, dont stop using it. Ask your provider or someone from your medical equipment company for ways to help make it work for you.  · Newer devices are small, lightweight, and portable. You should take your machine with you when you travel or are not sleeping at home. Some devices have chargeable batteries. They can be taken camping or when sleeping outdoors.  · Do not use tap water to fill the water chamber for humidity. Deposits in tap water can build up and affect the machine and your breathing. The product makers usually recommend that you use sterile water or distilled water.  · Ask your healthcare provider if you need oxygen along with your CPAP or BiPAP machine. Some people with heart failure need both.  · The equipment wears out with time. Make sure your equipment is tested. Get new equipment as often as your healthcare provider recommends.  Date Last Reviewed: 7/1/2016  © 8665-3086 [employer]. [address] [place] [address], [place] [address]. All rights reserved. This information is not intended as a substitute  for professional medical care. Always follow your healthcare professional's instructions.

## 2018-04-27 NOTE — PROCEDURES
"[facility] - Pulm Function [facility]  Six Minute Walk     SUMMARY     Ordering Provider: Dr. Roberts   Interpreting Provider: Dr. Roberts  Performing nurse/tech/RT: Collin RRT  Diagnosis:  (Dyspnea and Respiratory Abnormalities)  Height: 5' 8" (172.7 cm)  Weight: 83.5 kg (184 lb 1.4 oz)  BMI (Calculated): 28   Patient Race:             Phase Oxygen Assessment Supplemental O2 Heart   Rate Blood Pressure Srinath Dyspnea Scale Rating   Resting 85 % Room Air 86 bpm 127/77 3   Exercise        Minute        1 95 % 3 L/M 107 bpm     2 93 % 3 L/M 104 bpm     3 94 % 3 L/M 118 bpm     4 94 % 3 L/M 130 bpm     5 95 % 3 L/M 145 bpm     6  92 % 3 L/M 153 bpm (!) 138/96 5-6   Recovery        Minute        1 94 % 3 L/M 105 bpm     2 99 % 3 L/M 90 bpm     3 100 % 3 L/M 84 bpm     4 100 % 3 L/M 77 bpm 142/86 3     Six Minute Walk Summary  6MWT Status: completed without stopping  Patient Reported: Dyspnea     Interpretation:  Did the patient stop or pause?: No                                         Total Time Walked (Calculated): 360 seconds  Final Partial Lap Distance (feet): 138 feet  Total Distance Meters (Calculated): 224.94 meters  Predicted Distance Meters (Calculated): 505 meters  Percentage of Predicted (Calculated): 44.54  Peak VO2 (Calculated): 10.73  Mets: 3.07  Has The Patient Had a Previous Six Minute Walk Test?: No       Previous 6MWT Results  Has The Patient Had a Previous Six Minute Walk Test?: No       PHYSICIAN INTERPRETATION:    Six minute walk distance is 224.94  / 505 meters (44.54 % predicted) with somewhat heavy dyspnea. During exercise, there was significant desaturation while breathing room air to 85%. Oxygen saturation improved to 97% with oxygen supplementation at 3  L /min. Significant exercise impairment is likely due to cardiovascular causes. The patient did complete the study, walking 360 seconds of the 360 second test. No previous study performed. Based upon age and body mass index, exercise " capacity is less than predicted.    Peak VO2 during walking was 10.73 ml/min/kg.

## 2018-04-27 NOTE — PROGRESS NOTES
Pulmonary Disease Management  [facility]  Initial Visit    Referring Provider: Dr Roberts  Diagnosis: COPD severe  Last Hospital Admission: 2/12/18    Current Respiratory Medications:  Current Outpatient Prescriptions:     albuterol (PROVENTIL) 2.5 mg /3 mL (0.083 %) nebulizer solution, Take 2.5 mg by nebulization 2 (two) times daily. Rescue, Disp: , Rfl:     albuterol (PROVENTIL/VENTOLIN) 90 mcg/actuation inhaler, Inhale 2 puffs into the lungs once daily. , Disp: , Rfl:     OXYGEN-AIR DELIVERY SYSTEMS MISC, by Misc.(Non-Drug; Combo Route) route., Disp: , Rfl:     SYMBICORT 160-4.5 mcg/actuation HFAA, , Disp: , Rfl:     Allergies:   Review of patient's allergies indicates:   Allergen Reactions    Cephalexin Anaphylaxis       Smoking history:   History   Smoking Status    Former Smoker    Types: Cigarettes    Quit date: 7/9/2006   Smokeless Tobacco    Not on file     Heart Rate: 68  SPO2: 96  Respiratory Rate: 18    BMI (kg/m2): 28.05     Cough Type: nonproductive  Cough Frequency: infrequent  Sputum Color: white  Sputum Amount: scant       Resting Srinath Dyspnea Rating : 2     Current Oxygen Use: Yes  Device: Nasal cannula   Liter Flow: 3  Oxygen Usage Duration: Constantly  DME Provider: Janice            COPD Questionnaire:  Total 24    PFT Results:   PREFVC 47%, PREFEV1 38%, [id number] 62%        Is this patient a candidate for pulmonary rehab?: Yes  Method Used for Education: Literature, Demonstration, Verbal  Did the patient demonstrate understanding?: Yes     Patient Concerns or Expectations: Use of Triology (discomfort)    Therapist Comments:  Reviewed respiratory medications purpose and proper technique of inhalers. Patient was given and practiced proper technique using valved holding chamber.  Patient demonstrated understanding using teach back method.   Patient states he is compliant with respiratory medication and understands the difference between his rescue and maintenance medication.  Reviewed proper cleaning technique of nebulizer.     Discussed the importance of wearing Triology nightly and the use of his oxygen daily. Oxygen safety reviewed with patient. Patient stated understanding.      Plan:  Meds:  SAUNDRA, ICS/LABA,   No DME needs at this time  COPD action plan  Immunization: Pneumonccal due and Flu due this fall  Next provider visit: around 5/27/18    45 minutes spent with patient

## 2018-04-27 NOTE — PROCEDURES
PHYSICIAN INTERPRETATION:    ISTAT PROCEDURE   Result Value Ref Range     POC PH 7.390 7.35 - 7.45     POC PCO2 55.0 (H) 35 - 45 mmHg     POC PO2 60 (L) 80 - 100 mmHg     POC HCO3 33.3 (H) 24 - 28 mmol/L     POC BE 8 -2 to 2 mmol/L     POC SATURATED O2 90 (L) 95 - 100 %     Sample ARTERIAL       Site RR       Allens Test Pass       DelSys Room Air        Moderate hypoxemia with elevated A - a gradient.   Chronic (compensated) primary respiratory acidosis,  with metabolic alkalosis     pH < 7.28 and HCO3 < 26, for acute (uncompensated)  pH > 7.36 and HCO3 > 29, for chronic (compensated)     expected pH = 7.66  expected CO2 = 102  expected HCO3- = 32

## 2018-04-27 NOTE — PROGRESS NOTES
Subjective:      Established patient    Patient ID: Rea Vail is a 67 y.o. male.  Patient Active Problem List   Diagnosis    CVA (cerebral vascular accident)    HTN (hypertension)    Hyperlipidemia    Chronic gout    Multiple joint pain    Dysesthesia    Coagulation defect    Dyslipidemia    Chronic respiratory failure with hypoxia and hypercapnia    Atrial fibrillation    Chronic diastolic congestive heart failure    Abnormal CXR    COPD, severe    ANTHONY (acute kidney injury)    GUMARO (obstructive sleep apnea)       Problem list has been reviewed.    Chief Complaint: COPD (rev cxr walk pft abg)      HPI    PFT, 6 MWT and ABG reviewed with patient who expressed and voiced understanding.   All questions were answered to the patients satisfaction.      Patients reports NYHA III dyspnea    The patient does not have currently have symptoms / an exacerbation.         He reports progressive dyspnea with exertion.      A full  review of systems, past , family  and social histories was performed except as mentioned in the note above, these are non contributory to the main issues discussed today.       Previous Report Reviewed: lab reports, office notes and radiology reports     The following portions of the patient's history were reviewed and updated as appropriate: He  has a past medical history of Anemia (2013); Asthma; Atrial fibrillation; CHF (congestive heart failure); COPD (chronic obstructive pulmonary disease); COPD (chronic obstructive pulmonary disease) (2012); Diverticular disease; Gout (2012); Hyperlipidemia; Hypertension; GUMARO (obstructive sleep apnea) (4/27/2018); Other and unspecified hyperlipidemia; and Stroke.  He  has no past surgical history on file.  His family history includes Stroke in his cousin.  He  reports that he quit smoking about 11 years ago. His smoking use included Cigarettes. He does not have any smokeless tobacco history on file. He reports that he does not drink alcohol or  "use drugs.  He has a current medication list which includes the following prescription(s): albuterol, albuterol, allopurinol, apixaban, atrovent hfa, baclofen, carvedilol, cholecalciferol (vitamin d3), furosemide, gabapentin, hydrocodone-acetaminophen 10-325mg, irbesartan, lactobac 41-b.bifid,lactis-fos, multivitamin, nebivolol, oxygen-air delivery systems, pantoprazole, simvastatin, symbicort, tizanidine, aspirin, metolazone, and potassium chloride sa.  He is allergic to cephalexin..    Review of Systems   Constitutional: Negative for fatigue and night sweats.   HENT: Negative for nosebleeds, postnasal drip and hearing loss.    Respiratory: Positive for dyspnea on extertion. Negative for apnea.    Cardiovascular: Positive for leg swelling. Negative for chest pain.   Endocrine: Negative for polydipsia and cold intolerance.    Gastrointestinal: Negative for nausea and abdominal pain.   Neurological: Negative for headaches.   Hematological: Does not bruise/bleed easily.   Psychiatric/Behavioral: The patient is nervous/anxious.    All other systems reviewed and are negative.       Objective:     /66   Pulse 66   Resp 18   Ht 5' 8" (1.727 m)   Wt 83.5 kg (184 lb)   SpO2 (!) 93% Comment: on 2L O2 per NC  BMI 27.98 kg/m²   Body mass index is 27.98 kg/m².     Physical Exam   Constitutional: He is oriented to person, place, and time. He appears well-developed and well-nourished.   HENT:   Head: Normocephalic and atraumatic.   Eyes: Conjunctivae are normal. Pupils are equal, round, and reactive to light.   Neck: Normal range of motion. Neck supple.   Cardiovascular: An irregularly irregular rhythm present. Exam reveals gallop.    Murmur heard.  Pulmonary/Chest: He has rales.   Abdominal: Soft. Bowel sounds are normal. He exhibits no mass. There is no tenderness. There is no guarding.   Musculoskeletal: He exhibits edema (2+).   Neurological: He is alert and oriented to person, place, and time.   Skin: Skin is " warm. Capillary refill takes less than 2 seconds.   Psychiatric: He has a normal mood and affect. His behavior is normal.   Nursing note and vitals reviewed.      Personal Diagnostic Review    ABG:   Results for orders placed or performed in visit on 18   ISTAT PROCEDURE   Result Value Ref Range    POC PH 7.390 7.35 - 7.45    POC PCO2 55.0 (H) 35 - 45 mmHg    POC PO2 60 (L) 80 - 100 mmHg    POC HCO3 33.3 (H) 24 - 28 mmol/L    POC BE 8 -2 to 2 mmol/L    POC SATURATED O2 90 (L) 95 - 100 %    Sample ARTERIAL     Site RR     Allens Test Pass     DelSys Room Air      Moderate hypoxemia with elevated A - a gradient.   Chronic (compensated) primary respiratory acidosis,  with metabolic alkalosis    pH < 7.28 and HCO3 < 26, for acute (uncompensated)  pH > 7.36 and HCO3 > 29, for chronic (compensated)    expected pH = 7.66  expected CO2 = 102  expected HCO3- = 32    Pulmonary function tests: FEV1: 1.03   (38 % predicted), FVC:  1.68 (47 % predicted), FEV1/FVC:  62, T.32 (71 % predicted), RV/TLVC: 57 (142 % predicted), DLCO: 11.6 (55 % predicted)  Severe mixed obstruction with restriction. Diffusion capacity is moderately reduced.       Six Minute Walk Test: Six minute walk distance is 224.94  / 505 meters (44.54 % predicted) with somewhat heavy dyspnea. During exercise, there was significant desaturation while breathing room air to 85%. Oxygen saturation improved to 97% with oxygen supplementation at 3  L /min. Significant exercise impairment is likely due to cardiovascular causes.  The patient did complete the study, walking 360 seconds of the 360 second test. No previous study performed.  Based upon age and body mass index, exercise capacity is less than predicted.    Peak VO2 during walking was 10.73 ml/min/kg.       CXR: There is cardiomegaly and aortic uncoiling.  Calcified lymph nodes in the AP window.  Developing patchy infiltrates or atelectasis at the lung bases bilaterally and small right pleural  effusion.  No pneumothorax.  Thoracic spondylosis.  As above.    RAYMUNDO Index for COPD Survival Prediction   Select Criteria:   FEV1 % Predicted After Bronchodialator     [] >= 65% (0 points)    [] 50-64% (1 point)    [x] 36-49% (2 points)    [] <= 35% (3 points)   6 Minute Walk Distance     [] >= 350 Meters (0 points)    [] 250-349 Meters (1 point)    [x] 150-249 Meters (2 points)    [] <= 149 Meters (3 points)   MMRC Dyspnea Scale (4 is worst)     [] MMRC 0: Dyspneic on strenuous excercise (0 points)    [x] MMRC 1: Dyspneic on walking a slight hill (0 points)    [] MMRC 2: Dyspneic on walking level ground; must stop occasionally due to breathlessness (1 point)    [] MMRC 3: Must stop for breathlessness after walking 100 yards or after a few minutes (2 points)    [] MMRC 4: Cannot leave house; breathless on dressing/undressing (3 points)   Body Mass Index     [x] > 21 (0 points)    [] <= 21 (1 point)   Results: Total Criteria Point Count:     Approximate 4 Year Survival Interpretation   0-2 Points:  [] 80%   3-4 Points:  [x] 67%   5-6 Points:  [] 57%   7-10 Points:[] 18%         References  1. Mendez BR, Katie CG, Bishnu JM, et. al. The body-mass index, airflow obstruction, dyspnea and exercise capacity index in chronic obstructive pulmonary disease. N Engl J Med. 2004 Mar 4;350(10):1005-12.  2. Rosio DA, Dann CK. Evaluation of clinical methods for rating dyspnea. Chest. 1988 Mar;93(3):580-6      Assessment / Plan:       Discussed diagnosis, its evaluation, treatment and usual course. All questions answered.    Problem List Items Addressed This Visit        Pulmonary    Chronic respiratory failure with hypoxia and hypercapnia    Overview     MULTIFACTORIAL: CVA, CHF, SEVERE COPD, CHRONIC NARCOTIC USE    On nocturnal and daytime volume ventilation - TRIOLOGY  AVAPS.  Ventilator required because the absence of respiratory support is life threatening.  BIPAP considered insufficient due to severity of his condition              Current Assessment & Plan     Continue TRIOLOGY NIPPV. AVAPS- AE   INCREASE  oxygen supplementation at 3 L / min.         Relevant Orders    OXYGEN FOR HOME USE    COPD, severe - Primary    Current Assessment & Plan     COPD ROS: taking medications as instructed, no medication side effects noted, no significant ongoing wheezing or shortness of breath, using bronchodilator MDI less than twice a week.   New concerns: NONE .   Exam: appears well, vitals normal, no respiratory distress, acyanotic, normal RR.   Assessment:  COPD stable.   Plan: SYMBICORT, PROVENTIL, PROVENTIL HFA. Current treatment plan is effective, no change in therapy. CXR in 1 month.          Relevant Orders    X-Ray Chest PA And Lateral       Cardiac/Vascular    Chronic diastolic congestive heart failure    Current Assessment & Plan     Ambulatory referral to CARDIOLOGY - HEART FAILURE  Preload and afterload reduction. Furosemide 40mg po daily + Metolazone 2.5 mg PO daily.   BYSTOLIC, AVAPRO, COREG  Daily weighing. WEIGHT GOAL   - 170  LBS  Dietary salt restriction.  Alcohol and tobacco avoidance.         Relevant Medications    furosemide (LASIX) 40 MG tablet    potassium chloride SA (K-DUR,KLOR-CON) 20 MEQ tablet    metOLazone (ZAROXOLYN) 2.5 MG tablet    Other Relevant Orders    Ambulatory Referral to Cardiology       Other    GUMARO (obstructive sleep apnea)    Current Assessment & Plan     BIPAP considered insufficient due to severity of his condition.  Continue AVAPS AE     Bring Sleep study report on next visit.                      TIME SPENT WITH PATIENT: Time spent: 50 minutes in face to face  discussion concerning diagnosis, prognosis, review of lab and test results, benefits of treatment as well as management of disease, counseling of patient and coordination of care between various health  care providers . Greater than half the time spent was used for coordination of care and counseling of patient.       Follow-up in about 1  month (around 5/27/2018) for COPD, CHF, GUMARO, Chronic Respiratory Failure.

## 2018-05-04 ENCOUNTER — OFFICE VISIT (OUTPATIENT)
Dept: CARDIOLOGY | Facility: CLINIC | Age: 68
End: 2018-05-04
Payer: MEDICARE

## 2018-05-04 VITALS
BODY MASS INDEX: 28.4 KG/M2 | WEIGHT: 187.38 LBS | HEART RATE: 68 BPM | SYSTOLIC BLOOD PRESSURE: 122 MMHG | DIASTOLIC BLOOD PRESSURE: 66 MMHG | HEIGHT: 68 IN

## 2018-05-04 DIAGNOSIS — I50.32 CHRONIC DIASTOLIC CONGESTIVE HEART FAILURE: Primary | ICD-10-CM

## 2018-05-04 DIAGNOSIS — E78.5 HYPERLIPIDEMIA, UNSPECIFIED HYPERLIPIDEMIA TYPE: ICD-10-CM

## 2018-05-04 DIAGNOSIS — E78.5 DYSLIPIDEMIA: ICD-10-CM

## 2018-05-04 DIAGNOSIS — I50.32 CHRONIC DIASTOLIC CONGESTIVE HEART FAILURE: Chronic | ICD-10-CM

## 2018-05-04 DIAGNOSIS — G47.33 OSA (OBSTRUCTIVE SLEEP APNEA): ICD-10-CM

## 2018-05-04 DIAGNOSIS — I48.20 CHRONIC ATRIAL FIBRILLATION: ICD-10-CM

## 2018-05-04 DIAGNOSIS — I10 ESSENTIAL HYPERTENSION: ICD-10-CM

## 2018-05-04 DIAGNOSIS — J96.12 CHRONIC RESPIRATORY FAILURE WITH HYPOXIA AND HYPERCAPNIA: ICD-10-CM

## 2018-05-04 DIAGNOSIS — J44.9 COPD, SEVERE: ICD-10-CM

## 2018-05-04 DIAGNOSIS — I63.9 CEREBROVASCULAR ACCIDENT (CVA), UNSPECIFIED MECHANISM: ICD-10-CM

## 2018-05-04 DIAGNOSIS — J96.11 CHRONIC RESPIRATORY FAILURE WITH HYPOXIA AND HYPERCAPNIA: ICD-10-CM

## 2018-05-04 PROCEDURE — 99214 OFFICE O/P EST MOD 30 MIN: CPT | Mod: S$PBB,,, | Performed by: NURSE PRACTITIONER

## 2018-05-04 PROCEDURE — 99999 PR PBB SHADOW E&M-EST. PATIENT-LVL IV: CPT | Mod: PBBFAC,,, | Performed by: NURSE PRACTITIONER

## 2018-05-04 PROCEDURE — 99214 OFFICE O/P EST MOD 30 MIN: CPT | Mod: PBBFAC,PO | Performed by: NURSE PRACTITIONER

## 2018-05-04 RX ORDER — POTASSIUM CHLORIDE 20 MEQ/1
20 TABLET, EXTENDED RELEASE ORAL DAILY
Qty: 30 TABLET | Refills: 3 | Status: SHIPPED | OUTPATIENT
Start: 2018-05-04 | End: 2020-01-09

## 2018-05-04 RX ORDER — FUROSEMIDE 40 MG/1
TABLET ORAL
Qty: 45 TABLET | Refills: 6 | Status: SHIPPED | OUTPATIENT
Start: 2018-05-04 | End: 2018-06-15 | Stop reason: SDUPTHER

## 2018-05-04 NOTE — PROGRESS NOTES
Subjective:   Patient ID:  Rea Vail is a 67 y.o. male who presents for follow up of Congestive Heart Failure      HPI    Patient presents to clinic for follow up and management of CHF. He has PMH of Pulm HTN, A-fib, COPD, GUMARO, Diastolic HF. Is oxygen dependent at 2 liters. Following with Pulmonology . Using Bipap at night. Had EF of 60% with DD and pulmonary HTN with PA pressure of 50mmHg.   Has no abnormal bleeding on Eliquis. Has no CNS complaints to suggest TIA or CVA.   Lasix 40mg every other daily and Metolazone 2.5mg daily added on 4/27/18, but patient not taking.  Has been doing ok with heart healthy diet. Has no chest pain, orthopnea, PND, near syncope or syncope. Has been a little more SOB.       Past Medical History:   Diagnosis Date    Anemia 2013    Asthma     Atrial fibrillation     CHF (congestive heart failure)     COPD (chronic obstructive pulmonary disease)     COPD (chronic obstructive pulmonary disease) 2012    Diverticular disease     Gout 2012    Hyperlipidemia     Hypertension     GUMARO (obstructive sleep apnea) 4/27/2018    Other and unspecified hyperlipidemia     Stroke        History reviewed. No pertinent surgical history.    Social History   Substance Use Topics    Smoking status: Former Smoker     Types: Cigarettes     Quit date: 7/9/2006    Smokeless tobacco: Not on file    Alcohol use No       Family History   Problem Relation Age of Onset    Stroke Cousin        Current Outpatient Prescriptions   Medication Sig    albuterol (PROVENTIL) 2.5 mg /3 mL (0.083 %) nebulizer solution Take 2.5 mg by nebulization 2 (two) times daily. Rescue    albuterol (PROVENTIL/VENTOLIN) 90 mcg/actuation inhaler Inhale 2 puffs into the lungs once daily.     allopurinol (ZYLOPRIM) 300 MG tablet Take 300 mg by mouth once daily.    apixaban 5 mg Tab Take 1 tablet (5 mg total) by mouth 2 (two) times daily.    aspirin 81 MG Chew Take 1 tablet (81 mg total) by mouth once daily.     ATROVENT HFA 17 mcg/actuation inhaler     cholecalciferol, vitamin D3, (VITAMIN D3) 2,000 unit Cap 1 capsule once daily.     furosemide (LASIX) 40 MG tablet Take 1 pill daily and extra tab as needed for SOB, weight gain or swelling    gabapentin (NEURONTIN) 300 MG capsule Take 1 capsule (300 mg total) by mouth 2 (two) times daily. 1 capsule Oral Three times a day    hydrocodone-acetaminophen 10-325mg (NORCO)  mg Tab Take 1 tablet by mouth every 8 (eight) hours as needed for Pain. 1 tablet Oral Twice a day    irbesartan (AVAPRO) 150 MG tablet Take 150 mg by mouth once daily.     Lactobac 41-B.bifid,lactis- mg (25 billion cell) Cap Take by mouth.    multivitamin capsule Take 1 capsule by mouth once daily.    naproxen sodium (ALEVE ORAL) Take by mouth once daily.     nebivolol (BYSTOLIC) 10 MG Tab 10 mg 2 (two) times daily.     OXYGEN-AIR DELIVERY SYSTEMS MISC by Misc.(Non-Drug; Combo Route) route.    pantoprazole (PROTONIX) 40 MG tablet Take 40 mg by mouth every other day.    potassium chloride SA (K-DUR,KLOR-CON) 20 MEQ tablet Take 1 tablet (20 mEq total) by mouth once daily.    simvastatin (ZOCOR) 40 MG tablet Take 1 tablet (40 mg total) by mouth every evening. 1 tablet Oral Every day    SYMBICORT 160-4.5 mcg/actuation HFAA     tizanidine (ZANAFLEX) 4 MG tablet Take 4 mg by mouth 2 (two) times daily.      No current facility-administered medications for this visit.      Current Outpatient Prescriptions on File Prior to Visit   Medication Sig    albuterol (PROVENTIL) 2.5 mg /3 mL (0.083 %) nebulizer solution Take 2.5 mg by nebulization 2 (two) times daily. Rescue    albuterol (PROVENTIL/VENTOLIN) 90 mcg/actuation inhaler Inhale 2 puffs into the lungs once daily.     allopurinol (ZYLOPRIM) 300 MG tablet Take 300 mg by mouth once daily.    apixaban 5 mg Tab Take 1 tablet (5 mg total) by mouth 2 (two) times daily.    aspirin 81 MG Chew Take 1 tablet (81 mg total) by mouth once daily.     ATROVENT HFA 17 mcg/actuation inhaler     cholecalciferol, vitamin D3, (VITAMIN D3) 2,000 unit Cap 1 capsule once daily.     gabapentin (NEURONTIN) 300 MG capsule Take 1 capsule (300 mg total) by mouth 2 (two) times daily. 1 capsule Oral Three times a day    hydrocodone-acetaminophen 10-325mg (NORCO)  mg Tab Take 1 tablet by mouth every 8 (eight) hours as needed for Pain. 1 tablet Oral Twice a day    irbesartan (AVAPRO) 150 MG tablet Take 150 mg by mouth once daily.     Lactobac 41-B.bifid,lactis- mg (25 billion cell) Cap Take by mouth.    multivitamin capsule Take 1 capsule by mouth once daily.    nebivolol (BYSTOLIC) 10 MG Tab 10 mg 2 (two) times daily.     OXYGEN-AIR DELIVERY SYSTEMS MISC by Misc.(Non-Drug; Combo Route) route.    pantoprazole (PROTONIX) 40 MG tablet Take 40 mg by mouth every other day.    simvastatin (ZOCOR) 40 MG tablet Take 1 tablet (40 mg total) by mouth every evening. 1 tablet Oral Every day    SYMBICORT 160-4.5 mcg/actuation HFAA     tizanidine (ZANAFLEX) 4 MG tablet Take 4 mg by mouth 2 (two) times daily.     [DISCONTINUED] furosemide (LASIX) 40 MG tablet Take 1 tablet (40 mg total) by mouth once daily.    [DISCONTINUED] metOLazone (ZAROXOLYN) 2.5 MG tablet Take 1 tablet (2.5 mg total) by mouth once daily.    [DISCONTINUED] potassium chloride SA (K-DUR,KLOR-CON) 20 MEQ tablet Take 1 tablet (20 mEq total) by mouth once daily.    [DISCONTINUED] baclofen (LIORESAL) 10 MG tablet baclofen 10 mg tablet    [DISCONTINUED] carvedilol (COREG) 12.5 MG tablet Take 1 tablet (12.5 mg total) by mouth 2 (two) times daily.     No current facility-administered medications on file prior to visit.        Review of Systems   Constitution: Negative for weakness and malaise/fatigue.   Eyes: Negative for blurred vision.   Cardiovascular: Positive for dyspnea on exertion and leg swelling. Negative for chest pain, claudication, cyanosis, irregular heartbeat, near-syncope, orthopnea,  palpitations and paroxysmal nocturnal dyspnea.   Respiratory: Positive for shortness of breath and snoring. Negative for cough and hemoptysis.         Oxygen dependent 2 liters    Hematologic/Lymphatic: Negative for bleeding problem. Does not bruise/bleed easily.   Skin: Negative for dry skin and itching.   Musculoskeletal: Positive for arthritis and joint pain. Negative for falls, muscle weakness and myalgias.   Gastrointestinal: Negative for abdominal pain, diarrhea, heartburn, hematemesis, hematochezia and melena.   Genitourinary: Negative for flank pain and hematuria.   Neurological: Positive for disturbances in coordination, focal weakness and loss of balance. Negative for dizziness, headaches, light-headedness, numbness, paresthesias and seizures.   Psychiatric/Behavioral: Negative for altered mental status and memory loss. The patient is not nervous/anxious.    Allergic/Immunologic: Negative for hives.       Objective:   Physical Exam   Constitutional: He is oriented to person, place, and time. He appears well-developed and well-nourished. No distress.   HENT:   Head: Normocephalic and atraumatic.   Eyes: EOM are normal. Pupils are equal, round, and reactive to light. Right eye exhibits no discharge. Left eye exhibits no discharge.   Neck: Neck supple. JVD present. No thyromegaly present.   Cardiovascular: Normal rate and intact distal pulses.  An irregularly irregular rhythm present. Exam reveals no gallop and no friction rub.    Murmur heard.   Medium-pitched mid to late systolic murmur is present  at the lower left sternal border  Pulses:       Carotid pulses are 2+ on the right side, and 2+ on the left side.       Radial pulses are 2+ on the right side, and 2+ on the left side.        Femoral pulses are 2+ on the right side, and 2+ on the left side.       Popliteal pulses are 2+ on the right side, and 2+ on the left side.        Dorsalis pedis pulses are 2+ on the right side, and 2+ on the left side.       "  Posterior tibial pulses are 2+ on the right side, and 2+ on the left side.   Pulmonary/Chest: Effort normal and breath sounds normal. No respiratory distress. He has no wheezes. He has no rales. He exhibits no tenderness.   Abdominal: Soft. Bowel sounds are normal. He exhibits no distension. There is no tenderness.   obese   Musculoskeletal: Normal range of motion. He exhibits edema ( +1 BLE ).   Neurological: He is alert and oriented to person, place, and time. No cranial nerve deficit.   Skin: Skin is warm and dry. No rash noted. He is not diaphoretic. No erythema.   Psychiatric: He has a normal mood and affect. His behavior is normal.   Nursing note and vitals reviewed.    Vitals:    05/04/18 1020   BP: 122/66   Pulse: 68   Weight: 85 kg (187 lb 6.3 oz)   Height: 5' 8" (1.727 m)     Lab Results   Component Value Date    CHOL 149 03/21/2018    CHOL 133 02/08/2018    CHOL 177 01/15/2015     Lab Results   Component Value Date    HDL 35 (L) 03/21/2018    HDL 40 02/08/2018    HDL 34 (L) 01/15/2015     Lab Results   Component Value Date    LDLCALC 91.0 03/21/2018    LDLCALC 79.4 02/08/2018    LDLCALC 98.8 01/15/2015     Lab Results   Component Value Date    TRIG 115 03/21/2018    TRIG 68 02/08/2018    TRIG 221 (H) 01/15/2015     Lab Results   Component Value Date    CHOLHDL 23.5 03/21/2018    CHOLHDL 30.1 02/08/2018    CHOLHDL 19.2 (L) 01/15/2015       Chemistry        Component Value Date/Time     03/21/2018 1114    K 4.1 03/21/2018 1114     03/21/2018 1114    CO2 33 (H) 03/21/2018 1114    BUN 12 03/21/2018 1114    CREATININE 1.1 03/21/2018 1114    GLU 97 03/21/2018 1114        Component Value Date/Time    CALCIUM 9.2 03/21/2018 1114    ALKPHOS 100 03/21/2018 1114    AST 18 03/21/2018 1114    ALT 13 03/21/2018 1114    BILITOT 0.5 03/21/2018 1114    ESTGFRAFRICA >60 03/21/2018 1114    EGFRNONAA >60 03/21/2018 1114          Lab Results   Component Value Date    TSH 1.350 02/07/2018     Lab Results "   Component Value Date    INR 1.1 03/21/2018    INR 1.0 02/12/2018     Lab Results   Component Value Date    WBC 4.90 03/21/2018    HGB 11.4 (L) 03/21/2018    HCT 38.7 (L) 03/21/2018    MCV 88 03/21/2018     03/21/2018     BMP  Sodium   Date Value Ref Range Status   03/21/2018 140 136 - 145 mmol/L Final     Potassium   Date Value Ref Range Status   03/21/2018 4.1 3.5 - 5.1 mmol/L Final     Chloride   Date Value Ref Range Status   03/21/2018 100 95 - 110 mmol/L Final     CO2   Date Value Ref Range Status   03/21/2018 33 (H) 23 - 29 mmol/L Final     BUN, Bld   Date Value Ref Range Status   03/21/2018 12 8 - 23 mg/dL Final     Creatinine   Date Value Ref Range Status   03/21/2018 1.1 0.5 - 1.4 mg/dL Final     Calcium   Date Value Ref Range Status   03/21/2018 9.2 8.7 - 10.5 mg/dL Final     Anion Gap   Date Value Ref Range Status   03/21/2018 7 (L) 8 - 16 mmol/L Final     eGFR if    Date Value Ref Range Status   03/21/2018 >60 >60 mL/min/1.73 m^2 Final     eGFR if non    Date Value Ref Range Status   03/21/2018 >60 >60 mL/min/1.73 m^2 Final     Comment:     Calculation used to obtain the estimated glomerular filtration  rate (eGFR) is the CKD-EPI equation.        CrCl cannot be calculated (Patient's most recent lab result is older than the maximum 7 days allowed.).      CONCLUSIONS     1 - Normal left ventricular systolic function (EF 60-65%).     2 - No wall motion abnormalities.     3 - Normal right ventricular systolic function .     4 - Pulmonary hypertension. The estimated PA systolic pressure is 50 mmHg.     5 - Biatrial enlargement.     6 - Concentric hypertrophy.     7 - Mild tricuspid regurgitation.     8 - Increased central venous pressure.             This document has been electronically    SIGNED BY: Geovani Mae MD On: 03/16/2018 15:05  Assessment:     1. Chronic diastolic congestive heart failure    2. Cerebrovascular accident (CVA), unspecified mechanism    3.  Chronic respiratory failure with hypoxia and hypercapnia    4. COPD, severe    5. Chronic atrial fibrillation    6. Dyslipidemia    7. Essential hypertension    8. Hyperlipidemia, unspecified hyperlipidemia type    9. GUMARO (obstructive sleep apnea)    10. Chronic diastolic congestive heart failure Active     Using oxygen at 2 liters   JVD and edema on exam  BP well controlled  Currently taking Lasix 40mg every other day   Has been doing ok with sodium intake    Plan:     Increase Lasix to 40mg BID over the weekend  Phone review on Monday for symptom check. If symptoms better, will plan to decrease dose to 40mg daily  Continue K+supplemnt  Heart healthy diet  Limit fluid intake 50-60 oz   Daily weights and to notify clinic if weight increases by more than 3 lbs in 1 day or 5 lbs in 1 week.   Exercise routine as tolerated  Will refer patient to Dr. Denton for Pulmonary HTN treatment consultation

## 2018-05-29 ENCOUNTER — OFFICE VISIT (OUTPATIENT)
Dept: TRANSPLANT | Facility: CLINIC | Age: 68
End: 2018-05-29
Payer: MEDICARE

## 2018-05-29 VITALS
WEIGHT: 178.56 LBS | BODY MASS INDEX: 27.06 KG/M2 | SYSTOLIC BLOOD PRESSURE: 150 MMHG | HEART RATE: 88 BPM | HEIGHT: 68 IN | DIASTOLIC BLOOD PRESSURE: 88 MMHG

## 2018-05-29 DIAGNOSIS — J96.12 CHRONIC RESPIRATORY FAILURE WITH HYPOXIA AND HYPERCAPNIA: ICD-10-CM

## 2018-05-29 DIAGNOSIS — E78.5 DYSLIPIDEMIA: ICD-10-CM

## 2018-05-29 DIAGNOSIS — J44.9 COPD, SEVERE: ICD-10-CM

## 2018-05-29 DIAGNOSIS — J96.11 CHRONIC RESPIRATORY FAILURE WITH HYPOXIA AND HYPERCAPNIA: ICD-10-CM

## 2018-05-29 DIAGNOSIS — I27.20 PULMONARY HYPERTENSION: ICD-10-CM

## 2018-05-29 DIAGNOSIS — I48.20 CHRONIC ATRIAL FIBRILLATION: ICD-10-CM

## 2018-05-29 DIAGNOSIS — I10 ESSENTIAL HYPERTENSION: ICD-10-CM

## 2018-05-29 DIAGNOSIS — I50.32 CHRONIC DIASTOLIC CONGESTIVE HEART FAILURE: Primary | ICD-10-CM

## 2018-05-29 DIAGNOSIS — G47.33 OSA (OBSTRUCTIVE SLEEP APNEA): ICD-10-CM

## 2018-05-29 PROCEDURE — 99213 OFFICE O/P EST LOW 20 MIN: CPT | Mod: PBBFAC,PO | Performed by: INTERNAL MEDICINE

## 2018-05-29 PROCEDURE — 99205 OFFICE O/P NEW HI 60 MIN: CPT | Mod: S$PBB,,, | Performed by: INTERNAL MEDICINE

## 2018-05-29 PROCEDURE — 99999 PR PBB SHADOW E&M-EST. PATIENT-LVL III: CPT | Mod: PBBFAC,,, | Performed by: INTERNAL MEDICINE

## 2018-05-29 RX ORDER — TIOTROPIUM BROMIDE 18 UG/1
18 CAPSULE ORAL; RESPIRATORY (INHALATION)
COMMUNITY
End: 2018-12-03 | Stop reason: SDUPTHER

## 2018-05-29 RX ORDER — FERROUS SULFATE 324(65)MG
65 TABLET, DELAYED RELEASE (ENTERIC COATED) ORAL
COMMUNITY
End: 2019-10-03

## 2018-05-29 RX ORDER — ALBUTEROL SULFATE 90 UG/1
2 AEROSOL, METERED RESPIRATORY (INHALATION) EVERY 6 HOURS PRN
Qty: 1 EACH | Refills: 11 | Status: SHIPPED | OUTPATIENT
Start: 2018-05-29 | End: 2018-12-03 | Stop reason: SDUPTHER

## 2018-05-29 NOTE — PROGRESS NOTES
Subjective:    Patient ID:  Rea Vail is a 67 y.o. male who presents for evaluation of Pulmonary Hypertension.    HPI  68 yo man with Pulm HTN, A-fib, severe COPD, GUMARO, Diastolic HF, chronic resp failure ( oxygen dependent at 2 liters), strokes 2006 and 2008, HTN referred by Kun Barrios for eval of PH.  Following with Dr Roberts as well .    When he last saw Kun Barrios had fluid in his lungs- this has resolved since going back on lasix. Before that was in hosp with pneumonia, which is niece thinks he picked up in the ER the visit prior.  He works in garden for half hour and the he sits and rests. He can walk [employer] and doesn't think he would have to stop and rest. No swelling in his ankles or abc. No Cp. No orthopnea, PND and wears his mask religiously now as he is now comfortable with it.  Lasix is 40mg daily with good UOP.  Wt 178 and steady (was 187-193 prior to lasix)  bp at home runs a little high (log 145-170/101-108 last 5 days but he checks before taking his meds)    Using bipap at night his breathing has gotten better     SH  Pt smoked from age 12-> age 58's-  Has quit   His nieces care for him, lives alone  He does his cooking- no salt and mostly cooks at home    FH:  Dad had PPM    Phase Oxygen Assessment Supplemental O2 Heart   Rate Blood Pressure Srinath Dyspnea Scale Rating   Resting 85 % Room Air 86 bpm 127/77 3   Exercise             Minute             1 95 % 3 L/M 107 bpm       2 93 % 3 L/M 104 bpm       3 94 % 3 L/M 118 bpm       4 94 % 3 L/M 130 bpm       5 95 % 3 L/M 145 bpm       6  92 % 3 L/M 153 bpm (!) 138/96 5-6   Recovery             Minute             1 94 % 3 L/M 105 bpm       2 99 % 3 L/M 90 bpm       3 100 % 3 L/M 84 bpm       4 100 % 3 L/M 77 bpm 142/86 3      Six Minute Walk Summary  6MWT Status: completed without stopping  Patient Reported: Dyspnea         Interpretation:  Did the patient stop or pause?: No  Total Time Walked (Calculated): 360 seconds  Final Partial Lap  Distance (feet): 138 feet  Total Distance Meters (Calculated): 224.94 meters  Predicted Distance Meters (Calculated): 505 meters  Percentage of Predicted (Calculated): 44.54  Peak VO2 (Calculated): 10.73      Echo        Results for orders placed or performed during the hospital encounter of 18   2D echo with color flow doppler   Result Value Ref Range    EF 55 55 - 65    Mitral Valve Regurgitation MILD     Diastolic Dysfunction No     Est. PA Systolic Pressure 47.94 (A)     Tricuspid Valve Regurgitation MILD    mild LAE  right ventricle is normal in size measuring 3.2 cm at the base in the apical right ventricle-focused view. Global right ventricular systolic function appears normal. The estimated PA systolic pressure is 50 mmHg.    EKG   Atrial fibrillation  Septal infarct (cited on or before 2018)  Abnormal ECG  When compared with ECG of 2018 13:19,  No significant change was found        CXR   /    /  Mild cardiomegaly.  Aortic atherosclerotic type calcification.  Calcified AP window lymph nodes.  Developing patchy infiltrates or atelectasis at the lung bases bilaterally and small right pleural effusion.  No pneumothorax.  Thoracic spondylosis    Mild bibasilar atelectasis.  CT Chest    /    /     Mild degree of infiltrate or atelectasis again noted in the right base. Suggest posttreatment followup with chest x-ray.    All CT scans at this facility use dose modulation, iterative reconstruction, and/or weight-based dosing when appropriate to reduce radiation dose to as low as reasonably achievable.      PFTs      FEV1: 1.03   (38 % predicted), FVC:  1.68 (47 % predicted), FEV1/FVC:  62, T.32 (71 % predicted), RV/TLVC: 57 (142 % predicted), DLCO: 11.6 (55 % predicted)  Severe mixed obstruction with restriction. Diffusion capacity is moderately reduced    V/Q Scan  /    /  n/a    Review of Systems   Constitution: Positive for weight loss. Negative for chills, fever,  "malaise/fatigue and weight gain.   HENT: Negative.    Eyes: Negative.    Cardiovascular: Positive for dyspnea on exertion. Negative for chest pain, leg swelling, near-syncope, orthopnea, palpitations, paroxysmal nocturnal dyspnea and syncope.   Respiratory: Negative for cough and shortness of breath.    Endocrine: Negative.    Skin: Negative.    Musculoskeletal: Negative.    Gastrointestinal: Negative for bloating, abdominal pain and change in bowel habit.   Neurological: Negative for dizziness and light-headedness.   Psychiatric/Behavioral: Negative for depression.        Objective:  BP (!) 150/88   Pulse 88   Ht 5' 8" (1.727 m)   Wt 81 kg (178 lb 9.2 oz)   BMI 27.15 kg/m²       Physical Exam   Constitutional: He is oriented to person, place, and time. He appears well-developed and well-nourished.   HENT:   Head: Normocephalic and atraumatic.   Eyes: Right eye exhibits no discharge. Left eye exhibits no discharge.   Neck: Neck supple. No JVD present. No thyromegaly present.   Cardiovascular: Normal rate.  Exam reveals no gallop and no friction rub.    No murmur heard.  irreg irreg, 2/6 HSM LLSB     Pulmonary/Chest: Effort normal and breath sounds normal. No respiratory distress. He has no wheezes. He has no rales.   Abdominal: Soft. Bowel sounds are normal. He exhibits no distension. There is no tenderness.   Musculoskeletal: Normal range of motion. He exhibits no edema or tenderness.   Neurological: He is alert and oriented to person, place, and time. No cranial nerve deficit. Coordination normal.   Skin: Skin is warm and dry. No rash noted.   Psychiatric: He has a normal mood and affect. Judgment and thought content normal.           Chemistry        Component Value Date/Time     03/21/2018 1114    K 4.1 03/21/2018 1114     03/21/2018 1114    CO2 33 (H) 03/21/2018 1114    BUN 12 03/21/2018 1114    CREATININE 1.1 03/21/2018 1114    GLU 97 03/21/2018 1114        Component Value Date/Time    CALCIUM " 9.2 03/21/2018 1114    ALKPHOS 100 03/21/2018 1114    AST 18 03/21/2018 1114    ALT 13 03/21/2018 1114    BILITOT 0.5 03/21/2018 1114    ESTGFRAFRICA >60 03/21/2018 1114    EGFRNONAA >60 03/21/2018 1114            Magnesium   Date Value Ref Range Status   02/07/2018 2.4 1.6 - 2.6 mg/dL Final       Lab Results   Component Value Date    WBC 4.90 03/21/2018    HGB 11.4 (L) 03/21/2018    HCT 38.7 (L) 03/21/2018    MCV 88 03/21/2018     03/21/2018       Lab Results   Component Value Date    INR 1.1 03/21/2018    INR 1.0 02/12/2018       BNP   Date Value Ref Range Status   02/12/2018 230 (H) 0 - 99 pg/mL Final     Comment:     Values of less than 100 pg/ml are consistent with non-CHF populations.   02/07/2018 380 (H) 0 - 99 pg/mL Final     Comment:     Values of less than 100 pg/ml are consistent with non-CHF populations.       No results found for: LDH          Assessment:       1. Chronic diastolic congestive heart failure- euvolemic on exam today- no labs drawn. Feeling better since starting lasix-     2. Pulmonary hypertension    3. Chronic atrial fibrillation    4. Chronic respiratory failure with hypoxia and hypercapnia    5. COPD, severe    6. Dyslipidemia    7. Essential hypertension    8. GUMARO (obstructive sleep apnea)      WHO Group:2 + 3  Functional Class 2     Plan:     only issue today is that bp is uncontrolled- looking back all of his bp at MD visits for most part controlled and he has been checking before taking am meds  - asked him to cont checking but to check in the afternoon/evenings as well to see how it runs with his meds in his system.    If he does have ongoing bp above 130/80 would consider adding aldactone as it is the only med which has shown any benefit in diastolic HF    Discussed pathophysiology of diastolic dysfxn today and secondary PH and that unfortunately current evidence-based guidelines do not recommend the use of pulmonary vasodilators in patients with PH in the setting of  chronic left-sided heart disease. These medications have not only be shown to have no clinical benefit, but may in fact worsen the clinical picture by causing increased LV preload. The focus of treatment going forward should be treating the underlying CHF  (and his COPD/GUMARO and HTN)    Keep salt intake to under 2000 mg sodium, fluids to under 2 L (64 oz)    Check weights every morning after getting out of bed and urinating. If weight goes up 3# overnight or 5# in one week he should call the clinic    Will be happy to see pt in future if management of his CHF becomes problematic.

## 2018-05-29 NOTE — PATIENT INSTRUCTIONS
You have diastolic dysfunction or a stiff heart, that does not relax well- this makes you prone to retaining fluid and shortness of breath as the heart that can not fill allows fluid to back up in the lungs    This is worsened by high blood pressure, diabetes, untreated sleep apnea-   The pressure is higher than normal in the lungs but it is secondary to the stiff heart    We treat the stiff heart with bp management, weight loss, CPAP for sleep apnea- we use low sodium diet and fluid pills to manage the fluid    Check your weights every morning after getting out of bed and urinating. If your weight goes up 3# overnight or 5# in one week call the cardiology clinic and let Kun know    Keep salt intake to under 2000 mg sodium, fluids to under 2 L (64 oz)        Low-Salt Diet  This diet removes foods that are high in salt. It also limits the amount of salt you use when cooking. It is most often used for people with high blood pressure, edema (fluid retention), and kidney, liver, or heart disease.  Table salt contains the mineral sodium. Your body needs sodium to work normally. But too much sodium can make your health problems worse. Your healthcare provider is recommending a low-salt (also called low-sodium) diet for you. Your total daily allowance of salt is 1,500 to 2,300 milligrams (mg). It is less than 1 teaspoon of table salt. This means you can have only about 500 to 700 mg of sodium at each meal. People with certain health problems should limit salt intake to the lower end of the recommended range.    When you cook, dont add much salt. If you can cook without using salt, even better. Dont add salt to your food at the table.  When shopping, read food labels. Salt is often called sodium on the label. Choose foods that are salt-free, low salt, or very low salt. Note that foods with reduced salt may not lower your salt intake enough.    Beans, potatoes, and pasta  Ok: Dry beans, split peas, lentils, potatoes,  rice, macaroni, pasta, spaghetti without added salt  Avoid: Potato chips, tortilla chips, and similar products  Breads and cereals  Ok: Low-sodium breads, rolls, cereals, and cakes; low-salt crackers, matzo crackers  Avoid: Salted crackers, pretzels, popcorn, Vietnamese toast, pancakes, muffins  Dairy  Ok: Milk, chocolate milk, hot chocolate mix, low-salt cheeses, and yogurt  Avoid: Processed cheese and cheese spreads; Roquefort, Camembert, and cottage cheese; buttermilk, instant breakfast drink  Desserts  Ok: Ice cream, frozen yogurt, juice bars, gelatin, cookies and pies, sugar, honey, jelly, hard candy  Avoid: Most pies, cakes and cookies prepared or processed with salt; instant pudding  Drinks  Ok: Tea, coffee, fizzy (carbonated) drinks, juices  Avoid: Flavored coffees, electrolyte replacement drinks, sports drinks  Meats  Ok: All fresh meat, fish, poultry, low-salt tuna, eggs, egg substitute  Avoid: Smoked, pickled, brine-cured, or salted meats and fish. This includes mayorga, chipped beef, corned beef, hot dogs, deli meats, ham, kosher meats, salt pork, sausage, canned tuna, salted codfish, smoked salmon, herring, sardines, or anchovies.  Seasonings and spices  Ok: Most seasonings are okay. Good substitutes for salt include: fresh herb blends, hot sauce, lemon, garlic, otto, vinegar, dry mustard, parsley, cilantro, horseradish, tomato paste, regular margarine, mayonnaise, unsalted butter, cream cheese, vegetable oil, cream, low-salt salad dressing and gravy.  Avoid: Regular ketchup, relishes, pickles, soy sauce, teriyaki sauce, Worcestershire sauce, BBQ sauce, tartar sauce, meat tenderizer, chili sauce, regular gravy, regular salad dressing, salted butter  Soups  Ok: Low-salt soups and broths made with allowed foods  Avoid: Bouillon cubes, soups with smoked or salted meats, regular soup and broth  Vegetables  Ok: Most vegetables are okay; also low-salt tomato and vegetable juices  Avoid: Sauerkraut and other  brine-soaked vegetables; pickles and other pickled vegetables; tomato juice, olives  Date Last Reviewed: 8/1/2016  © 9778-0534 [employer]. [address] [place] [address], [place] [address]. All rights reserved. This information is not intended as a substitute for professional medical care. Always follow your healthcare professional's instructions.

## 2018-06-15 ENCOUNTER — OFFICE VISIT (OUTPATIENT)
Dept: CARDIOLOGY | Facility: CLINIC | Age: 68
End: 2018-06-15
Payer: MEDICARE

## 2018-06-15 ENCOUNTER — TELEPHONE (OUTPATIENT)
Dept: CARDIOLOGY | Facility: CLINIC | Age: 68
End: 2018-06-15

## 2018-06-15 ENCOUNTER — LAB VISIT (OUTPATIENT)
Dept: LAB | Facility: HOSPITAL | Age: 68
End: 2018-06-15
Attending: NURSE PRACTITIONER
Payer: MEDICARE

## 2018-06-15 VITALS
DIASTOLIC BLOOD PRESSURE: 72 MMHG | SYSTOLIC BLOOD PRESSURE: 142 MMHG | HEIGHT: 68 IN | HEART RATE: 76 BPM | BODY MASS INDEX: 28.66 KG/M2 | WEIGHT: 189.13 LBS

## 2018-06-15 DIAGNOSIS — I63.9 CEREBROVASCULAR ACCIDENT (CVA), UNSPECIFIED MECHANISM: ICD-10-CM

## 2018-06-15 DIAGNOSIS — E78.5 DYSLIPIDEMIA: ICD-10-CM

## 2018-06-15 DIAGNOSIS — I50.32 CHRONIC DIASTOLIC CONGESTIVE HEART FAILURE: Primary | ICD-10-CM

## 2018-06-15 DIAGNOSIS — I48.20 CHRONIC ATRIAL FIBRILLATION: ICD-10-CM

## 2018-06-15 DIAGNOSIS — I10 ESSENTIAL HYPERTENSION: ICD-10-CM

## 2018-06-15 DIAGNOSIS — I50.32 CHRONIC DIASTOLIC CONGESTIVE HEART FAILURE: Chronic | ICD-10-CM

## 2018-06-15 DIAGNOSIS — I27.20 PULMONARY HYPERTENSION: ICD-10-CM

## 2018-06-15 DIAGNOSIS — J44.9 COPD, SEVERE: ICD-10-CM

## 2018-06-15 DIAGNOSIS — I50.32 CHRONIC DIASTOLIC CONGESTIVE HEART FAILURE: ICD-10-CM

## 2018-06-15 DIAGNOSIS — J96.12 CHRONIC RESPIRATORY FAILURE WITH HYPOXIA AND HYPERCAPNIA: ICD-10-CM

## 2018-06-15 DIAGNOSIS — G47.33 OSA (OBSTRUCTIVE SLEEP APNEA): ICD-10-CM

## 2018-06-15 DIAGNOSIS — J96.11 CHRONIC RESPIRATORY FAILURE WITH HYPOXIA AND HYPERCAPNIA: ICD-10-CM

## 2018-06-15 LAB
ANION GAP SERPL CALC-SCNC: 9 MMOL/L
BNP SERPL-MCNC: 1007 PG/ML
BUN SERPL-MCNC: 33 MG/DL
CALCIUM SERPL-MCNC: 9.2 MG/DL
CHLORIDE SERPL-SCNC: 102 MMOL/L
CO2 SERPL-SCNC: 32 MMOL/L
CREAT SERPL-MCNC: 1.7 MG/DL
EST. GFR  (AFRICAN AMERICAN): 47 ML/MIN/1.73 M^2
EST. GFR  (NON AFRICAN AMERICAN): 40 ML/MIN/1.73 M^2
GLUCOSE SERPL-MCNC: 105 MG/DL
POTASSIUM SERPL-SCNC: 4.9 MMOL/L
SODIUM SERPL-SCNC: 143 MMOL/L

## 2018-06-15 PROCEDURE — 80048 BASIC METABOLIC PNL TOTAL CA: CPT | Mod: PO

## 2018-06-15 PROCEDURE — 83880 ASSAY OF NATRIURETIC PEPTIDE: CPT

## 2018-06-15 PROCEDURE — 99999 PR PBB SHADOW E&M-EST. PATIENT-LVL IV: CPT | Mod: PBBFAC,,, | Performed by: NURSE PRACTITIONER

## 2018-06-15 PROCEDURE — 99214 OFFICE O/P EST MOD 30 MIN: CPT | Mod: S$PBB,,, | Performed by: NURSE PRACTITIONER

## 2018-06-15 PROCEDURE — 36415 COLL VENOUS BLD VENIPUNCTURE: CPT | Mod: PO

## 2018-06-15 PROCEDURE — 99214 OFFICE O/P EST MOD 30 MIN: CPT | Mod: PBBFAC,PO | Performed by: NURSE PRACTITIONER

## 2018-06-15 RX ORDER — FUROSEMIDE 40 MG/1
TABLET ORAL
Qty: 50 TABLET | Refills: 6 | Status: SHIPPED | OUTPATIENT
Start: 2018-06-15 | End: 2018-09-06 | Stop reason: DRUGHIGH

## 2018-06-15 NOTE — TELEPHONE ENCOUNTER
Please inform patient that all labs are not resulted back just yet. If not back by the end of the day, I will call them on Monday with results

## 2018-06-15 NOTE — PROGRESS NOTES
Subjective:   Patient ID:  Rea Vail is a 67 y.o. male who presents for follow up of Congestive Heart Failure      HPI  Patient presents to clinic for follow up and management of diastolic HF.   He has PMH of Pulm HTN, A-fib, COPD, GUMARO, Diastolic HF. Is oxygen dependent at 2 liters. Following with Pulmonology . Using Bipap at night. Had EF of 60% with DD and pulmonary HTN with PA pressure of 50mmHg.   Has no abnormal bleeding on Eliquis. Has no CNS complaints to suggest TIA or CVA.   Lasix 40mg every other daily and Metolazone 2.5mg daily added on 4/27/18, but patient not taking.  Has been doing ok with heart healthy diet. Has no chest pain, orthopnea, PND, near syncope or syncope. Has been a little more SOB.   Seen in consult by Dr. Denton for pulm HTN. Recommendation made to add Aldactone for BP control and to help with Diastolic HF.   Has noticed weight gain and lower edema. Took Lasix 40mg BID on yesterday. Usually takes once daily. Admits that he has been eating more foods from his culture that contains more sodium than his usual intake. Family frustrated with him sneaking and eating high sodium foods and drinking lots of fluids    Past Medical History:   Diagnosis Date    Anemia 2013    Asthma     Atrial fibrillation     CHF (congestive heart failure)     COPD (chronic obstructive pulmonary disease)     COPD (chronic obstructive pulmonary disease) 2012    Diverticular disease     Gout 2012    Hyperlipidemia     Hypertension     GUMARO (obstructive sleep apnea) 4/27/2018    Other and unspecified hyperlipidemia     Stroke        History reviewed. No pertinent surgical history.    Social History   Substance Use Topics    Smoking status: Former Smoker     Types: Cigarettes     Quit date: 7/9/2006    Smokeless tobacco: Not on file    Alcohol use No       Family History   Problem Relation Age of Onset    Stroke Cousin        Current Outpatient Prescriptions   Medication Sig    albuterol  (PROVENTIL) 2.5 mg /3 mL (0.083 %) nebulizer solution Take 2.5 mg by nebulization 2 (two) times daily. Rescue    albuterol 90 mcg/actuation inhaler Inhale 2 puffs into the lungs every 6 (six) hours as needed for Wheezing. Rescue    allopurinol (ZYLOPRIM) 300 MG tablet Take 300 mg by mouth once daily.    apixaban 5 mg Tab Take 1 tablet (5 mg total) by mouth 2 (two) times daily.    aspirin 81 MG Chew Take 1 tablet (81 mg total) by mouth once daily.    ATROVENT HFA 17 mcg/actuation inhaler     cholecalciferol, vitamin D3, (VITAMIN D3) 2,000 unit Cap 1 capsule once daily.     ferrous sulfate 324 mg (65 mg iron) TbEC Take 65 mg by mouth.    furosemide (LASIX) 40 MG tablet Take 1 pill daily and extra tab as needed for SOB, weight gain or swelling    gabapentin (NEURONTIN) 300 MG capsule Take 1 capsule (300 mg total) by mouth 2 (two) times daily. 1 capsule Oral Three times a day    hydrocodone-acetaminophen 10-325mg (NORCO)  mg Tab Take 1 tablet by mouth every 8 (eight) hours as needed for Pain. 1 tablet Oral Twice a day    irbesartan (AVAPRO) 150 MG tablet Take 150 mg by mouth once daily.     multivitamin capsule Take 1 capsule by mouth once daily.    naproxen sodium (ALEVE ORAL) Take by mouth once daily.     nebivolol (BYSTOLIC) 10 MG Tab 10 mg 2 (two) times daily.     OXYGEN-AIR DELIVERY SYSTEMS MISC by [facility].(Non-Drug; Combo Route) route.    pantoprazole (PROTONIX) 40 MG tablet Take 40 mg by mouth every other day.    potassium chloride SA (K-DUR,KLOR-CON) 20 MEQ tablet Take 1 tablet (20 mEq total) by mouth once daily.    simvastatin (ZOCOR) 40 MG tablet Take 1 tablet (40 mg total) by mouth every evening. 1 tablet Oral Every day    SYMBICORT 160-4.5 mcg/actuation HFAA     tiotropium (SPIRIVA) 18 mcg inhalation capsule Inhale 18 mcg into the lungs.    tizanidine (ZANAFLEX) 4 MG tablet Take 4 mg by mouth 2 (two) times daily.      No current facility-administered medications for this visit.       Current Outpatient Prescriptions on File Prior to Visit   Medication Sig    albuterol (PROVENTIL) 2.5 mg /3 mL (0.083 %) nebulizer solution Take 2.5 mg by nebulization 2 (two) times daily. Rescue    albuterol 90 mcg/actuation inhaler Inhale 2 puffs into the lungs every 6 (six) hours as needed for Wheezing. Rescue    allopurinol (ZYLOPRIM) 300 MG tablet Take 300 mg by mouth once daily.    apixaban 5 mg Tab Take 1 tablet (5 mg total) by mouth 2 (two) times daily.    aspirin 81 MG Chew Take 1 tablet (81 mg total) by mouth once daily.    ATROVENT HFA 17 mcg/actuation inhaler     cholecalciferol, vitamin D3, (VITAMIN D3) 2,000 unit Cap 1 capsule once daily.     ferrous sulfate 324 mg (65 mg iron) TbEC Take 65 mg by mouth.    furosemide (LASIX) 40 MG tablet Take 1 pill daily and extra tab as needed for SOB, weight gain or swelling    gabapentin (NEURONTIN) 300 MG capsule Take 1 capsule (300 mg total) by mouth 2 (two) times daily. 1 capsule Oral Three times a day    hydrocodone-acetaminophen 10-325mg (NORCO)  mg Tab Take 1 tablet by mouth every 8 (eight) hours as needed for Pain. 1 tablet Oral Twice a day    irbesartan (AVAPRO) 150 MG tablet Take 150 mg by mouth once daily.     multivitamin capsule Take 1 capsule by mouth once daily.    naproxen sodium (ALEVE ORAL) Take by mouth once daily.     nebivolol (BYSTOLIC) 10 MG Tab 10 mg 2 (two) times daily.     OXYGEN-AIR DELIVERY SYSTEMS MISC by [facility].(Non-Drug; Combo Route) route.    pantoprazole (PROTONIX) 40 MG tablet Take 40 mg by mouth every other day.    potassium chloride SA (K-DUR,KLOR-CON) 20 MEQ tablet Take 1 tablet (20 mEq total) by mouth once daily.    simvastatin (ZOCOR) 40 MG tablet Take 1 tablet (40 mg total) by mouth every evening. 1 tablet Oral Every day    SYMBICORT 160-4.5 mcg/actuation HFAA     tiotropium (SPIRIVA) 18 mcg inhalation capsule Inhale 18 mcg into the lungs.    tizanidine (ZANAFLEX) 4 MG tablet Take 4 mg by mouth 2  (two) times daily.      No current facility-administered medications on file prior to visit.        Review of Systems   Constitution: Positive for weight gain. Negative for weakness and malaise/fatigue.   Eyes: Negative for blurred vision.   Cardiovascular: Positive for dyspnea on exertion and leg swelling. Negative for chest pain, claudication, cyanosis, irregular heartbeat, near-syncope, orthopnea, palpitations and paroxysmal nocturnal dyspnea.   Respiratory: Positive for shortness of breath and snoring. Negative for cough and hemoptysis.         Oxygen dependent 2 liters    Hematologic/Lymphatic: Negative for bleeding problem. Does not bruise/bleed easily.   Skin: Negative for dry skin and itching.   Musculoskeletal: Positive for arthritis and joint pain. Negative for falls, muscle weakness and myalgias.   Gastrointestinal: Negative for abdominal pain, diarrhea, heartburn, hematemesis, hematochezia and melena.   Genitourinary: Negative for flank pain and hematuria.   Neurological: Positive for disturbances in coordination, focal weakness and loss of balance. Negative for dizziness, headaches, light-headedness, numbness, paresthesias and seizures.   Psychiatric/Behavioral: Negative for altered mental status and memory loss. The patient is not nervous/anxious.    Allergic/Immunologic: Negative for hives.       Objective:   Physical Exam   Constitutional: He is oriented to person, place, and time. He appears well-developed and well-nourished. No distress.   HENT:   Head: Normocephalic and atraumatic.   Eyes: EOM are normal. Pupils are equal, round, and reactive to light. Right eye exhibits no discharge. Left eye exhibits no discharge.   Neck: Neck supple. No JVD present. No thyromegaly present.   Cardiovascular: Normal rate and intact distal pulses.  An irregularly irregular rhythm present. Exam reveals no gallop and no friction rub.    Murmur heard.   Medium-pitched mid to late systolic murmur is present  at the  "lower left sternal border  Pulses:       Carotid pulses are 2+ on the right side, and 2+ on the left side.       Radial pulses are 2+ on the right side, and 2+ on the left side.        Femoral pulses are 2+ on the right side, and 2+ on the left side.       Popliteal pulses are 2+ on the right side, and 2+ on the left side.        Dorsalis pedis pulses are 2+ on the right side, and 2+ on the left side.        Posterior tibial pulses are 2+ on the right side, and 2+ on the left side.   Pulmonary/Chest: Effort normal. No respiratory distress. He has no wheezes. He has rales in the right lower field and the left lower field. He exhibits no tenderness.   Abdominal: Soft. Bowel sounds are normal. He exhibits no distension. There is no tenderness.   obese   Musculoskeletal: Normal range of motion. He exhibits edema ( +1 BLE  ).   Neurological: He is alert and oriented to person, place, and time. No cranial nerve deficit.   Skin: Skin is warm and dry. No rash noted. He is not diaphoretic. No erythema.   Psychiatric: He has a normal mood and affect. His behavior is normal.   Nursing note and vitals reviewed.    Vitals:    06/15/18 0942   BP: (!) 142/72   Pulse: 76   Weight: 85.8 kg (189 lb 2.5 oz)   Height: 5' 8" (1.727 m)     Lab Results   Component Value Date    CHOL 149 03/21/2018    CHOL 133 02/08/2018    CHOL 177 01/15/2015     Lab Results   Component Value Date    HDL 35 (L) 03/21/2018    HDL 40 02/08/2018    HDL 34 (L) 01/15/2015     Lab Results   Component Value Date    LDLCALC 91.0 03/21/2018    LDLCALC 79.4 02/08/2018    LDLCALC 98.8 01/15/2015     Lab Results   Component Value Date    TRIG 115 03/21/2018    TRIG 68 02/08/2018    TRIG 221 (H) 01/15/2015     Lab Results   Component Value Date    CHOLHDL 23.5 03/21/2018    CHOLHDL 30.1 02/08/2018    CHOLHDL 19.2 (L) 01/15/2015       Chemistry        Component Value Date/Time     03/21/2018 1114    K 4.1 03/21/2018 1114     03/21/2018 1114    CO2 33 (H) " 03/21/2018 1114    BUN 12 03/21/2018 1114    CREATININE 1.1 03/21/2018 1114    GLU 97 03/21/2018 1114        Component Value Date/Time    CALCIUM 9.2 03/21/2018 1114    ALKPHOS 100 03/21/2018 1114    AST 18 03/21/2018 1114    ALT 13 03/21/2018 1114    BILITOT 0.5 03/21/2018 1114    ESTGFRAFRICA >60 03/21/2018 1114    EGFRNONAA >60 03/21/2018 1114          Lab Results   Component Value Date    TSH 1.350 02/07/2018     Lab Results   Component Value Date    INR 1.1 03/21/2018    INR 1.0 02/12/2018     Lab Results   Component Value Date    WBC 4.90 03/21/2018    HGB 11.4 (L) 03/21/2018    HCT 38.7 (L) 03/21/2018    MCV 88 03/21/2018     03/21/2018     BMP  Sodium   Date Value Ref Range Status   03/21/2018 140 136 - 145 mmol/L Final     Potassium   Date Value Ref Range Status   03/21/2018 4.1 3.5 - 5.1 mmol/L Final     Chloride   Date Value Ref Range Status   03/21/2018 100 95 - 110 mmol/L Final     CO2   Date Value Ref Range Status   03/21/2018 33 (H) 23 - 29 mmol/L Final     BUN, Bld   Date Value Ref Range Status   03/21/2018 12 8 - 23 mg/dL Final     Creatinine   Date Value Ref Range Status   03/21/2018 1.1 0.5 - 1.4 mg/dL Final     Calcium   Date Value Ref Range Status   03/21/2018 9.2 8.7 - 10.5 mg/dL Final     Anion Gap   Date Value Ref Range Status   03/21/2018 7 (L) 8 - 16 mmol/L Final     eGFR if    Date Value Ref Range Status   03/21/2018 >60 >60 mL/min/1.73 m^2 Final     eGFR if non    Date Value Ref Range Status   03/21/2018 >60 >60 mL/min/1.73 m^2 Final     Comment:     Calculation used to obtain the estimated glomerular filtration  rate (eGFR) is the CKD-EPI equation.        CrCl cannot be calculated (Patient's most recent lab result is older than the maximum 7 days allowed.).    Assessment:     1. Chronic diastolic congestive heart failure    2. Cerebrovascular accident (CVA), unspecified mechanism    3. Chronic respiratory failure with hypoxia and hypercapnia    4.  COPD, severe    5. Pulmonary hypertension    6. Chronic atrial fibrillation    7. Dyslipidemia    8. Essential hypertension    9. GUMARO (obstructive sleep apnea)      No CNS Complaints to suggest TIA or CVA  No abnormal bleeding on OAC  BP stable  Has edema and rales on exam   Oxygen at 3 liters   Stable COPD  A-fib rate controlled  Can use improvement in sodium and fluid intake     Plan:   Needs to take Lasix 40mg BID x 3 days then resume once daily   May need to use BID lasix as maintenance dose   Heart healthy diet  Limit fluid intake 50-60 oz   Daily weights and to notify clinic if weight increases by more than 3 lbs in 1 day or 5 lbs in 1 week.   Exercise routine as tolerated  RTC in 3 months

## 2018-06-18 ENCOUNTER — TELEPHONE (OUTPATIENT)
Dept: ADMINISTRATIVE | Facility: CLINIC | Age: 68
End: 2018-06-18

## 2018-06-18 ENCOUNTER — TELEPHONE (OUTPATIENT)
Dept: CARDIOLOGY | Facility: CLINIC | Age: 68
End: 2018-06-18

## 2018-06-18 ENCOUNTER — TELEPHONE (OUTPATIENT)
Dept: CARDIOLOGY | Facility: HOSPITAL | Age: 68
End: 2018-06-18

## 2018-06-18 DIAGNOSIS — I50.32 CHRONIC DIASTOLIC CONGESTIVE HEART FAILURE: Primary | ICD-10-CM

## 2018-06-18 NOTE — TELEPHONE ENCOUNTER
----- Message from Franca Jefferson sent at 6/18/2018  4:23 PM CDT -----  Contact: pt wife  Stated she calling with information about the pt and can be reached at [phone]    Pt still has swelling and hasn't lost any weight 06/15 184.4 and 06/18 185.6

## 2018-06-18 NOTE — TELEPHONE ENCOUNTER
Spoke with pt caregiver Stephenie I notified her the information. Pt caregiver states pt has lost 4lbs pt is now 185lbs states she will give us a call back regarding pt's symptom status.

## 2018-06-18 NOTE — TELEPHONE ENCOUNTER
Please inform patient that his BNP is up significantly from previous. Fluid marker number went from 230 up to 1,007 which is consistent with his exam and symptoms. Please see how he is doing today since increasing Lasix over the weekend and let me know. He may need to continue the higher dose of Lasix for a few more days. His creatine has bumped some, but he needs to diurese

## 2018-06-18 NOTE — PROGRESS NOTES
Home Health SOC with [facility] - Dr. Kai Escamilla. Patient received SN,PT,OT services.

## 2018-06-19 NOTE — TELEPHONE ENCOUNTER
Spoke with pt niece states pt is still having edema in both legs states pt is having a little sob. Pt is currently taking Lasix 40 mg BID. Pt denies any other symptoms.     Pt Wt    6/15/.4lbs  6/18/.6lbs

## 2018-06-19 NOTE — TELEPHONE ENCOUNTER
Spoke with pt niece notified her the information pt niece verbalized understanding.    I have scheduled pt for labs on this Thursday [facility] 6/21/18.

## 2018-06-25 ENCOUNTER — TELEPHONE (OUTPATIENT)
Dept: CARDIOLOGY | Facility: CLINIC | Age: 68
End: 2018-06-25

## 2018-06-25 NOTE — TELEPHONE ENCOUNTER
Spoke patient caregiver and patient need labs reschedule for this week due to miss appointment. Labs are schedule for 6/26/18 at 10:50

## 2018-06-26 ENCOUNTER — LAB VISIT (OUTPATIENT)
Dept: LAB | Facility: HOSPITAL | Age: 68
End: 2018-06-26
Attending: FAMILY MEDICINE
Payer: MEDICARE

## 2018-06-26 DIAGNOSIS — I50.32 CHRONIC DIASTOLIC CONGESTIVE HEART FAILURE: ICD-10-CM

## 2018-06-26 LAB
ANION GAP SERPL CALC-SCNC: 13 MMOL/L
BNP SERPL-MCNC: 796 PG/ML
BUN SERPL-MCNC: 32 MG/DL
CALCIUM SERPL-MCNC: 9.3 MG/DL
CHLORIDE SERPL-SCNC: 98 MMOL/L
CO2 SERPL-SCNC: 30 MMOL/L
CREAT SERPL-MCNC: 2.1 MG/DL
EST. GFR  (AFRICAN AMERICAN): 36 ML/MIN/1.73 M^2
EST. GFR  (NON AFRICAN AMERICAN): 31 ML/MIN/1.73 M^2
GLUCOSE SERPL-MCNC: 118 MG/DL
POTASSIUM SERPL-SCNC: 4.7 MMOL/L
SODIUM SERPL-SCNC: 141 MMOL/L

## 2018-06-26 PROCEDURE — 83880 ASSAY OF NATRIURETIC PEPTIDE: CPT

## 2018-06-26 PROCEDURE — 36415 COLL VENOUS BLD VENIPUNCTURE: CPT | Mod: PO

## 2018-06-26 PROCEDURE — 80048 BASIC METABOLIC PNL TOTAL CA: CPT | Mod: PO

## 2018-06-27 ENCOUNTER — TELEPHONE (OUTPATIENT)
Dept: CARDIOLOGY | Facility: CLINIC | Age: 68
End: 2018-06-27

## 2018-06-27 NOTE — TELEPHONE ENCOUNTER
BNP down to 700. Please see how he is currently taking Lasix. If taking 40mg BID, he should now decrease to once daily

## 2018-07-19 ENCOUNTER — LAB VISIT (OUTPATIENT)
Dept: LAB | Facility: HOSPITAL | Age: 68
End: 2018-07-19
Attending: NURSE PRACTITIONER
Payer: MEDICARE

## 2018-07-19 ENCOUNTER — OFFICE VISIT (OUTPATIENT)
Dept: CARDIOLOGY | Facility: CLINIC | Age: 68
End: 2018-07-19
Payer: MEDICARE

## 2018-07-19 VITALS
SYSTOLIC BLOOD PRESSURE: 132 MMHG | BODY MASS INDEX: 28.5 KG/M2 | WEIGHT: 188.06 LBS | DIASTOLIC BLOOD PRESSURE: 74 MMHG | HEIGHT: 68 IN | HEART RATE: 90 BPM

## 2018-07-19 DIAGNOSIS — I27.20 PULMONARY HYPERTENSION: ICD-10-CM

## 2018-07-19 DIAGNOSIS — J44.9 COPD, SEVERE: ICD-10-CM

## 2018-07-19 DIAGNOSIS — I48.20 CHRONIC ATRIAL FIBRILLATION: ICD-10-CM

## 2018-07-19 DIAGNOSIS — E78.5 DYSLIPIDEMIA: ICD-10-CM

## 2018-07-19 DIAGNOSIS — J96.11 CHRONIC RESPIRATORY FAILURE WITH HYPOXIA AND HYPERCAPNIA: ICD-10-CM

## 2018-07-19 DIAGNOSIS — I50.32 CHRONIC DIASTOLIC CONGESTIVE HEART FAILURE: ICD-10-CM

## 2018-07-19 DIAGNOSIS — I63.9 CEREBROVASCULAR ACCIDENT (CVA), UNSPECIFIED MECHANISM: ICD-10-CM

## 2018-07-19 DIAGNOSIS — G47.33 OSA (OBSTRUCTIVE SLEEP APNEA): ICD-10-CM

## 2018-07-19 DIAGNOSIS — J96.12 CHRONIC RESPIRATORY FAILURE WITH HYPOXIA AND HYPERCAPNIA: ICD-10-CM

## 2018-07-19 DIAGNOSIS — I10 ESSENTIAL HYPERTENSION: ICD-10-CM

## 2018-07-19 DIAGNOSIS — I50.32 CHRONIC DIASTOLIC CONGESTIVE HEART FAILURE: Primary | ICD-10-CM

## 2018-07-19 LAB
ANION GAP SERPL CALC-SCNC: 9 MMOL/L
BNP SERPL-MCNC: 943 PG/ML
BUN SERPL-MCNC: 32 MG/DL
CALCIUM SERPL-MCNC: 9.3 MG/DL
CHLORIDE SERPL-SCNC: 103 MMOL/L
CO2 SERPL-SCNC: 33 MMOL/L
CREAT SERPL-MCNC: 1.8 MG/DL
EST. GFR  (AFRICAN AMERICAN): 43 ML/MIN/1.73 M^2
EST. GFR  (NON AFRICAN AMERICAN): 38 ML/MIN/1.73 M^2
GLUCOSE SERPL-MCNC: 101 MG/DL
POTASSIUM SERPL-SCNC: 4.6 MMOL/L
SODIUM SERPL-SCNC: 145 MMOL/L

## 2018-07-19 PROCEDURE — 83880 ASSAY OF NATRIURETIC PEPTIDE: CPT

## 2018-07-19 PROCEDURE — 99999 PR PBB SHADOW E&M-EST. PATIENT-LVL IV: CPT | Mod: PBBFAC,,, | Performed by: NURSE PRACTITIONER

## 2018-07-19 PROCEDURE — 99214 OFFICE O/P EST MOD 30 MIN: CPT | Mod: PBBFAC,PO | Performed by: NURSE PRACTITIONER

## 2018-07-19 PROCEDURE — 36415 COLL VENOUS BLD VENIPUNCTURE: CPT | Mod: PO

## 2018-07-19 PROCEDURE — 80048 BASIC METABOLIC PNL TOTAL CA: CPT | Mod: PO

## 2018-07-19 PROCEDURE — 99215 OFFICE O/P EST HI 40 MIN: CPT | Mod: S$PBB,,, | Performed by: NURSE PRACTITIONER

## 2018-07-19 NOTE — PROGRESS NOTES
Subjective:   Patient ID:  Rea Vail is a 67 y.o. male who presents for follow up of Congestive Heart Failure and Atrial Fibrillation      HPI  Patient presents to clinic for CHF management. He has PMH of Pulm HTN, A-fib, COPD, GUMARO, Diastolic HF.Wears oxygen at 2 liters PRN. Following with Pulmonology for severe COPD . Using Bipap at night. Had EF of 60% with DD and pulmonary HTN with PA pressure of 50mmHg.     Presents today with complaints of edema, increased SOB than his baseline and weight gain +3 lbs over the last 3 days. He denies any chest pain , orthopnea, PND, dizziness, near syncope or syncope. Has no abnormal bleeding on Eliquis. Has no CNS complaints to suggest TIA or CVA.     Seen in clinic on 6/15/18 and noted to have rales and edema on exam. Given instructions to increase his Lasix to 40mg BID x 3 days, then resume 40mg daily. His symptoms improved. BMP showed slight bump in creatine to 2.1 and improved BNP to 300. Has been taking Lasix 40mg daily. Hasn't taken any extra lasix since onset of new symptoms. His BP is stable today in clinic. Can use improvement in sodium intake. Family reports that patient has been eating turkey sandwiches and patient admits to eating lots of yogurt.       Past Medical History:   Diagnosis Date    Anemia 2013    Asthma     Atrial fibrillation     CHF (congestive heart failure)     COPD (chronic obstructive pulmonary disease)     COPD (chronic obstructive pulmonary disease) 2012    Diverticular disease     Gout 2012    Hyperlipidemia     Hypertension     GUMARO (obstructive sleep apnea) 4/27/2018    Other and unspecified hyperlipidemia     Stroke        No past surgical history on file.    Social History   Substance Use Topics    Smoking status: Former Smoker     Types: Cigarettes     Quit date: 7/9/2006    Smokeless tobacco: Not on file    Alcohol use No       Family History   Problem Relation Age of Onset    Stroke Cousin        Current Outpatient  Prescriptions   Medication Sig    albuterol (PROVENTIL) 2.5 mg /3 mL (0.083 %) nebulizer solution Take 2.5 mg by nebulization 2 (two) times daily. Rescue    albuterol 90 mcg/actuation inhaler Inhale 2 puffs into the lungs every 6 (six) hours as needed for Wheezing. Rescue    allopurinol (ZYLOPRIM) 300 MG tablet Take 300 mg by mouth once daily.    apixaban 5 mg Tab Take 1 tablet (5 mg total) by mouth 2 (two) times daily.    aspirin 81 MG Chew Take 1 tablet (81 mg total) by mouth once daily.    ATROVENT HFA 17 mcg/actuation inhaler     cholecalciferol, vitamin D3, (VITAMIN D3) 2,000 unit Cap 1 capsule once daily.     ferrous sulfate 324 mg (65 mg iron) TbEC Take 65 mg by mouth.    furosemide (LASIX) 40 MG tablet Take 1 pill daily and extra tab as needed for SOB, weight gain or swelling    gabapentin (NEURONTIN) 300 MG capsule Take 1 capsule (300 mg total) by mouth 2 (two) times daily. 1 capsule Oral Three times a day    hydrocodone-acetaminophen 10-325mg (NORCO)  mg Tab Take 1 tablet by mouth every 8 (eight) hours as needed for Pain. 1 tablet Oral Twice a day    irbesartan (AVAPRO) 150 MG tablet Take 150 mg by mouth once daily.     multivitamin capsule Take 1 capsule by mouth once daily.    naproxen sodium (ALEVE ORAL) Take by mouth once daily.     nebivolol (BYSTOLIC) 10 MG Tab 10 mg 2 (two) times daily.     OXYGEN-A[place]IV[place] [facility] [place]; Combo Route) route.    pantoprazole (PROTONIX) 40 MG tablet Take 40 mg by mouth every other day.    potassium chloride SA (K-DUR,KLOR-CON) 20 MEQ tablet Take 1 tablet (20 mEq total) by mouth once daily.    simvastatin (ZOCOR) 40 MG tablet Take 1 tablet (40 mg total) by mouth every evening. 1 tablet Oral Every day    SYMBICORT 160-4.5 mcg/actuation HFAA     tiotropium (SPIRIVA) 18 mcg inhalation capsule Inhale 18 mcg into the lungs.    tizanidine (ZANAFLEX) 4 MG tablet Take 4 mg by mouth 2 (two) times daily.      No current  facility-administered medications for this visit.      Current Outpatient Prescriptions on File Prior to Visit   Medication Sig    albuterol (PROVENTIL) 2.5 mg /3 mL (0.083 %) nebulizer solution Take 2.5 mg by nebulization 2 (two) times daily. Rescue    albuterol 90 mcg/actuation inhaler Inhale 2 puffs into the lungs every 6 (six) hours as needed for Wheezing. Rescue    allopurinol (ZYLOPRIM) 300 MG tablet Take 300 mg by mouth once daily.    apixaban 5 mg Tab Take 1 tablet (5 mg total) by mouth 2 (two) times daily.    aspirin 81 MG Chew Take 1 tablet (81 mg total) by mouth once daily.    ATROVENT HFA 17 mcg/actuation inhaler     cholecalciferol, vitamin D3, (VITAMIN D3) 2,000 unit Cap 1 capsule once daily.     ferrous sulfate 324 mg (65 mg iron) TbEC Take 65 mg by mouth.    furosemide (LASIX) 40 MG tablet Take 1 pill daily and extra tab as needed for SOB, weight gain or swelling    gabapentin (NEURONTIN) 300 MG capsule Take 1 capsule (300 mg total) by mouth 2 (two) times daily. 1 capsule Oral Three times a day    hydrocodone-acetaminophen 10-325mg (NORCO)  mg Tab Take 1 tablet by mouth every 8 (eight) hours as needed for Pain. 1 tablet Oral Twice a day    irbesartan (AVAPRO) 150 MG tablet Take 150 mg by mouth once daily.     multivitamin capsule Take 1 capsule by mouth once daily.    naproxen sodium (ALEVE ORAL) Take by mouth once daily.     nebivolol (BYSTOLIC) 10 MG Tab 10 mg 2 (two) times daily.     OXYGEN-AIR DELIVERY SYSTEMS MISC by [facility] [place].(Non-Drug; Combo Route) route.    pantoprazole (PROTONIX) 40 MG tablet Take 40 mg by mouth every other day.    potassium chloride SA (K-DUR,KLOR-CON) 20 MEQ tablet Take 1 tablet (20 mEq total) by mouth once daily.    simvastatin (ZOCOR) 40 MG tablet Take 1 tablet (40 mg total) by mouth every evening. 1 tablet Oral Every day    SYMBICORT 160-4.5 mcg/actuation HFAA     tiotropium (SPIRIVA) 18 mcg inhalation capsule Inhale 18 mcg into the lungs.     tizanidine (ZANAFLEX) 4 MG tablet Take 4 mg by mouth 2 (two) times daily.      No current facility-administered medications on file prior to visit.        Review of Systems   Constitution: Positive for weight gain ( 3 lbs). Negative for weakness and malaise/fatigue.   Eyes: Negative for blurred vision.   Cardiovascular: Positive for dyspnea on exertion and leg swelling. Negative for chest pain, claudication, cyanosis, irregular heartbeat, near-syncope, orthopnea, palpitations and paroxysmal nocturnal dyspnea.   Respiratory: Positive for shortness of breath and snoring. Negative for cough and hemoptysis.         Wears Oxygen 2 liters PRN   Hematologic/Lymphatic: Negative for bleeding problem. Does not bruise/bleed easily.   Skin: Negative for dry skin and itching.   Musculoskeletal: Positive for arthritis and joint pain. Negative for falls, muscle weakness and myalgias.   Gastrointestinal: Positive for bloating. Negative for abdominal pain, diarrhea, heartburn, hematemesis, hematochezia and melena.   Genitourinary: Negative for flank pain and hematuria.   Neurological: Positive for loss of balance. Negative for disturbances in coordination, dizziness, focal weakness, headaches, light-headedness, numbness, paresthesias and seizures.   Psychiatric/Behavioral: Negative for altered mental status and memory loss. The patient is not nervous/anxious.    Allergic/Immunologic: Negative for hives.       Objective:   Physical Exam   Constitutional: He is oriented to person, place, and time. He appears well-developed and well-nourished. No distress.   HENT:   Head: Normocephalic and atraumatic.   Eyes: EOM are normal. Pupils are equal, round, and reactive to light. Right eye exhibits no discharge. Left eye exhibits no discharge.   Neck: Neck supple. JVD present. No thyromegaly present.   Cardiovascular: Normal rate and intact distal pulses.  An irregularly irregular rhythm present. Exam reveals no gallop and no friction rub.     Murmur heard.   Medium-pitched mid to late systolic murmur is present  at the lower left sternal border  Pulses:       Carotid pulses are 2+ on the right side, and 2+ on the left side.       Radial pulses are 2+ on the right side, and 2+ on the left side.        Femoral pulses are 2+ on the right side, and 2+ on the left side.       Popliteal pulses are 2+ on the right side, and 2+ on the left side.        Dorsalis pedis pulses are 2+ on the right side, and 2+ on the left side.        Posterior tibial pulses are 2+ on the right side, and 2+ on the left side.   Pulmonary/Chest: Effort normal. No respiratory distress. He has no wheezes. He has rales in the right lower field and the left lower field. He exhibits no tenderness.   Abdominal: Soft. Bowel sounds are normal. He exhibits no distension. There is no tenderness.   obese   Musculoskeletal: Normal range of motion. He exhibits edema (+1- 2 BLE.   ).   Neurological: He is alert and oriented to person, place, and time. No cranial nerve deficit.   Skin: Skin is warm and dry. No rash noted. He is not diaphoretic. No erythema.   Psychiatric: He has a normal mood and affect. His behavior is normal.   Nursing note and vitals reviewed.    There were no vitals filed for this visit.  Lab Results   Component Value Date    CHOL 149 03/21/2018    CHOL 133 02/08/2018    CHOL 177 01/15/2015     Lab Results   Component Value Date    HDL 35 (L) 03/21/2018    HDL 40 02/08/2018    HDL 34 (L) 01/15/2015     Lab Results   Component Value Date    LDLCALC 91.0 03/21/2018    LDLCALC 79.4 02/08/2018    LDLCALC 98.8 01/15/2015     Lab Results   Component Value Date    TRIG 115 03/21/2018    TRIG 68 02/08/2018    TRIG 221 (H) 01/15/2015     Lab Results   Component Value Date    CHOLHDL 23.5 03/21/2018    CHOLHDL 30.1 02/08/2018    CHOLHDL 19.2 (L) 01/15/2015       Chemistry        Component Value Date/Time     06/26/2018 0953    K 4.7 06/26/2018 0953    CL 98 06/26/2018 0953    CO2  30 (H) 06/26/2018 0953    BUN 32 (H) 06/26/2018 0953    CREATININE 2.1 (H) 06/26/2018 0953     (H) 06/26/2018 0953        Component Value Date/Time    CALCIUM 9.3 06/26/2018 0953    ALKPHOS 100 03/21/2018 1114    AST 18 03/21/2018 1114    ALT 13 03/21/2018 1114    BILITOT 0.5 03/21/2018 1114    ESTGFRAFRICA 36 (A) 06/26/2018 0953    EGFRNONAA 31 (A) 06/26/2018 0953          Lab Results   Component Value Date    TSH 1.350 02/07/2018     Lab Results   Component Value Date    INR 1.1 03/21/2018    INR 1.0 02/12/2018     Lab Results   Component Value Date    WBC 4.90 03/21/2018    HGB 11.4 (L) 03/21/2018    HCT 38.7 (L) 03/21/2018    MCV 88 03/21/2018     03/21/2018     BMP  Sodium   Date Value Ref Range Status   06/26/2018 141 136 - 145 mmol/L Final     Potassium   Date Value Ref Range Status   06/26/2018 4.7 3.5 - 5.1 mmol/L Final     Chloride   Date Value Ref Range Status   06/26/2018 98 95 - 110 mmol/L Final     CO2   Date Value Ref Range Status   06/26/2018 30 (H) 23 - 29 mmol/L Final     BUN, Bld   Date Value Ref Range Status   06/26/2018 32 (H) 8 - 23 mg/dL Final     Creatinine   Date Value Ref Range Status   06/26/2018 2.1 (H) 0.5 - 1.4 mg/dL Final     Calcium   Date Value Ref Range Status   06/26/2018 9.3 8.7 - 10.5 mg/dL Final     Anion Gap   Date Value Ref Range Status   06/26/2018 13 8 - 16 mmol/L Final     eGFR if    Date Value Ref Range Status   06/26/2018 36 (A) >60 mL/min/1.73 m^2 Final     eGFR if non    Date Value Ref Range Status   06/26/2018 31 (A) >60 mL/min/1.73 m^2 Final     Comment:     Calculation used to obtain the estimated glomerular filtration  rate (eGFR) is the CKD-EPI equation.        CrCl cannot be calculated (Patient's most recent lab result is older than the maximum 7 days allowed.).    Assessment:     1. Chronic diastolic congestive heart failure    2. Cerebrovascular accident (CVA), unspecified mechanism    3. COPD, severe    4. Chronic  respiratory failure with hypoxia and hypercapnia    5. Pulmonary hypertension    6. Chronic atrial fibrillation    7. Dyslipidemia    8. Essential hypertension    9. GUMARO (obstructive sleep apnea)      More SOB from baseline, +weight gain and edema on exam   Has been taking Lasix 40mg daily   Can use improvement in his diet  BP stable   Chronic A-fib rate controlled on current regimen  Has no abnormal bleeding on Eliquis  Has no CNS complaints to suggest TIA or CVA  No angina or equivalent  Using Bipap at night   Following with Pulmonology for severe COPD  Plan:   Patient advise to take Lasix 40mg BID today.   Will check BMP and BNP today and do phone review of lab results   Continue current medical management for now  Continue Eliquis for CVA prophylaxis   Heart healthy diet  Limit fluid intake 50-60 oz   Daily weights and to notify clinic if weight increases by more than 3 lbs in 1 day or 5 lbs in 1 week.   Exercise routine as tolerated  Will refer to Nephrology for recommendations  if renal function continues to be an issue with PO Lasix   FU TBD based on labs and phone review     This has been an hour long visit. Time spent visit today educating patient on CHF diagnoses, counseling on sodium and fluid restrictions and have spent time teaching the patient and family how to read food labels. I have also reviewed CHF handouts in detail with patient and family

## 2018-07-20 ENCOUNTER — TELEPHONE (OUTPATIENT)
Dept: CARDIOLOGY | Facility: CLINIC | Age: 68
End: 2018-07-20

## 2018-07-20 DIAGNOSIS — I50.32 CHRONIC DIASTOLIC CONGESTIVE HEART FAILURE: Primary | ICD-10-CM

## 2018-07-20 NOTE — TELEPHONE ENCOUNTER
We can see how he responds to the change in Lasix, but should keep his routine follow up appt with Pulmonology for now

## 2018-07-20 NOTE — TELEPHONE ENCOUNTER
Please inform patient that his fluid marker number is up to 900. I think we should try doing Lasix 40mg BID on MWF. So have him take the Lasix BID again today and start the MWF on next week. Repeat BMP and BNP in 2 weeks

## 2018-07-20 NOTE — TELEPHONE ENCOUNTER
The patient has been notified of this information and all questions answered. Voiced understand.    PT would like to know if you recommend him to see the Pulmonologist. Please advise thanks.

## 2018-08-02 ENCOUNTER — TELEPHONE (OUTPATIENT)
Dept: CARDIOLOGY | Facility: CLINIC | Age: 68
End: 2018-08-02

## 2018-08-02 ENCOUNTER — LAB VISIT (OUTPATIENT)
Dept: LAB | Facility: HOSPITAL | Age: 68
End: 2018-08-02
Attending: NURSE PRACTITIONER
Payer: MEDICARE

## 2018-08-02 ENCOUNTER — TELEPHONE (OUTPATIENT)
Dept: CARDIOLOGY | Facility: HOSPITAL | Age: 68
End: 2018-08-02

## 2018-08-02 DIAGNOSIS — I50.32 CHRONIC DIASTOLIC CONGESTIVE HEART FAILURE: ICD-10-CM

## 2018-08-02 LAB
ANION GAP SERPL CALC-SCNC: 9 MMOL/L
BNP SERPL-MCNC: 1181 PG/ML
BUN SERPL-MCNC: 31 MG/DL
CALCIUM SERPL-MCNC: 9.4 MG/DL
CHLORIDE SERPL-SCNC: 104 MMOL/L
CO2 SERPL-SCNC: 30 MMOL/L
CREAT SERPL-MCNC: 1.6 MG/DL
EST. GFR  (AFRICAN AMERICAN): 50.1 ML/MIN/1.73 M^2
EST. GFR  (NON AFRICAN AMERICAN): 43.3 ML/MIN/1.73 M^2
GLUCOSE SERPL-MCNC: 118 MG/DL
POTASSIUM SERPL-SCNC: 4 MMOL/L
SODIUM SERPL-SCNC: 143 MMOL/L

## 2018-08-02 PROCEDURE — 83880 ASSAY OF NATRIURETIC PEPTIDE: CPT

## 2018-08-02 PROCEDURE — 80048 BASIC METABOLIC PNL TOTAL CA: CPT

## 2018-08-02 PROCEDURE — 36415 COLL VENOUS BLD VENIPUNCTURE: CPT | Mod: PO

## 2018-08-02 NOTE — TELEPHONE ENCOUNTER
Patient's creatine is stable, but BNP is elevated at 1,181, up from 943.  Looks like he needs to be diuresed. This is likely the cause of his symptoms. Just wanted to let the family know even though I know they have taken him to the ED

## 2018-08-02 NOTE — TELEPHONE ENCOUNTER
pts niece called stating pt is having swelling phillin his feet still. Having some increase in sob, decrease in energy/lethargic. She says he is having PND and orthopnea. Did not weigh today due to getting up to go to the lab. Currently taking lasix 40mg daily. She says yesterday he was fine. Didn't eat much, only an apple and salad. Having labs drawn now.  Please advise

## 2018-08-02 NOTE — TELEPHONE ENCOUNTER
pts niece called back. She states she brought pt to Er. He was having trouble keeping his O2 up. Was 74% at 3L. I let her know to call me back once pt is d/c to set up f/u appt.

## 2018-08-02 NOTE — TELEPHONE ENCOUNTER
I have attempted without success to contact this patient by phone to I left a message on answering machine.

## 2018-08-02 NOTE — TELEPHONE ENCOUNTER
As counseled before, they should increase his Lasix dose PRN for CHF symptoms. We will worry about worsening renal function if it becomes an issue. Labs are pending     Also, according to my telephone note on 7/20/18, I recommended that he take 40mg BID on MWF. Is he not doing this.

## 2018-09-06 ENCOUNTER — OFFICE VISIT (OUTPATIENT)
Dept: CARDIOLOGY | Facility: CLINIC | Age: 68
End: 2018-09-06
Payer: MEDICARE

## 2018-09-06 ENCOUNTER — LAB VISIT (OUTPATIENT)
Dept: LAB | Facility: HOSPITAL | Age: 68
End: 2018-09-06
Attending: NURSE PRACTITIONER
Payer: MEDICARE

## 2018-09-06 VITALS
SYSTOLIC BLOOD PRESSURE: 128 MMHG | WEIGHT: 174.19 LBS | HEART RATE: 96 BPM | HEIGHT: 68 IN | DIASTOLIC BLOOD PRESSURE: 64 MMHG | BODY MASS INDEX: 26.4 KG/M2

## 2018-09-06 DIAGNOSIS — I27.20 PULMONARY HYPERTENSION: ICD-10-CM

## 2018-09-06 DIAGNOSIS — G47.33 OSA (OBSTRUCTIVE SLEEP APNEA): ICD-10-CM

## 2018-09-06 DIAGNOSIS — I63.9 CEREBROVASCULAR ACCIDENT (CVA), UNSPECIFIED MECHANISM: ICD-10-CM

## 2018-09-06 DIAGNOSIS — I50.32 CHRONIC DIASTOLIC CONGESTIVE HEART FAILURE: Primary | ICD-10-CM

## 2018-09-06 DIAGNOSIS — E78.5 DYSLIPIDEMIA: ICD-10-CM

## 2018-09-06 DIAGNOSIS — J44.9 COPD, SEVERE: ICD-10-CM

## 2018-09-06 DIAGNOSIS — I48.20 CHRONIC ATRIAL FIBRILLATION: ICD-10-CM

## 2018-09-06 DIAGNOSIS — I10 ESSENTIAL HYPERTENSION: ICD-10-CM

## 2018-09-06 LAB
BASOPHILS # BLD AUTO: 0.01 K/UL
BASOPHILS NFR BLD: 0.2 %
DIFFERENTIAL METHOD: ABNORMAL
EOSINOPHIL # BLD AUTO: 0.2 K/UL
EOSINOPHIL NFR BLD: 4 %
ERYTHROCYTE [DISTWIDTH] IN BLOOD BY AUTOMATED COUNT: 16.6 %
HCT VFR BLD AUTO: 34.2 %
HGB BLD-MCNC: 10 G/DL
LYMPHOCYTES # BLD AUTO: 1.2 K/UL
LYMPHOCYTES NFR BLD: 19.8 %
MCH RBC QN AUTO: 26.2 PG
MCHC RBC AUTO-ENTMCNC: 29.2 G/DL
MCV RBC AUTO: 90 FL
MONOCYTES # BLD AUTO: 0.3 K/UL
MONOCYTES NFR BLD: 5.2 %
NEUTROPHILS # BLD AUTO: 4.1 K/UL
NEUTROPHILS NFR BLD: 70.8 %
PLATELET # BLD AUTO: 234 K/UL
PMV BLD AUTO: 10.5 FL
RBC # BLD AUTO: 3.81 M/UL
WBC # BLD AUTO: 5.81 K/UL

## 2018-09-06 PROCEDURE — 36415 COLL VENOUS BLD VENIPUNCTURE: CPT | Mod: PO

## 2018-09-06 PROCEDURE — 99214 OFFICE O/P EST MOD 30 MIN: CPT | Mod: S$PBB,,, | Performed by: NURSE PRACTITIONER

## 2018-09-06 PROCEDURE — 99214 OFFICE O/P EST MOD 30 MIN: CPT | Mod: PBBFAC,PO | Performed by: NURSE PRACTITIONER

## 2018-09-06 PROCEDURE — 99999 PR PBB SHADOW E&M-EST. PATIENT-LVL IV: CPT | Mod: PBBFAC,,, | Performed by: NURSE PRACTITIONER

## 2018-09-06 PROCEDURE — 85025 COMPLETE CBC W/AUTO DIFF WBC: CPT | Mod: PO

## 2018-09-06 RX ORDER — FUROSEMIDE 40 MG/1
40 TABLET ORAL
COMMUNITY
End: 2019-03-14 | Stop reason: SDUPTHER

## 2018-09-06 RX ORDER — MUPIROCIN 20 MG/G
OINTMENT TOPICAL
COMMUNITY
Start: 2018-08-22 | End: 2018-09-06

## 2018-09-06 RX ORDER — OLOPATADINE HYDROCHLORIDE 2 MG/ML
SOLUTION/ DROPS OPHTHALMIC
COMMUNITY
Start: 2018-07-04 | End: 2018-11-01

## 2018-09-06 NOTE — PROGRESS NOTES
Subjective:   Patient ID:  Rea Vail is a 67 y.o. male who presents for follow up of Congestive Heart Failure      HPI     Patient presents to clinic for hospital follow up and CHF management. He has PMH of Pulm HTN, A-fib, COPD, GUMARO, Diastolic HF.Wears oxygen at 2 liters. Following with Pulmonology for severe COPD . Using Bipap at night. Had EF of 60% with DD and pulmonary HTN with PA pressure of 50mmHg.      Admitted to [facility] on 8/2/18 with COPD exacerbation, pulmonary edema, hypoxia and CHF. Noted to have o2 sat in the 80's upon arrival. Treated with IV Lasix and responded well.  Patient treated with steroids and Doxycycline for COPD exacerbation. Had A-fib during admission that was thought to be from steroids, so dose decreased to avoid RVR. Cardiology consulted during admission and diagnosed patient with right heart failure and chronic A-fib. [place] [facility] [place] doesn't show evidence of this. Was discharged home with Prednisone 20mg daily, Doxycycline for 10 days and Lasix increased to 40mg daily and BID on Monday and Wed. Prednisone has been tapered and Doxy course completed.  Is anticoagulated on Eliquis with no abnormal bleeding. Has no CNS complaints to suggest TIA or CVA.     Patient follows with Dr. Radford, Pulmonology for Severe obstruction and restrictive lung disease. Recommend that he wear his oxygen continuously during the day and with his BiPAP at night. He also felt that patient's prognosis is poor to guarded.     Patient denies any chest pain. Chronic stable SOB. Oxygen not in place in clinic today. Has no edema, orthopnea, PND, dizziness, near syncope or palpitations. Denies any CNS complaints to suggest TIA or CVA. Had 2 episodes of blood in his stool, but resolved on its own. Was constipated at the time. Has known hemorrhoids. Scheduled to see GI next week for further workup. Doing better with sodium intake.       Past Medical History:   Diagnosis Date    Anemia 2013     Asthma     Atrial fibrillation     CHF (congestive heart failure)     COPD (chronic obstructive pulmonary disease)     COPD (chronic obstructive pulmonary disease)     Diverticular disease     Gout 2012    Hyperlipidemia     Hypertension     GUMARO (obstructive sleep apnea) 2018    Other and unspecified hyperlipidemia     Stroke        History reviewed. No pertinent surgical history.    Social History     Tobacco Use    Smoking status: Former Smoker     Types: Cigarettes     Last attempt to quit: 2006     Years since quittin.1   Substance Use Topics    Alcohol use: No    Drug use: No       Family History   Problem Relation Age of Onset    Stroke Cousin        Current Outpatient Medications   Medication Sig    albuterol (PROVENTIL) 2.5 mg /3 mL (0.083 %) nebulizer solution Take 2.5 mg by nebulization 2 (two) times daily. Rescue    albuterol 90 mcg/actuation inhaler Inhale 2 puffs into the lungs every 6 (six) hours as needed for Wheezing. Rescue    allopurinol (ZYLOPRIM) 300 MG tablet Take 300 mg by mouth once daily.    apixaban 5 mg Tab Take 1 tablet (5 mg total) by mouth 2 (two) times daily.    aspirin 81 MG Chew Take 1 tablet (81 mg total) by mouth once daily.    ATROVENT HFA 17 mcg/actuation inhaler     cholecalciferol, vitamin D3, (VITAMIN D3) 2,000 unit Cap 1 capsule once daily.     ferrous sulfate 324 mg (65 mg iron) TbEC Take 65 mg by mouth.    furosemide (LASIX) 40 MG tablet Take 40 mg by mouth. Take 2 tablets (80 mg total) BID on  and . Take 40mg every other days.    gabapentin (NEURONTIN) 300 MG capsule Take 1 capsule (300 mg total) by mouth 2 (two) times daily. 1 capsule Oral Three times a day (Patient taking differently: Take 400 mg by mouth 3 (three) times daily. 1 capsule Oral Three times a day)    hydrocodone-acetaminophen 10-325mg (NORCO)  mg Tab Take 1 tablet by mouth every 8 (eight) hours as needed for Pain. 1 tablet Oral Twice a day     irbesartan (AVAPRO) 150 MG tablet Take 150 mg by mouth once daily.     multivitamin capsule Take 1 capsule by mouth once daily.    nebivolol (BYSTOLIC) 10 MG Tab 10 mg 2 (two) times daily.     olopatadine (PATADAY) 0.2 % Drop     OXYGEN-AIR DELIVERY SYSTEMS MISC by [facility].(Non-Drug; Combo Route) route.    pantoprazole (PROTONIX) 40 MG tablet Take 40 mg by mouth every other day.    potassium chloride SA (K-DUR,KLOR-CON) 20 MEQ tablet Take 1 tablet (20 mEq total) by mouth once daily. (Patient taking differently: Take 20 mEq by mouth 2 (two) times daily. )    simvastatin (ZOCOR) 40 MG tablet Take 1 tablet (40 mg total) by mouth every evening. 1 tablet Oral Every day    SYMBICORT 160-4.5 mcg/actuation HFAA     tiotropium (SPIRIVA) 18 mcg inhalation capsule Inhale 18 mcg into the lungs.     No current facility-administered medications for this visit.      Current Outpatient Medications on File Prior to Visit   Medication Sig    albuterol (PROVENTIL) 2.5 mg /3 mL (0.083 %) nebulizer solution Take 2.5 mg by nebulization 2 (two) times daily. Rescue    albuterol 90 mcg/actuation inhaler Inhale 2 puffs into the lungs every 6 (six) hours as needed for Wheezing. Rescue    allopurinol (ZYLOPRIM) 300 MG tablet Take 300 mg by mouth once daily.    apixaban 5 mg Tab Take 1 tablet (5 mg total) by mouth 2 (two) times daily.    aspirin 81 MG Chew Take 1 tablet (81 mg total) by mouth once daily.    ATROVENT HFA 17 mcg/actuation inhaler     cholecalciferol, vitamin D3, (VITAMIN D3) 2,000 unit Cap 1 capsule once daily.     ferrous sulfate 324 mg (65 mg iron) TbEC Take 65 mg by mouth.    furosemide (LASIX) 40 MG tablet Take 40 mg by mouth. Take 2 tablets (80 mg total) BID on Mondays and wednesdays. Take 40mg every other days.    gabapentin (NEURONTIN) 300 MG capsule Take 1 capsule (300 mg total) by mouth 2 (two) times daily. 1 capsule Oral Three times a day (Patient taking differently: Take 400 mg by mouth 3 (three)  times daily. 1 capsule Oral Three times a day)    hydrocodone-acetaminophen 10-325mg (NORCO)  mg Tab Take 1 tablet by mouth every 8 (eight) hours as needed for Pain. 1 tablet Oral Twice a day    irbesartan (AVAPRO) 150 MG tablet Take 150 mg by mouth once daily.     multivitamin capsule Take 1 capsule by mouth once daily.    nebivolol (BYSTOLIC) 10 MG Tab 10 mg 2 (two) times daily.     olopatadine (PATADAY) 0.2 % Drop     OXYGEN-AIR DELIVERY SYSTEMS MISC by [facility].(Non-Drug; Combo Route) route.    pantoprazole (PROTONIX) 40 MG tablet Take 40 mg by mouth every other day.    potassium chloride SA (K-DUR,KLOR-CON) 20 MEQ tablet Take 1 tablet (20 mEq total) by mouth once daily. (Patient taking differently: Take 20 mEq by mouth 2 (two) times daily. )    simvastatin (ZOCOR) 40 MG tablet Take 1 tablet (40 mg total) by mouth every evening. 1 tablet Oral Every day    SYMBICORT 160-4.5 mcg/actuation HFAA     tiotropium (SPIRIVA) 18 mcg inhalation capsule Inhale 18 mcg into the lungs.    [DISCONTINUED] naproxen sodium (ALEVE ORAL) Take by mouth once daily.     [DISCONTINUED] furosemide (LASIX) 40 MG tablet Take 1 pill daily and extra tab as needed for SOB, weight gain or swelling    [DISCONTINUED] mupirocin (BACTROBAN) 2 % ointment     [DISCONTINUED] tizanidine (ZANAFLEX) 4 MG tablet Take 4 mg by mouth 2 (two) times daily.      No current facility-administered medications on file prior to visit.        Review of Systems   Constitution: Negative for diaphoresis, weakness, malaise/fatigue, weight gain and weight loss.   HENT: Negative for congestion and nosebleeds.    Cardiovascular: Negative for chest pain, claudication, cyanosis, dyspnea on exertion, irregular heartbeat, leg swelling, near-syncope, orthopnea, palpitations, paroxysmal nocturnal dyspnea and syncope.   Respiratory: Positive for shortness of breath (chronic, but stable). Negative for cough, hemoptysis, sleep disturbances due to breathing,  snoring, sputum production and wheezing.         Wears oxygen PRN despite Pulmonology recommendations to wear continuously    Hematologic/Lymphatic: Negative for bleeding problem. Does not bruise/bleed easily.   Skin: Negative for rash.   Musculoskeletal: Positive for arthritis and joint pain. Negative for back pain, falls, muscle cramps and muscle weakness.   Gastrointestinal: Positive for hemorrhoids. Negative for abdominal pain, constipation, diarrhea, heartburn, hematemesis, hematochezia, melena and nausea.   Genitourinary: Negative for dysuria, hematuria and nocturia.   Neurological: Negative for excessive daytime sleepiness, dizziness, headaches, light-headedness, loss of balance, numbness and vertigo.       Objective:   Physical Exam   Constitutional: He is oriented to person, place, and time. He appears well-developed and well-nourished. No distress.   HENT:   Head: Normocephalic and atraumatic.   Eyes: EOM are normal. Pupils are equal, round, and reactive to light. Right eye exhibits no discharge. Left eye exhibits no discharge.   Neck: Neck supple. JVD present. No thyromegaly present.   Cardiovascular: Normal rate and intact distal pulses. An irregularly irregular rhythm present. Exam reveals no gallop and no friction rub.   Murmur heard.   Medium-pitched mid to late systolic murmur is present at the lower left sternal border.  Pulses:       Carotid pulses are 2+ on the right side, and 2+ on the left side.       Radial pulses are 2+ on the right side, and 2+ on the left side.        Femoral pulses are 2+ on the right side, and 2+ on the left side.       Popliteal pulses are 2+ on the right side, and 2+ on the left side.        Dorsalis pedis pulses are 2+ on the right side, and 2+ on the left side.        Posterior tibial pulses are 2+ on the right side, and 2+ on the left side.   Pulmonary/Chest: Effort normal. No respiratory distress. He has no wheezes. He has rales in the right lower field and the left  "lower field. He exhibits no tenderness.   Abdominal: Soft. Bowel sounds are normal. He exhibits no distension. There is no tenderness.   obese   Musculoskeletal: Normal range of motion. He exhibits edema (+1- 2 BLE.   ).   Neurological: He is alert and oriented to person, place, and time. No cranial nerve deficit.   Skin: Skin is warm and dry. No rash noted. He is not diaphoretic. No erythema.   Psychiatric: He has a normal mood and affect. His behavior is normal.   Nursing note and vitals reviewed.    Vitals:    09/06/18 1109   BP: 128/64   BP Location: Left arm   Patient Position: Sitting   BP Method: Medium (Manual)   Pulse: 96   Weight: 79 kg (174 lb 2.6 oz)   Height: 5' 8" (1.727 m)     Lab Results   Component Value Date    CHOL 149 03/21/2018    CHOL 133 02/08/2018    CHOL 177 01/15/2015     Lab Results   Component Value Date    HDL 35 (L) 03/21/2018    HDL 40 02/08/2018    HDL 34 (L) 01/15/2015     Lab Results   Component Value Date    LDLCALC 91.0 03/21/2018    LDLCALC 79.4 02/08/2018    LDLCALC 98.8 01/15/2015     Lab Results   Component Value Date    TRIG 115 03/21/2018    TRIG 68 02/08/2018    TRIG 221 (H) 01/15/2015     Lab Results   Component Value Date    CHOLHDL 23.5 03/21/2018    CHOLHDL 30.1 02/08/2018    CHOLHDL 19.2 (L) 01/15/2015       Chemistry        Component Value Date/Time     08/02/2018 0859    K 4.0 08/02/2018 0859     08/02/2018 0859    CO2 30 (H) 08/02/2018 0859    BUN 31 (H) 08/02/2018 0859    CREATININE 1.6 (H) 08/02/2018 0859     (H) 08/02/2018 0859        Component Value Date/Time    CALCIUM 9.4 08/02/2018 0859    ALKPHOS 100 03/21/2018 1114    AST 18 03/21/2018 1114    ALT 13 03/21/2018 1114    BILITOT 0.5 03/21/2018 1114    ESTGFRAFRICA 50.1 (A) 08/02/2018 0859    EGFRNONAA 43.3 (A) 08/02/2018 0859          Lab Results   Component Value Date    TSH 1.350 02/07/2018     Lab Results   Component Value Date    INR 1.1 03/21/2018    INR 1.0 02/12/2018     Lab Results "   Component Value Date    WBC 4.90 03/21/2018    HGB 11.4 (L) 03/21/2018    HCT 38.7 (L) 03/21/2018    MCV 88 03/21/2018     03/21/2018     BMP  Sodium   Date Value Ref Range Status   08/02/2018 143 136 - 145 mmol/L Final     Potassium   Date Value Ref Range Status   08/02/2018 4.0 3.5 - 5.1 mmol/L Final     Chloride   Date Value Ref Range Status   08/02/2018 104 95 - 110 mmol/L Final     CO2   Date Value Ref Range Status   08/02/2018 30 (H) 23 - 29 mmol/L Final     BUN, Bld   Date Value Ref Range Status   08/02/2018 31 (H) 8 - 23 mg/dL Final     Creatinine   Date Value Ref Range Status   08/02/2018 1.6 (H) 0.5 - 1.4 mg/dL Final     Calcium   Date Value Ref Range Status   08/02/2018 9.4 8.7 - 10.5 mg/dL Final     Anion Gap   Date Value Ref Range Status   08/02/2018 9 8 - 16 mmol/L Final     eGFR if    Date Value Ref Range Status   08/02/2018 50.1 (A) >60 mL/min/1.73 m^2 Final     eGFR if non    Date Value Ref Range Status   08/02/2018 43.3 (A) >60 mL/min/1.73 m^2 Final     Comment:     Calculation used to obtain the estimated glomerular filtration  rate (eGFR) is the CKD-EPI equation.        CrCl cannot be calculated (Patient's most recent lab result is older than the maximum 7 days allowed.).    Assessment:     1. Chronic diastolic congestive heart failure    2. Cerebrovascular accident (CVA), unspecified mechanism    3. COPD, severe    4. Pulmonary hypertension    5. Chronic atrial fibrillation    6. Dyslipidemia    7. Essential hypertension    8. GUMARO (obstructive sleep apnea)      No CNS complaints to suggest TIA or CVA  BP stable   Did have 2 episodes of bleeding hemorrhoids that has resolved. Is scheduled to see GI for further workup  Scheduled to see GI for further workup of hemorrhoids  Wearing Bipap nightly and oxygen during the day PRN  No s/sx to suggest decompensated HF. Has responded well to adjusted dose of Lasix (40mg daily and BID on MW)  Stable chronic  SOB  Chronic A-fib with controlled rate  Plan:     CBC today -phone review  Continue current medical management for now  Agree with Lasix 40mg daily and BID on MW  Heart healthy diet  Limit fluid intake 50-60 oz   Daily weights and to notify clinic if weight increases by more than 3 lbs in 1 day or 5 lbs in 1 week.   Exercise routine as tolerated  RTC in 1 month with BMP and BNP

## 2018-09-22 DIAGNOSIS — I50.33 ACUTE ON CHRONIC DIASTOLIC CONGESTIVE HEART FAILURE: ICD-10-CM

## 2018-09-22 DIAGNOSIS — I48.91 ATRIAL FIBRILLATION, UNSPECIFIED TYPE: ICD-10-CM

## 2018-09-22 RX ORDER — APIXABAN 5 MG/1
TABLET, FILM COATED ORAL
Qty: 60 TABLET | Refills: 6 | Status: SHIPPED | OUTPATIENT
Start: 2018-09-22

## 2018-10-04 ENCOUNTER — LAB VISIT (OUTPATIENT)
Dept: LAB | Facility: HOSPITAL | Age: 68
End: 2018-10-04
Attending: PSYCHIATRY & NEUROLOGY
Payer: MEDICARE

## 2018-10-04 DIAGNOSIS — I43: ICD-10-CM

## 2018-10-04 DIAGNOSIS — M60.80: ICD-10-CM

## 2018-10-04 DIAGNOSIS — E83.119: ICD-10-CM

## 2018-10-04 DIAGNOSIS — I63.9 IMPENDING CEREBROVASCULAR ACCIDENT: ICD-10-CM

## 2018-10-04 LAB
25(OH)D3+25(OH)D2 SERPL-MCNC: 54 NG/ML
ALBUMIN SERPL BCP-MCNC: 3 G/DL
ALP SERPL-CCNC: 134 U/L
ALT SERPL W/O P-5'-P-CCNC: 22 U/L
ANION GAP SERPL CALC-SCNC: 6 MMOL/L
AST SERPL-CCNC: 32 U/L
BASOPHILS # BLD AUTO: 0.04 K/UL
BASOPHILS NFR BLD: 0.5 %
BILIRUB SERPL-MCNC: 0.5 MG/DL
BUN SERPL-MCNC: 23 MG/DL
CALCIUM SERPL-MCNC: 9.9 MG/DL
CHLORIDE SERPL-SCNC: 101 MMOL/L
CO2 SERPL-SCNC: 34 MMOL/L
CREAT SERPL-MCNC: 1.5 MG/DL
D DIMER PPP IA.FEU-MCNC: 0.88 MG/L FEU
DIFFERENTIAL METHOD: ABNORMAL
EOSINOPHIL # BLD AUTO: 0.3 K/UL
EOSINOPHIL NFR BLD: 3.5 %
ERYTHROCYTE [DISTWIDTH] IN BLOOD BY AUTOMATED COUNT: 17.6 %
EST. GFR  (AFRICAN AMERICAN): 53.8 ML/MIN/1.73 M^2
EST. GFR  (NON AFRICAN AMERICAN): 46.5 ML/MIN/1.73 M^2
ESTIMATED AVG GLUCOSE: 117 MG/DL
FIBRINOGEN PPP-MCNC: 521 MG/DL
GLUCOSE SERPL-MCNC: 107 MG/DL
HBA1C MFR BLD HPLC: 5.7 %
HCT VFR BLD AUTO: 33.4 %
HGB BLD-MCNC: 9.3 G/DL
IMM GRANULOCYTES # BLD AUTO: 0.02 K/UL
IMM GRANULOCYTES NFR BLD AUTO: 0.2 %
LYMPHOCYTES # BLD AUTO: 0.9 K/UL
LYMPHOCYTES NFR BLD: 11.1 %
MCH RBC QN AUTO: 26.1 PG
MCHC RBC AUTO-ENTMCNC: 27.8 G/DL
MCV RBC AUTO: 94 FL
MONOCYTES # BLD AUTO: 0.6 K/UL
MONOCYTES NFR BLD: 7.2 %
NEUTROPHILS # BLD AUTO: 6.6 K/UL
NEUTROPHILS NFR BLD: 77.5 %
NRBC BLD-RTO: 0 /100 WBC
PLATELET # BLD AUTO: 262 K/UL
PMV BLD AUTO: 12.1 FL
POTASSIUM SERPL-SCNC: 4.7 MMOL/L
PROT SERPL-MCNC: 6.8 G/DL
RBC # BLD AUTO: 3.57 M/UL
SODIUM SERPL-SCNC: 141 MMOL/L
WBC # BLD AUTO: 8.46 K/UL

## 2018-10-04 PROCEDURE — 85025 COMPLETE CBC W/AUTO DIFF WBC: CPT

## 2018-10-04 PROCEDURE — 36415 COLL VENOUS BLD VENIPUNCTURE: CPT | Mod: PO

## 2018-10-04 PROCEDURE — 82306 VITAMIN D 25 HYDROXY: CPT

## 2018-10-04 PROCEDURE — 80053 COMPREHEN METABOLIC PANEL: CPT

## 2018-10-04 PROCEDURE — 85384 FIBRINOGEN ACTIVITY: CPT

## 2018-10-04 PROCEDURE — 85379 FIBRIN DEGRADATION QUANT: CPT

## 2018-10-04 PROCEDURE — 83036 HEMOGLOBIN GLYCOSYLATED A1C: CPT

## 2018-10-31 ENCOUNTER — TELEPHONE (OUTPATIENT)
Dept: PULMONOLOGY | Facility: CLINIC | Age: 68
End: 2018-10-31

## 2018-11-01 ENCOUNTER — OFFICE VISIT (OUTPATIENT)
Dept: CARDIOLOGY | Facility: CLINIC | Age: 68
End: 2018-11-01
Payer: MEDICARE

## 2018-11-01 VITALS
BODY MASS INDEX: 26.16 KG/M2 | HEIGHT: 68 IN | WEIGHT: 172.63 LBS | DIASTOLIC BLOOD PRESSURE: 80 MMHG | SYSTOLIC BLOOD PRESSURE: 142 MMHG | HEART RATE: 88 BPM

## 2018-11-01 DIAGNOSIS — I50.32 CHRONIC DIASTOLIC CONGESTIVE HEART FAILURE: Primary | ICD-10-CM

## 2018-11-01 DIAGNOSIS — G47.33 OSA (OBSTRUCTIVE SLEEP APNEA): ICD-10-CM

## 2018-11-01 DIAGNOSIS — J96.11 CHRONIC RESPIRATORY FAILURE WITH HYPOXIA AND HYPERCAPNIA: ICD-10-CM

## 2018-11-01 DIAGNOSIS — J44.9 COPD, SEVERE: ICD-10-CM

## 2018-11-01 DIAGNOSIS — I27.20 PULMONARY HYPERTENSION: ICD-10-CM

## 2018-11-01 DIAGNOSIS — I48.20 CHRONIC ATRIAL FIBRILLATION: ICD-10-CM

## 2018-11-01 DIAGNOSIS — E78.5 DYSLIPIDEMIA: ICD-10-CM

## 2018-11-01 DIAGNOSIS — I10 ESSENTIAL HYPERTENSION: ICD-10-CM

## 2018-11-01 DIAGNOSIS — J96.12 CHRONIC RESPIRATORY FAILURE WITH HYPOXIA AND HYPERCAPNIA: ICD-10-CM

## 2018-11-01 PROCEDURE — 99214 OFFICE O/P EST MOD 30 MIN: CPT | Mod: S$PBB,,, | Performed by: NURSE PRACTITIONER

## 2018-11-01 PROCEDURE — 99214 OFFICE O/P EST MOD 30 MIN: CPT | Mod: PBBFAC,PO | Performed by: NURSE PRACTITIONER

## 2018-11-01 PROCEDURE — 99999 PR PBB SHADOW E&M-EST. PATIENT-LVL IV: CPT | Mod: PBBFAC,,, | Performed by: NURSE PRACTITIONER

## 2018-11-01 NOTE — PROGRESS NOTES
Subjective:   Patient ID:  Rea Vail is a 68 y.o. male who presents for follow up of Congestive Heart Failure      HPI    Patient presents to clinic for follow up and management of diastolic HF and A-fib.   He has PMH of Pulm HTN, A-fib, COPD, GUMARO, Diastolic HF.  Following with Pulmonology for severe COPD.  Had echo in 2018 that showed LVEF of 60% with DD and pulmonary HTN with PA pressure of 50mmHg. Patient follows Dr. Florez Pulmonology for severe obstruction and restrictive lung disease. Uses his oxygen at  2 liters PRN during the day and BiPAP at night. Patient denies any chest pain. Chronic stable SOB. Oxygen not in place in clinic today. Has no edema, orthopnea, PND, dizziness, near syncope or palpitations. Denies any CNS complaints to suggest TIA or CVA. Is following with Dr. Martinez, Neurology.   Lasix increased to 40mg daily and BID on Monday and Wed.   Is anticoagulated on Eliquis with no abnormal bleeding. Did receive iron infusion recently. Has variable diet compliance, but has been doing much better. BNP on 10/4/18 down to 776 from 1181 two months ago. BMP stable.       Past Medical History:   Diagnosis Date    Anemia     Asthma     Atrial fibrillation     CHF (congestive heart failure)     COPD (chronic obstructive pulmonary disease)     COPD (chronic obstructive pulmonary disease)     Diverticular disease     Gout 2012    Hyperlipidemia     Hypertension     GUMARO (obstructive sleep apnea) 2018    Other and unspecified hyperlipidemia     Stroke        History reviewed. No pertinent surgical history.    Social History     Tobacco Use    Smoking status: Former Smoker     Types: Cigarettes     Last attempt to quit: 2006     Years since quittin.3   Substance Use Topics    Alcohol use: No    Drug use: No       Family History   Problem Relation Age of Onset    Stroke Cousin        Current Outpatient Medications   Medication Sig    allopurinol (ZYLOPRIM) 300 MG  tablet Take 300 mg by mouth once daily.    aspirin 81 MG Chew Take 1 tablet (81 mg total) by mouth once daily.    ATROVENT HFA 17 mcg/actuation inhaler     cholecalciferol, vitamin D3, (VITAMIN D3) 2,000 unit Cap 1 capsule once daily.     ELIQUIS 5 mg Tab TAKE 1 TABLET BY MOUTH TWICE DAILY    ferrous sulfate 324 mg (65 mg iron) TbEC Take 65 mg by mouth.    furosemide (LASIX) 40 MG tablet Take 40 mg by mouth. Take 2 tablets (80 mg total) BID on Mondays and wednesdays. Take 40mg every other days.    gabapentin (NEURONTIN) 300 MG capsule Take 1 capsule (300 mg total) by mouth 2 (two) times daily. 1 capsule Oral Three times a day (Patient taking differently: Take 400 mg by mouth 3 (three) times daily. 1 capsule Oral Three times a day)    hydrocodone-acetaminophen 10-325mg (NORCO)  mg Tab Take 1 tablet by mouth every 8 (eight) hours as needed for Pain. 1 tablet Oral Twice a day    irbesartan (AVAPRO) 150 MG tablet Take 150 mg by mouth once daily.     multivitamin capsule Take 1 capsule by mouth once daily.    nebivolol (BYSTOLIC) 10 MG Tab 10 mg 2 (two) times daily.     OXYGEN-AIR DELIVERY SYSTEMS MISC by [facility].(Non-Drug; Combo Route) route.    potassium chloride SA (K-DUR,KLOR-CON) 20 MEQ tablet Take 1 tablet (20 mEq total) by mouth once daily. (Patient taking differently: Take 20 mEq by mouth 2 (two) times daily. )    PROCTOFOAM HC rectal foam     simvastatin (ZOCOR) 40 MG tablet Take 1 tablet (40 mg total) by mouth every evening. 1 tablet Oral Every day    tiotropium (SPIRIVA) 18 mcg inhalation capsule Inhale 18 mcg into the lungs.    tiZANidine 4 mg Cap Take 4 mg by mouth.    albuterol (PROVENTIL) 2.5 mg /3 mL (0.083 %) nebulizer solution Take 2.5 mg by nebulization 2 (two) times daily. Rescue    albuterol 90 mcg/actuation inhaler Inhale 2 puffs into the lungs every 6 (six) hours as needed for Wheezing. Rescue     No current facility-administered medications for this visit.      Current  Outpatient Medications on File Prior to Visit   Medication Sig    allopurinol (ZYLOPRIM) 300 MG tablet Take 300 mg by mouth once daily.    aspirin 81 MG Chew Take 1 tablet (81 mg total) by mouth once daily.    ATROVENT HFA 17 mcg/actuation inhaler     cholecalciferol, vitamin D3, (VITAMIN D3) 2,000 unit Cap 1 capsule once daily.     ELIQUIS 5 mg Tab TAKE 1 TABLET BY MOUTH TWICE DAILY    ferrous sulfate 324 mg (65 mg iron) TbEC Take 65 mg by mouth.    furosemide (LASIX) 40 MG tablet Take 40 mg by mouth. Take 2 tablets (80 mg total) BID on Mondays and wednesdays. Take 40mg every other days.    gabapentin (NEURONTIN) 300 MG capsule Take 1 capsule (300 mg total) by mouth 2 (two) times daily. 1 capsule Oral Three times a day (Patient taking differently: Take 400 mg by mouth 3 (three) times daily. 1 capsule Oral Three times a day)    hydrocodone-acetaminophen 10-325mg (NORCO)  mg Tab Take 1 tablet by mouth every 8 (eight) hours as needed for Pain. 1 tablet Oral Twice a day    irbesartan (AVAPRO) 150 MG tablet Take 150 mg by mouth once daily.     multivitamin capsule Take 1 capsule by mouth once daily.    nebivolol (BYSTOLIC) 10 MG Tab 10 mg 2 (two) times daily.     OXYGEN-AIR DELIVERY SYSTEMS MISC by [facility].(Non-Drug; Combo Route) route.    potassium chloride SA (K-DUR,KLOR-CON) 20 MEQ tablet Take 1 tablet (20 mEq total) by mouth once daily. (Patient taking differently: Take 20 mEq by mouth 2 (two) times daily. )    PROCTOFOAM HC rectal foam     simvastatin (ZOCOR) 40 MG tablet Take 1 tablet (40 mg total) by mouth every evening. 1 tablet Oral Every day    tiotropium (SPIRIVA) 18 mcg inhalation capsule Inhale 18 mcg into the lungs.    tiZANidine 4 mg Cap Take 4 mg by mouth.    albuterol (PROVENTIL) 2.5 mg /3 mL (0.083 %) nebulizer solution Take 2.5 mg by nebulization 2 (two) times daily. Rescue    albuterol 90 mcg/actuation inhaler Inhale 2 puffs into the lungs every 6 (six) hours as needed for  Wheezing. Rescue    [DISCONTINUED] olopatadine (PATADAY) 0.2 % Drop     [DISCONTINUED] pantoprazole (PROTONIX) 40 MG tablet Take 40 mg by mouth every other day.    [DISCONTINUED] SYMBICORT 160-4.5 mcg/actuation HFAA 2 puffs every 12 (twelve) hours.      No current facility-administered medications on file prior to visit.        Review of Systems   Constitution: Negative for diaphoresis, weakness, malaise/fatigue, weight gain and weight loss.   HENT: Negative for congestion and nosebleeds.    Cardiovascular: Negative for chest pain, claudication, cyanosis, dyspnea on exertion, irregular heartbeat, leg swelling, near-syncope, orthopnea, palpitations, paroxysmal nocturnal dyspnea and syncope.   Respiratory: Positive for shortness of breath (chronic, but stable). Negative for cough, hemoptysis, sleep disturbances due to breathing, snoring, sputum production and wheezing.         Wears oxygen PRN   Hematologic/Lymphatic: Negative for bleeding problem. Does not bruise/bleed easily.   Skin: Negative for rash.   Musculoskeletal: Positive for arthritis and joint pain. Negative for back pain, falls, muscle cramps and muscle weakness.   Gastrointestinal: Positive for hemorrhoids. Negative for abdominal pain, constipation, diarrhea, heartburn, hematemesis, hematochezia, melena and nausea.   Genitourinary: Negative for dysuria, hematuria and nocturia.   Neurological: Negative for excessive daytime sleepiness, dizziness, headaches, light-headedness, loss of balance, numbness and vertigo.       Objective:   Physical Exam   Constitutional: He is oriented to person, place, and time. He appears well-developed and well-nourished. No distress.   HENT:   Head: Normocephalic and atraumatic.   Eyes: EOM are normal. Pupils are equal, round, and reactive to light. Right eye exhibits no discharge. Left eye exhibits no discharge.   Neck: Neck supple. JVD present. No thyromegaly present.   Cardiovascular: Normal rate and intact distal  "pulses. An irregularly irregular rhythm present. Exam reveals no gallop and no friction rub.   Murmur heard.   Medium-pitched mid to late systolic murmur is present at the lower left sternal border.  Pulses:       Carotid pulses are 2+ on the right side, and 2+ on the left side.       Radial pulses are 2+ on the right side, and 2+ on the left side.        Femoral pulses are 2+ on the right side, and 2+ on the left side.       Popliteal pulses are 2+ on the right side, and 2+ on the left side.        Dorsalis pedis pulses are 2+ on the right side, and 2+ on the left side.        Posterior tibial pulses are 2+ on the right side, and 2+ on the left side.   Pulmonary/Chest: Effort normal. No respiratory distress. He has wheezes. He has no rales. He exhibits no tenderness.   Abdominal: Soft. Bowel sounds are normal. He exhibits no distension. There is no tenderness.   obese   Musculoskeletal: Normal range of motion. He exhibits no edema.   Neurological: He is alert and oriented to person, place, and time. No cranial nerve deficit.   Skin: Skin is warm and dry. No rash noted. He is not diaphoretic. No erythema.   Psychiatric: He has a normal mood and affect. His behavior is normal.   Nursing note and vitals reviewed.    Vitals:    11/01/18 1540   BP: (!) 142/80   BP Location: Left arm   Patient Position: Sitting   BP Method: Medium (Manual)   Pulse: 88   Weight: 78.3 kg (172 lb 9.9 oz)   Height: 5' 8" (1.727 m)     Lab Results   Component Value Date    CHOL 149 03/21/2018    CHOL 133 02/08/2018    CHOL 177 01/15/2015     Lab Results   Component Value Date    HDL 35 (L) 03/21/2018    HDL 40 02/08/2018    HDL 34 (L) 01/15/2015     Lab Results   Component Value Date    LDLCALC 91.0 03/21/2018    LDLCALC 79.4 02/08/2018    LDLCALC 98.8 01/15/2015     Lab Results   Component Value Date    TRIG 115 03/21/2018    TRIG 68 02/08/2018    TRIG 221 (H) 01/15/2015     Lab Results   Component Value Date    CHOLHDL 23.5 03/21/2018    " CHOLHDL 30.1 02/08/2018    CHOLHDL 19.2 (L) 01/15/2015       Chemistry        Component Value Date/Time     10/04/2018 0756     10/04/2018 0756    K 4.7 10/04/2018 0756    K 4.5 10/04/2018 0756     10/04/2018 0756     10/04/2018 0756    CO2 34 (H) 10/04/2018 0756    CO2 31 (H) 10/04/2018 0756    BUN 23 10/04/2018 0756    BUN 22 10/04/2018 0756    CREATININE 1.5 (H) 10/04/2018 0756    CREATININE 1.4 10/04/2018 0756     10/04/2018 0756     10/04/2018 0756        Component Value Date/Time    CALCIUM 9.9 10/04/2018 0756    CALCIUM 9.8 10/04/2018 0756    ALKPHOS 134 10/04/2018 0756    AST 32 10/04/2018 0756    ALT 22 10/04/2018 0756    BILITOT 0.5 10/04/2018 0756    ESTGFRAFRICA 53.8 (A) 10/04/2018 0756    ESTGFRAFRICA 58 (A) 10/04/2018 0756    EGFRNONAA 46.5 (A) 10/04/2018 0756    EGFRNONAA 51 (A) 10/04/2018 0756          Lab Results   Component Value Date    TSH 1.350 02/07/2018     Lab Results   Component Value Date    INR 1.1 03/21/2018    INR 1.0 02/12/2018     Lab Results   Component Value Date    WBC 8.46 10/04/2018    HGB 9.3 (L) 10/04/2018    HCT 33.4 (L) 10/04/2018    MCV 94 10/04/2018     10/04/2018     BMP  Sodium   Date Value Ref Range Status   10/04/2018 141 136 - 145 mmol/L Final   10/04/2018 143 136 - 145 mmol/L Final     Potassium   Date Value Ref Range Status   10/04/2018 4.7 3.5 - 5.1 mmol/L Final   10/04/2018 4.5 3.5 - 5.1 mmol/L Final     Chloride   Date Value Ref Range Status   10/04/2018 101 95 - 110 mmol/L Final   10/04/2018 102 95 - 110 mmol/L Final     CO2   Date Value Ref Range Status   10/04/2018 34 (H) 23 - 29 mmol/L Final   10/04/2018 31 (H) 23 - 29 mmol/L Final     BUN, Bld   Date Value Ref Range Status   10/04/2018 23 8 - 23 mg/dL Final   10/04/2018 22 8 - 23 mg/dL Final     Creatinine   Date Value Ref Range Status   10/04/2018 1.5 (H) 0.5 - 1.4 mg/dL Final   10/04/2018 1.4 0.5 - 1.4 mg/dL Final     Calcium   Date Value Ref Range Status    10/04/2018 9.9 8.7 - 10.5 mg/dL Final   10/04/2018 9.8 8.7 - 10.5 mg/dL Final     Anion Gap   Date Value Ref Range Status   10/04/2018 6 (L) 8 - 16 mmol/L Final   10/04/2018 10 8 - 16 mmol/L Final     eGFR if    Date Value Ref Range Status   10/04/2018 53.8 (A) >60 mL/min/1.73 m^2 Final   10/04/2018 58 (A) >60 mL/min/1.73 m^2 Final     eGFR if non    Date Value Ref Range Status   10/04/2018 46.5 (A) >60 mL/min/1.73 m^2 Final     Comment:     Calculation used to obtain the estimated glomerular filtration  rate (eGFR) is the CKD-EPI equation.      10/04/2018 51 (A) >60 mL/min/1.73 m^2 Final     Comment:     Calculation used to obtain the estimated glomerular filtration  rate (eGFR) is the CKD-EPI equation.        CrCl cannot be calculated (Patient's most recent lab result is older than the maximum 7 days allowed.).    Assessment:     1. Chronic diastolic congestive heart failure    2. Chronic respiratory failure with hypoxia and hypercapnia    3. COPD, severe    4. Pulmonary hypertension    5. Essential hypertension    6. Dyslipidemia    7. Chronic atrial fibrillation    8. GUMARO (obstructive sleep apnea)      Has no evidence of volume overload on exam   BNP on 10/4/18 down to 776 from 1181 two months ago. BMP stable   No CNS complaints to suggest TIA or CVA   BP stable   Wearing Bipap nightly and oxygen during the day PRN   No s/sx to suggest decompensated HF. Still taking Lasix 40mg daily and BID on MW   Stable chronic SOB   Chronic A-fib with controlled rate   No abnormal bleeding on Eliquis   Plan:     Continue Lasix 40mg daily and BID on MW   Heart healthy diet   Limit fluid intake 50-60 oz   Daily weights and to notify clinic if weight increases by more than 3 lbs in 1 day or 5 lbs in 1 week.   Exercise routine as tolerated   RTC in 3 months or sooner if needed

## 2018-11-08 ENCOUNTER — TELEPHONE (OUTPATIENT)
Dept: CARDIOLOGY | Facility: CLINIC | Age: 68
End: 2018-11-08

## 2018-11-08 NOTE — TELEPHONE ENCOUNTER
Returned call to family member regarding recent OLOL for possible reaction to Iron infusion after second dose.  Family member wanted to make sure we have/request all records and update allergy list.  Will send request to PEDRO and Ivonne Rosario

## 2018-11-08 NOTE — TELEPHONE ENCOUNTER
----- Message from Allegra Martinez sent at 11/8/2018 10:53 AM CST -----  Contact: Pt Kerline  Pt Kerline is calling giving noification of what is needed for Pt appt. Pt kerline states that Pt   Medical record and Nursing Note On IV infusion needs  to be requested from [facility]  And there Process. Pt pilar would like nurse to call for information. .[phone]

## 2018-11-09 ENCOUNTER — TELEPHONE (OUTPATIENT)
Dept: CARDIOLOGY | Facility: CLINIC | Age: 68
End: 2018-11-09

## 2018-11-09 NOTE — TELEPHONE ENCOUNTER
Returned phone call to patient's family member and offered 12/7/18 with Dr. Wyatt or earlier with [facility] NP.  Refused stating they were told to follow-up within 2-3 days from ER visit and needs to discuss new medication for A-fib.  Will see Dr. Hanson on Wednesday at 8am since Tuesday at 10:20 was not early enough.

## 2018-11-09 NOTE — TELEPHONE ENCOUNTER
----- Message from Yanique Burton sent at 11/9/2018  3:37 PM CST -----  Contact: pilar  Requesting call back regarding why pt appt was r/s for 12/19 when pt was discharged on 11/8. Pt was in route to appt today due to being unaware that appt had been rescheduled.  Niece states pt can not wait a month to be seen. Please call back at [phone].    Thanks,  Yanique Burton

## 2018-11-09 NOTE — TELEPHONE ENCOUNTER
----- Message from Franca Jefferson sent at 11/9/2018  3:58 PM CST -----  Contact: pt family member - Ms Pedroza  Pt appointment was canceled for 11/09 for hospital follow pt needs to reschedule early morning, pt can be reached at  [phone] Thanks

## 2018-11-13 ENCOUNTER — TELEPHONE (OUTPATIENT)
Dept: PULMONOLOGY | Facility: CLINIC | Age: 68
End: 2018-11-13

## 2018-11-13 ENCOUNTER — PATIENT MESSAGE (OUTPATIENT)
Dept: PULMONOLOGY | Facility: CLINIC | Age: 68
End: 2018-11-13

## 2018-11-13 ENCOUNTER — TELEPHONE (OUTPATIENT)
Dept: CARDIOLOGY | Facility: CLINIC | Age: 68
End: 2018-11-13

## 2018-11-13 DIAGNOSIS — I50.9 CONGESTIVE HEART FAILURE, UNSPECIFIED HF CHRONICITY, UNSPECIFIED HEART FAILURE TYPE: Primary | ICD-10-CM

## 2018-11-13 DIAGNOSIS — R06.02 SOB (SHORTNESS OF BREATH): Primary | ICD-10-CM

## 2018-11-13 NOTE — TELEPHONE ENCOUNTER
----- Message from Allegra Martinez sent at 11/13/2018  1:19 PM CST -----  Contact: Pt   Pt Granddaughter is calling regarding requesting to have nurse call Pt. Pt granddaughter would like to check to see if the office as received all neccessary  Updated  records of Pt. [phone]

## 2018-11-13 NOTE — TELEPHONE ENCOUNTER
Spoke with pts granddaughter and confirmed notes received.     ----- Message from Allegra Martinez sent at 11/13/2018  1:16 PM CST -----  Contact: Pt.   Pt Granddaughter is calling regarding requesting to have nurse call Pt. Pt granddaughter would like top check to see if the office as received all neccessary   up records of Pt. [phone]

## 2018-11-14 ENCOUNTER — OFFICE VISIT (OUTPATIENT)
Dept: CARDIOLOGY | Facility: CLINIC | Age: 68
End: 2018-11-14
Payer: MEDICARE

## 2018-11-14 ENCOUNTER — CLINICAL SUPPORT (OUTPATIENT)
Dept: CARDIOLOGY | Facility: CLINIC | Age: 68
End: 2018-11-14
Payer: MEDICARE

## 2018-11-14 VITALS
SYSTOLIC BLOOD PRESSURE: 124 MMHG | HEIGHT: 68 IN | DIASTOLIC BLOOD PRESSURE: 80 MMHG | BODY MASS INDEX: 26.83 KG/M2 | WEIGHT: 177 LBS | HEART RATE: 81 BPM

## 2018-11-14 DIAGNOSIS — I10 ESSENTIAL HYPERTENSION: ICD-10-CM

## 2018-11-14 DIAGNOSIS — I27.20 PULMONARY HYPERTENSION: ICD-10-CM

## 2018-11-14 DIAGNOSIS — Z91.89 AT RISK FOR CORONARY ARTERY DISEASE: ICD-10-CM

## 2018-11-14 DIAGNOSIS — I50.9 CONGESTIVE HEART FAILURE, UNSPECIFIED HF CHRONICITY, UNSPECIFIED HEART FAILURE TYPE: ICD-10-CM

## 2018-11-14 DIAGNOSIS — E78.5 DYSLIPIDEMIA: ICD-10-CM

## 2018-11-14 DIAGNOSIS — R94.31 ABNORMAL ELECTROCARDIOGRAM: ICD-10-CM

## 2018-11-14 DIAGNOSIS — I50.32 CHRONIC DIASTOLIC CONGESTIVE HEART FAILURE: Primary | ICD-10-CM

## 2018-11-14 DIAGNOSIS — I44.7 LBBB (LEFT BUNDLE BRANCH BLOCK): ICD-10-CM

## 2018-11-14 DIAGNOSIS — I48.20 CHRONIC ATRIAL FIBRILLATION: ICD-10-CM

## 2018-11-14 DIAGNOSIS — J44.9 COPD, SEVERE: ICD-10-CM

## 2018-11-14 DIAGNOSIS — I63.9 CEREBROVASCULAR ACCIDENT (CVA), UNSPECIFIED MECHANISM: ICD-10-CM

## 2018-11-14 PROCEDURE — 99214 OFFICE O/P EST MOD 30 MIN: CPT | Mod: S$PBB,,, | Performed by: INTERNAL MEDICINE

## 2018-11-14 PROCEDURE — 99213 OFFICE O/P EST LOW 20 MIN: CPT | Mod: PBBFAC,PO,25 | Performed by: INTERNAL MEDICINE

## 2018-11-14 PROCEDURE — 93010 ELECTROCARDIOGRAM REPORT: CPT | Mod: S$PBB,,, | Performed by: INTERNAL MEDICINE

## 2018-11-14 PROCEDURE — 99999 PR PBB SHADOW E&M-EST. PATIENT-LVL III: CPT | Mod: PBBFAC,,, | Performed by: INTERNAL MEDICINE

## 2018-11-14 PROCEDURE — 93005 ELECTROCARDIOGRAM TRACING: CPT | Mod: PBBFAC,PO | Performed by: INTERNAL MEDICINE

## 2018-11-14 RX ORDER — PANTOPRAZOLE SODIUM 40 MG/1
40 TABLET, DELAYED RELEASE ORAL DAILY
COMMUNITY
End: 2020-11-19

## 2018-11-19 ENCOUNTER — HOSPITAL ENCOUNTER (OUTPATIENT)
Dept: RADIOLOGY | Facility: HOSPITAL | Age: 68
Discharge: HOME | End: 2018-11-19
Attending: INTERNAL MEDICINE
Payer: MEDICARE

## 2018-11-19 ENCOUNTER — CLINICAL SUPPORT (OUTPATIENT)
Dept: CARDIOLOGY | Facility: CLINIC | Age: 68
End: 2018-11-19
Attending: INTERNAL MEDICINE
Payer: MEDICARE

## 2018-11-19 DIAGNOSIS — I44.7 LBBB (LEFT BUNDLE BRANCH BLOCK): ICD-10-CM

## 2018-11-19 DIAGNOSIS — I50.32 CHRONIC DIASTOLIC CONGESTIVE HEART FAILURE: ICD-10-CM

## 2018-11-19 DIAGNOSIS — Z91.89 AT RISK FOR CORONARY ARTERY DISEASE: ICD-10-CM

## 2018-11-19 DIAGNOSIS — R94.31 ABNORMAL ELECTROCARDIOGRAM: ICD-10-CM

## 2018-11-19 LAB — DIASTOLIC DYSFUNCTION: NO

## 2018-11-19 PROCEDURE — 78452 HT MUSCLE IMAGE SPECT MULT: CPT | Mod: TC,PO

## 2018-11-19 PROCEDURE — 93016 CV STRESS TEST SUPVJ ONLY: CPT | Mod: S$PBB,,, | Performed by: NUCLEAR MEDICINE

## 2018-11-19 PROCEDURE — 78452 HT MUSCLE IMAGE SPECT MULT: CPT | Mod: 26,,, | Performed by: NUCLEAR MEDICINE

## 2018-11-19 PROCEDURE — 93018 CV STRESS TEST I&R ONLY: CPT | Mod: S$PBB,,, | Performed by: NUCLEAR MEDICINE

## 2018-11-20 ENCOUNTER — TELEPHONE (OUTPATIENT)
Dept: CARDIOLOGY | Facility: CLINIC | Age: 68
End: 2018-11-20

## 2018-11-20 NOTE — TELEPHONE ENCOUNTER
Attempted without success to contact pt with nm stress test.  Left vm to return call.     ----- Message from Polo Hanson MD sent at 11/19/2018  8:18 PM CST -----  Nuke stress normal

## 2018-11-20 NOTE — TELEPHONE ENCOUNTER
Spoke with pts daughter with test results.  Daughter verbalized understanding.  Daughter would also like to know if there is anything they need to do next?  Follow up, other tests, etc?  Will ask dr. Hanson to advise.     ----- Message from Polo Hanson MD sent at 11/19/2018  8:18 PM CST -----  Nuke stress normal

## 2018-11-21 ENCOUNTER — TELEPHONE (OUTPATIENT)
Dept: CARDIOLOGY | Facility: CLINIC | Age: 68
End: 2018-11-21

## 2018-11-21 NOTE — TELEPHONE ENCOUNTER
Attempted without success to contact pt to follow up with [facility] in 6months.  I did put them on recall.

## 2018-11-26 ENCOUNTER — TELEPHONE (OUTPATIENT)
Dept: PULMONOLOGY | Facility: CLINIC | Age: 68
End: 2018-11-26

## 2018-11-26 NOTE — TELEPHONE ENCOUNTER
----- Message from Caitlyn Schulz sent at 11/26/2018 11:34 AM CST -----  Contact: Stephenie Vail/pilar  States he has an appt on tomorrow and the xray and PFT is scheduled on 12/3. Nothing coming up on the schedule. Please call Stephenie Vail at [phone]. Thank you

## 2018-12-03 ENCOUNTER — HOSPITAL ENCOUNTER (OUTPATIENT)
Dept: RADIOLOGY | Facility: HOSPITAL | Age: 68
Discharge: HOME | End: 2018-12-03
Attending: INTERNAL MEDICINE
Payer: MEDICARE

## 2018-12-03 ENCOUNTER — OFFICE VISIT (OUTPATIENT)
Dept: PULMONOLOGY | Facility: CLINIC | Age: 68
End: 2018-12-03
Payer: MEDICARE

## 2018-12-03 ENCOUNTER — CLINICAL SUPPORT (OUTPATIENT)
Dept: PULMONOLOGY | Facility: CLINIC | Age: 68
End: 2018-12-03
Payer: MEDICARE

## 2018-12-03 VITALS
WEIGHT: 170 LBS | HEART RATE: 101 BPM | OXYGEN SATURATION: 97 % | BODY MASS INDEX: 25.76 KG/M2 | DIASTOLIC BLOOD PRESSURE: 80 MMHG | SYSTOLIC BLOOD PRESSURE: 124 MMHG | RESPIRATION RATE: 16 BRPM | HEIGHT: 68 IN

## 2018-12-03 DIAGNOSIS — G47.30 HYPERSOMNIA WITH SLEEP APNEA: ICD-10-CM

## 2018-12-03 DIAGNOSIS — G47.10 HYPERSOMNIA WITH SLEEP APNEA: ICD-10-CM

## 2018-12-03 DIAGNOSIS — G47.33 OSA ON CPAP: Primary | ICD-10-CM

## 2018-12-03 DIAGNOSIS — J44.89 ASTHMA WITH COPD: ICD-10-CM

## 2018-12-03 DIAGNOSIS — I27.20 PULMONARY HYPERTENSION: ICD-10-CM

## 2018-12-03 DIAGNOSIS — J44.9 COPD, SEVERE: ICD-10-CM

## 2018-12-03 DIAGNOSIS — R06.02 SOB (SHORTNESS OF BREATH): ICD-10-CM

## 2018-12-03 DIAGNOSIS — R09.02 EXERCISE HYPOXEMIA: ICD-10-CM

## 2018-12-03 PROBLEM — I50.9 CHF (CONGESTIVE HEART FAILURE): Status: ACTIVE | Noted: 2018-12-03

## 2018-12-03 PROCEDURE — 94729 DIFFUSING CAPACITY: CPT | Mod: 26,S$PBB,, | Performed by: INTERNAL MEDICINE

## 2018-12-03 PROCEDURE — 94060 EVALUATION OF WHEEZING: CPT | Mod: 26,S$PBB,, | Performed by: INTERNAL MEDICINE

## 2018-12-03 PROCEDURE — 94060 EVALUATION OF WHEEZING: CPT | Mod: PBBFAC,PO

## 2018-12-03 PROCEDURE — 99214 OFFICE O/P EST MOD 30 MIN: CPT | Mod: PBBFAC,25,PO | Performed by: INTERNAL MEDICINE

## 2018-12-03 PROCEDURE — 94726 PLETHYSMOGRAPHY LUNG VOLUMES: CPT | Mod: PBBFAC,PO

## 2018-12-03 PROCEDURE — 94729 DIFFUSING CAPACITY: CPT | Mod: PBBFAC,PO

## 2018-12-03 PROCEDURE — 99215 OFFICE O/P EST HI 40 MIN: CPT | Mod: 25,S$PBB,, | Performed by: INTERNAL MEDICINE

## 2018-12-03 PROCEDURE — 99999 PR PBB SHADOW E&M-EST. PATIENT-LVL IV: CPT | Mod: PBBFAC,,, | Performed by: INTERNAL MEDICINE

## 2018-12-03 PROCEDURE — 71046 X-RAY EXAM CHEST 2 VIEWS: CPT | Mod: TC,FY,PO

## 2018-12-03 PROCEDURE — 94726 PLETHYSMOGRAPHY LUNG VOLUMES: CPT | Mod: 26,S$PBB,, | Performed by: INTERNAL MEDICINE

## 2018-12-03 PROCEDURE — 71046 X-RAY EXAM CHEST 2 VIEWS: CPT | Mod: 26,,, | Performed by: RADIOLOGY

## 2018-12-03 RX ORDER — POLYETHYLENE GLYCOL 3350 17 G/17G
POWDER, FOR SOLUTION ORAL
COMMUNITY
End: 2019-10-03

## 2018-12-03 RX ORDER — BUDESONIDE AND FORMOTEROL FUMARATE DIHYDRATE 160; 4.5 UG/1; UG/1
2 AEROSOL RESPIRATORY (INHALATION) 2 TIMES DAILY
Qty: 3 INHALER | Refills: 3 | Status: SHIPPED | OUTPATIENT
Start: 2018-12-03 | End: 2019-10-03 | Stop reason: SDUPTHER

## 2018-12-03 RX ORDER — ALBUTEROL SULFATE 0.83 MG/ML
2.5 SOLUTION RESPIRATORY (INHALATION) EVERY 6 HOURS PRN
Qty: 360 ML | Refills: 11 | Status: SHIPPED | OUTPATIENT
Start: 2018-12-03 | End: 2019-10-03 | Stop reason: SDUPTHER

## 2018-12-03 RX ORDER — DILTIAZEM HYDROCHLORIDE 30 MG/1
30 TABLET, FILM COATED ORAL
COMMUNITY
Start: 2018-11-08 | End: 2018-12-08

## 2018-12-03 RX ORDER — TIOTROPIUM BROMIDE 18 UG/1
18 CAPSULE ORAL; RESPIRATORY (INHALATION) DAILY
Qty: 90 CAPSULE | Refills: 3 | Status: SHIPPED | OUTPATIENT
Start: 2018-12-03 | End: 2019-10-03 | Stop reason: SDUPTHER

## 2018-12-03 RX ORDER — ALBUTEROL SULFATE 90 UG/1
2 AEROSOL, METERED RESPIRATORY (INHALATION) EVERY 6 HOURS PRN
Qty: 3 EACH | Refills: 3 | Status: SHIPPED | OUTPATIENT
Start: 2018-12-03 | End: 2019-04-18

## 2018-12-03 NOTE — PROGRESS NOTES
Subjective:       Patient ID: Rea Vail is a 68 y.o. male.    Chief Complaint: He       COPD    HPI     Dyspnea  Patient complains of shortness of breath. Symptoms occur after more than one flight stairs, with more than one block walking. Symptoms began 7 months ago, gradually improving since. Associated symptoms include  dyspnea on exertion, post nasal drip and shortness of breath. He denies chest pain, located left chest. He does not have had recent travel. Weight has been stable. Symptoms are exacerbated by moderate activity. Symptoms are alleviated by rest.         Past Medical History:   Diagnosis Date    Anemia 2013    Asthma     Atrial fibrillation     CHF (congestive heart failure)     COPD (chronic obstructive pulmonary disease)     COPD (chronic obstructive pulmonary disease)     Diverticular disease     Gout     Hyperlipidemia     Hypertension     GUMARO (obstructive sleep apnea) 2018    Other and unspecified hyperlipidemia     Stroke      History reviewed. No pertinent surgical history.  Social History     Socioeconomic History    Marital status: Single     Spouse name: Not on file    Number of children: Not on file    Years of education: Not on file    Highest education level: Not on file   Social Needs    Financial resource strain: Not on file    Food insecurity - worry: Not on file    Food insecurity - inability: Not on file    Transportation needs - medical: Not on file    Transportation needs - non-medical: Not on file   Occupational History    Not on file   Tobacco Use    Smoking status: Former Smoker     Types: Cigarettes     Last attempt to quit: 2006     Years since quittin.4    Smokeless tobacco: Never Used   Substance and Sexual Activity    Alcohol use: No    Drug use: No    Sexual activity: Not on file   Other Topics Concern    Not on file   Social History Narrative    Not on file     Review of Systems   Constitutional: Positive for  fatigue. Negative for fever.   HENT: Positive for postnasal drip, rhinorrhea and congestion.    Eyes: Negative for redness and itching.   Respiratory: Positive for cough, sputum production, shortness of breath, dyspnea on extertion, use of rescue inhaler and Paroxysmal Nocturnal Dyspnea.    Cardiovascular: Negative for chest pain, palpitations and leg swelling.   Genitourinary: Negative for difficulty urinating and hematuria.   Endocrine: Negative for cold intolerance and heat intolerance.    Skin: Negative for rash.   Gastrointestinal: Negative for nausea and abdominal pain.   Neurological: Negative for dizziness, syncope, weakness and light-headedness.   Hematological: Negative for adenopathy. Does not bruise/bleed easily.   Psychiatric/Behavioral: Negative for sleep disturbance. The patient is not nervous/anxious.        Objective:      Physical Exam   Constitutional: He is oriented to person, place, and time. He appears well-developed and well-nourished.   HENT:   Head: Normocephalic and atraumatic.   Mouth/Throat: Oropharyngeal exudate present.   Eyes: Conjunctivae are normal. Pupils are equal, round, and reactive to light.   Neck: Neck supple. No JVD present. No tracheal deviation present. No thyromegaly present.   Cardiovascular: Normal rate, regular rhythm and normal heart sounds.   No murmur heard.  Pulmonary/Chest: Effort normal. He has decreased breath sounds. He has wheezes in the right lower field and the left lower field. He has no rhonchi. He has no rales.   Abdominal: Soft. Bowel sounds are normal.   Musculoskeletal: Normal range of motion. He exhibits no edema or tenderness.   Lymphadenopathy:     He has no cervical adenopathy.   Neurological: He is alert and oriented to person, place, and time.   Skin: Skin is warm and dry.   Nursing note and vitals reviewed.    Personal Diagnostic Review  Chest x-ray: stable cardiomegaly      Office Spirometry Results:PFT:  Moderately severe obstruction. FEV1   52.63 %  predicted. (FEV1/VC< LLN and  FEV1> or equal to 50% predicted and < or equal to 59% of predicted).   Airflow is not clearly improved after bronchodilator. Clinical improvement following bronchodilator therapy may occur in the absence of spirometric improvement.   Mild restriction. (TLC< LLN and > or equal to 70% of predicted).   Mild reduction in diffusion capacity -adjusted for hemoglobin (DLCO > or equal to 60% predicted and < LLN) .     This interpretation of diffusing capacity is adjusted for patient's hemoglobin level.   Overall this is a mixed obstructive and restrictive defect.   Overall there is moderately severe ventilatory impairment. (FEV1 > or equal to 50% of predicted and < or equal to 59% of predicted.)      No flowsheet data found.  Pulmonary Studies Review 12/3/2018   SpO2 97   Ordering Provider -   Interpreting Provider -   Performing nurse/tech/RT -   Diagnosis -   Height 68.000   Weight 2720   BMI (Calculated) 25.9   Predicted Distance 355.9   Patient Race -   6MWT Status -   Patient Reported -   Was O2 used? -   Delivery Method -   Did patient stop? -   Type of assistive device(s) used? -   Is extra documentation required for this patient? -   Oxygen Saturation -   Supplemental Oxygen -   Heart Rate -   Blood Pressure -   Srinath Dyspnea Rating  -   Oxygen Saturation -   Supplemental Oxygen -   Heart Rate -   Blood Pressure -   Srinath Dyspnea Rating  -   Recovery Time (seconds) -   Oxygen Saturation -   Supplemental Oxygen -   Heart Rate -   Blood Pressure -   Srinath Dyspnea Rating  -   Is procedure ready for interpretation? -   Did the patient stop or pause? -   Total Time Walked (Calculated) -   Total Laps Walked -   Final Partial Lap Distance (feet) -   Total Distance Feet (Calculated) -   Total Distance Meters (Calculated) -   Predicted Distance Meters (Calculated) 511.22   Percentage of Predicted (Calculated) -   Peak VO2 (Calculated) -   Mets -   Has The Patient Had a Previous Six Minute  Walk Test? -   Oxygen Qualification? -   Oxygen Saturation -   Supplemental Oxygen -   Heart Rate -   Blood Pressure -   Srinath Dyspnea Rating  -   Oxygen Saturation -   Supplemental Oxygen -   Heart Rate -   Blood Pressure -   Srinath Dyspnea Rating  -   Recovery Time (seconds) -   Oxygen Saturation -   Supplemental Oxygen -   Heart Rate -   Blood Pressure -   Srinath Dyspnea Rating  -         Assessment:       1. GUMARO on CPAP    2. Asthma with COPD    3. COPD, severe    4. Hypersomnia with sleep apnea    5. Pulmonary hypertension    6. Exercise hypoxemia        Outpatient Encounter Medications as of 12/3/2018   Medication Sig Dispense Refill    albuterol (PROVENTIL) 2.5 mg /3 mL (0.083 %) nebulizer solution Take 3 mLs (2.5 mg total) by nebulization every 6 (six) hours as needed for Wheezing. Rescue 360 mL 11    albuterol (PROVENTIL/VENTOLIN HFA) 90 mcg/actuation inhaler Inhale 2 puffs into the lungs every 6 (six) hours as needed for Wheezing. Rescue 3 each 3    allopurinol (ZYLOPRIM) 300 MG tablet Take 300 mg by mouth once daily.      aspirin 81 MG Chew Take 1 tablet (81 mg total) by mouth once daily.  0    ATROVENT HFA 17 mcg/actuation inhaler       baclofen (LIORESAL) 10 MG tablet       cholecalciferol, vitamin D3, (VITAMIN D3) 2,000 unit Cap 1 capsule once daily.       diltiaZEM (CARDIZEM) 30 MG tablet Take 30 mg by mouth.      ELIQUIS 5 mg Tab TAKE 1 TABLET BY MOUTH TWICE DAILY 60 tablet 6    ferrous sulfate 324 mg (65 mg iron) TbEC Take 65 mg by mouth.      furosemide (LASIX) 40 MG tablet Take 40 mg by mouth. Take 2 tablets (80 mg total) BID on Mondays and wednesdays. Take 40mg every other days.      gabapentin (NEURONTIN) 300 MG capsule Take 1 capsule (300 mg total) by mouth 2 (two) times daily. 1 capsule Oral Three times a day (Patient taking differently: Take 400 mg by mouth 3 (three) times daily. 1 capsule Oral Three times a day) 60 capsule 5    hydrocodone-acetaminophen 10-325mg (NORCO)  mg Tab  Take 1 tablet by mouth every 8 (eight) hours as needed for Pain. 1 tablet Oral Twice a day 8 tablet 0    irbesartan (AVAPRO) 150 MG tablet Take 150 mg by mouth once daily.       multivitamin capsule Take 1 capsule by mouth once daily.      nebivolol (BYSTOLIC) 10 MG Tab 10 mg 2 (two) times daily.       OXYGEN-AIR DELIVERY SYSTEMS MISC by [facility] [place].(Non-Drug; Combo Route) route.      pantoprazole (PROTONIX) 40 MG tablet Take 40 mg by mouth once daily.      potassium chloride SA (K-DUR,KLOR-CON) 20 MEQ tablet Take 1 tablet (20 mEq total) by mouth once daily. (Patient taking differently: Take 20 mEq by mouth 2 (two) times daily. ) 30 tablet 3    PROCTOFOAM HC rectal foam       simvastatin (ZOCOR) 40 MG tablet Take 1 tablet (40 mg total) by mouth every evening. 1 tablet Oral Every day 90 tablet 4    tiotropium (SPIRIVA) 18 mcg inhalation capsule Inhale 1 capsule (18 mcg total) into the lungs once daily. 90 capsule 3    tiZANidine 4 mg Cap Take 4 mg by mouth.      [DISCONTINUED] albuterol (PROVENTIL) 2.5 mg /3 mL (0.083 %) nebulizer solution Take 2.5 mg by nebulization 2 (two) times daily. Rescue      [DISCONTINUED] albuterol 90 mcg/actuation inhaler Inhale 2 puffs into the lungs every 6 (six) hours as needed for Wheezing. Rescue 1 each 11    [DISCONTINUED] budesonide/formoterol fumarate (SYMBICORT INHL) Inhale into the lungs 2 (two) times daily.      [DISCONTINUED] tiotropium (SPIRIVA) 18 mcg inhalation capsule Inhale 18 mcg into the lungs.      ALBUTEROL INHL Inhale into the lungs.      budesonide-formoterol 160-4.5 mcg (SYMBICORT) 160-4.5 mcg/actuation HFAA Inhale 2 puffs into the lungs 2 (two) times daily. 3 Inhaler 3    polyethylene glycol (GLYCOLAX) 17 gram/dose powder polyethylene glycol 3350 17 gram/dose oral powder       No facility-administered encounter medications on file as of 12/3/2018.      Orders Placed This Encounter   Procedures    CPAP/BIPAP SUPPLIES     [facility] for  CPAP/Oxygen/Nebulizer supplies.  Customer Service: [phone]  Call: [phone]  Fax: [phone]  Billing Inquiries: [phone] or [phone]     Order Specific Question:   Type of mask:     Answer:   FFM     Order Specific Question:   Headgear?     Answer:   Yes     Order Specific Question:   Tubing?     Answer:   Yes     Order Specific Question:   Humidifier chamber?     Answer:   Yes     Order Specific Question:   Chin strap?     Answer:   Yes     Order Specific Question:   Filters?     Answer:   Yes     Order Specific Question:   Other supplies:     Answer:   Give 90 day supply with 4 refills     Order Specific Question:   Length of need (1-99 months):     Answer:   99    Six Minute Walk Test to qualify for Home Oxygen     Standing Status:   Future     Standing Expiration Date:   12/3/2019     Plan:       Requested Prescriptions     Signed Prescriptions Disp Refills    albuterol (PROVENTIL/VENTOLIN HFA) 90 mcg/actuation inhaler 3 each 3     Sig: Inhale 2 puffs into the lungs every 6 (six) hours as needed for Wheezing. Rescue    albuterol (PROVENTIL) 2.5 mg /3 mL (0.083 %) nebulizer solution 360 mL 11     Sig: Take 3 mLs (2.5 mg total) by nebulization every 6 (six) hours as needed for Wheezing. Rescue    tiotropium (SPIRIVA) 18 mcg inhalation capsule 90 capsule 3     Sig: Inhale 1 capsule (18 mcg total) into the lungs once daily.    budesonide-formoterol 160-4.5 mcg (SYMBICORT) 160-4.5 mcg/actuation HFAA 3 Inhaler 3     Sig: Inhale 2 puffs into the lungs 2 (two) times daily.     GUMARO on CPAP  -     CPAP/BIPAP SUPPLIES    Asthma with COPD  -     albuterol (PROVENTIL/VENTOLIN HFA) 90 mcg/actuation inhaler; Inhale 2 puffs into the lungs every 6 (six) hours as needed for Wheezing. Rescue  Dispense: 3 each; Refill: 3  -     albuterol (PROVENTIL) 2.5 mg /3 mL (0.083 %) nebulizer solution; Take 3 mLs (2.5 mg total) by nebulization every 6 (six) hours as needed for Wheezing. Rescue  Dispense: 360 mL;  Refill: 11  -     tiotropium (SPIRIVA) 18 mcg inhalation capsule; Inhale 1 capsule (18 mcg total) into the lungs once daily.  Dispense: 90 capsule; Refill: 3  -     budesonide-formoterol 160-4.5 mcg (SYMBICORT) 160-4.5 mcg/actuation HFAA; Inhale 2 puffs into the lungs 2 (two) times daily.  Dispense: 3 Inhaler; Refill: 3    COPD, severe    Hypersomnia with sleep apnea    Pulmonary hypertension  -     Six Minute Walk Test to qualify for Home Oxygen; Future    Exercise hypoxemia  -     Six Minute Walk Test to qualify for Home Oxygen; Future           Follow-up in about 6 months (around 6/3/2019) for 6 min walk.    MEDICAL DECISION MAKING: Moderate to high complexity.  Overall, the multiple problems listed are of moderate to high severity that may impact quality of life and activities of daily living. Side effects of medications, treatment plan as well as options and alternatives reviewed and discussed with patient. There was counseling of patient concerning these issues.    Total time spent in face to face counseling and coordination of care - 45  minutes over 50% of time was used in discussion of prognosis, risks, benefits of treatment, instructions and compliance with regimen . Discussion with other physicians or health care providers (DME, NP, pharmacy, respiratory therapy) occurred.

## 2018-12-03 NOTE — PATIENT INSTRUCTIONS
All [facility] appointments after 1/09/2019 will be conducted in our new facility:  [facility]  [address] [place] [address]?

## 2018-12-05 ENCOUNTER — PATIENT MESSAGE (OUTPATIENT)
Dept: PULMONOLOGY | Facility: CLINIC | Age: 68
End: 2018-12-05

## 2018-12-05 LAB
BRPFT: ABNORMAL
DLCO ADJ PRE: 16.7 ML/(MIN*MMHG) (ref 16.01–29.86)
DLCO SINGLE BREATH LLN: 16.01
DLCO SINGLE BREATH PRE REF: 59 %
DLCO SINGLE BREATH REF: 22.94
DLCOC SBVA LLN: 2.42
DLCOC SBVA PRE REF: 153.2 %
DLCOC SBVA REF: 3.76
DLCOC SINGLE BREATH LLN: 16.01
DLCOC SINGLE BREATH PRE REF: 72.8 %
DLCOC SINGLE BREATH REF: 22.94
DLCOVA LLN: 2.42
DLCOVA PRE REF: 124.1 %
DLCOVA PRE: 4.66 ML/(MIN*MMHG*L) (ref 2.42–5.1)
DLCOVA REF: 3.76
DLVAADJ PRE: 5.76 ML/(MIN*MMHG*L) (ref 2.42–5.1)
ERV LLN: 0.93
ERV PRE REF: 24.7 %
ERV REF: 0.93
ERVN2 LLN: 0.93
ERVN2 REF: 0.93
FEF 25 75 CHG: 5.6 %
FEF 25 75 LLN: 0.67
FEF 25 75 POST REF: 25.3 %
FEF 25 75 PRE REF: 24 %
FEF 25 75 REF: 1.87
FET100 CHG: -8.5 %
FEV1 CHG: 7.5 %
FEV1 FVC CHG: 4.2 %
FEV1 FVC LLN: 64
FEV1 FVC POST REF: 79.1 %
FEV1 FVC PRE REF: 75.9 %
FEV1 FVC REF: 77
FEV1 LLN: 1.59
FEV1 POST REF: 56.3 %
FEV1 PRE REF: 52.4 %
FEV1 REF: 2.32
FEV6 CHG: 8.4 %
FEV6 LLN: 2.14
FEV6 POST REF: 66.6 %
FEV6 POST: 1.96 L (ref 2.14–3.74)
FEV6 PRE REF: 61.5 %
FEV6 PRE: 1.81 L (ref 2.14–3.74)
FEV6 REF: 2.94
FRCN2 LLN: 2.41
FRCN2 REF: 3.4
FRCPLETH LLN: 2.41
FRCPLETH PREREF: 72.3 %
FRCPLETH REF: 3.4
FVC CHG: 3.1 %
FVC LLN: 2.16
FVC POST REF: 71.2 %
FVC PRE REF: 69 %
FVC REF: 2.99
IVC PRE: 1.76 L (ref 2.16–3.83)
IVC SINGLE BREATH LLN: 2.16
IVC SINGLE BREATH PRE REF: 58.8 %
IVC SINGLE BREATH REF: 2.99
MVV LLN: 88
MVV PRE REF: 49.5 %
MVV REF: 103
PEF CHG: 5.2 %
PEF LLN: 4.52
PEF POST REF: 78.3 %
PEF PRE REF: 74.5 %
PEF REF: 6.81
POST FEF 25 75: 0.47 L/S (ref 0.67–3.07)
POST FET 100: 11.46 SEC
POST FEV1 FVC: 61.29 % (ref 64.33–90.6)
POST FEV1: 1.31 L (ref 1.59–3.04)
POST FVC: 2.13 L (ref 2.16–3.83)
POST PEF: 5.33 L/S (ref 4.52–9.1)
PRE DLCO: 13.53 ML/(MIN*MMHG) (ref 16.01–29.86)
PRE ERV: 0.23 L (ref 0.93–0.93)
PRE FEF 25 75: 0.45 L/S (ref 0.67–3.07)
PRE FET 100: 12.53 SEC
PRE FEV1 FVC: 58.81 % (ref 64.33–90.6)
PRE FEV1: 1.22 L (ref 1.59–3.04)
PRE FRC PL: 2.46 L
PRE FVC: 2.07 L (ref 2.16–3.83)
PRE MVV: 51 L/MIN (ref 87.55–118.45)
PRE PEF: 5.07 L/S (ref 4.52–9.1)
PRE RV: 2.27 L (ref 1.8–3.15)
PRE TLC: 4.34 L (ref 4.95–7.25)
RAW LLN: 3.06
RAW PRE REF: 330.3 %
RAW PRE: 10.11 CMH2O*S/L (ref 3.06–3.06)
RAW REF: 3.06
RV LLN: 1.8
RV PRE REF: 91.7 %
RV REF: 2.47
RVN2 LLN: 1.8
RVN2 REF: 2.47
RVN2TLCN2 LLN: 32
RVN2TLCN2 REF: 41
RVTLC LLN: 32
RVTLC PRE REF: 126.7 %
RVTLC PRE: 52.26 % (ref 32.28–50.24)
RVTLC REF: 41
TLC LLN: 4.95
TLC PRE REF: 71.1 %
TLC REF: 6.1
TLCN2 LLN: 4.95
TLCN2 REF: 6.1
VA PRE: 2.9 L (ref 5.95–5.95)
VA SINGLE BREATH LLN: 5.95
VA SINGLE BREATH PRE REF: 48.8 %
VA SINGLE BREATH REF: 5.95
VC LLN: 2.16
VC PRE REF: 69.2 %
VC PRE: 2.07 L (ref 2.16–3.83)
VC REF: 2.99
VCMAXN2 LLN: 2.16
VCMAXN2 REF: 2.99
VTGRAWPRE: 2.61 L

## 2018-12-06 RX ORDER — BACLOFEN 10 MG/1
TABLET ORAL
COMMUNITY
Start: 2018-12-04 | End: 2021-09-30

## 2019-02-14 ENCOUNTER — OFFICE VISIT (OUTPATIENT)
Dept: CARDIOLOGY | Facility: CLINIC | Age: 69
End: 2019-02-14
Payer: MEDICARE

## 2019-02-14 VITALS
WEIGHT: 168.63 LBS | HEART RATE: 88 BPM | BODY MASS INDEX: 25.56 KG/M2 | HEIGHT: 68 IN | DIASTOLIC BLOOD PRESSURE: 64 MMHG | SYSTOLIC BLOOD PRESSURE: 122 MMHG

## 2019-02-14 DIAGNOSIS — J96.12 CHRONIC RESPIRATORY FAILURE WITH HYPOXIA AND HYPERCAPNIA: ICD-10-CM

## 2019-02-14 DIAGNOSIS — I48.20 CHRONIC ATRIAL FIBRILLATION: ICD-10-CM

## 2019-02-14 DIAGNOSIS — J44.9 CHRONIC OBSTRUCTIVE PULMONARY DISEASE, UNSPECIFIED COPD TYPE: ICD-10-CM

## 2019-02-14 DIAGNOSIS — I27.20 PULMONARY HYPERTENSION: ICD-10-CM

## 2019-02-14 DIAGNOSIS — E78.5 HYPERLIPIDEMIA, UNSPECIFIED HYPERLIPIDEMIA TYPE: ICD-10-CM

## 2019-02-14 DIAGNOSIS — I50.32 CHRONIC DIASTOLIC CONGESTIVE HEART FAILURE: Primary | ICD-10-CM

## 2019-02-14 DIAGNOSIS — I10 ESSENTIAL HYPERTENSION: ICD-10-CM

## 2019-02-14 DIAGNOSIS — J96.11 CHRONIC RESPIRATORY FAILURE WITH HYPOXIA AND HYPERCAPNIA: ICD-10-CM

## 2019-02-14 DIAGNOSIS — I63.9 CEREBROVASCULAR ACCIDENT (CVA), UNSPECIFIED MECHANISM: ICD-10-CM

## 2019-02-14 DIAGNOSIS — J44.9 COPD, SEVERE: ICD-10-CM

## 2019-02-14 DIAGNOSIS — E78.5 DYSLIPIDEMIA: ICD-10-CM

## 2019-02-14 DIAGNOSIS — G47.33 OSA (OBSTRUCTIVE SLEEP APNEA): ICD-10-CM

## 2019-02-14 PROCEDURE — 99214 OFFICE O/P EST MOD 30 MIN: CPT | Mod: S$PBB,,, | Performed by: NURSE PRACTITIONER

## 2019-02-14 PROCEDURE — 99214 PR OFFICE/OUTPT VISIT, EST, LEVL IV, 30-39 MIN: ICD-10-PCS | Mod: S$PBB,,, | Performed by: NURSE PRACTITIONER

## 2019-02-14 PROCEDURE — 99214 OFFICE O/P EST MOD 30 MIN: CPT | Mod: PBBFAC,PN | Performed by: NURSE PRACTITIONER

## 2019-02-14 PROCEDURE — 99999 PR PBB SHADOW E&M-EST. PATIENT-LVL IV: ICD-10-PCS | Mod: PBBFAC,,, | Performed by: NURSE PRACTITIONER

## 2019-02-14 PROCEDURE — 99999 PR PBB SHADOW E&M-EST. PATIENT-LVL IV: CPT | Mod: PBBFAC,,, | Performed by: NURSE PRACTITIONER

## 2019-02-14 RX ORDER — MELOXICAM 7.5 MG/1
7.5 TABLET ORAL
COMMUNITY
Start: 2019-02-07 | End: 2019-04-18

## 2019-02-14 NOTE — PROGRESS NOTES
Subjective:   Patient ID:  Rea Vail is a 68 y.o. male who presents for follow up of Congestive Heart Failure      HPI    Mr. Vail presents to clinic for follow up and management of diastolic HF. PMH CHFpEF, chronic AFIB, COPD, on home o2, GUMARO. HTN and CKD III.  Echo done in  normal BIV function, PRISCILLA and pAP 50 mmHg.    Had allergic reaction after second iron infusion in Dec 2018. Patient treated with Epi and transported to [facility]. Noted to be in A-fib with RVR with new LBBB upon arrival. HR controlled with Cardizem. He is anticoagulated with Eliquis. Seen in follow up by Dr. Hanson and noted to have new LBBB and chronic A-fib on EKG. Was asymptomatic, but stress test done to rule out ischemia. Stress test was negative for ischemia.   Exercises at [facility] with angina symptoms. Currenty Takes Lasix 80mg BID on MW and 40mg daily on Tues, Th, Fri, Sat and Sun. Has no abnormal bleeding on Eliquis. Has no CNS complaints to suggest TIA or CVA. Denies any chest pain . Chronic SOB stable. Has no orthopnea, PND, dizziness, near syncope or syncope. Uses oxygen PRN.     Past Medical History:   Diagnosis Date    Anemia     Asthma     Atrial fibrillation     CHF (congestive heart failure)     COPD (chronic obstructive pulmonary disease)     COPD (chronic obstructive pulmonary disease)     Diverticular disease     Gout     Hyperlipidemia     Hypertension     GUMARO (obstructive sleep apnea) 2018    Other and unspecified hyperlipidemia     Stroke        History reviewed. No pertinent surgical history.    Social History     Tobacco Use    Smoking status: Former Smoker     Types: Cigarettes     Last attempt to quit: 2006     Years since quittin.6    Smokeless tobacco: Never Used   Substance Use Topics    Alcohol use: No    Drug use: No       Family History   Problem Relation Age of Onset    Stroke Cousin        Current Outpatient Medications   Medication Sig    albuterol  (PROVENTIL) 2.5 mg /3 mL (0.083 %) nebulizer solution Take 3 mLs (2.5 mg total) by nebulization every 6 (six) hours as needed for Wheezing. Rescue    albuterol (PROVENTIL/VENTOLIN HFA) 90 mcg/actuation inhaler Inhale 2 puffs into the lungs every 6 (six) hours as needed for Wheezing. Rescue    allopurinol (ZYLOPRIM) 300 MG tablet Take 300 mg by mouth once daily.    aspirin 81 MG Chew Take 1 tablet (81 mg total) by mouth once daily.    ATROVENT HFA 17 mcg/actuation inhaler     baclofen (LIORESAL) 10 MG tablet     budesonide-formoterol 160-4.5 mcg (SYMBICORT) 160-4.5 mcg/actuation HFAA Inhale 2 puffs into the lungs 2 (two) times daily.    cholecalciferol, vitamin D3, (VITAMIN D3) 2,000 unit Cap 1 capsule once daily.     ELIQUIS 5 mg Tab TAKE 1 TABLET BY MOUTH TWICE DAILY    ferrous sulfate 324 mg (65 mg iron) TbEC Take 65 mg by mouth.    furosemide (LASIX) 40 MG tablet Take 40 mg by mouth. Take 2 tablets (80 mg total) BID on Mondays and wednesdays. Take 40mg every other days.    gabapentin (NEURONTIN) 300 MG capsule Take 1 capsule (300 mg total) by mouth 2 (two) times daily. 1 capsule Oral Three times a day (Patient taking differently: Take 400 mg by mouth 3 (three) times daily. 1 capsule Oral Three times a day)    hydrocodone-acetaminophen 10-325mg (NORCO)  mg Tab Take 1 tablet by mouth every 8 (eight) hours as needed for Pain. 1 tablet Oral Twice a day    irbesartan (AVAPRO) 150 MG tablet Take 150 mg by mouth once daily.     meloxicam (MOBIC) 7.5 MG tablet Take 7.5 mg by mouth.    multivitamin capsule Take 1 capsule by mouth once daily.    nebivolol (BYSTOLIC) 10 MG Tab 10 mg 2 (two) times daily.     OXYGEN-AIR DELIVERY SYSTEMS MISC by [facility].(Non-Drug; Combo Route) route.    pantoprazole (PROTONIX) 40 MG tablet Take 40 mg by mouth once daily.    polyethylene glycol (GLYCOLAX) 17 gram/dose powder polyethylene glycol 3350 17 gram/dose oral powder    potassium chloride SA (K-DUR,KLOR-CON) 20 MEQ  tablet Take 1 tablet (20 mEq total) by mouth once daily. (Patient taking differently: Take 20 mEq by mouth 2 (two) times daily. )    PROCTOFOAM HC rectal foam     simvastatin (ZOCOR) 40 MG tablet Take 1 tablet (40 mg total) by mouth every evening. 1 tablet Oral Every day    tiotropium (SPIRIVA) 18 mcg inhalation capsule Inhale 1 capsule (18 mcg total) into the lungs once daily.     No current facility-administered medications for this visit.      Current Outpatient Medications on File Prior to Visit   Medication Sig    albuterol (PROVENTIL) 2.5 mg /3 mL (0.083 %) nebulizer solution Take 3 mLs (2.5 mg total) by nebulization every 6 (six) hours as needed for Wheezing. Rescue    albuterol (PROVENTIL/VENTOLIN HFA) 90 mcg/actuation inhaler Inhale 2 puffs into the lungs every 6 (six) hours as needed for Wheezing. Rescue    allopurinol (ZYLOPRIM) 300 MG tablet Take 300 mg by mouth once daily.    aspirin 81 MG Chew Take 1 tablet (81 mg total) by mouth once daily.    ATROVENT HFA 17 mcg/actuation inhaler     baclofen (LIORESAL) 10 MG tablet     budesonide-formoterol 160-4.5 mcg (SYMBICORT) 160-4.5 mcg/actuation HFAA Inhale 2 puffs into the lungs 2 (two) times daily.    cholecalciferol, vitamin D3, (VITAMIN D3) 2,000 unit Cap 1 capsule once daily.     ELIQUIS 5 mg Tab TAKE 1 TABLET BY MOUTH TWICE DAILY    ferrous sulfate 324 mg (65 mg iron) TbEC Take 65 mg by mouth.    furosemide (LASIX) 40 MG tablet Take 40 mg by mouth. Take 2 tablets (80 mg total) BID on Mondays and wednesdays. Take 40mg every other days.    gabapentin (NEURONTIN) 300 MG capsule Take 1 capsule (300 mg total) by mouth 2 (two) times daily. 1 capsule Oral Three times a day (Patient taking differently: Take 400 mg by mouth 3 (three) times daily. 1 capsule Oral Three times a day)    hydrocodone-acetaminophen 10-325mg (NORCO)  mg Tab Take 1 tablet by mouth every 8 (eight) hours as needed for Pain. 1 tablet Oral Twice a day    irbesartan  (AVAPRO) 150 MG tablet Take 150 mg by mouth once daily.     meloxicam (MOBIC) 7.5 MG tablet Take 7.5 mg by mouth.    multivitamin capsule Take 1 capsule by mouth once daily.    nebivolol (BYSTOLIC) 10 MG Tab 10 mg 2 (two) times daily.     OXYGEN-AIR DELIVERY SYSTEMS MISC [place] [facility]Non-Drug; Combo Route) route.    pantoprazole (PROTONIX) 40 MG tablet Take 40 mg by mouth once daily.    polyethylene glycol (GLYCOLAX) 17 gram/dose powder polyethylene glycol 3350 17 gram/dose oral powder    potassium chloride SA (K-DUR,KLOR-CON) 20 MEQ tablet Take 1 tablet (20 mEq total) by mouth once daily. (Patient taking differently: Take 20 mEq by mouth 2 (two) times daily. )    PROCTOFOAM HC rectal foam     simvastatin (ZOCOR) 40 MG tablet Take 1 tablet (40 mg total) by mouth every evening. 1 tablet Oral Every day    tiotropium (SPIRIVA) 18 mcg inhalation capsule Inhale 1 capsule (18 mcg total) into the lungs once daily.    [DISCONTINUED] ALBUTEROL INHL Inhale into the lungs.    [DISCONTINUED] tiZANidine 4 mg Cap Take 4 mg by mouth.     No current facility-administered medications on file prior to visit.        Review of Systems   Constitution: Negative for diaphoresis, weakness, malaise/fatigue, weight gain and weight loss.   HENT: Negative for congestion and nosebleeds.    Cardiovascular: Negative for chest pain, claudication, cyanosis, dyspnea on exertion, irregular heartbeat, leg swelling, near-syncope, orthopnea, palpitations, paroxysmal nocturnal dyspnea and syncope.   Respiratory: Positive for shortness of breath (chronic, but stable . using oxygen PRN ). Negative for cough, hemoptysis, sleep disturbances due to breathing, snoring, sputum production and wheezing.         Wears oxygen PRN   Hematologic/Lymphatic: Negative for bleeding problem. Does not bruise/bleed easily.   Skin: Negative for rash.   Musculoskeletal: Positive for arthritis and joint pain. Negative for back pain, falls, muscle cramps and muscle  weakness.   Gastrointestinal: Positive for hemorrhoids. Negative for abdominal pain, constipation, diarrhea, heartburn, hematemesis, hematochezia, melena and nausea.   Genitourinary: Negative for dysuria, hematuria and nocturia.   Neurological: Negative for excessive daytime sleepiness, dizziness, headaches, light-headedness, loss of balance, numbness and vertigo.       Objective:   Physical Exam   Constitutional: He is oriented to person, place, and time. He appears well-developed and well-nourished. No distress.   HENT:   Head: Normocephalic and atraumatic.   Eyes: EOM are normal. Pupils are equal, round, and reactive to light. Right eye exhibits no discharge. Left eye exhibits no discharge.   Neck: Neck supple. No JVD present. No thyromegaly present.   Cardiovascular: Normal rate and intact distal pulses. An irregularly irregular rhythm present. Exam reveals no gallop and no friction rub.   Murmur heard.   Medium-pitched mid to late systolic murmur is present at the lower left sternal border.  Pulses:       Carotid pulses are 2+ on the right side, and 2+ on the left side.       Radial pulses are 2+ on the right side, and 2+ on the left side.        Femoral pulses are 2+ on the right side, and 2+ on the left side.       Popliteal pulses are 2+ on the right side, and 2+ on the left side.        Dorsalis pedis pulses are 2+ on the right side, and 2+ on the left side.        Posterior tibial pulses are 2+ on the right side, and 2+ on the left side.   Pulmonary/Chest: Effort normal. No respiratory distress. He has no wheezes. He has no rales. He exhibits no tenderness.   Abdominal: Soft. Bowel sounds are normal. He exhibits no distension. There is no tenderness.   obese   Musculoskeletal: Normal range of motion. He exhibits no edema.   Neurological: He is alert and oriented to person, place, and time. No cranial nerve deficit.   Skin: Skin is warm and dry. No rash noted. He is not diaphoretic. No erythema.  "  Psychiatric: He has a normal mood and affect. His behavior is normal.   Nursing note and vitals reviewed.    Vitals:    02/14/19 0813   BP: 122/64   BP Location: Left arm   Patient Position: Sitting   BP Method: Medium (Manual)   Pulse: 88   Weight: 76.5 kg (168 lb 10.4 oz)   Height: 5' 8" (1.727 m)     Lab Results   Component Value Date    CHOL 149 03/21/2018    CHOL 133 02/08/2018    CHOL 177 01/15/2015     Lab Results   Component Value Date    HDL 35 (L) 03/21/2018    HDL 40 02/08/2018    HDL 34 (L) 01/15/2015     Lab Results   Component Value Date    LDLCALC 91.0 03/21/2018    LDLCALC 79.4 02/08/2018    LDLCALC 98.8 01/15/2015     Lab Results   Component Value Date    TRIG 115 03/21/2018    TRIG 68 02/08/2018    TRIG 221 (H) 01/15/2015     Lab Results   Component Value Date    CHOLHDL 23.5 03/21/2018    CHOLHDL 30.1 02/08/2018    CHOLHDL 19.2 (L) 01/15/2015       Chemistry        Component Value Date/Time     10/04/2018 0756     10/04/2018 0756    K 4.7 10/04/2018 0756    K 4.5 10/04/2018 0756     10/04/2018 0756     10/04/2018 0756    CO2 34 (H) 10/04/2018 0756    CO2 31 (H) 10/04/2018 0756    BUN 23 10/04/2018 0756    BUN 22 10/04/2018 0756    CREATININE 1.5 (H) 10/04/2018 0756    CREATININE 1.4 10/04/2018 0756     10/04/2018 0756     10/04/2018 0756        Component Value Date/Time    CALCIUM 9.9 10/04/2018 0756    CALCIUM 9.8 10/04/2018 0756    ALKPHOS 134 10/04/2018 0756    AST 32 10/04/2018 0756    ALT 22 10/04/2018 0756    BILITOT 0.5 10/04/2018 0756    ESTGFRAFRICA 53.8 (A) 10/04/2018 0756    ESTGFRAFRICA 58 (A) 10/04/2018 0756    EGFRNONAA 46.5 (A) 10/04/2018 0756    EGFRNONAA 51 (A) 10/04/2018 0756          Lab Results   Component Value Date    TSH 1.350 02/07/2018     Lab Results   Component Value Date    INR 1.1 03/21/2018    INR 1.0 02/12/2018     Lab Results   Component Value Date    WBC 8.46 10/04/2018    HGB 9.3 (L) 10/04/2018    HCT 33.4 (L) 10/04/2018    " MCV 94 10/04/2018     10/04/2018     BMP  Sodium   Date Value Ref Range Status   10/04/2018 141 136 - 145 mmol/L Final   10/04/2018 143 136 - 145 mmol/L Final     Potassium   Date Value Ref Range Status   10/04/2018 4.7 3.5 - 5.1 mmol/L Final   10/04/2018 4.5 3.5 - 5.1 mmol/L Final     Chloride   Date Value Ref Range Status   10/04/2018 101 95 - 110 mmol/L Final   10/04/2018 102 95 - 110 mmol/L Final     CO2   Date Value Ref Range Status   10/04/2018 34 (H) 23 - 29 mmol/L Final   10/04/2018 31 (H) 23 - 29 mmol/L Final     BUN, Bld   Date Value Ref Range Status   10/04/2018 23 8 - 23 mg/dL Final   10/04/2018 22 8 - 23 mg/dL Final     Creatinine   Date Value Ref Range Status   10/04/2018 1.5 (H) 0.5 - 1.4 mg/dL Final   10/04/2018 1.4 0.5 - 1.4 mg/dL Final     Calcium   Date Value Ref Range Status   10/04/2018 9.9 8.7 - 10.5 mg/dL Final   10/04/2018 9.8 8.7 - 10.5 mg/dL Final     Anion Gap   Date Value Ref Range Status   10/04/2018 6 (L) 8 - 16 mmol/L Final   10/04/2018 10 8 - 16 mmol/L Final     eGFR if    Date Value Ref Range Status   10/04/2018 53.8 (A) >60 mL/min/1.73 m^2 Final   10/04/2018 58 (A) >60 mL/min/1.73 m^2 Final     eGFR if non    Date Value Ref Range Status   10/04/2018 46.5 (A) >60 mL/min/1.73 m^2 Final     Comment:     Calculation used to obtain the estimated glomerular filtration  rate (eGFR) is the CKD-EPI equation.      10/04/2018 51 (A) >60 mL/min/1.73 m^2 Final     Comment:     Calculation used to obtain the estimated glomerular filtration  rate (eGFR) is the CKD-EPI equation.        CrCl cannot be calculated (Patient's most recent lab result is older than the maximum 7 days allowed.).    Assessment:     1. Chronic diastolic congestive heart failure    2. Cerebrovascular accident (CVA), unspecified mechanism    3. Chronic obstructive pulmonary disease, unspecified COPD type    4. Chronic respiratory failure with hypoxia and hypercapnia    5. COPD, severe     6. Pulmonary hypertension    7. Chronic atrial fibrillation    8. Dyslipidemia    9. Essential hypertension    10. Hyperlipidemia, unspecified hyperlipidemia type    11. GUMARO (obstructive sleep apnea)    No evidence of volume overload   Has no abnormal bleeding on Eliquis   Has no CNS complaints to suggest TIA or CVA  Using Oxygen PRN  Uses CPAP nightly   Using  Lasix at 80mg BID on MW and 40mg daily on Tues, Thurs, Fri, Sat and Sun   Plan:   Will continue current medical management for now   Continue Lasix at 80mg BID on MW and 40mg daily on Tues, Thurs, Fri, Sat and Sun   Heart healthy diet  Limit fluid intake 50-60 oz   Daily weights and to notify clinic if weight increases by more than 3 lbs in 1 day or 5 lbs in 1 week.   Exercise routine as tolerated  RTC in 3 months with BMP before visit

## 2019-03-05 ENCOUNTER — TELEPHONE (OUTPATIENT)
Dept: GASTROENTEROLOGY | Facility: CLINIC | Age: 69
End: 2019-03-05

## 2019-03-05 ENCOUNTER — PATIENT MESSAGE (OUTPATIENT)
Dept: GASTROENTEROLOGY | Facility: CLINIC | Age: 69
End: 2019-03-05

## 2019-03-05 DIAGNOSIS — D64.9 ANEMIA, UNSPECIFIED TYPE: Primary | ICD-10-CM

## 2019-03-06 ENCOUNTER — PATIENT MESSAGE (OUTPATIENT)
Dept: CARDIOLOGY | Facility: CLINIC | Age: 69
End: 2019-03-06

## 2019-03-13 ENCOUNTER — HOSPITAL ENCOUNTER (EMERGENCY)
Facility: HOSPITAL | Age: 69
Discharge: HOME | End: 2019-03-13
Attending: EMERGENCY MEDICINE
Payer: MEDICARE

## 2019-03-13 ENCOUNTER — TELEPHONE (OUTPATIENT)
Dept: CARDIOLOGY | Facility: CLINIC | Age: 69
End: 2019-03-13

## 2019-03-13 VITALS
SYSTOLIC BLOOD PRESSURE: 159 MMHG | HEART RATE: 98 BPM | OXYGEN SATURATION: 98 % | TEMPERATURE: 98 F | DIASTOLIC BLOOD PRESSURE: 97 MMHG | WEIGHT: 184.19 LBS | BODY MASS INDEX: 31.45 KG/M2 | HEIGHT: 64 IN | RESPIRATION RATE: 13 BRPM

## 2019-03-13 DIAGNOSIS — Z99.81 DEPENDENCE ON SUPPLEMENTAL OXYGEN: ICD-10-CM

## 2019-03-13 DIAGNOSIS — R14.0 ABDOMINAL DISTENSION: ICD-10-CM

## 2019-03-13 DIAGNOSIS — J44.9 CHRONIC OBSTRUCTIVE PULMONARY DISEASE, UNSPECIFIED COPD TYPE: ICD-10-CM

## 2019-03-13 DIAGNOSIS — Z91.199 NONCOMPLIANCE: ICD-10-CM

## 2019-03-13 DIAGNOSIS — I48.20 CHRONIC ATRIAL FIBRILLATION: ICD-10-CM

## 2019-03-13 DIAGNOSIS — Z79.01 CHRONIC ANTICOAGULATION: ICD-10-CM

## 2019-03-13 DIAGNOSIS — R06.02 SOB (SHORTNESS OF BREATH): ICD-10-CM

## 2019-03-13 DIAGNOSIS — I50.33 ACUTE ON CHRONIC DIASTOLIC CONGESTIVE HEART FAILURE: Primary | ICD-10-CM

## 2019-03-13 LAB
ALBUMIN SERPL BCP-MCNC: 2.8 G/DL
ALP SERPL-CCNC: 168 U/L
ALT SERPL W/O P-5'-P-CCNC: 59 U/L
ANION GAP SERPL CALC-SCNC: 11 MMOL/L
APTT BLDCRRT: 33.3 SEC
AST SERPL-CCNC: 60 U/L
BASOPHILS # BLD AUTO: 0.02 K/UL
BASOPHILS NFR BLD: 0.2 %
BILIRUB SERPL-MCNC: 1.1 MG/DL
BILIRUB UR QL STRIP: NEGATIVE
BNP SERPL-MCNC: 1064 PG/ML
BUN SERPL-MCNC: 36 MG/DL
CALCIUM SERPL-MCNC: 9 MG/DL
CHLORIDE SERPL-SCNC: 99 MMOL/L
CLARITY UR: CLEAR
CO2 SERPL-SCNC: 26 MMOL/L
COLOR UR: YELLOW
CREAT SERPL-MCNC: 1.6 MG/DL
DIFFERENTIAL METHOD: ABNORMAL
EOSINOPHIL # BLD AUTO: 0.3 K/UL
EOSINOPHIL NFR BLD: 2.8 %
ERYTHROCYTE [DISTWIDTH] IN BLOOD BY AUTOMATED COUNT: 18.5 %
EST. GFR  (AFRICAN AMERICAN): 50 ML/MIN/1.73 M^2
EST. GFR  (NON AFRICAN AMERICAN): 43 ML/MIN/1.73 M^2
GLUCOSE SERPL-MCNC: 84 MG/DL
GLUCOSE UR QL STRIP: NEGATIVE
HCT VFR BLD AUTO: 37.7 %
HGB BLD-MCNC: 11.5 G/DL
HGB UR QL STRIP: NEGATIVE
INR PPP: 1.2
KETONES UR QL STRIP: NEGATIVE
LACTATE SERPL-SCNC: 1.4 MMOL/L
LEUKOCYTE ESTERASE UR QL STRIP: NEGATIVE
LIPASE SERPL-CCNC: 5 U/L
LYMPHOCYTES # BLD AUTO: 0.7 K/UL
LYMPHOCYTES NFR BLD: 7.1 %
MAGNESIUM SERPL-MCNC: 2.2 MG/DL
MCH RBC QN AUTO: 28.3 PG
MCHC RBC AUTO-ENTMCNC: 30.5 G/DL
MCV RBC AUTO: 93 FL
MONOCYTES # BLD AUTO: 1.1 K/UL
MONOCYTES NFR BLD: 11.4 %
NEUTROPHILS # BLD AUTO: 7.5 K/UL
NEUTROPHILS NFR BLD: 78.5 %
NITRITE UR QL STRIP: NEGATIVE
PH UR STRIP: 7 [PH] (ref 5–8)
PHOSPHATE SERPL-MCNC: 3.8 MG/DL
PLATELET # BLD AUTO: 164 K/UL
PMV BLD AUTO: 11.5 FL
POTASSIUM SERPL-SCNC: 5.3 MMOL/L
PROT SERPL-MCNC: 6.1 G/DL
PROT UR QL STRIP: NEGATIVE
PROTHROMBIN TIME: 12.5 SEC
RBC # BLD AUTO: 4.06 M/UL
SODIUM SERPL-SCNC: 136 MMOL/L
SP GR UR STRIP: 1.01 (ref 1–1.03)
TROPONIN I SERPL DL<=0.01 NG/ML-MCNC: 0.03 NG/ML
URN SPEC COLLECT METH UR: NORMAL
UROBILINOGEN UR STRIP-ACNC: NEGATIVE EU/DL
WBC # BLD AUTO: 9.54 K/UL

## 2019-03-13 PROCEDURE — 25000003 PHARM REV CODE 250: Performed by: EMERGENCY MEDICINE

## 2019-03-13 PROCEDURE — 80053 COMPREHEN METABOLIC PANEL: CPT

## 2019-03-13 PROCEDURE — 83605 ASSAY OF LACTIC ACID: CPT

## 2019-03-13 PROCEDURE — 93010 EKG 12-LEAD: ICD-10-PCS | Mod: ,,, | Performed by: INTERNAL MEDICINE

## 2019-03-13 PROCEDURE — 63600175 PHARM REV CODE 636 W HCPCS: Performed by: EMERGENCY MEDICINE

## 2019-03-13 PROCEDURE — 94640 AIRWAY INHALATION TREATMENT: CPT

## 2019-03-13 PROCEDURE — 27000221 HC OXYGEN, UP TO 24 HOURS

## 2019-03-13 PROCEDURE — 83880 ASSAY OF NATRIURETIC PEPTIDE: CPT

## 2019-03-13 PROCEDURE — 81003 URINALYSIS AUTO W/O SCOPE: CPT

## 2019-03-13 PROCEDURE — 85610 PROTHROMBIN TIME: CPT

## 2019-03-13 PROCEDURE — 93005 ELECTROCARDIOGRAM TRACING: CPT

## 2019-03-13 PROCEDURE — 96375 TX/PRO/DX INJ NEW DRUG ADDON: CPT

## 2019-03-13 PROCEDURE — 85025 COMPLETE CBC W/AUTO DIFF WBC: CPT

## 2019-03-13 PROCEDURE — 85730 THROMBOPLASTIN TIME PARTIAL: CPT

## 2019-03-13 PROCEDURE — 84100 ASSAY OF PHOSPHORUS: CPT

## 2019-03-13 PROCEDURE — 84484 ASSAY OF TROPONIN QUANT: CPT

## 2019-03-13 PROCEDURE — 96376 TX/PRO/DX INJ SAME DRUG ADON: CPT

## 2019-03-13 PROCEDURE — 96374 THER/PROPH/DIAG INJ IV PUSH: CPT

## 2019-03-13 PROCEDURE — 93010 ELECTROCARDIOGRAM REPORT: CPT | Mod: ,,, | Performed by: INTERNAL MEDICINE

## 2019-03-13 PROCEDURE — 99284 EMERGENCY DEPT VISIT MOD MDM: CPT | Mod: 25

## 2019-03-13 PROCEDURE — 25000242 PHARM REV CODE 250 ALT 637 W/ HCPCS: Performed by: EMERGENCY MEDICINE

## 2019-03-13 PROCEDURE — 83735 ASSAY OF MAGNESIUM: CPT

## 2019-03-13 PROCEDURE — 83690 ASSAY OF LIPASE: CPT

## 2019-03-13 RX ORDER — IPRATROPIUM BROMIDE AND ALBUTEROL SULFATE 2.5; .5 MG/3ML; MG/3ML
3 SOLUTION RESPIRATORY (INHALATION)
Status: COMPLETED | OUTPATIENT
Start: 2019-03-13 | End: 2019-03-13

## 2019-03-13 RX ORDER — METHYLPREDNISOLONE SOD SUCC 125 MG
125 VIAL (EA) INJECTION
Status: COMPLETED | OUTPATIENT
Start: 2019-03-13 | End: 2019-03-13

## 2019-03-13 RX ORDER — ASPIRIN 325 MG
325 TABLET ORAL
Status: COMPLETED | OUTPATIENT
Start: 2019-03-13 | End: 2019-03-13

## 2019-03-13 RX ORDER — FUROSEMIDE 10 MG/ML
40 INJECTION INTRAMUSCULAR; INTRAVENOUS
Status: COMPLETED | OUTPATIENT
Start: 2019-03-13 | End: 2019-03-13

## 2019-03-13 RX ORDER — IPRATROPIUM BROMIDE AND ALBUTEROL SULFATE 2.5; .5 MG/3ML; MG/3ML
3 SOLUTION RESPIRATORY (INHALATION)
Status: DISCONTINUED | OUTPATIENT
Start: 2019-03-13 | End: 2019-03-13

## 2019-03-13 RX ADMIN — FUROSEMIDE 40 MG: 10 INJECTION, SOLUTION INTRAMUSCULAR; INTRAVENOUS at 06:03

## 2019-03-13 RX ADMIN — IPRATROPIUM BROMIDE AND ALBUTEROL SULFATE 3 ML: .5; 3 SOLUTION RESPIRATORY (INHALATION) at 06:03

## 2019-03-13 RX ADMIN — NITROGLYCERIN 1 INCH: 20 OINTMENT TOPICAL at 04:03

## 2019-03-13 RX ADMIN — METHYLPREDNISOLONE SODIUM SUCCINATE 125 MG: 125 INJECTION, POWDER, FOR SOLUTION INTRAMUSCULAR; INTRAVENOUS at 06:03

## 2019-03-13 RX ADMIN — FUROSEMIDE 40 MG: 10 INJECTION, SOLUTION INTRAMUSCULAR; INTRAVENOUS at 05:03

## 2019-03-13 RX ADMIN — ASPIRIN 325 MG ORAL TABLET 325 MG: 325 PILL ORAL at 06:03

## 2019-03-13 NOTE — ED PROVIDER NOTES
"SCRIBE #1 NOTE: I, Leeann Araiza, am scribing for, and in the presence of, Flynn Galdamez MD. I have scribed the HPI, ROS, and PEx.    SCRIBE #2 NOTE: I, Aziza Galdamez, am scribing for, and in the presence of,  Lizeth Street MD. I have scribed the remaining portions of the note not scribed by Scribe #1.      History     Chief Complaint   Patient presents with    Shortness of Breath     + abdominal distention. Pt states that he ran out of his Oxygen.      Review of patient's allergies indicates:   Allergen Reactions    Cephalexin Anaphylaxis    Ferumoxytol Anaphylaxis         History of Present Illness     HPI    3/13/2019, 4:23 AM  History obtained from the patient      History of Present Illness: Rea Vail (former smoker) is a 68 y.o. male patient with a PMHx of anemia, asthma, Afib, CHF, COPD, HLD, HTN, GUMARO, and CVA who presents to the Emergency Department via EMS for evaluation of abdominal distention which onset gradually since 17:00 yesterday. Symptoms are constant and moderate in severity. No mitigating or exacerbating factors reported. Associated sxs include SOB, abdominal pain, constipation (last BM 7 days ago), fatigue, and BLE edema. Pt states he takes an "unknown" anticoagulant. Patient denies any n/v/d, expelling gas, CP, wheezing, HA, dizziness, lightheadedness, fever, chills, cough, congestion, dysuria, hematuria, and all other sxs at this time. No further complaints or concerns at this time.     Arrival mode: EMS     PCP: Estiven Oseguera MD        Past Medical History:  Past Medical History:   Diagnosis Date    Anemia 2013    Asthma     Atrial fibrillation     CHF (congestive heart failure)     COPD (chronic obstructive pulmonary disease) 2012    Diverticular disease     Gout 2012    Hyperlipidemia     Hypertension     GUMARO (obstructive sleep apnea) 4/27/2018    Other and unspecified hyperlipidemia     Stroke        Past Surgical History:  History reviewed. No pertinent " surgical history.    Family History:  Family History   Problem Relation Age of Onset    Stroke Cousin        Social History:  Social History     Tobacco Use    Smoking status: Former Smoker     Types: Cigarettes     Last attempt to quit: 2006     Years since quittin.6    Smokeless tobacco: Never Used   Substance and Sexual Activity    Alcohol use: No    Drug use: No    Sexual activity: Unknown        Review of Systems     Review of Systems   Constitutional: Positive for fatigue. Negative for chills and fever.   HENT: Negative for congestion and sore throat.    Respiratory: Positive for shortness of breath. Negative for cough and wheezing.    Cardiovascular: Positive for leg swelling. Negative for chest pain and palpitations.   Gastrointestinal: Positive for abdominal distention and constipation. Negative for abdominal pain, blood in stool, diarrhea, nausea and vomiting.        - gas   Genitourinary: Negative for dysuria and hematuria.   Musculoskeletal: Negative for back pain.   Skin: Negative for rash.   Neurological: Negative for dizziness, weakness, light-headedness and headaches.   Hematological: Does not bruise/bleed easily.   All other systems reviewed and are negative.     Physical Exam     Initial Vitals [19 0419]   BP Pulse Resp Temp SpO2   (!) 164/108 88 20 99.2 °F (37.3 °C) (!) 92 %      MAP       --          Physical Exam  Nursing Notes and Vital Signs Reviewed.  Constitutional: Patient is in no acute distress. Well-developed and well-nourished.  Head: Atraumatic. Normocephalic.  Eyes: PERRL. EOM intact. Conjunctivae are not pale. No scleral icterus.  ENT: Mucous membranes are moist. Oropharynx is clear and symmetric.    Neck: Full ROM. No lymphadenopathy. JVD.  Cardiovascular: Irregular irregular. No murmurs, rubs, or gallops. Distal pulses are 2+ and symmetric.  Pulmonary/Chest: No respiratory distress. Clear to auscultation bilaterally. No wheezing or rales.  Abdominal: Somewhat  "distended. Soft. There is no tenderness.  No rebound, guarding, or rigidity. Good bowel sounds.  Genitourinary: No CVA tenderness  Musculoskeletal: Moves all extremities. No obvious deformities. No calf tenderness. 2+ pitting edema bilaterally.   Skin: Warm and dry.  Neurological:  Alert, awake, and appropriate.  Normal speech.  No acute focal neurological deficits are appreciated.  Psychiatric: Normal affect. Good eye contact. Appropriate in content.     ED Course   Procedures  ED Vital Signs:  Vitals:    03/13/19 0419 03/13/19 0445 03/13/19 0521 03/13/19 0545   BP: (!) 164/108 (!) 152/96  (!) 147/88   Pulse: 88 93 96 93   Resp: 20 18  15   Temp: 99.2 °F (37.3 °C)   98.1 °F (36.7 °C)   TempSrc: Oral   Oral   SpO2: (!) 92% 98%  96%   Weight:       Height: 5' 4" (1.626 m)       03/13/19 0623 03/13/19 0627 03/13/19 0650 03/13/19 0749   BP:   (!) 132/101 (!) 141/92   Pulse: 89  96 101   Resp: 19 14 18   Temp:       TempSrc:       SpO2: 100%  98% 95%   Weight:  83.6 kg (184 lb 3.2 oz)     Height:           Abnormal Lab Results:  Labs Reviewed   CBC W/ AUTO DIFFERENTIAL - Abnormal; Notable for the following components:       Result Value    RBC 4.06 (*)     Hemoglobin 11.5 (*)     Hematocrit 37.7 (*)     MCHC 30.5 (*)     RDW 18.5 (*)     Lymph # 0.7 (*)     Mono # 1.1 (*)     Gran% 78.5 (*)     Lymph% 7.1 (*)     All other components within normal limits   COMPREHENSIVE METABOLIC PANEL - Abnormal; Notable for the following components:    Potassium 5.3 (*)     BUN, Bld 36 (*)     Creatinine 1.6 (*)     Albumin 2.8 (*)     Total Bilirubin 1.1 (*)     Alkaline Phosphatase 168 (*)     AST 60 (*)     ALT 59 (*)     eGFR if  50 (*)     eGFR if non  43 (*)     All other components within normal limits   APTT - Abnormal; Notable for the following components:    aPTT 33.3 (*)     All other components within normal limits   B-TYPE NATRIURETIC PEPTIDE - Abnormal; Notable for the following " components:    BNP 1,064 (*)     All other components within normal limits   PROTIME-INR   LACTIC ACID, PLASMA   PHOSPHORUS   MAGNESIUM   TROPONIN I   URINALYSIS, REFLEX TO URINE CULTURE    Narrative:     Preferred Collection Type->Urine, Clean Catch   LIPASE        All Lab Results:  Results for orders placed or performed during the hospital encounter of 03/13/19   CBC auto differential   Result Value Ref Range    WBC 9.54 3.90 - 12.70 K/uL    RBC 4.06 (L) 4.60 - 6.20 M/uL    Hemoglobin 11.5 (L) 14.0 - 18.0 g/dL    Hematocrit 37.7 (L) 40.0 - 54.0 %    MCV 93 82 - 98 fL    MCH 28.3 27.0 - 31.0 pg    MCHC 30.5 (L) 32.0 - 36.0 g/dL    RDW 18.5 (H) 11.5 - 14.5 %    Platelets 164 150 - 350 K/uL    MPV 11.5 9.2 - 12.9 fL    Gran # (ANC) 7.5 1.8 - 7.7 K/uL    Lymph # 0.7 (L) 1.0 - 4.8 K/uL    Mono # 1.1 (H) 0.3 - 1.0 K/uL    Eos # 0.3 0.0 - 0.5 K/uL    Baso # 0.02 0.00 - 0.20 K/uL    Gran% 78.5 (H) 38.0 - 73.0 %    Lymph% 7.1 (L) 18.0 - 48.0 %    Mono% 11.4 4.0 - 15.0 %    Eosinophil% 2.8 0.0 - 8.0 %    Basophil% 0.2 0.0 - 1.9 %    Differential Method Automated    Comprehensive metabolic panel   Result Value Ref Range    Sodium 136 136 - 145 mmol/L    Potassium 5.3 (H) 3.5 - 5.1 mmol/L    Chloride 99 95 - 110 mmol/L    CO2 26 23 - 29 mmol/L    Glucose 84 70 - 110 mg/dL    BUN, Bld 36 (H) 8 - 23 mg/dL    Creatinine 1.6 (H) 0.5 - 1.4 mg/dL    Calcium 9.0 8.7 - 10.5 mg/dL    Total Protein 6.1 6.0 - 8.4 g/dL    Albumin 2.8 (L) 3.5 - 5.2 g/dL    Total Bilirubin 1.1 (H) 0.1 - 1.0 mg/dL    Alkaline Phosphatase 168 (H) 55 - 135 U/L    AST 60 (H) 10 - 40 U/L    ALT 59 (H) 10 - 44 U/L    Anion Gap 11 8 - 16 mmol/L    eGFR if African American 50 (A) >60 mL/min/1.73 m^2    eGFR if non African American 43 (A) >60 mL/min/1.73 m^2   Protime-INR   Result Value Ref Range    Prothrombin Time 12.5 9.0 - 12.5 sec    INR 1.2 0.8 - 1.2   APTT   Result Value Ref Range    aPTT 33.3 (H) 21.0 - 32.0 sec   Brain natriuretic peptide   Result Value  Ref Range    BNP 1,064 (H) 0 - 99 pg/mL   Lactic acid, plasma   Result Value Ref Range    Lactate (Lactic Acid) 1.4 0.5 - 2.2 mmol/L   Phosphorus   Result Value Ref Range    Phosphorus 3.8 2.7 - 4.5 mg/dL   Magnesium   Result Value Ref Range    Magnesium 2.2 1.6 - 2.6 mg/dL   Troponin I   Result Value Ref Range    Troponin I 0.025 0.000 - 0.026 ng/mL   Urinalysis, Reflex to Urine Culture Urine, Clean Catch   Result Value Ref Range    Specimen UA Urine, Clean Catch     Color, UA Yellow Yellow, Straw, [place]    Appearance, UA Clear Clear    pH, UA 7.0 5.0 - 8.0    Specific Gravity, UA 1.010 1.005 - 1.030    Protein, UA Negative Negative    Glucose, UA Negative Negative    Ketones, UA Negative Negative    Bilirubin (UA) Negative Negative    Occult Blood UA Negative Negative    Nitrite, UA Negative Negative    Urobilinogen, UA Negative <2.0 EU/dL    Leukocytes, UA Negative Negative   Lipase   Result Value Ref Range    Lipase 5 4 - 60 U/L         Imaging Results:  Imaging Results          X-Ray Abdomen Flat And Erect (Final result)  Result time 03/13/19 08:06:27    Final result by Moy Schneider MD (03/13/19 08:06:27)                 Impression:      See findings above      Electronically signed by: Moy Schneider MD  Date:    03/13/2019  Time:    08:06             Narrative:    EXAMINATION:  XR ABDOMEN FLAT AND ERECT    CLINICAL HISTORY:  Abdominal distension (gaseous)    TECHNIQUE:  Two view of the abdomen was performed.    COMPARISON:  None    FINDINGS:  Nonobstructive bowel gas pattern.  Study is motion limited.  Moderate constipation within the transverse and left colon.    No obvious free air.  No portal venous gas.    No acute fracture. Moderate degenerative changes lower lumbar spine.  Cardiomegaly.  Lung bases demonstrate mild atelectasis at the lung bases..                               X-Ray Chest 1 View (Final result)  Result time 03/13/19 08:07:42    Final result by Moy Schneider MD (03/13/19 08:07:42)                  Impression:      No acute process seen.      Electronically signed by: Moy Schneider MD  Date:    03/13/2019  Time:    08:07             Narrative:    EXAMINATION:  XR CHEST 1 VIEW    CLINICAL HISTORY:  Shortness of breath    FINDINGS:  Single view of the chest.    Cardiac silhouette is enlarged consistent with cardiomegaly.  Aorta demonstrates atherosclerotic disease.  Mild dependent atelectasis seen at the lung bases.  Calcified nodes are suspected within the hilum which are thought to reflect prior granulomatous disease..  The lungs demonstrate no evidence of active disease.  No evidence of pleural effusion or pneumothorax.  Bones demonstrate degenerative changes.                                 7:43 AM: Per ED physician, pt's CXR results: pulmonary edema.  7:43 AM: Per ED physician, pt's XR abdomen results: NAF.      The EKG was ordered, reviewed, and independently interpreted by the ED provider.  Interpretation time: 5:21  Rate: 96 BPM  Rhythm: atrial fibrillation  Interpretation: Nonspecific intraventricular block. No STEMI.           The Emergency Provider reviewed the vital signs and test results, which are outlined above.     ED Discussion     6:00 AM: Dr. Galdamez transfers care of pt to Dr. Street pending lab/ radiology results.    6:18 AM: Re-evaluated pt. Pt is resting comfortably and is in no acute distress. Pt states he has been taking his 40 mg Lasix PRN. Pt has expiratory wheezing on exam and 2-3+ BLE pitting edema. Pt has put out 700 cc of urine.    6:46 AM: Per previous notes, pt was prescribed Lasix 80mg BID on MW and 40mg daily on Tues, Thurs, Fri, Sat and Sun. Pt was recently admitted at [facility] on 03/02 for COPD exacerbation.    7:41 AM: Reassessed pt at this time. Pt has diuresed over a litre. He is in no respiratory distress. He has no wheezing or crackles. Lungs are clear. Pt's vital signs are stable. D/w pt the importance of compliance with his Lasix and a low-salt diet. Advised  pt to f/u with Cardiology within the next 2 days. Called patient's niece and discussed the above, she will be coming to pick patient up within the hour. Patient is awake, alert, and in NAD. Pt states his condition has improved at this time. Discussed with pt all pertinent ED information and results. Discussed pt dx and plan of tx. Gave pt all f/u and return to the ED instructions. All questions and concerns were addressed at this time. Pt expresses understanding of information and instructions, and is comfortable with plan to discharge. Pt is stable for discharge.    I discussed with patient and/or family/caretaker that evaluation in the ED does not suggest any emergent or life threatening medical conditions requiring immediate intervention beyond what was provided in the ED, and I believe patient is safe for discharge.  Regardless, an unremarkable evaluation in the ED does not preclude the development or presence of a serious of life threatening condition. As such, patient was instructed to return immediately for any worsening or change in current symptoms.    ED Medication(s):  Medications   furosemide injection 40 mg (40 mg Intravenous Given 3/13/19 1652)   nitroGLYCERIN 2% TD oint ointment 1 inch (1 inch Topical (Top) Given 3/13/19 7498)   methylPREDNISolone sodium succinate injection 125 mg (125 mg Intravenous Given 3/13/19 0624)   albuterol-ipratropium 2.5 mg-0.5 mg/3 mL nebulizer solution 3 mL (3 mLs Nebulization Given by Other 3/13/19 0676)   furosemide injection 40 mg (40 mg Intravenous Given 3/13/19 0633)   aspirin tablet 325 mg (325 mg Oral Given 3/13/19 0633)       New Prescriptions    No medications on file       Follow-up Information     [facility] In 2 days.    Specialty:  Cardiology  Why:  Return to the Emergency Room, If symptoms worsen  Contact information:  [address] [facility] [address] [phone]                       Medical Decision Making:   Clinical Tests:    Lab Tests: Reviewed and Ordered  Radiological Study: Reviewed and Ordered  Medical Tests: Reviewed and Ordered             Scribe Attestation:   Scribe #1: I performed the above scribed service and the documentation accurately describes the services I performed. I attest to the accuracy of the note.     Attending:   Physician Attestation Statement for Scribe #1: I, Tristin Galdamez MD, personally performed the services described in this documentation, as scribed by Leeann Araiza, in my presence, and it is both accurate and complete.       Scribe Attestation:   Scribe #2: I performed the above scribed service and the documentation accurately describes the services I performed. I attest to the accuracy of the note.    Attending Attestation:           Physician Attestation for Scribe:    Physician Attestation Statement for Scribe #2: I, Lizeth Street MD, reviewed documentation, as scribed by Aziza Galdamez in my presence, and it is both accurate and complete. I also acknowledge and confirm the content of the note done by Scribe #1.           Clinical Impression       ICD-10-CM ICD-9-CM   1. Acute on chronic diastolic congestive heart failure I50.33 428.33     428.0   2. SOB (shortness of breath) R06.02 786.05   3. Abdominal distension R14.0 787.3   4. Chronic obstructive pulmonary disease, unspecified COPD type J44.9 496   5. Dependence on supplemental oxygen Z99.81 V46.2   6. Noncompliance Z91.19 V15.81   7. Chronic atrial fibrillation I48.2 427.31   8. Chronic anticoagulation Z79.01 V58.61       Disposition:   Disposition: Discharged  Condition: Stable             Lizeth Street MD  03/13/19 0830       Lizeth Street MD  03/13/19 0852

## 2019-03-13 NOTE — ED NOTES
Spoke with patient's niece. States someone will be here to get patient within an hour. Cn informed. MD informed. Will continue to monitor.

## 2019-03-13 NOTE — DISCHARGE INSTRUCTIONS
Please make sure you are taking your lasix as prescribed. You should be taking 2 tablets by mouth (80 mg) on Monday and Wednesday and 1 tablet by mouth (40mg) on Tues, Thurs, Fri-Sunday.    No

## 2019-03-13 NOTE — TELEPHONE ENCOUNTER
Spoke with pt and scheduled appt for tomorrow.      ----- Message from Polo Hanson MD sent at 3/13/2019  9:04 AM CDT -----   Please schedule a ER visit f/u tomorrow.  THX  ----- Message -----  From: Lizeth Street MD  Sent: 3/13/2019   8:54 AM  To: Polo Hanson MD    Patient needs follow up in heart failure clinic. Diuresed in the ER over 2 liters, VSS. Didn't really meet admission criteria b/c he was hemodynamically stable, niece was very angry with me b/c I didn't admit him. She was quite rude.

## 2019-03-13 NOTE — ED NOTES
"Reviewed home meds with patient, he was only able to name a few and states "my niece gives me my medications you would have to ask her the other names"    "

## 2019-03-14 ENCOUNTER — OFFICE VISIT (OUTPATIENT)
Dept: CARDIOLOGY | Facility: CLINIC | Age: 69
End: 2019-03-14
Payer: MEDICARE

## 2019-03-14 VITALS
WEIGHT: 190.94 LBS | HEART RATE: 64 BPM | DIASTOLIC BLOOD PRESSURE: 68 MMHG | BODY MASS INDEX: 32.6 KG/M2 | SYSTOLIC BLOOD PRESSURE: 116 MMHG | HEIGHT: 64 IN

## 2019-03-14 DIAGNOSIS — I50.32 CHRONIC DIASTOLIC CONGESTIVE HEART FAILURE: Primary | ICD-10-CM

## 2019-03-14 DIAGNOSIS — E78.5 DYSLIPIDEMIA: ICD-10-CM

## 2019-03-14 DIAGNOSIS — I10 ESSENTIAL HYPERTENSION: ICD-10-CM

## 2019-03-14 DIAGNOSIS — J44.9 COPD, SEVERE: ICD-10-CM

## 2019-03-14 DIAGNOSIS — I48.20 CHRONIC ATRIAL FIBRILLATION: ICD-10-CM

## 2019-03-14 PROCEDURE — 99215 PR OFFICE/OUTPT VISIT, EST, LEVL V, 40-54 MIN: ICD-10-PCS | Mod: S$PBB,,, | Performed by: INTERNAL MEDICINE

## 2019-03-14 PROCEDURE — 99999 PR PBB SHADOW E&M-EST. PATIENT-LVL III: ICD-10-PCS | Mod: PBBFAC,,, | Performed by: INTERNAL MEDICINE

## 2019-03-14 PROCEDURE — 99213 OFFICE O/P EST LOW 20 MIN: CPT | Mod: PBBFAC,PN | Performed by: INTERNAL MEDICINE

## 2019-03-14 PROCEDURE — 99215 OFFICE O/P EST HI 40 MIN: CPT | Mod: S$PBB,,, | Performed by: INTERNAL MEDICINE

## 2019-03-14 PROCEDURE — 99999 PR PBB SHADOW E&M-EST. PATIENT-LVL III: CPT | Mod: PBBFAC,,, | Performed by: INTERNAL MEDICINE

## 2019-03-14 RX ORDER — FUROSEMIDE 40 MG/1
40 TABLET ORAL 2 TIMES DAILY
Qty: 60 TABLET | Refills: 5 | OUTPATIENT
Start: 2019-03-14 | End: 2019-04-18

## 2019-03-14 RX ORDER — LUBIPROSTONE 24 UG/1
CAPSULE ORAL
COMMUNITY
Start: 2019-03-01 | End: 2019-03-19

## 2019-03-14 RX ORDER — LUBIPROSTONE 24 UG/1
CAPSULE, GELATIN COATED ORAL
COMMUNITY
Start: 2019-03-01 | End: 2019-10-03

## 2019-03-14 NOTE — PROGRESS NOTES
Subjective:   Patient ID:  Rea Vail is a 68 y.o. male who presents for follow up of Shortness of Breath; Hospital Follow Up; Leg Pain; and body swelling      69 yo male, came in for discharge f/u. F/u at CHF clinic  PMH CHFpEF, chronic AFIB, COPD, on home o2, GUMARO. HTN and CKD III. H/o CVA in .  ECHO done in  normal BIV function, PRISCILLA and pAP 50 mmHg.  Pt went to ER yesterday for worsening SOB. Breathing did not help the breathing. CXR showed large heart and BNP elevated to 1100 and Cr 1.7 pt gained > 10 pounds in the past 3 months. Improved dyspne after lasix ivp in ER  No orthopnea, and PND.   Has wheezing.  Recent GI f/u for intermittent LGI bleeding  Goes to [facility] daily for exercise.  EKG showed Afib and LBBB, new compared to prior EKG in   Prepared meals and sometime salt crackers.                  Past Medical History:   Diagnosis Date    Anemia     Asthma     Atrial fibrillation     CHF (congestive heart failure)     COPD (chronic obstructive pulmonary disease)     Diverticular disease     Gout     Hyperlipidemia     Hypertension     GUMARO (obstructive sleep apnea) 2018    Other and unspecified hyperlipidemia     Stroke        History reviewed. No pertinent surgical history.    Social History     Tobacco Use    Smoking status: Former Smoker     Types: Cigarettes     Last attempt to quit: 2006     Years since quittin.6    Smokeless tobacco: Never Used   Substance Use Topics    Alcohol use: No    Drug use: No       Family History   Problem Relation Age of Onset    Stroke Cousin          Review of Systems   Constitution: Negative for decreased appetite, diaphoresis, fever, weakness, malaise/fatigue and night sweats.   HENT: Negative for nosebleeds.    Eyes: Negative for blurred vision and double vision.   Cardiovascular: Positive for dyspnea on exertion and leg swelling. Negative for chest pain, claudication, irregular heartbeat, near-syncope,  orthopnea, palpitations, paroxysmal nocturnal dyspnea and syncope.   Respiratory: Negative for cough, shortness of breath, sleep disturbances due to breathing, snoring, sputum production and wheezing.    Endocrine: Negative for cold intolerance and polyuria.   Hematologic/Lymphatic: Does not bruise/bleed easily.   Skin: Negative for rash.   Musculoskeletal: Negative for back pain, falls, joint pain, joint swelling and neck pain.   Gastrointestinal: Negative for abdominal pain, heartburn, nausea and vomiting.   Genitourinary: Negative for dysuria, frequency and hematuria.   Neurological: Negative for difficulty with concentration, dizziness, focal weakness, headaches, light-headedness, numbness and seizures.   Psychiatric/Behavioral: Negative for depression, memory loss and substance abuse. The patient does not have insomnia.    Allergic/Immunologic: Negative for HIV exposure and hives.       Objective:   Physical Exam   Constitutional: He is oriented to person, place, and time. He appears well-nourished.   HENT:   Head: Normocephalic.   Eyes: Pupils are equal, round, and reactive to light.   Neck: Normal carotid pulses and no JVD present. Carotid bruit is not present. No thyromegaly present.   Cardiovascular: Normal rate, regular rhythm, normal heart sounds and normal pulses.  No extrasystoles are present. PMI is not displaced. Exam reveals no gallop and no S3.   No murmur heard.  Pulmonary/Chest: No stridor. No respiratory distress. He has decreased breath sounds.   Abdominal: Soft. Bowel sounds are normal. There is no tenderness. There is no rebound.   Musculoskeletal: Normal range of motion. He exhibits edema.   1+ edema   Neurological: He is alert and oriented to person, place, and time.   Skin: Skin is intact. No rash noted.   Psychiatric: His behavior is normal.       Lab Results   Component Value Date    CHOL 149 03/21/2018    CHOL 133 02/08/2018    CHOL 177 01/15/2015     Lab Results   Component Value Date     HDL 35 (L) 03/21/2018    HDL 40 02/08/2018    HDL 34 (L) 01/15/2015     Lab Results   Component Value Date    LDLCALC 91.0 03/21/2018    LDLCALC 79.4 02/08/2018    LDLCALC 98.8 01/15/2015     Lab Results   Component Value Date    TRIG 115 03/21/2018    TRIG 68 02/08/2018    TRIG 221 (H) 01/15/2015     Lab Results   Component Value Date    CHOLHDL 23.5 03/21/2018    CHOLHDL 30.1 02/08/2018    CHOLHDL 19.2 (L) 01/15/2015       Chemistry        Component Value Date/Time     03/13/2019 0503    K 5.3 (H) 03/13/2019 0503    CL 99 03/13/2019 0503    CO2 26 03/13/2019 0503    BUN 36 (H) 03/13/2019 0503    CREATININE 1.6 (H) 03/13/2019 0503    GLU 84 03/13/2019 0503        Component Value Date/Time    CALCIUM 9.0 03/13/2019 0503    ALKPHOS 168 (H) 03/13/2019 0503    AST 60 (H) 03/13/2019 0503    ALT 59 (H) 03/13/2019 0503    BILITOT 1.1 (H) 03/13/2019 0503    ESTGFRAFRICA 50 (A) 03/13/2019 0503    EGFRNONAA 43 (A) 03/13/2019 0503          Lab Results   Component Value Date    HGBA1C 5.7 (H) 10/04/2018     Lab Results   Component Value Date    TSH 1.350 02/07/2018     Lab Results   Component Value Date    INR 1.2 03/13/2019    INR 1.1 03/21/2018    INR 1.0 02/12/2018     Lab Results   Component Value Date    WBC 9.54 03/13/2019    HGB 11.5 (L) 03/13/2019    HCT 37.7 (L) 03/13/2019    MCV 93 03/13/2019     03/13/2019     BMP  Sodium   Date Value Ref Range Status   03/13/2019 136 136 - 145 mmol/L Final     Potassium   Date Value Ref Range Status   03/13/2019 5.3 (H) 3.5 - 5.1 mmol/L Final     Chloride   Date Value Ref Range Status   03/13/2019 99 95 - 110 mmol/L Final     CO2   Date Value Ref Range Status   03/13/2019 26 23 - 29 mmol/L Final     BUN, Bld   Date Value Ref Range Status   03/13/2019 36 (H) 8 - 23 mg/dL Final     Creatinine   Date Value Ref Range Status   03/13/2019 1.6 (H) 0.5 - 1.4 mg/dL Final     Calcium   Date Value Ref Range Status   03/13/2019 9.0 8.7 - 10.5 mg/dL Final     Anion Gap   Date  Value Ref Range Status   03/13/2019 11 8 - 16 mmol/L Final     eGFR if    Date Value Ref Range Status   03/13/2019 50 (A) >60 mL/min/1.73 m^2 Final     eGFR if non    Date Value Ref Range Status   03/13/2019 43 (A) >60 mL/min/1.73 m^2 Final     Comment:     Calculation used to obtain the estimated glomerular filtration  rate (eGFR) is the CKD-EPI equation.        BNP  @LABRCNTIP(BNP,BNPTRIAGEBLO)@  @LABRCNTIP(troponini)@  Estimated Creatinine Clearance: 42.7 mL/min (A) (based on SCr of 1.6 mg/dL (H)).  No results found in the last 24 hours.  No results found in the last 24 hours.  No results found in the last 24 hours.    Assessment:      1. Chronic diastolic congestive heart failure    2. Chronic atrial fibrillation    3. Essential hypertension    4. Dyslipidemia    5. COPD, severe      CHFpEF exacerbation due ti diet indiscretion  Recent GI workup for LGI bleeding HGB stable  COPD on home O2 as needed    Plan:   Ok to take Lasix 80 mg in AM and 40 mg in PM for 3 days and then down to 40 mg bid.  Check BMP, CBC and BNP in 5 days.  Ok ot hold ASA and continue eliquis until GI work done  Keep Na< 2 gm and fluid restriction to 50 oz a day  F/u in 1 week

## 2019-03-14 NOTE — PATIENT INSTRUCTIONS
Ok to take Lasix 80 mg in AM and 40 mg in PM for 3 days and then down to 40 mg bid.  Check BMP, CBC and BNP in 5 days.  Ok ot hold ASA and continue eliquis until GI work done  Keep Na< 2 gm and fluid restriction to 50 oz a day

## 2019-03-18 ENCOUNTER — TELEPHONE (OUTPATIENT)
Dept: GASTROENTEROLOGY | Facility: CLINIC | Age: 69
End: 2019-03-18

## 2019-03-18 NOTE — TELEPHONE ENCOUNTER
----- Message from Luz Elena King sent at 3/18/2019 11:18 AM CDT -----  Contact: Stephenie([phone])  Type:  Patient Returning Call    Who Called:Stephenie  Who Left Message for Patient:nurse  Does the patient know what this is regarding?:no  Would the patient rather a call back or a response via "[employer]? Call back  Best Call Back Number:[phone]  Additional Information:

## 2019-03-19 ENCOUNTER — OFFICE VISIT (OUTPATIENT)
Dept: CARDIOLOGY | Facility: CLINIC | Age: 69
End: 2019-03-19
Payer: MEDICARE

## 2019-03-19 ENCOUNTER — TELEPHONE (OUTPATIENT)
Dept: GASTROENTEROLOGY | Facility: CLINIC | Age: 69
End: 2019-03-19

## 2019-03-19 ENCOUNTER — LAB VISIT (OUTPATIENT)
Dept: LAB | Facility: HOSPITAL | Age: 69
End: 2019-03-19
Attending: INTERNAL MEDICINE
Payer: MEDICARE

## 2019-03-19 VITALS
HEART RATE: 98 BPM | OXYGEN SATURATION: 89 % | SYSTOLIC BLOOD PRESSURE: 112 MMHG | WEIGHT: 193.31 LBS | DIASTOLIC BLOOD PRESSURE: 62 MMHG | BODY MASS INDEX: 33 KG/M2 | HEIGHT: 64 IN

## 2019-03-19 DIAGNOSIS — E78.5 DYSLIPIDEMIA: ICD-10-CM

## 2019-03-19 DIAGNOSIS — J44.9 COPD, SEVERE: ICD-10-CM

## 2019-03-19 DIAGNOSIS — I48.19 PERSISTENT ATRIAL FIBRILLATION: ICD-10-CM

## 2019-03-19 DIAGNOSIS — I50.33 ACUTE ON CHRONIC DIASTOLIC CONGESTIVE HEART FAILURE: Primary | ICD-10-CM

## 2019-03-19 DIAGNOSIS — I10 ESSENTIAL HYPERTENSION: ICD-10-CM

## 2019-03-19 DIAGNOSIS — I50.32 CHRONIC DIASTOLIC CONGESTIVE HEART FAILURE: ICD-10-CM

## 2019-03-19 LAB
ANION GAP SERPL CALC-SCNC: 9 MMOL/L
BASOPHILS # BLD AUTO: 0.03 K/UL
BASOPHILS NFR BLD: 0.4 %
BNP SERPL-MCNC: 907 PG/ML
BUN SERPL-MCNC: 48 MG/DL
CALCIUM SERPL-MCNC: 9.6 MG/DL
CHLORIDE SERPL-SCNC: 103 MMOL/L
CO2 SERPL-SCNC: 32 MMOL/L
CREAT SERPL-MCNC: 1.5 MG/DL
DIFFERENTIAL METHOD: ABNORMAL
EOSINOPHIL # BLD AUTO: 0.2 K/UL
EOSINOPHIL NFR BLD: 2.7 %
ERYTHROCYTE [DISTWIDTH] IN BLOOD BY AUTOMATED COUNT: 18 %
EST. GFR  (AFRICAN AMERICAN): 53.8 ML/MIN/1.73 M^2
EST. GFR  (NON AFRICAN AMERICAN): 46.5 ML/MIN/1.73 M^2
GLUCOSE SERPL-MCNC: 100 MG/DL
HCT VFR BLD AUTO: 37.4 %
HGB BLD-MCNC: 11.2 G/DL
IMM GRANULOCYTES # BLD AUTO: 0.01 K/UL
IMM GRANULOCYTES NFR BLD AUTO: 0.1 %
LYMPHOCYTES # BLD AUTO: 0.7 K/UL
LYMPHOCYTES NFR BLD: 8.8 %
MCH RBC QN AUTO: 28.4 PG
MCHC RBC AUTO-ENTMCNC: 29.9 G/DL
MCV RBC AUTO: 95 FL
MONOCYTES # BLD AUTO: 0.7 K/UL
MONOCYTES NFR BLD: 8.2 %
NEUTROPHILS # BLD AUTO: 6.3 K/UL
NEUTROPHILS NFR BLD: 79.8 %
NRBC BLD-RTO: 0 /100 WBC
PLATELET # BLD AUTO: 193 K/UL
PMV BLD AUTO: 12.7 FL
POTASSIUM SERPL-SCNC: 4.5 MMOL/L
RBC # BLD AUTO: 3.94 M/UL
SODIUM SERPL-SCNC: 144 MMOL/L
WBC # BLD AUTO: 7.91 K/UL

## 2019-03-19 PROCEDURE — 99215 OFFICE O/P EST HI 40 MIN: CPT | Mod: S$PBB,,, | Performed by: INTERNAL MEDICINE

## 2019-03-19 PROCEDURE — 80048 BASIC METABOLIC PNL TOTAL CA: CPT

## 2019-03-19 PROCEDURE — 83880 ASSAY OF NATRIURETIC PEPTIDE: CPT

## 2019-03-19 PROCEDURE — 99999 PR PBB SHADOW E&M-EST. PATIENT-LVL III: ICD-10-PCS | Mod: PBBFAC,,, | Performed by: INTERNAL MEDICINE

## 2019-03-19 PROCEDURE — 99999 PR PBB SHADOW E&M-EST. PATIENT-LVL III: CPT | Mod: PBBFAC,,, | Performed by: INTERNAL MEDICINE

## 2019-03-19 PROCEDURE — 99215 PR OFFICE/OUTPT VISIT, EST, LEVL V, 40-54 MIN: ICD-10-PCS | Mod: S$PBB,,, | Performed by: INTERNAL MEDICINE

## 2019-03-19 PROCEDURE — 99213 OFFICE O/P EST LOW 20 MIN: CPT | Mod: PBBFAC,PN | Performed by: INTERNAL MEDICINE

## 2019-03-19 PROCEDURE — 36415 COLL VENOUS BLD VENIPUNCTURE: CPT

## 2019-03-19 PROCEDURE — 85025 COMPLETE CBC W/AUTO DIFF WBC: CPT

## 2019-03-19 NOTE — PROGRESS NOTES
Subjective:   Patient ID:  Rea Vail is a 68 y.o. male who presents for follow up of Shortness of Breath; Dizziness; Angioedema (legs); and Fatigue      67 yo male, came in for one week f/u.  PMH CHFpEF, chronic AFIB, COPD, on home o2, GUMARO. HTN and CKD III. H/o CVA in .  ECHO done in  normal BIV function, PRISCILLA and pAP 50 mmHg.  Pt went to ER yesterday for worsening SOB. Breathing did not help the breathing. CXR showed large heart and BNP elevated to 1100 and Cr 1.7 pt gained > 10 pounds in the past 3 months. Improved dyspne after lasix ivp in ER  No orthopnea, and PND.   Has wheezing.  Recent GI f/u for intermittent LGI bleeding  EKG showed Afib and LBBB, persistent   Prepared meals and sometime salt crackers.  Saw him one week ago for CHF exacerbation and lasix was increased to 80 mg in Am and 40 mg in PM for 5 days and back to 40 mg bid.  Today, in the office, SaO2 87% on RA when he was talking. Sleepy. Gained 3 pounds compared to one week ago. Lab pending                Past Medical History:   Diagnosis Date    Anemia     Asthma     Atrial fibrillation     CHF (congestive heart failure)     COPD (chronic obstructive pulmonary disease)     Diverticular disease     Gout     Hyperlipidemia     Hypertension     GUMARO (obstructive sleep apnea) 2018    Other and unspecified hyperlipidemia     Stroke        No past surgical history on file.    Social History     Tobacco Use    Smoking status: Former Smoker     Types: Cigarettes     Last attempt to quit: 2006     Years since quittin.7    Smokeless tobacco: Never Used   Substance Use Topics    Alcohol use: No    Drug use: No       Family History   Problem Relation Age of Onset    Stroke Cousin          Review of Systems   Constitution: Positive for malaise/fatigue. Negative for decreased appetite, diaphoresis, fever, weakness and night sweats.   HENT: Negative for nosebleeds.    Eyes: Negative for blurred vision and  double vision.   Cardiovascular: Positive for dyspnea on exertion and leg swelling. Negative for chest pain, claudication, irregular heartbeat, near-syncope, orthopnea, palpitations, paroxysmal nocturnal dyspnea and syncope.   Respiratory: Negative for cough, shortness of breath, sleep disturbances due to breathing, snoring, sputum production and wheezing.    Endocrine: Negative for cold intolerance and polyuria.   Hematologic/Lymphatic: Does not bruise/bleed easily.   Skin: Negative for rash.   Musculoskeletal: Negative for back pain, falls, joint pain, joint swelling and neck pain.   Gastrointestinal: Negative for abdominal pain, heartburn, nausea and vomiting.   Genitourinary: Negative for dysuria, frequency and hematuria.   Neurological: Negative for difficulty with concentration, dizziness, focal weakness, headaches, light-headedness, numbness and seizures.   Psychiatric/Behavioral: Negative for depression, memory loss and substance abuse. The patient does not have insomnia.    Allergic/Immunologic: Negative for HIV exposure and hives.       Objective:   Physical Exam   Constitutional: He is oriented to person, place, and time. He appears well-nourished.   HENT:   Head: Normocephalic.   Eyes: Pupils are equal, round, and reactive to light.   Neck: Normal carotid pulses and no JVD present. Carotid bruit is not present. No thyromegaly present.   Cardiovascular: Normal rate, regular rhythm, normal heart sounds and normal pulses.  No extrasystoles are present. PMI is not displaced. Exam reveals no gallop and no S3.   No murmur heard.  Pulmonary/Chest: No stridor. No respiratory distress. He has decreased breath sounds.   Abdominal: Soft. Bowel sounds are normal. There is no tenderness. There is no rebound.   Musculoskeletal: Normal range of motion. He exhibits edema.   2+ edema   Neurological: He is alert and oriented to person, place, and time.   Skin: Skin is intact. No rash noted.   Psychiatric: His behavior is  normal.       Lab Results   Component Value Date    CHOL 149 03/21/2018    CHOL 133 02/08/2018    CHOL 177 01/15/2015     Lab Results   Component Value Date    HDL 35 (L) 03/21/2018    HDL 40 02/08/2018    HDL 34 (L) 01/15/2015     Lab Results   Component Value Date    LDLCALC 91.0 03/21/2018    LDLCALC 79.4 02/08/2018    LDLCALC 98.8 01/15/2015     Lab Results   Component Value Date    TRIG 115 03/21/2018    TRIG 68 02/08/2018    TRIG 221 (H) 01/15/2015     Lab Results   Component Value Date    CHOLHDL 23.5 03/21/2018    CHOLHDL 30.1 02/08/2018    CHOLHDL 19.2 (L) 01/15/2015       Chemistry        Component Value Date/Time     03/13/2019 0503    K 5.3 (H) 03/13/2019 0503    CL 99 03/13/2019 0503    CO2 26 03/13/2019 0503    BUN 36 (H) 03/13/2019 0503    CREATININE 1.6 (H) 03/13/2019 0503    GLU 84 03/13/2019 0503        Component Value Date/Time    CALCIUM 9.0 03/13/2019 0503    ALKPHOS 168 (H) 03/13/2019 0503    AST 60 (H) 03/13/2019 0503    ALT 59 (H) 03/13/2019 0503    BILITOT 1.1 (H) 03/13/2019 0503    ESTGFRAFRICA 50 (A) 03/13/2019 0503    EGFRNONAA 43 (A) 03/13/2019 0503          Lab Results   Component Value Date    HGBA1C 5.7 (H) 10/04/2018     Lab Results   Component Value Date    TSH 1.350 02/07/2018     Lab Results   Component Value Date    INR 1.2 03/13/2019    INR 1.1 03/21/2018    INR 1.0 02/12/2018     Lab Results   Component Value Date    WBC 9.54 03/13/2019    HGB 11.5 (L) 03/13/2019    HCT 37.7 (L) 03/13/2019    MCV 93 03/13/2019     03/13/2019     BMP  Sodium   Date Value Ref Range Status   03/13/2019 136 136 - 145 mmol/L Final     Potassium   Date Value Ref Range Status   03/13/2019 5.3 (H) 3.5 - 5.1 mmol/L Final     Chloride   Date Value Ref Range Status   03/13/2019 99 95 - 110 mmol/L Final     CO2   Date Value Ref Range Status   03/13/2019 26 23 - 29 mmol/L Final     BUN, Bld   Date Value Ref Range Status   03/13/2019 36 (H) 8 - 23 mg/dL Final     Creatinine   Date Value Ref  Range Status   03/13/2019 1.6 (H) 0.5 - 1.4 mg/dL Final     Calcium   Date Value Ref Range Status   03/13/2019 9.0 8.7 - 10.5 mg/dL Final     Anion Gap   Date Value Ref Range Status   03/13/2019 11 8 - 16 mmol/L Final     eGFR if    Date Value Ref Range Status   03/13/2019 50 (A) >60 mL/min/1.73 m^2 Final     eGFR if non    Date Value Ref Range Status   03/13/2019 43 (A) >60 mL/min/1.73 m^2 Final     Comment:     Calculation used to obtain the estimated glomerular filtration  rate (eGFR) is the CKD-EPI equation.        BNP  @LABRCNTIP(BNP,BNPTRIAGEBLO)@  @LABRCNTIP(troponini)@  Estimated Creatinine Clearance: 42.9 mL/min (A) (based on SCr of 1.6 mg/dL (H)).  No results found in the last 24 hours.  No results found in the last 24 hours.  No results found in the last 24 hours.    Assessment:      1. Acute on chronic diastolic congestive heart failure    2. Persistent atrial fibrillation    3. Essential hypertension    4. Dyslipidemia    5. COPD, severe      Fluid overloaded  Hypoxic, and lethargic    Plan:   Advise to go to ER for further eval and IV Lasix  Pt and family prefer to go to [facility].   F/u after discharge

## 2019-03-19 NOTE — TELEPHONE ENCOUNTER
----- Message from Taylor Crowder sent at 3/19/2019  3:28 PM CDT -----  Contact: Stephenie quezada  Calling to reschedule patient appointment. Please call Stephenie @ [phone]. Thanks, arianna

## 2019-03-20 ENCOUNTER — TELEPHONE (OUTPATIENT)
Dept: CARDIOLOGY | Facility: CLINIC | Age: 69
End: 2019-03-20

## 2019-03-20 NOTE — TELEPHONE ENCOUNTER
Spoke with caller who found her keys underneath her car seat.     ----- Message from Caitlyn Schulz sent at 3/20/2019 10:37 AM CDT -----  Contact: pilar/Angela Vail  States she brought her uncle yesterday and she had to bring him to the ER. She thinks she may have left her keys in the room, its has a white sub key chain on it for the computer. I tried to reach the  and no on answered. Please call Angela Vail at [phone]. Thank you

## 2019-04-18 ENCOUNTER — LAB VISIT (OUTPATIENT)
Dept: LAB | Facility: HOSPITAL | Age: 69
End: 2019-04-18
Attending: INTERNAL MEDICINE
Payer: MEDICARE

## 2019-04-18 ENCOUNTER — OFFICE VISIT (OUTPATIENT)
Dept: CARDIOLOGY | Facility: CLINIC | Age: 69
End: 2019-04-18
Payer: MEDICARE

## 2019-04-18 VITALS
HEART RATE: 80 BPM | WEIGHT: 156.06 LBS | SYSTOLIC BLOOD PRESSURE: 98 MMHG | HEIGHT: 64 IN | BODY MASS INDEX: 26.64 KG/M2 | DIASTOLIC BLOOD PRESSURE: 62 MMHG

## 2019-04-18 DIAGNOSIS — I50.33 ACUTE ON CHRONIC DIASTOLIC CONGESTIVE HEART FAILURE: ICD-10-CM

## 2019-04-18 DIAGNOSIS — I27.20 PULMONARY HYPERTENSION: ICD-10-CM

## 2019-04-18 DIAGNOSIS — G47.33 OSA (OBSTRUCTIVE SLEEP APNEA): ICD-10-CM

## 2019-04-18 DIAGNOSIS — I63.9 CEREBROVASCULAR ACCIDENT (CVA), UNSPECIFIED MECHANISM: ICD-10-CM

## 2019-04-18 DIAGNOSIS — I50.32 CHRONIC DIASTOLIC CONGESTIVE HEART FAILURE: Primary | ICD-10-CM

## 2019-04-18 DIAGNOSIS — I48.19 PERSISTENT ATRIAL FIBRILLATION: ICD-10-CM

## 2019-04-18 DIAGNOSIS — J96.12 CHRONIC RESPIRATORY FAILURE WITH HYPOXIA AND HYPERCAPNIA: ICD-10-CM

## 2019-04-18 DIAGNOSIS — J44.9 CHRONIC OBSTRUCTIVE PULMONARY DISEASE, UNSPECIFIED COPD TYPE: ICD-10-CM

## 2019-04-18 DIAGNOSIS — E78.5 HYPERLIPIDEMIA, UNSPECIFIED HYPERLIPIDEMIA TYPE: ICD-10-CM

## 2019-04-18 DIAGNOSIS — I10 ESSENTIAL HYPERTENSION: ICD-10-CM

## 2019-04-18 DIAGNOSIS — J96.11 CHRONIC RESPIRATORY FAILURE WITH HYPOXIA AND HYPERCAPNIA: ICD-10-CM

## 2019-04-18 DIAGNOSIS — J44.9 COPD, SEVERE: ICD-10-CM

## 2019-04-18 LAB — BNP SERPL-MCNC: 556 PG/ML (ref 0–99)

## 2019-04-18 PROCEDURE — 99213 OFFICE O/P EST LOW 20 MIN: CPT | Mod: PBBFAC | Performed by: INTERNAL MEDICINE

## 2019-04-18 PROCEDURE — 83880 ASSAY OF NATRIURETIC PEPTIDE: CPT

## 2019-04-18 PROCEDURE — 99999 PR PBB SHADOW E&M-EST. PATIENT-LVL III: ICD-10-PCS | Mod: PBBFAC,,, | Performed by: INTERNAL MEDICINE

## 2019-04-18 PROCEDURE — 99214 OFFICE O/P EST MOD 30 MIN: CPT | Mod: S$PBB,,, | Performed by: INTERNAL MEDICINE

## 2019-04-18 PROCEDURE — 99214 PR OFFICE/OUTPT VISIT, EST, LEVL IV, 30-39 MIN: ICD-10-PCS | Mod: S$PBB,,, | Performed by: INTERNAL MEDICINE

## 2019-04-18 PROCEDURE — 99999 PR PBB SHADOW E&M-EST. PATIENT-LVL III: CPT | Mod: PBBFAC,,, | Performed by: INTERNAL MEDICINE

## 2019-04-18 PROCEDURE — 36415 COLL VENOUS BLD VENIPUNCTURE: CPT

## 2019-04-18 RX ORDER — MINOCYCLINE HYDROCHLORIDE 100 MG/1
CAPSULE ORAL
Refills: 0 | COMMUNITY
Start: 2019-04-09 | End: 2019-04-18 | Stop reason: ALTCHOICE

## 2019-04-18 RX ORDER — SPIRONOLACTONE 25 MG/1
25 TABLET ORAL EVERY OTHER DAY
COMMUNITY
Start: 2019-03-27 | End: 2020-12-16

## 2019-04-18 RX ORDER — BISOPROLOL FUMARATE 10 MG/1
TABLET, FILM COATED ORAL
COMMUNITY
Start: 2019-04-10 | End: 2021-02-10 | Stop reason: ALTCHOICE

## 2019-04-18 RX ORDER — PREDNISONE 10 MG/1
10 TABLET ORAL
COMMUNITY
Start: 2019-04-09 | End: 2019-04-18 | Stop reason: ALTCHOICE

## 2019-04-18 RX ORDER — BUMETANIDE 1 MG/1
TABLET ORAL
COMMUNITY
Start: 2019-03-26 | End: 2021-09-30

## 2019-04-18 RX ORDER — METOLAZONE 5 MG/1
5 TABLET ORAL
COMMUNITY
Start: 2019-04-09 | End: 2019-10-03

## 2019-04-18 RX ORDER — METOPROLOL SUCCINATE 25 MG/1
TABLET, EXTENDED RELEASE ORAL
COMMUNITY
Start: 2019-03-27 | End: 2019-04-18

## 2019-04-18 RX ORDER — RISPERIDONE 0.25 MG/1
TABLET ORAL
COMMUNITY
Start: 2019-03-26 | End: 2020-01-09

## 2019-04-18 NOTE — PROGRESS NOTES
Subjective:   Patient ID:  Rea Vail is a 68 y.o. male who presents for follow up of Itching (left side)      HPI  67 yo male with diastolic chf respiratory failure since he saw DR PATHAK IN MID MARCH 2 TRIPS TO THE ER AND FINALLY AN ADMIT AT END OF MARCH WITH WITH INTUBATION FROM [facility]UM[facility]IA [facility]. REVIEWED RECORDS AND TALKED TO HIS NIECE BY PHONE TO GET HISTORY AND SYMPTOMS AS WELL AS RECONCILE HIS MED LIST. HE IS FEELING WELL HIS DIURETIC REGIMEN NOW IS BUMEX 1 MG PO BID ALDACTONE 25 MG PO DAILY METOLAZONE 5 MG ONCE WEEKLY. HE HAS NO NEW COMPLAINTS OF SHORTNESS OF BREATH ORTHOPNEA LEG SWELLING. HE SEEMS COMPLIANT WITH MEDS AND SALT. HAS  BEEN HAVING PHYSICAL THERAPY HE COMPLETED ANTIBIOTICS AND COURSE OF STEROIDS. NO WHEEZING COUGH FEVER CHILLS HAS NO OTHER ISSUES CLINICALLY. HE HAS NO SYNCOPE NEAR SYNCOPE. HE IS OFF ASA FOR LOWER GI BLEED HE IS ON ELIQUIS HE WILL FOLLOW WITH GI TO [facility] ON [address].  Past Medical History:   Diagnosis Date    Anemia     Asthma     Atrial fibrillation     CHF (congestive heart failure)     COPD (chronic obstructive pulmonary disease)     Diverticular disease     Gout     Hyperlipidemia     Hypertension     GUMARO (obstructive sleep apnea) 2018    Other and unspecified hyperlipidemia     Stroke        History reviewed. No pertinent surgical history.    Social History     Tobacco Use    Smoking status: Former Smoker     Types: Cigarettes     Last attempt to quit: 2006     Years since quittin.7    Smokeless tobacco: Never Used   Substance Use Topics    Alcohol use: No    Drug use: No       Family History   Problem Relation Age of Onset    Stroke Cousin        Current Outpatient Medications   Medication Sig    albuterol (PROVENTIL) 2.5 mg /3 mL (0.083 %) nebulizer solution Take 3 mLs (2.5 mg total) by nebulization every 6 (six) hours as needed for Wheezing. Rescue    allopurinol (ZYLOPRIM) 300 MG tablet Take 300 mg by mouth once  daily.    AMITIZA 24 mcg Cap     aspirin 81 MG Chew Take 1 tablet (81 mg total) by mouth once daily.    ATROVENT HFA 17 mcg/actuation inhaler     baclofen (LIORESAL) 10 MG tablet     bisoprolol (ZEBETA) 10 MG tablet bisoprolol fumarate 10 mg tablet    budesonide-formoterol 160-4.5 mcg (SYMBICORT) 160-4.5 mcg/actuation HFAA Inhale 2 puffs into the lungs 2 (two) times daily.    bumetanide (BUMEX) 1 MG tablet bumetanide 1 mg tablet    cholecalciferol, vitamin D3, (VITAMIN D3) 2,000 unit Cap 1 capsule once daily.     ELIQUIS 5 mg Tab TAKE 1 TABLET BY MOUTH TWICE DAILY    ferrous sulfate 324 mg (65 mg iron) TbEC Take 65 mg by mouth.    gabapentin (NEURONTIN) 300 MG capsule Take 1 capsule (300 mg total) by mouth 2 (two) times daily. 1 capsule Oral Three times a day (Patient taking differently: Take 400 mg by mouth 3 (three) times daily. 1 capsule Oral Three times a day)    hydrocodone-acetaminophen 10-325mg (NORCO)  mg Tab Take 1 tablet by mouth every 8 (eight) hours as needed for Pain. 1 tablet Oral Twice a day    irbesartan (AVAPRO) 150 MG tablet Take 150 mg by mouth once daily.     meloxicam (MOBIC) 7.5 MG tablet Take 7.5 mg by mouth.    metOLazone (ZAROXOLYN) 5 MG tablet Take 5 mg by mouth.    metoprolol succinate (TOPROL-XL) 25 MG 24 hr tablet metoprolol succinate ER 25 mg tablet,extended release 24 hr    minocycline (MINOCIN,DYNACIN) 100 MG capsule     multivitamin capsule Take 1 capsule by mouth once daily.    OXYGEN-AIR DELIVERY SYSTEMS MISC by Misc.(Non-Drug; Combo Route) route.    pantoprazole (PROTONIX) 40 MG tablet Take 40 mg by mouth once daily.    polyethylene glycol (GLYCOLAX) 17 gram/dose powder polyethylene glycol 3350 17 gram/dose oral powder    potassium chloride SA (K-DUR,KLOR-CON) 20 MEQ tablet Take 1 tablet (20 mEq total) by mouth once daily. (Patient taking differently: Take 20 mEq by mouth 2 (two) times daily. )    predniSONE (DELTASONE) 10 MG tablet Take 10 mg by  mouth.    risperiDONE (RISPERDAL) 0.25 MG Tab risperidone 0.25 mg tablet    simvastatin (ZOCOR) 40 MG tablet Take 1 tablet (40 mg total) by mouth every evening. 1 tablet Oral Every day    spironolactone (ALDACTONE) 25 MG tablet spironolactone 25 mg tablet    tiotropium (SPIRIVA) 18 mcg inhalation capsule Inhale 1 capsule (18 mcg total) into the lungs once daily.     No current facility-administered medications for this visit.      Current Outpatient Medications on File Prior to Visit   Medication Sig    albuterol (PROVENTIL) 2.5 mg /3 mL (0.083 %) nebulizer solution Take 3 mLs (2.5 mg total) by nebulization every 6 (six) hours as needed for Wheezing. Rescue    allopurinol (ZYLOPRIM) 300 MG tablet Take 300 mg by mouth once daily.    AMITIZA 24 mcg Cap     aspirin 81 MG Chew Take 1 tablet (81 mg total) by mouth once daily.    ATROVENT HFA 17 mcg/actuation inhaler     baclofen (LIORESAL) 10 MG tablet     bisoprolol (ZEBETA) 10 MG tablet bisoprolol fumarate 10 mg tablet    budesonide-formoterol 160-4.5 mcg (SYMBICORT) 160-4.5 mcg/actuation HFAA Inhale 2 puffs into the lungs 2 (two) times daily.    bumetanide (BUMEX) 1 MG tablet bumetanide 1 mg tablet    cholecalciferol, vitamin D3, (VITAMIN D3) 2,000 unit Cap 1 capsule once daily.     ELIQUIS 5 mg Tab TAKE 1 TABLET BY MOUTH TWICE DAILY    ferrous sulfate 324 mg (65 mg iron) TbEC Take 65 mg by mouth.    gabapentin (NEURONTIN) 300 MG capsule Take 1 capsule (300 mg total) by mouth 2 (two) times daily. 1 capsule Oral Three times a day (Patient taking differently: Take 400 mg by mouth 3 (three) times daily. 1 capsule Oral Three times a day)    hydrocodone-acetaminophen 10-325mg (NORCO)  mg Tab Take 1 tablet by mouth every 8 (eight) hours as needed for Pain. 1 tablet Oral Twice a day    irbesartan (AVAPRO) 150 MG tablet Take 150 mg by mouth once daily.     meloxicam (MOBIC) 7.5 MG tablet Take 7.5 mg by mouth.    metOLazone (ZAROXOLYN) 5 MG tablet  Take 5 mg by mouth.    metoprolol succinate (TOPROL-XL) 25 MG 24 hr tablet metoprolol succinate ER 25 mg tablet,extended release 24 hr    minocycline (MINOCIN,DYNACIN) 100 MG capsule     multivitamin capsule Take 1 capsule by mouth once daily.    OXYGEN-AIR DELIVERY SYSTEMS MISC by [facility].(Non-Drug; Combo Route) route.    pantoprazole (PROTONIX) 40 MG tablet Take 40 mg by mouth once daily.    polyethylene glycol (GLYCOLAX) 17 gram/dose powder polyethylene glycol 3350 17 gram/dose oral powder    potassium chloride SA (K-DUR,KLOR-CON) 20 MEQ tablet Take 1 tablet (20 mEq total) by mouth once daily. (Patient taking differently: Take 20 mEq by mouth 2 (two) times daily. )    predniSONE (DELTASONE) 10 MG tablet Take 10 mg by mouth.    risperiDONE (RISPERDAL) 0.25 MG Tab risperidone 0.25 mg tablet    simvastatin (ZOCOR) 40 MG tablet Take 1 tablet (40 mg total) by mouth every evening. 1 tablet Oral Every day    spironolactone (ALDACTONE) 25 MG tablet spironolactone 25 mg tablet    tiotropium (SPIRIVA) 18 mcg inhalation capsule Inhale 1 capsule (18 mcg total) into the lungs once daily.    [DISCONTINUED] albuterol (PROVENTIL/VENTOLIN HFA) 90 mcg/actuation inhaler Inhale 2 puffs into the lungs every 6 (six) hours as needed for Wheezing. Rescue    [DISCONTINUED] ALBUTEROL INHL Inhale into the lungs.    [DISCONTINUED] furosemide (LASIX) 40 MG tablet Take 1 tablet (40 mg total) by mouth 2 (two) times daily. Take 2 tablets (80 mg total) BID on Mondays and wednesdays. Take 40mg every other days.    [DISCONTINUED] nebivolol (BYSTOLIC) 10 MG Tab 10 mg 2 (two) times daily.     [DISCONTINUED] PROCTOFOAM HC rectal foam      No current facility-administered medications on file prior to visit.      Review of patient's allergies indicates:   Allergen Reactions    Cephalexin Anaphylaxis    Ferumoxytol Anaphylaxis     Review of Systems   Constitution: Negative for malaise/fatigue.   Eyes: Negative for blurred vision.    Cardiovascular: Negative for chest pain, claudication, cyanosis, dyspnea on exertion, irregular heartbeat, leg swelling, near-syncope, orthopnea, palpitations and paroxysmal nocturnal dyspnea.   Respiratory: Positive for shortness of breath. Negative for cough and hemoptysis.    Hematologic/Lymphatic: Negative for bleeding problem. Does not bruise/bleed easily.   Skin: Negative for dry skin and itching.   Musculoskeletal: Negative for falls, muscle weakness and myalgias.   Gastrointestinal: Negative for abdominal pain, diarrhea, heartburn, hematemesis, hematochezia and melena.   Genitourinary: Negative for flank pain and hematuria.   Neurological: Negative for dizziness, focal weakness, headaches, light-headedness, numbness, paresthesias, seizures and weakness.   Psychiatric/Behavioral: Negative for altered mental status and memory loss. The patient is not nervous/anxious.    Allergic/Immunologic: Negative for hives.       Objective:   Physical Exam   Constitutional: He is oriented to person, place, and time. He appears well-developed and well-nourished. No distress.   HENT:   Head: Normocephalic and atraumatic.   Eyes: Pupils are equal, round, and reactive to light. EOM are normal. Right eye exhibits no discharge. Left eye exhibits no discharge.   Neck: Neck supple. No JVD present. No thyromegaly present.   Cardiovascular: Normal rate, regular rhythm, normal heart sounds and intact distal pulses. Exam reveals no gallop and no friction rub.   No murmur heard.  Pulses:       Carotid pulses are 2+ on the right side, and 2+ on the left side.       Radial pulses are 2+ on the right side, and 2+ on the left side.        Femoral pulses are 2+ on the right side, and 2+ on the left side.       Popliteal pulses are 2+ on the right side, and 2+ on the left side.        Dorsalis pedis pulses are 2+ on the right side, and 2+ on the left side.        Posterior tibial pulses are 2+ on the right side, and 2+ on the left side.  "  Pulmonary/Chest: Effort normal and breath sounds normal. No respiratory distress. He has no wheezes. He has no rales. He exhibits no tenderness.   Abdominal: Soft. Bowel sounds are normal. He exhibits no distension. There is no tenderness.   Musculoskeletal: Normal range of motion. He exhibits no edema.   Neurological: He is alert and oriented to person, place, and time. No cranial nerve deficit.   LEFT FACIAL DROOP(   Skin: Skin is warm and dry. No rash noted. He is not diaphoretic. No erythema.   Psychiatric: He has a normal mood and affect. His behavior is normal.   Nursing note and vitals reviewed.    Vitals:    04/18/19 0901   BP: 98/62   BP Location: Left arm   Patient Position: Sitting   BP Method: Medium (Manual)   Pulse: 80   Weight: 70.8 kg (156 lb 1.4 oz)   Height: 5' 4" (1.626 m)     Lab Results   Component Value Date    CHOL 149 03/21/2018    CHOL 133 02/08/2018    CHOL 177 01/15/2015     Lab Results   Component Value Date    HDL 35 (L) 03/21/2018    HDL 40 02/08/2018    HDL 34 (L) 01/15/2015     Lab Results   Component Value Date    LDLCALC 91.0 03/21/2018    LDLCALC 79.4 02/08/2018    LDLCALC 98.8 01/15/2015     Lab Results   Component Value Date    TRIG 115 03/21/2018    TRIG 68 02/08/2018    TRIG 221 (H) 01/15/2015     Lab Results   Component Value Date    CHOLHDL 23.5 03/21/2018    CHOLHDL 30.1 02/08/2018    CHOLHDL 19.2 (L) 01/15/2015       Chemistry        Component Value Date/Time     03/19/2019 1000    K 4.5 03/19/2019 1000     03/19/2019 1000    CO2 32 (H) 03/19/2019 1000    BUN 48 (H) 03/19/2019 1000    CREATININE 1.5 (H) 03/19/2019 1000     03/19/2019 1000        Component Value Date/Time    CALCIUM 9.6 03/19/2019 1000    ALKPHOS 168 (H) 03/13/2019 0503    AST 60 (H) 03/13/2019 0503    ALT 59 (H) 03/13/2019 0503    BILITOT 1.1 (H) 03/13/2019 0503    ESTGFRAFRICA 53.8 (A) 03/19/2019 1000    EGFRNONAA 46.5 (A) 03/19/2019 1000          Lab Results   Component Value Date    " TSH 1.350 02/07/2018     Lab Results   Component Value Date    INR 1.2 03/13/2019    INR 1.1 03/21/2018    INR 1.0 02/12/2018     Lab Results   Component Value Date    WBC 7.91 03/19/2019    HGB 11.2 (L) 03/19/2019    HCT 37.4 (L) 03/19/2019    MCV 95 03/19/2019     03/19/2019     BMP  Sodium   Date Value Ref Range Status   03/19/2019 144 136 - 145 mmol/L Final     Potassium   Date Value Ref Range Status   03/19/2019 4.5 3.5 - 5.1 mmol/L Final     Chloride   Date Value Ref Range Status   03/19/2019 103 95 - 110 mmol/L Final     CO2   Date Value Ref Range Status   03/19/2019 32 (H) 23 - 29 mmol/L Final     BUN, Bld   Date Value Ref Range Status   03/19/2019 48 (H) 8 - 23 mg/dL Final     Creatinine   Date Value Ref Range Status   03/19/2019 1.5 (H) 0.5 - 1.4 mg/dL Final     Calcium   Date Value Ref Range Status   03/19/2019 9.6 8.7 - 10.5 mg/dL Final     Anion Gap   Date Value Ref Range Status   03/19/2019 9 8 - 16 mmol/L Final     eGFR if    Date Value Ref Range Status   03/19/2019 53.8 (A) >60 mL/min/1.73 m^2 Final     eGFR if non    Date Value Ref Range Status   03/19/2019 46.5 (A) >60 mL/min/1.73 m^2 Final     Comment:     Calculation used to obtain the estimated glomerular filtration  rate (eGFR) is the CKD-EPI equation.        CrCl cannot be calculated (Patient's most recent lab result is older than the maximum 7 days allowed.).    Assessment:     1. Chronic diastolic congestive heart failure    2. Cerebrovascular accident (CVA), unspecified mechanism    3. Essential hypertension    4. Hyperlipidemia, unspecified hyperlipidemia type    5. Chronic respiratory failure with hypoxia and hypercapnia    6. Persistent atrial fibrillation    7. COPD, severe    8. GUMARO (obstructive sleep apnea)    9. Pulmonary hypertension    10. Acute on chronic diastolic congestive heart failure    11. Chronic obstructive pulmonary disease, unspecified COPD type      APPEARS WELL COMPENSATED ON  CURRENT REGIMEN.HE NEEDS TO STAY COMPLIANT DISCUSSED PLAN WITH SHIMON.   WILL GET CLOSE FOLLOW UP WITH CHF CLINIC AND DR PATHAK  Plan:   Continue current therapy  Cardiac low salt diet.  Risk factor modification and excercise program.  F/u in 2 WEEKS WITH DR PATHAK WITH BNP BMP.

## 2019-05-06 ENCOUNTER — TELEPHONE (OUTPATIENT)
Dept: PULMONOLOGY | Facility: CLINIC | Age: 69
End: 2019-05-06

## 2019-05-06 NOTE — TELEPHONE ENCOUNTER
----- Message from Jone Gunderson sent at 5/6/2019 12:08 PM CDT -----  Contact: pt Daughter Stephenie  Pt is calling Staff regarding the appt schedule for 5/10 19. Pt call back [phone]    Thanks

## 2019-05-06 NOTE — TELEPHONE ENCOUNTER
----- Message from Jone Gunderson sent at 5/6/2019 12:08 PM CDT -----  Contact: pt Daughter Stephenie  Pt is calling Staff regarding the appt schedule for 5/10 19. Pt call back [phone]    Thanks

## 2019-05-09 ENCOUNTER — TELEPHONE (OUTPATIENT)
Dept: GASTROENTEROLOGY | Facility: CLINIC | Age: 69
End: 2019-05-09

## 2019-05-10 ENCOUNTER — CLINICAL SUPPORT (OUTPATIENT)
Dept: GASTROENTEROLOGY | Facility: CLINIC | Age: 69
End: 2019-05-10
Payer: MEDICARE

## 2019-05-10 DIAGNOSIS — D64.9 ANEMIA, UNSPECIFIED TYPE: ICD-10-CM

## 2019-05-10 NOTE — PROGRESS NOTES
Patient here for a capsule endoscopy. 2 patient identifiers verified. Asked patient how they were feeling and how their procedure prep went. Patient reports, it was good. Reviewed procedure with patient. Patient verbalized understanding. Patient swallowed pill camera at 7:52am.    Reviewed diet and times patient can intake. Written instruction also given to patient. Informed patient the return time is 3:52pm.Patient verbalized understanding.

## 2019-05-15 ENCOUNTER — TELEPHONE (OUTPATIENT)
Dept: PULMONOLOGY | Facility: CLINIC | Age: 69
End: 2019-05-15

## 2019-05-16 ENCOUNTER — OFFICE VISIT (OUTPATIENT)
Dept: PULMONOLOGY | Facility: CLINIC | Age: 69
End: 2019-05-16
Payer: MEDICARE

## 2019-05-16 ENCOUNTER — CLINICAL SUPPORT (OUTPATIENT)
Dept: PULMONOLOGY | Facility: CLINIC | Age: 69
End: 2019-05-16
Payer: MEDICARE

## 2019-05-16 VITALS
DIASTOLIC BLOOD PRESSURE: 58 MMHG | OXYGEN SATURATION: 96 % | WEIGHT: 157.88 LBS | SYSTOLIC BLOOD PRESSURE: 102 MMHG | HEIGHT: 64 IN | WEIGHT: 157.88 LBS | HEART RATE: 77 BPM | BODY MASS INDEX: 26.95 KG/M2 | BODY MASS INDEX: 26.95 KG/M2 | HEIGHT: 64 IN

## 2019-05-16 DIAGNOSIS — G47.33 OSA (OBSTRUCTIVE SLEEP APNEA): ICD-10-CM

## 2019-05-16 DIAGNOSIS — J96.12 CHRONIC RESPIRATORY FAILURE WITH HYPOXIA AND HYPERCAPNIA: Primary | ICD-10-CM

## 2019-05-16 DIAGNOSIS — I27.20 PULMONARY HYPERTENSION: ICD-10-CM

## 2019-05-16 DIAGNOSIS — J18.9 PNEUMONIA DUE TO INFECTIOUS ORGANISM, UNSPECIFIED LATERALITY, UNSPECIFIED PART OF LUNG: ICD-10-CM

## 2019-05-16 DIAGNOSIS — J44.9 COPD, SEVERE: ICD-10-CM

## 2019-05-16 DIAGNOSIS — R09.02 EXERCISE HYPOXEMIA: ICD-10-CM

## 2019-05-16 DIAGNOSIS — J96.11 CHRONIC RESPIRATORY FAILURE WITH HYPOXIA AND HYPERCAPNIA: Primary | ICD-10-CM

## 2019-05-16 PROCEDURE — 99214 OFFICE O/P EST MOD 30 MIN: CPT | Mod: 25,S$PBB,, | Performed by: NURSE PRACTITIONER

## 2019-05-16 PROCEDURE — 99999 PR PBB SHADOW E&M-EST. PATIENT-LVL V: ICD-10-PCS | Mod: PBBFAC,,, | Performed by: NURSE PRACTITIONER

## 2019-05-16 PROCEDURE — 99999 PR PBB SHADOW E&M-EST. PATIENT-LVL V: CPT | Mod: PBBFAC,,, | Performed by: NURSE PRACTITIONER

## 2019-05-16 PROCEDURE — 94618 PULMONARY STRESS TESTING: ICD-10-PCS | Mod: 26,S$PBB,, | Performed by: INTERNAL MEDICINE

## 2019-05-16 PROCEDURE — 99214 PR OFFICE/OUTPT VISIT, EST, LEVL IV, 30-39 MIN: ICD-10-PCS | Mod: 25,S$PBB,, | Performed by: NURSE PRACTITIONER

## 2019-05-16 PROCEDURE — 94618 PULMONARY STRESS TESTING: CPT | Mod: PBBFAC,PN

## 2019-05-16 PROCEDURE — 94618 PULMONARY STRESS TESTING: CPT | Mod: 26,S$PBB,, | Performed by: INTERNAL MEDICINE

## 2019-05-16 PROCEDURE — 99215 OFFICE O/P EST HI 40 MIN: CPT | Mod: PBBFAC,25 | Performed by: NURSE PRACTITIONER

## 2019-05-16 NOTE — PROGRESS NOTES
"Subjective:      Patient ID: Rea Vail is a 68 y.o. male.    Chief Complaint: COPD and Sleep Apnea    HPI   Two recent hospital admissions for CHF exacerbation, COPD  3/19/19- ICU for fluid overload.   Chronic atrial fibrillation (HCC)    Acute on chronic diastolic (congestive) heart failure (HCC)    Acute on chronic respiratory failure with hypoxia and hypercapnia (HCC)    Panlobular emphysema (HCC)    COPD, severe (HCC)    Acute on chronic diastolic congestive heart failure (HCC)    Second admission 3/31/19  4/1 cxr with possible pneumonia/ 4/3 cxr with improvement.     Doing much better with change in dieuritc- Bumex.   Breathing improved.   Compliant with Symbicort and Spiriva.  He wears BiPAP.  This is followed by [place] [facility].  AHI 5.6 events per hour on download.          BP (!) 102/58   Pulse 77   Ht 5' 4" (1.626 m)   Wt 71.6 kg (157 lb 13.6 oz)   SpO2 96%   BMI 27.09 kg/m²   Body mass index is 27.09 kg/m².    Review of Systems   Constitutional: Negative.    HENT: Negative.    Respiratory: Negative.    Cardiovascular: Negative.    Musculoskeletal: Negative.    Gastrointestinal: Negative.    Neurological: Negative.    Psychiatric/Behavioral: Negative.      Objective:      Physical Exam   Constitutional: He is oriented to person, place, and time. He appears well-developed and well-nourished.   HENT:   Head: Normocephalic and atraumatic.   Nose: Nose normal.   Mouth/Throat: Uvula is midline and oropharynx is clear and moist.   Neck: Trachea normal and normal range of motion. Neck supple. No thyroid mass and no thyromegaly present.   Cardiovascular: Normal rate, regular rhythm and normal heart sounds.   Pulmonary/Chest: Effort normal and breath sounds normal. He has no wheezes. He has no rhonchi. He has no rales. Chest wall is not dull to percussion.   Barrel chest   Abdominal: Soft. He exhibits no mass. There is no hepatosplenomegaly or splenomegaly. There is no tenderness.   Musculoskeletal: " Normal range of motion. He exhibits no edema.   Neurological: He is alert and oriented to person, place, and time.   Skin: Skin is warm and dry.   Psychiatric: He has a normal mood and affect.     Personal Diagnostic Review  4/3/19  [facility] cxr  EXAM: XR CHEST 1 VIEW    INDICATION: infiltrates    FINDINGS:  Compared to 4/1/2019, there is improvement in the moderate right and mild left lung airspace opacities consistent with pneumonia. Small pleural effusions are also likely present. Cardiomegaly persists.   Other Result Information   Interface, Rad Results In - 04/03/2019  8:30 AM CDT  EXAM: XR CHEST 1 VIEW    INDICATION: infiltrates    FINDINGS:  Compared to 4/1/2019, there is improvement in the moderate right and mild left lung airspace opacities consistent with pneumonia. Small pleural effusions are also likely present. Cardiomegaly persists.             6 min walk:  78% of predicted distance.  Normal.  Lowest oxygen level on room air 94%.  Normal blood pressure readings.    Assessment:       1. Chronic respiratory failure with hypoxia and hypercapnia    2. COPD, severe    3. GUMARO (obstructive sleep apnea)    4. Pneumonia due to infectious organism, unspecified laterality, unspecified part of lung        Outpatient Encounter Medications as of 5/16/2019   Medication Sig Dispense Refill    albuterol (PROVENTIL) 2.5 mg /3 mL (0.083 %) nebulizer solution Take 3 mLs (2.5 mg total) by nebulization every 6 (six) hours as needed for Wheezing. Rescue 360 mL 11    allopurinol (ZYLOPRIM) 300 MG tablet Take 300 mg by mouth once daily.      AMITIZA 24 mcg Cap       baclofen (LIORESAL) 10 MG tablet       bisoprolol (ZEBETA) 10 MG tablet bisoprolol fumarate 10 mg tablet      budesonide-formoterol 160-4.5 mcg (SYMBICORT) 160-4.5 mcg/actuation HFAA Inhale 2 puffs into the lungs 2 (two) times daily. 3 Inhaler 3    bumetanide (BUMEX) 1 MG tablet bumetanide 1 mg tablet      cholecalciferol, vitamin D3, (VITAMIN D3) 2,000 unit  Cap 1 capsule once daily.       ELIQUIS 5 mg Tab TAKE 1 TABLET BY MOUTH TWICE DAILY 60 tablet 6    gabapentin (NEURONTIN) 300 MG capsule Take 1 capsule (300 mg total) by mouth 2 (two) times daily. 1 capsule Oral Three times a day (Patient taking differently: Take 400 mg by mouth 3 (three) times daily. 1 capsule Oral Three times a day) 60 capsule 5    irbesartan (AVAPRO) 150 MG tablet Take 75 mg by mouth once daily.       multivitamin (THERA) tablet Take 1 tablet by mouth.      multivitamin capsule Take 1 capsule by mouth once daily.      OXYGEN-AIR DELIVERY SYSTEMS MISC by [facility].(Non-Drug; Combo Route) route.      pantoprazole (PROTONIX) 40 MG tablet Take 40 mg by mouth once daily.      polysaccharide iron complex (EZFE 200) 200 mg iron Cap EZFE 200 200 mg iron capsule   One po q day      potassium chloride SA (K-DUR,KLOR-CON) 20 MEQ tablet Take 1 tablet (20 mEq total) by mouth once daily. (Patient taking differently: Take 20 mEq by mouth 2 (two) times daily. ) 30 tablet 3    risperiDONE (RISPERDAL) 0.25 MG Tab risperidone 0.25 mg tablet      simvastatin (ZOCOR) 40 MG tablet Take 1 tablet (40 mg total) by mouth every evening. 1 tablet Oral Every day 90 tablet 4    spironolactone (ALDACTONE) 25 MG tablet spironolactone 25 mg tablet      tiotropium (SPIRIVA) 18 mcg inhalation capsule Inhale 1 capsule (18 mcg total) into the lungs once daily. 90 capsule 3    aspirin 81 MG Chew Take 1 tablet (81 mg total) by mouth once daily.  0    ATROVENT HFA 17 mcg/actuation inhaler       ferrous sulfate 324 mg (65 mg iron) TbEC Take 65 mg by mouth.      hydrocodone-acetaminophen 10-325mg (NORCO)  mg Tab Take 1 tablet by mouth every 8 (eight) hours as needed for Pain. 1 tablet Oral Twice a day 8 tablet 0    metOLazone (ZAROXOLYN) 5 MG tablet Take 5 mg by mouth.      polyethylene glycol (GLYCOLAX) 17 gram/dose powder polyethylene glycol 3350 17 gram/dose oral powder       No facility-administered encounter  medications on file as of 5/16/2019.      Orders Placed This Encounter   Procedures    X-Ray Chest PA And Lateral     Standing Status:   Future     Standing Expiration Date:   5/16/2020     Order Specific Question:   May the Radiologist modify the order per protocol to meet the clinical needs of the patient?     Answer:   Yes    Spirometry without Bronchodilator     Standing Status:   Future     Standing Expiration Date:   5/16/2020     Plan:        Problem List Items Addressed This Visit        Pulmonary    Chronic respiratory failure with hypoxia and hypercapnia - Primary    Overview     MULTIFACTORIAL: CVA, CHF, SEVERE COPD, CHRONIC NARCOTIC USE           Relevant Orders    Spirometry without Bronchodilator    COPD, severe    Overview     Overview:   Last Assessment & Plan:   COPD   Plan: SYMBICORT, PROVENTIL, PROVENTIL HFA.   Oxygen         Relevant Orders    X-Ray Chest PA And Lateral    Spirometry without Bronchodilator       Other    GUMARO (obstructive sleep apnea)    Overview     [facility]IPAP [facility]         Relevant Orders    Spirometry without Bronchodilator      Other Visit Diagnoses     Pneumonia due to infectious organism, unspecified laterality, unspecified part of lung        Relevant Orders    X-Ray Chest PA And Lateral       Normal 6 min walk today.  Close observation of weights.  He is at his dry weight today is 157 lb.  Continue pulmonary medication.  Follow-up in 4 months with spirometry  Chest x-ray today to review for resolution of pneumonia

## 2019-05-16 NOTE — PROCEDURES
"[facility] - Pulmonary Function [facility]  Six Minute Walk     SUMMARY     Ordering Provider:    Interpreting Provider: Dr. Ziegler  Performing nurse/tech/RT: Marilyn RRT  Diagnosis: Pulmonary Hypertension  Height: 5' 4" (162.6 cm)  Weight: 71.6 kg (157 lb 13.6 oz)  BMI (Calculated): 27.2   Patient Race:             Phase Oxygen Assessment Supplemental O2 Heart   Rate Blood Pressure Srinath Dyspnea Scale Rating   Resting 95 %   91 bpm 92/55 0   Exercise        Minute        1 94 % Room Air 95 bpm     2 94 % Room Air 104 bpm     3 94 % Room Air 128 bpm     4 96 % Room Air 124 bpm     5 97 % Room Air 131 bpm     6  97 %   110 bpm 110/60 2   Recovery        Minute        1 98 % Room Air 98 bpm     2 97 % Room Air 98 bpm     3 98 % Room Air 98 bpm     4 98 % Room Air 88 bpm 102/58 1     Six Minute Walk Summary  6MWT Status: completed without stopping  Patient Reported: No complaints     Interpretation:  Did the patient stop or pause?: No                                         Total Time Walked (Calculated): 360 seconds  Final Partial Lap Distance (feet): 150 feet  Total Distance Meters (Calculated): 350.52 meters  Predicted Distance Meters (Calculated): 444.47 meters  Percentage of Predicted (Calculated): 78.86  Peak VO2 (Calculated): 14.5  Mets: 4.14  Has The Patient Had a Previous Six Minute Walk Test?: Yes       Previous 6MWT Results  Has The Patient Had a Previous Six Minute Walk Test?: Yes  Date of Previous Test: 04/27/18  Total Time Walked: 360 seconds  Total Distance (meters): 224.94  Predicted Distance (meters): 505 meters  Percent Change (Calculated): -0.56        Interpretation:  Total distance walked in six minutes is mildly reduced indicating a reduction in overall  functional capacity. There was no  exercise induced hypoxemia with significant oxygen desaturation.    Oxygen desaturation did not meet criteria for supplemental oxygen prescription.    Clinical correlation suggestedAnson Rojas " MD Toney    Mild exercise-induced hypoxemia described as an arterial oxygen saturation of 93-95% (or 3-4% less than at rest), moderate exercise-induced hypoxemia as 89-93%, and severe exercise induced hypoxemia as < 89% O2 saturation.  Medicare Criteria Comments:   When arterial oxygen saturation is at or below 88% during exercise (severe exercise induced hypoxemia) then the patient falls under Medicare Group 1 criteria for supplemental oxygen.  Details about Medicare Group Criteria coverage can be found at http://www.cms.hhs.gov/manuals/downloads/

## 2019-05-20 ENCOUNTER — HOSPITAL ENCOUNTER (OUTPATIENT)
Dept: RADIOLOGY | Facility: HOSPITAL | Age: 69
Discharge: HOME | End: 2019-05-20
Attending: NURSE PRACTITIONER
Payer: MEDICARE

## 2019-05-20 DIAGNOSIS — J44.9 COPD, SEVERE: ICD-10-CM

## 2019-05-20 DIAGNOSIS — J18.9 PNEUMONIA DUE TO INFECTIOUS ORGANISM, UNSPECIFIED LATERALITY, UNSPECIFIED PART OF LUNG: ICD-10-CM

## 2019-05-20 PROCEDURE — 71046 X-RAY EXAM CHEST 2 VIEWS: CPT | Mod: TC

## 2019-05-20 PROCEDURE — 71046 X-RAY EXAM CHEST 2 VIEWS: CPT | Mod: 26,,, | Performed by: RADIOLOGY

## 2019-05-20 PROCEDURE — 71046 XR CHEST PA AND LATERAL: ICD-10-PCS | Mod: 26,,, | Performed by: RADIOLOGY

## 2019-05-24 ENCOUNTER — LAB VISIT (OUTPATIENT)
Dept: LAB | Facility: HOSPITAL | Age: 69
End: 2019-05-24
Attending: PSYCHIATRY & NEUROLOGY
Payer: MEDICARE

## 2019-05-24 DIAGNOSIS — M60.80: ICD-10-CM

## 2019-05-24 DIAGNOSIS — I43 NUTRITIONAL AND METABOLIC CARDIOMYOPATHY: ICD-10-CM

## 2019-05-24 DIAGNOSIS — I43: ICD-10-CM

## 2019-05-24 DIAGNOSIS — E83.119: ICD-10-CM

## 2019-05-24 DIAGNOSIS — I63.9 IMPENDING CEREBROVASCULAR ACCIDENT: ICD-10-CM

## 2019-05-24 DIAGNOSIS — E88.9 NUTRITIONAL AND METABOLIC CARDIOMYOPATHY: ICD-10-CM

## 2019-05-24 DIAGNOSIS — E63.9 NUTRITIONAL AND METABOLIC CARDIOMYOPATHY: ICD-10-CM

## 2019-05-24 LAB
25(OH)D3+25(OH)D2 SERPL-MCNC: 42 NG/ML (ref 30–96)
ALBUMIN SERPL BCP-MCNC: 2.8 G/DL (ref 3.5–5.2)
ALP SERPL-CCNC: 95 U/L (ref 55–135)
ALT SERPL W/O P-5'-P-CCNC: 13 U/L (ref 10–44)
ANION GAP SERPL CALC-SCNC: 13 MMOL/L (ref 8–16)
AST SERPL-CCNC: 23 U/L (ref 10–40)
BASOPHILS # BLD AUTO: 0.02 K/UL (ref 0–0.2)
BASOPHILS NFR BLD: 0.3 % (ref 0–1.9)
BILIRUB SERPL-MCNC: 0.2 MG/DL (ref 0.1–1)
BUN SERPL-MCNC: 63 MG/DL (ref 8–23)
CALCIUM SERPL-MCNC: 9.3 MG/DL (ref 8.7–10.5)
CHLORIDE SERPL-SCNC: 97 MMOL/L (ref 95–110)
CO2 SERPL-SCNC: 22 MMOL/L (ref 23–29)
CREAT SERPL-MCNC: 1.9 MG/DL (ref 0.5–1.4)
D DIMER PPP IA.FEU-MCNC: 0.51 MG/L FEU
DIFFERENTIAL METHOD: ABNORMAL
EOSINOPHIL # BLD AUTO: 0.2 K/UL (ref 0–0.5)
EOSINOPHIL NFR BLD: 2.6 % (ref 0–8)
ERYTHROCYTE [DISTWIDTH] IN BLOOD BY AUTOMATED COUNT: 15.1 % (ref 11.5–14.5)
EST. GFR  (AFRICAN AMERICAN): 40.4 ML/MIN/1.73 M^2
EST. GFR  (NON AFRICAN AMERICAN): 34.9 ML/MIN/1.73 M^2
GLUCOSE SERPL-MCNC: 78 MG/DL (ref 70–110)
HCT VFR BLD AUTO: 35.6 % (ref 40–54)
HGB BLD-MCNC: 10.5 G/DL (ref 14–18)
IMM GRANULOCYTES # BLD AUTO: 0.02 K/UL (ref 0–0.04)
IMM GRANULOCYTES NFR BLD AUTO: 0.3 % (ref 0–0.5)
LYMPHOCYTES # BLD AUTO: 1.1 K/UL (ref 1–4.8)
LYMPHOCYTES NFR BLD: 14.5 % (ref 18–48)
MCH RBC QN AUTO: 27.8 PG (ref 27–31)
MCHC RBC AUTO-ENTMCNC: 29.5 G/DL (ref 32–36)
MCV RBC AUTO: 94 FL (ref 82–98)
MONOCYTES # BLD AUTO: 0.6 K/UL (ref 0.3–1)
MONOCYTES NFR BLD: 7.7 % (ref 4–15)
NEUTROPHILS # BLD AUTO: 5.8 K/UL (ref 1.8–7.7)
NEUTROPHILS NFR BLD: 74.6 % (ref 38–73)
NRBC BLD-RTO: 0 /100 WBC
PLATELET # BLD AUTO: 277 K/UL (ref 150–350)
PMV BLD AUTO: 11.7 FL (ref 9.2–12.9)
POTASSIUM SERPL-SCNC: 4.9 MMOL/L (ref 3.5–5.1)
PROT SERPL-MCNC: 6.8 G/DL (ref 6–8.4)
RBC # BLD AUTO: 3.78 M/UL (ref 4.6–6.2)
SODIUM SERPL-SCNC: 132 MMOL/L (ref 136–145)
WBC # BLD AUTO: 7.78 K/UL (ref 3.9–12.7)

## 2019-05-24 PROCEDURE — 82306 VITAMIN D 25 HYDROXY: CPT

## 2019-05-24 PROCEDURE — 80053 COMPREHEN METABOLIC PANEL: CPT

## 2019-05-24 PROCEDURE — 85025 COMPLETE CBC W/AUTO DIFF WBC: CPT

## 2019-05-24 PROCEDURE — 36415 COLL VENOUS BLD VENIPUNCTURE: CPT

## 2019-05-24 PROCEDURE — 85379 FIBRIN DEGRADATION QUANT: CPT

## 2019-06-11 NOTE — [employer] PATIENT INSTRUCTIONS
Discharge Summary/Instructions after an Endoscopic Procedure  Patient Name: Rea Vail  Patient MRN: [id number]  Patient YOB: 1950  Friday, May 10, 2019 Aziza Gates MD  RESTRICTIONS:  During your procedure today, you received medications for sedation.  These   medications may affect your judgment, balance and coordination.  Therefore,   for 24 hours, you have the following restrictions:   - DO NOT drive a car, operate machinery, make legal/financial decisions,   sign important papers or drink alcohol.    ACTIVITY:  Today: no heavy lifting, straining or running due to procedural   sedation/anesthesia.  The following day: return to full activity including work.  DIET:  Eat and drink normally unless instructed otherwise.     TREATMENT FOR COMMON SIDE EFFECTS:  - Mild abdominal pain, nausea, belching, bloating or excessive gas:  rest,   eat lightly and use a heating pad.  - Sore Throat: treat with throat lozenges and/or gargle with warm salt   water.  - Because air was used during the procedure, expelling large amounts of air   from your rectum or belching is normal.  - If a bowel prep was taken, you may not have a bowel movement for 1-3 days.    This is normal.  SYMPTOMS TO WATCH FOR AND REPORT TO YOUR PHYSICIAN:  1. Abdominal pain or bloating, other than gas cramps.  2. Chest pain.  3. Back pain.  4. Signs of infection such as: chills or fever occurring within 24 hours   after the procedure.  5. Rectal bleeding, which would show as bright red, maroon, or black stools.   (A tablespoon of blood from the rectum is not serious, especially if   hemorrhoids are present.)  6. Vomiting.  7. Weakness or dizziness.  GO DIRECTLY TO THE NEAREST EMERGENCY ROOM IF YOU HAVE ANY OF THE FOLLOWING:      Difficulty breathing              Chills and/or fever over 101 F   Persistent vomiting and/or vomiting blood   Severe abdominal pain   Severe chest pain   Black, tarry stools   Bleeding- more than one tablespoon   Any  other symptom or condition that you feel may need urgent attention  Your doctor recommends these additional instructions:  If any biopsies were taken, your doctors clinic will contact you in 1 to 2   weeks with any results.  - Discharge patient to home (ambulatory).   - Per referring provider.  For questions, problems or results please call your physician Aziza Gates MD at Work:  [phone]  If you have any questions about the above instructions, call the GI   department at [phone] or call the endoscopy unit at [phone]   from 7am until 3 pm.  [facility] [place], EMERGENCY ROOM PHONE NUMBER:   [phone]  IF A COMPLICATION OR EMERGENCY SITUATION ARISES AND YOU ARE UNABLE TO REACH   YOUR PHYSICIAN - GO DIRECTLY TO THE EMERGENCY ROOM.  I have read or have had read to me these discharge instructions for my   procedure and have received a written copy.  I understand these   instructions and will follow-up with my physician if I have any questions.     __________________________________       _____________________________________  Nurse Signature                                          Patient/Designated   Responsible Party Signature  MD Aziza Villalba MD  6/11/2019 2:35:16 PM  This report has been verified and signed electronically.  [employer]

## 2019-06-14 ENCOUNTER — TELEPHONE (OUTPATIENT)
Dept: GASTROENTEROLOGY | Facility: CLINIC | Age: 69
End: 2019-06-14

## 2019-10-03 ENCOUNTER — CLINICAL SUPPORT (OUTPATIENT)
Dept: PULMONOLOGY | Facility: CLINIC | Age: 69
End: 2019-10-03
Payer: MEDICARE

## 2019-10-03 ENCOUNTER — OFFICE VISIT (OUTPATIENT)
Dept: PULMONOLOGY | Facility: CLINIC | Age: 69
End: 2019-10-03
Payer: MEDICARE

## 2019-10-03 VITALS
OXYGEN SATURATION: 98 % | RESPIRATION RATE: 18 BRPM | HEART RATE: 90 BPM | BODY MASS INDEX: 30.05 KG/M2 | SYSTOLIC BLOOD PRESSURE: 102 MMHG | HEIGHT: 64 IN | DIASTOLIC BLOOD PRESSURE: 60 MMHG | WEIGHT: 176 LBS

## 2019-10-03 VITALS — HEIGHT: 64 IN | BODY MASS INDEX: 29.66 KG/M2 | WEIGHT: 173.75 LBS

## 2019-10-03 DIAGNOSIS — R09.02 EXERCISE HYPOXEMIA: ICD-10-CM

## 2019-10-03 DIAGNOSIS — J96.12 CHRONIC RESPIRATORY FAILURE WITH HYPOXIA AND HYPERCAPNIA: ICD-10-CM

## 2019-10-03 DIAGNOSIS — J44.89 ASTHMA WITH COPD: Primary | ICD-10-CM

## 2019-10-03 DIAGNOSIS — G47.30 HYPERSOMNIA WITH SLEEP APNEA: ICD-10-CM

## 2019-10-03 DIAGNOSIS — I50.32 CHRONIC DIASTOLIC CONGESTIVE HEART FAILURE: ICD-10-CM

## 2019-10-03 DIAGNOSIS — J44.89 ASTHMA WITH COPD: ICD-10-CM

## 2019-10-03 DIAGNOSIS — G47.33 OSA (OBSTRUCTIVE SLEEP APNEA): ICD-10-CM

## 2019-10-03 DIAGNOSIS — G47.10 HYPERSOMNIA WITH SLEEP APNEA: ICD-10-CM

## 2019-10-03 DIAGNOSIS — J96.11 CHRONIC RESPIRATORY FAILURE WITH HYPOXIA AND HYPERCAPNIA: ICD-10-CM

## 2019-10-03 DIAGNOSIS — R93.89 ABNORMAL CXR: ICD-10-CM

## 2019-10-03 DIAGNOSIS — J44.9 COPD, SEVERE: ICD-10-CM

## 2019-10-03 DIAGNOSIS — I50.9 OTHER CONGESTIVE HEART FAILURE: ICD-10-CM

## 2019-10-03 LAB
BRPFT: ABNORMAL
FEF 25 75 LLN: 0.64
FEF 25 75 PRE REF: 39.4 %
FEF 25 75 REF: 1.8
FEV1 FVC LLN: 64
FEV1 FVC PRE REF: 88.6 %
FEV1 FVC REF: 77
FEV1 LLN: 1.53
FEV1 PRE REF: 65.6 %
FEV1 REF: 2.23
FVC LLN: 2.07
FVC PRE REF: 74 %
FVC REF: 2.88
PEF LLN: 4.36
PEF PRE REF: 78 %
PEF REF: 6.59
PRE FEF 25 75: 0.71 L/S (ref 0.64–2.97)
PRE FET 100: 11.14 SEC
PRE FEV1 FVC: 68.67 % (ref 64.22–90.77)
PRE FEV1: 1.47 L (ref 1.53–2.94)
PRE FVC: 2.13 L (ref 2.07–3.7)
PRE PEF: 5.14 L/S (ref 4.36–8.82)

## 2019-10-03 PROCEDURE — 99215 OFFICE O/P EST HI 40 MIN: CPT | Mod: 25,S$PBB,, | Performed by: INTERNAL MEDICINE

## 2019-10-03 PROCEDURE — 94618 PULMONARY STRESS TESTING: CPT | Mod: PBBFAC

## 2019-10-03 PROCEDURE — 94618 PULMONARY STRESS TESTING: CPT | Mod: 26,S$PBB,, | Performed by: INTERNAL MEDICINE

## 2019-10-03 PROCEDURE — 94010 BREATHING CAPACITY TEST: CPT | Mod: 26,59,S$PBB, | Performed by: INTERNAL MEDICINE

## 2019-10-03 PROCEDURE — 94010 BREATHING CAPACITY TEST: ICD-10-PCS | Mod: 26,59,S$PBB, | Performed by: INTERNAL MEDICINE

## 2019-10-03 PROCEDURE — 99999 PR PBB SHADOW E&M-EST. PATIENT-LVL I: CPT | Mod: PBBFAC,,,

## 2019-10-03 PROCEDURE — 99999 PR PBB SHADOW E&M-EST. PATIENT-LVL V: CPT | Mod: PBBFAC,,, | Performed by: INTERNAL MEDICINE

## 2019-10-03 PROCEDURE — 94010 BREATHING CAPACITY TEST: CPT | Mod: PBBFAC

## 2019-10-03 PROCEDURE — 99999 PR PBB SHADOW E&M-EST. PATIENT-LVL I: ICD-10-PCS | Mod: PBBFAC,,,

## 2019-10-03 PROCEDURE — 99215 OFFICE O/P EST HI 40 MIN: CPT | Mod: PBBFAC | Performed by: INTERNAL MEDICINE

## 2019-10-03 PROCEDURE — 99211 OFF/OP EST MAY X REQ PHY/QHP: CPT | Mod: PBBFAC,27,25

## 2019-10-03 PROCEDURE — 94618 PULMONARY STRESS TESTING: ICD-10-PCS | Mod: 26,S$PBB,, | Performed by: INTERNAL MEDICINE

## 2019-10-03 PROCEDURE — 99215 PR OFFICE/OUTPT VISIT, EST, LEVL V, 40-54 MIN: ICD-10-PCS | Mod: 25,S$PBB,, | Performed by: INTERNAL MEDICINE

## 2019-10-03 PROCEDURE — 99999 PR PBB SHADOW E&M-EST. PATIENT-LVL V: ICD-10-PCS | Mod: PBBFAC,,, | Performed by: INTERNAL MEDICINE

## 2019-10-03 RX ORDER — TIOTROPIUM BROMIDE 18 UG/1
18 CAPSULE ORAL; RESPIRATORY (INHALATION) DAILY
Qty: 90 CAPSULE | Refills: 3 | Status: SHIPPED | OUTPATIENT
Start: 2019-10-03 | End: 2020-10-19

## 2019-10-03 RX ORDER — BUDESONIDE AND FORMOTEROL FUMARATE DIHYDRATE 160; 4.5 UG/1; UG/1
2 AEROSOL RESPIRATORY (INHALATION) 2 TIMES DAILY
Qty: 3 INHALER | Refills: 3 | Status: SHIPPED | OUTPATIENT
Start: 2019-10-03 | End: 2021-01-05 | Stop reason: SDUPTHER

## 2019-10-03 RX ORDER — ALBUTEROL SULFATE 0.83 MG/ML
2.5 SOLUTION RESPIRATORY (INHALATION) EVERY 6 HOURS PRN
Qty: 360 ML | Refills: 11 | Status: SHIPPED | OUTPATIENT
Start: 2019-10-03 | End: 2020-10-15

## 2019-10-03 NOTE — PROGRESS NOTES
Subjective:       Patient ID: Rea Vail is a 69 y.o. male.    Chief Complaint: He       Sleep Apnea and COPD    COPD   Associated symptoms include congestion, coughing and fatigue. Pertinent negatives include no abdominal pain, chest pain, fever, nausea, rash or weakness.        Dyspnea  Patient complains of shortness of breath. Symptoms occur after more than one flight stairs, with more than one block walking. Symptoms began 7 months ago, gradually improving since. Associated symptoms include  dyspnea on exertion, post nasal drip and shortness of breath. He denies chest pain, located left chest. He does not have had recent travel. Weight has been stable. Symptoms are exacerbated by moderate activity. Symptoms are alleviated by rest.         Past Medical History:   Diagnosis Date    Anemia     Asthma     Atrial fibrillation     CHF (congestive heart failure)     COPD (chronic obstructive pulmonary disease)     Diverticular disease     Gout     Hyperlipidemia     Hypertension     GUMARO (obstructive sleep apnea) 2018    Other and unspecified hyperlipidemia     Stroke      History reviewed. No pertinent surgical history.  Social History     Socioeconomic History    Marital status: Single     Spouse name: Not on file    Number of children: Not on file    Years of education: Not on file    Highest education level: Not on file   Occupational History    Not on file   Social Needs    Financial resource strain: Not on file    Food insecurity:     Worry: Not on file     Inability: Not on file    Transportation needs:     Medical: Not on file     Non-medical: Not on file   Tobacco Use    Smoking status: Former Smoker     Types: Cigarettes     Last attempt to quit: 2006     Years since quittin.2    Smokeless tobacco: Never Used   Substance and Sexual Activity    Alcohol use: No    Drug use: No    Sexual activity: Not on file   Lifestyle    Physical activity:     Days per  week: Not on file     Minutes per session: Not on file    Stress: Not on file   Relationships    Social connections:     Talks on phone: Not on file     Gets together: Not on file     Attends [employer] service: Not on file     Active member of club or organization: Not on file     Attends meetings of clubs or organizations: Not on file     Relationship status: Not on file   Other Topics Concern    Not on file   Social History Narrative    Not on file     Review of Systems   Constitutional: Positive for fatigue. Negative for fever.   HENT: Positive for postnasal drip, rhinorrhea and congestion.    Eyes: Negative for redness and itching.   Respiratory: Positive for cough, sputum production, shortness of breath, dyspnea on extertion, use of rescue inhaler and Paroxysmal Nocturnal Dyspnea.    Cardiovascular: Negative for chest pain, palpitations and leg swelling.   Genitourinary: Negative for difficulty urinating and hematuria.   Endocrine: Negative for cold intolerance and heat intolerance.    Skin: Negative for rash.   Gastrointestinal: Negative for nausea and abdominal pain.   Neurological: Negative for dizziness, syncope, weakness and light-headedness.   Hematological: Negative for adenopathy. Does not bruise/bleed easily.   Psychiatric/Behavioral: Negative for sleep disturbance. The patient is not nervous/anxious.        Objective:      Physical Exam   Constitutional: He is oriented to person, place, and time. He appears well-developed and well-nourished.   HENT:   Head: Normocephalic and atraumatic.   Mouth/Throat: Oropharyngeal exudate present.   Eyes: Pupils are equal, round, and reactive to light. Conjunctivae are normal.   Neck: Neck supple. No JVD present. No tracheal deviation present. No thyromegaly present.   Cardiovascular: Normal rate. An irregularly irregular rhythm present. PMI is displaced.   Murmur heard.   Systolic murmur is present with a grade of 2/6.  Pulmonary/Chest: Effort normal. He has  decreased breath sounds. He has wheezes in the right lower field and the left lower field. He has no rhonchi. He has no rales.   Abdominal: Soft. Bowel sounds are normal.   Musculoskeletal: Normal range of motion. He exhibits no edema or tenderness.   Lymphadenopathy:     He has no cervical adenopathy.   Neurological: He is alert and oriented to person, place, and time.   Skin: Skin is warm and dry.   Nursing note and vitals reviewed.    Personal Diagnostic Review  Chest x-ray: stable cardiomegaly      Office Spirometry Results:PFT:  Moderately severe obstruction. FEV1  52.63 %  predicted. (FEV1/VC< LLN and  FEV1> or equal to 50% predicted and < or equal to 59% of predicted).   Airflow is not clearly improved after bronchodilator. Clinical improvement following bronchodilator therapy may occur in the absence of spirometric improvement.   Mild restriction. (TLC< LLN and > or equal to 70% of predicted).   Mild reduction in diffusion capacity -adjusted for hemoglobin (DLCO > or equal to 60% predicted and < LLN) .     This interpretation of diffusing capacity is adjusted for patient's hemoglobin level.   Overall this is a mixed obstructive and restrictive defect.   Overall there is moderately severe ventilatory impairment. (FEV1 > or equal to 50% of predicted and < or equal to 59% of predicted.)      No flowsheet data found.  Pulmonary Studies Review 10/3/2019   SpO2 98   Ordering Provider -   Interpreting Provider -   Performing nurse/tech/RT -   Diagnosis -   Height 64.000   Weight 2816   BMI (Calculated) 30.3   Predicted Distance 324.28   Patient Race -   6MWT Status -   Patient Reported -   Was O2 used? -   Delivery Method -   6MW Distance walked (feet) -   Distance walked (meters) -   Did patient stop? -   Type of assistive device(s) used? -   Is extra documentation required for this patient? -   Oxygen Saturation -   Supplemental Oxygen -   Heart Rate -   Blood Pressure -   Srinath Dyspnea Rating  -   Oxygen  Saturation -   Supplemental Oxygen -   Heart Rate -   Blood Pressure -   Srinath Dyspnea Rating  -   Recovery Time (seconds) -   Oxygen Saturation -   Supplemental Oxygen -   Heart Rate -   Blood Pressure -   Srinath Dyspnea Rating  -   Is procedure ready for interpretation? -   Did the patient stop or pause? -   Total Time Walked (Calculated) -   Total Laps Walked -   Final Partial Lap Distance (feet) -   Total Distance Feet (Calculated) -   Total Distance Meters (Calculated) -   Predicted Distance Meters (Calculated) 424.96   Percentage of Predicted (Calculated) -   Peak VO2 (Calculated) -   Mets -   Has The Patient Had a Previous Six Minute Walk Test? -   Oxygen Qualification? -   Oxygen Saturation -   Supplemental Oxygen -   Heart Rate -   Blood Pressure -   Srinath Dyspnea Rating  -   Oxygen Saturation -   Supplemental Oxygen -   Heart Rate -   Blood Pressure -   Srinath Dyspnea Rating  -   Recovery Time (seconds) -   Oxygen Saturation -   Supplemental Oxygen -   Heart Rate -   Blood Pressure -   Srinath Dyspnea Rating  -         Assessment:       1. Asthma with COPD    2. Other congestive heart failure    3. Hypersomnia with sleep apnea    4. Chronic diastolic congestive heart failure    5. Abnormal CXR    6. Exercise hypoxemia        Outpatient Encounter Medications as of 10/3/2019   Medication Sig Dispense Refill    albuterol (PROVENTIL) 2.5 mg /3 mL (0.083 %) nebulizer solution Take 3 mLs (2.5 mg total) by nebulization every 6 (six) hours as needed for Wheezing. Rescue 360 mL 11    allopurinol (ZYLOPRIM) 300 MG tablet Take 300 mg by mouth once daily.      baclofen (LIORESAL) 10 MG tablet       bisoprolol (ZEBETA) 10 MG tablet bisoprolol fumarate 10 mg tablet      budesonide-formoterol 160-4.5 mcg (SYMBICORT) 160-4.5 mcg/actuation HFAA Inhale 2 puffs into the lungs 2 (two) times daily. 3 Inhaler 3    bumetanide (BUMEX) 1 MG tablet bumetanide 1 mg tablet      cholecalciferol, vitamin D3, (VITAMIN D3) 2,000 unit Cap 1  capsule once daily.       diphth,pertus,acell,,tetanus (BOOSTRIX TDAP) 2.5-8-5 Lf-mcg-Lf/0.5mL Syrg injection Boostrix Tdap 2.5 Lf unit-8 mcg-5 Lf/0.5 mL intramuscular syringe      ELIQUIS 5 mg Tab TAKE 1 TABLET BY MOUTH TWICE DAILY 60 tablet 6    FLUZONE HIGH-DOSE 2019-20, PF, 180 mcg/0.5 mL Syrg PHARMACIST ADMINISTERED IMMUNIZATION ADMINISTERED AT TIME OF DISPENSING  0    gabapentin (NEURONTIN) 300 MG capsule Take 1 capsule (300 mg total) by mouth 2 (two) times daily. 1 capsule Oral Three times a day (Patient taking differently: Take 400 mg by mouth 3 (three) times daily. 1 capsule Oral Three times a day) 60 capsule 5    hepatitis A and B vaccine, PF, (TWINRIX, PF,) 720 DIAMANTE unit- 20 mcg/mL Syrg suspension Twinrix (PF) 720 DIAMANTE unit-20 mcg/mL intramuscular syringe      multivitamin (THERA) tablet Take 1 tablet by mouth.      multivitamin capsule Take 1 capsule by mouth once daily.      OXYGEN-AIR DELIVERY SYSTEMS MISC by Misc.(Non-Drug; Combo Route) route.      pantoprazole (PROTONIX) 40 MG tablet Take 40 mg by mouth once daily.      polysaccharide iron complex (EZFE 200) 200 mg iron Cap EZFE 200 200 mg iron capsule   One po q day      potassium chloride SA (K-DUR,KLOR-CON) 20 MEQ tablet Take 1 tablet (20 mEq total) by mouth once daily. (Patient taking differently: Take 20 mEq by mouth 2 (two) times daily. ) 30 tablet 3    risperiDONE (RISPERDAL) 0.25 MG Tab risperidone 0.25 mg tablet      simvastatin (ZOCOR) 40 MG tablet Take 1 tablet (40 mg total) by mouth every evening. 1 tablet Oral Every day 90 tablet 4    spironolactone (ALDACTONE) 25 MG tablet spironolactone 25 mg tablet      tiotropium (SPIRIVA) 18 mcg inhalation capsule Inhale 1 capsule (18 mcg total) into the lungs once daily. 90 capsule 3    [DISCONTINUED] albuterol (PROVENTIL) 2.5 mg /3 mL (0.083 %) nebulizer solution Take 3 mLs (2.5 mg total) by nebulization every 6 (six) hours as needed for Wheezing. Rescue 360 mL 11     "[DISCONTINUED] AMITIZA 24 mcg Cap       [DISCONTINUED] aspirin 81 MG Chew Take 1 tablet (81 mg total) by mouth once daily.  0    [DISCONTINUED] ATROVENT HFA 17 mcg/actuation inhaler       [DISCONTINUED] budesonide-formoterol 160-4.5 mcg (SYMBICORT) 160-4.5 mcg/actuation HFAA Inhale 2 puffs into the lungs 2 (two) times daily. 3 Inhaler 3    [DISCONTINUED] ferrous sulfate 324 mg (65 mg iron) TbEC Take 65 mg by mouth.      [DISCONTINUED] hydrocodone-acetaminophen 10-325mg (NORCO)  mg Tab Take 1 tablet by mouth every 8 (eight) hours as needed for Pain. 1 tablet Oral Twice a day 8 tablet 0    [DISCONTINUED] irbesartan (AVAPRO) 150 MG tablet Take 75 mg by mouth once daily.       [DISCONTINUED] metOLazone (ZAROXOLYN) 5 MG tablet Take 5 mg by mouth.      [DISCONTINUED] polyethylene glycol (GLYCOLAX) 17 gram/dose powder polyethylene glycol 3350 17 gram/dose oral powder      [DISCONTINUED] tiotropium (SPIRIVA) 18 mcg inhalation capsule Inhale 1 capsule (18 mcg total) into the lungs once daily. 90 capsule 3    theophylline (ANDREA-24) 200 mg 24 hr capsule Take 200 mg by mouth once daily. 90 capsule 3    zoster vaccine live, PF, (ZOSTAVAX, PF,) 19,400 unit/0.65 mL injection Zostavax (PF) 19,400 unit/0.65 mL subcutaneous suspension      [DISCONTINUED] theophylline (ANDREA-24) 200 mg 24 hr capsule Andrea-24 200 mg capsule,extended release       No facility-administered encounter medications on file as of 10/3/2019.      Orders Placed This Encounter   Procedures    NEBULIZER KIT (SUPPLIES) FOR HOME USE     Order Specific Question:   Height:     Answer:   5' 4" (1.626 m)     Order Specific Question:   Weight:     Answer:   79.8 kg (176 lb)     Order Specific Question:   Length of need (1-99 months):     Answer:   99     Order Specific Question:   Mask or Mouthpiece?     Answer:   Mouthpiece    X-Ray Chest PA And Lateral     Standing Status:   Future     Standing Expiration Date:   4/3/2021     Order Specific Question: "   Reason for Exam:     Answer:   SOB    Stress test, pulmonary     Standing Status:   Future     Number of Occurrences:   1     Standing Expiration Date:   10/3/2020     Plan:       Requested Prescriptions     Signed Prescriptions Disp Refills    albuterol (PROVENTIL) 2.5 mg /3 mL (0.083 %) nebulizer solution 360 mL 11     Sig: Take 3 mLs (2.5 mg total) by nebulization every 6 (six) hours as needed for Wheezing. Rescue    budesonide-formoterol 160-4.5 mcg (SYMBICORT) 160-4.5 mcg/actuation HFAA 3 Inhaler 3     Sig: Inhale 2 puffs into the lungs 2 (two) times daily.    theophylline (KEESHA-24) 200 mg 24 hr capsule 90 capsule 3     Sig: Take 200 mg by mouth once daily.    tiotropium (SPIRIVA) 18 mcg inhalation capsule 90 capsule 3     Sig: Inhale 1 capsule (18 mcg total) into the lungs once daily.     Asthma with COPD  -     albuterol (PROVENTIL) 2.5 mg /3 mL (0.083 %) nebulizer solution; Take 3 mLs (2.5 mg total) by nebulization every 6 (six) hours as needed for Wheezing. Rescue  Dispense: 360 mL; Refill: 11  -     budesonide-formoterol 160-4.5 mcg (SYMBICORT) 160-4.5 mcg/actuation HFAA; Inhale 2 puffs into the lungs 2 (two) times daily.  Dispense: 3 Inhaler; Refill: 3  -     theophylline (KEESHA-24) 200 mg 24 hr capsule; Take 200 mg by mouth once daily.  Dispense: 90 capsule; Refill: 3  -     tiotropium (SPIRIVA) 18 mcg inhalation capsule; Inhale 1 capsule (18 mcg total) into the lungs once daily.  Dispense: 90 capsule; Refill: 3  -     NEBULIZER KIT (SUPPLIES) FOR HOME USE  -     X-Ray Chest PA And Lateral; Future; Expected date: 10/03/2019  -     Cancel: Stress test, pulmonary; Future; Expected date: 10/03/2019  -     Stress test, pulmonary; Future; Expected date: 10/03/2019    Other congestive heart failure    Hypersomnia with sleep apnea    Chronic diastolic congestive heart failure  -     X-Ray Chest PA And Lateral; Future; Expected date: 10/03/2019  -     Cancel: Stress test, pulmonary; Future; Expected date:  10/03/2019    Abnormal CXR    Exercise hypoxemia  -     Stress test, pulmonary; Future; Expected date: 10/03/2019           Follow up in about 6 months (around 4/3/2020) for Review CXR.    MEDICAL DECISION MAKING: Moderate to high complexity.  Overall, the multiple problems listed are of moderate to high severity that may impact quality of life and activities of daily living. Side effects of medications, treatment plan as well as options and alternatives reviewed and discussed with patient. There was counseling of patient concerning these issues.    Total time spent in face to face counseling and coordination of care - 45  minutes over 50% of time was used in discussion of prognosis, risks, benefits of treatment, instructions and compliance with regimen . Discussion with other physicians or health care providers (DME, NP, pharmacy, respiratory therapy) occurred.

## 2019-10-03 NOTE — PROCEDURES
"[facility] [place] - Pulmonary Function Sv  Six Minute Walk     SUMMARY     Ordering Provider: Dr. Ziegler   Interpreting Provider: Dr. Ziegler  Performing nurse/tech/RT: IRASEMA Cortez RRT  Diagnosis: COPD(Asthma)  Height: 5' 4" (162.6 cm)  Weight: 78.8 kg (173 lb 11.6 oz)  BMI (Calculated): 29.9   Patient Race:             Phase Oxygen Assessment Supplemental O2 Heart   Rate Blood Pressure Srinath Dyspnea Scale Rating   Resting 96 % Room Air 70 bpm 134/76 0   Exercise        Minute        1 89 % Room Air 91 bpm     2 90 % Room Air 102 bpm     3 93 % Room Air 98 bpm     4 91 % Room Air 93 bpm     5 91 % Room Air 105 bpm     6  94 % Room Air 110 bpm (!) 150/91 7-8   Recovery        Minute        1 95 % Room Air 94 bpm     2 96 % Room Air 83 bpm     3 96 % Room Air 81 bpm     4 98 % Room Air 76 bpm 136/89 2     Six Minute Walk Summary  6MWT Status: completed without stopping  Patient Reported: Dyspnea     Interpretation:  Did the patient stop or pause?: No                                         Total Time Walked (Calculated): 360 seconds  Final Partial Lap Distance (feet): 0 feet  Total Distance Meters (Calculated): 365.76 meters  Predicted Distance Meters (Calculated): 426.77 meters  Percentage of Predicted (Calculated): 85.7  Peak VO2 (Calculated): 14.95  Mets: 4.27  Has The Patient Had a Previous Six Minute Walk Test?: Yes       Previous 6MWT Results  Has The Patient Had a Previous Six Minute Walk Test?: Yes  Date of Previous Test: 05/16/19  Total Time Walked: 360 seconds  Total Distance (meters): 350.52  Predicted Distance (meters): 444.47 meters  Percentage of Predicted: 78.86  Percent Change (Calculated): -0.04       Interpretation:  Total distance walked in six minutes is moderately reduced indicating a reduction in overall  functional capacity.   There was moderate exercise induced hypoxemia with significant oxygen desaturation to 89% while on room air..    Oxygen desaturation did not meet criteria for " supplemental oxygen prescription.    Clinical correlation suggested.    Bob Ziegler MD    Mild exercise-induced hypoxemia described as an arterial oxygen saturation of 93-95% (or 3-4% less than at rest), moderate exercise-induced hypoxemia as 89-93%, and severe exercise induced hypoxemia as < 89% O2 saturation.  Medicare Criteria Comments:   When arterial oxygen saturation is at or below 88% during exercise (severe exercise induced hypoxemia) then the patient falls under Medicare Group 1 criteria for supplemental oxygen.  Details about Medicare Group Criteria coverage can be found at http://www.cms.hhs.gov/manuals/downloads/

## 2019-11-21 ENCOUNTER — LAB VISIT (OUTPATIENT)
Dept: LAB | Facility: HOSPITAL | Age: 69
End: 2019-11-21
Attending: PSYCHIATRY & NEUROLOGY
Payer: MEDICARE

## 2019-11-21 DIAGNOSIS — K90.49 MALABSORPTION DUE TO INTOLERANCE, NOT ELSEWHERE CLASSIFIED: ICD-10-CM

## 2019-11-21 DIAGNOSIS — I63.9 CEREBROVASCULAR ACCIDENT (CVA), UNSPECIFIED MECHANISM: ICD-10-CM

## 2019-11-21 LAB
25(OH)D3+25(OH)D2 SERPL-MCNC: 41 NG/ML (ref 30–96)
ALBUMIN SERPL BCP-MCNC: 3.3 G/DL (ref 3.5–5.2)
ALP SERPL-CCNC: 134 U/L (ref 55–135)
ALT SERPL W/O P-5'-P-CCNC: 10 U/L (ref 10–44)
ANION GAP SERPL CALC-SCNC: 9 MMOL/L (ref 8–16)
AST SERPL-CCNC: 20 U/L (ref 10–40)
BASOPHILS # BLD AUTO: 0.03 K/UL (ref 0–0.2)
BASOPHILS NFR BLD: 0.5 % (ref 0–1.9)
BILIRUB SERPL-MCNC: 0.4 MG/DL (ref 0.1–1)
BUN SERPL-MCNC: 27 MG/DL (ref 8–23)
CALCIUM SERPL-MCNC: 9.4 MG/DL (ref 8.7–10.5)
CHLORIDE SERPL-SCNC: 101 MMOL/L (ref 95–110)
CHOLEST SERPL-MCNC: 156 MG/DL (ref 120–199)
CHOLEST/HDLC SERPL: 3.7 {RATIO} (ref 2–5)
CO2 SERPL-SCNC: 30 MMOL/L (ref 23–29)
CREAT SERPL-MCNC: 1.8 MG/DL (ref 0.5–1.4)
D DIMER PPP IA.FEU-MCNC: 0.37 MG/L FEU
DIFFERENTIAL METHOD: ABNORMAL
EOSINOPHIL # BLD AUTO: 0.1 K/UL (ref 0–0.5)
EOSINOPHIL NFR BLD: 2.4 % (ref 0–8)
ERYTHROCYTE [DISTWIDTH] IN BLOOD BY AUTOMATED COUNT: 15.6 % (ref 11.5–14.5)
EST. GFR  (AFRICAN AMERICAN): 42.8 ML/MIN/1.73 M^2
EST. GFR  (NON AFRICAN AMERICAN): 37 ML/MIN/1.73 M^2
GLUCOSE SERPL-MCNC: 128 MG/DL (ref 70–110)
HCT VFR BLD AUTO: 41.4 % (ref 40–54)
HDLC SERPL-MCNC: 42 MG/DL (ref 40–75)
HDLC SERPL: 26.9 % (ref 20–50)
HGB BLD-MCNC: 11.8 G/DL (ref 14–18)
IMM GRANULOCYTES # BLD AUTO: 0.02 K/UL (ref 0–0.04)
IMM GRANULOCYTES NFR BLD AUTO: 0.3 % (ref 0–0.5)
LDLC SERPL CALC-MCNC: 87.8 MG/DL (ref 63–159)
LYMPHOCYTES # BLD AUTO: 0.8 K/UL (ref 1–4.8)
LYMPHOCYTES NFR BLD: 13.4 % (ref 18–48)
MCH RBC QN AUTO: 27.8 PG (ref 27–31)
MCHC RBC AUTO-ENTMCNC: 28.5 G/DL (ref 32–36)
MCV RBC AUTO: 98 FL (ref 82–98)
MONOCYTES # BLD AUTO: 0.5 K/UL (ref 0.3–1)
MONOCYTES NFR BLD: 8.7 % (ref 4–15)
NEUTROPHILS # BLD AUTO: 4.3 K/UL (ref 1.8–7.7)
NEUTROPHILS NFR BLD: 74.7 % (ref 38–73)
NONHDLC SERPL-MCNC: 114 MG/DL
NRBC BLD-RTO: 0 /100 WBC
PLATELET # BLD AUTO: 220 K/UL (ref 150–350)
PMV BLD AUTO: 12.2 FL (ref 9.2–12.9)
POTASSIUM SERPL-SCNC: 5.1 MMOL/L (ref 3.5–5.1)
PROT SERPL-MCNC: 7 G/DL (ref 6–8.4)
RBC # BLD AUTO: 4.24 M/UL (ref 4.6–6.2)
SODIUM SERPL-SCNC: 140 MMOL/L (ref 136–145)
TRIGL SERPL-MCNC: 131 MG/DL (ref 30–150)
WBC # BLD AUTO: 5.73 K/UL (ref 3.9–12.7)

## 2019-11-21 PROCEDURE — 85025 COMPLETE CBC W/AUTO DIFF WBC: CPT

## 2019-11-21 PROCEDURE — 82306 VITAMIN D 25 HYDROXY: CPT

## 2019-11-21 PROCEDURE — 36415 COLL VENOUS BLD VENIPUNCTURE: CPT

## 2019-11-21 PROCEDURE — 85379 FIBRIN DEGRADATION QUANT: CPT

## 2019-11-21 PROCEDURE — 80061 LIPID PANEL: CPT

## 2019-11-21 PROCEDURE — 80053 COMPREHEN METABOLIC PANEL: CPT

## 2019-12-23 RX ORDER — IRBESARTAN 150 MG/1
TABLET ORAL
Qty: 90 TABLET | Refills: 1 | Status: SHIPPED | OUTPATIENT
Start: 2019-12-23 | End: 2020-08-21

## 2020-01-07 ENCOUNTER — TELEPHONE (OUTPATIENT)
Dept: PULMONOLOGY | Facility: CLINIC | Age: 70
End: 2020-01-07

## 2020-01-07 DIAGNOSIS — J44.9 COPD, SEVERE: Primary | ICD-10-CM

## 2020-01-07 NOTE — TELEPHONE ENCOUNTER
----- Message from Vesta Carter sent at 1/7/2020  1:49 PM CST -----  ..Type:  Patient Returning Call    Who Called: Teri ( Dr Lauren )   Who Left Message for Patient:  Does the patient know what this is regarding?: pre op   Would the patient rather a call back or a response via [facility]? Call back   Best Call Back Number: [phone] or [phone]  Additional Information: Teri Lauren is requesting a call from nurse to get a tye for a hernia repair surgery

## 2020-01-09 ENCOUNTER — CLINICAL SUPPORT (OUTPATIENT)
Dept: PULMONOLOGY | Facility: CLINIC | Age: 70
End: 2020-01-09
Payer: MEDICARE

## 2020-01-09 ENCOUNTER — OFFICE VISIT (OUTPATIENT)
Dept: PULMONOLOGY | Facility: CLINIC | Age: 70
End: 2020-01-09
Payer: MEDICARE

## 2020-01-09 ENCOUNTER — DOCUMENTATION ONLY (OUTPATIENT)
Dept: PULMONOLOGY | Facility: CLINIC | Age: 70
End: 2020-01-09

## 2020-01-09 ENCOUNTER — HOSPITAL ENCOUNTER (OUTPATIENT)
Dept: RADIOLOGY | Facility: HOSPITAL | Age: 70
Discharge: HOME | End: 2020-01-09
Attending: INTERNAL MEDICINE
Payer: MEDICARE

## 2020-01-09 VITALS
WEIGHT: 177 LBS | BODY MASS INDEX: 30.22 KG/M2 | HEIGHT: 64 IN | SYSTOLIC BLOOD PRESSURE: 110 MMHG | HEART RATE: 70 BPM | RESPIRATION RATE: 18 BRPM | DIASTOLIC BLOOD PRESSURE: 68 MMHG | OXYGEN SATURATION: 95 %

## 2020-01-09 DIAGNOSIS — G47.33 OSA (OBSTRUCTIVE SLEEP APNEA): ICD-10-CM

## 2020-01-09 DIAGNOSIS — J44.9 CHRONIC OBSTRUCTIVE PULMONARY DISEASE, UNSPECIFIED COPD TYPE: ICD-10-CM

## 2020-01-09 DIAGNOSIS — J44.9 COPD, SEVERE: ICD-10-CM

## 2020-01-09 DIAGNOSIS — Z01.811 PRE-OP CHEST EXAM: Primary | ICD-10-CM

## 2020-01-09 DIAGNOSIS — I50.32 CHRONIC DIASTOLIC CONGESTIVE HEART FAILURE: ICD-10-CM

## 2020-01-09 LAB
ALLENS TEST: ABNORMAL
DELSYS: ABNORMAL
HCO3 UR-SCNC: 30.5 MMOL/L (ref 24–28)
PCO2 BLDA: 48.4 MMHG (ref 35–45)
PH SMN: 7.41 [PH] (ref 7.35–7.45)
PO2 BLDA: 74 MMHG (ref 80–100)
POC BE: 6 MMOL/L
POC SATURATED O2: 95 % (ref 95–100)
SAMPLE: ABNORMAL
SITE: ABNORMAL

## 2020-01-09 PROCEDURE — 82803 BLOOD GASES ANY COMBINATION: CPT | Mod: PBBFAC

## 2020-01-09 PROCEDURE — 36600 WITHDRAWAL OF ARTERIAL BLOOD: CPT | Mod: 59,S$PBB,, | Performed by: INTERNAL MEDICINE

## 2020-01-09 PROCEDURE — 71046 X-RAY EXAM CHEST 2 VIEWS: CPT | Mod: TC

## 2020-01-09 PROCEDURE — 99214 OFFICE O/P EST MOD 30 MIN: CPT | Mod: 25,S$PBB,, | Performed by: NURSE PRACTITIONER

## 2020-01-09 PROCEDURE — 71046 X-RAY EXAM CHEST 2 VIEWS: CPT | Mod: 26,,, | Performed by: RADIOLOGY

## 2020-01-09 PROCEDURE — 1159F PR MEDICATION LIST DOCUMENTED IN MEDICAL RECORD: ICD-10-PCS | Mod: ,,, | Performed by: NURSE PRACTITIONER

## 2020-01-09 PROCEDURE — 36600 PR WITHDRAWAL OF ARTERIAL BLOOD: ICD-10-PCS | Mod: 59,S$PBB,, | Performed by: INTERNAL MEDICINE

## 2020-01-09 PROCEDURE — 99999 PR PBB SHADOW E&M-EST. PATIENT-LVL V: CPT | Mod: PBBFAC,,, | Performed by: NURSE PRACTITIONER

## 2020-01-09 PROCEDURE — 94010 BREATHING CAPACITY TEST: CPT | Mod: 26,S$PBB,, | Performed by: INTERNAL MEDICINE

## 2020-01-09 PROCEDURE — 36600 WITHDRAWAL OF ARTERIAL BLOOD: CPT | Mod: PBBFAC

## 2020-01-09 PROCEDURE — 99215 OFFICE O/P EST HI 40 MIN: CPT | Mod: PBBFAC,25 | Performed by: NURSE PRACTITIONER

## 2020-01-09 PROCEDURE — 1159F MED LIST DOCD IN RCRD: CPT | Mod: ,,, | Performed by: NURSE PRACTITIONER

## 2020-01-09 PROCEDURE — 99214 PR OFFICE/OUTPT VISIT, EST, LEVL IV, 30-39 MIN: ICD-10-PCS | Mod: 25,S$PBB,, | Performed by: NURSE PRACTITIONER

## 2020-01-09 PROCEDURE — 94010 BREATHING CAPACITY TEST: ICD-10-PCS | Mod: 26,S$PBB,, | Performed by: INTERNAL MEDICINE

## 2020-01-09 PROCEDURE — 99999 PR PBB SHADOW E&M-EST. PATIENT-LVL V: ICD-10-PCS | Mod: PBBFAC,,, | Performed by: NURSE PRACTITIONER

## 2020-01-09 PROCEDURE — 71046 XR CHEST PA AND LATERAL: ICD-10-PCS | Mod: 26,,, | Performed by: RADIOLOGY

## 2020-01-09 PROCEDURE — 94010 BREATHING CAPACITY TEST: CPT | Mod: PBBFAC

## 2020-01-09 NOTE — PROGRESS NOTES
"Subjective:      Patient ID: Rea Vail is a 69 y.o. male.    Chief Complaint: Pre-op Exam    HPI  Patient presents to the office today for evaluation.  Pulmonary preop evaluation for possible right inguinal hernia repair with Dr. Lauren.  Patient seen in the Pulmonary Department for COPD.  He also has obstructive sleep apnea on BiPAP followed by Dr. Kyle Schwab.   He has multiple comorbidities including CVA, CHF, chronic AFib.  History of pneumonia.  At least 2 hospital admissions in 2019 for CHF after exacerbation, COPD.  3/19/19- ICU for fluid overload.   Chronic atrial fibrillation (HCC)    Acute on chronic diastolic (congestive) heart failure (HCC)    Acute on chronic respiratory failure with hypoxia and hypercapnia (HCC)    Panlobular emphysema (HCC)    COPD, severe (HCC)    Acute on chronic diastolic congestive heart failure (HCC)     Second admission 3/31/19  4/1 cxr with possible pneumonia/ 4/3 cxr with improvement      He is compliant with his pulmonary inhalers.    Patient Active Problem List   Diagnosis    Cerebrovascular accident    HTN (hypertension)    Hyperlipidemia    Gout    Multiple joint pain    Dysesthesia    Coagulation defect    Dyslipidemia    Chronic respiratory failure with hypoxia and hypercapnia    Atrial fibrillation    Chronic diastolic congestive heart failure    Abnormal CXR    COPD, severe    ANTHONY (acute kidney injury)    GUMARO (obstructive sleep apnea)    Pulmonary hypertension    CHF (congestive heart failure)    Chronic back pain    Gastroesophageal reflux disease    Hypersomnia with sleep apnea    Spasticity    Chronic obstructive lung disease       /68   Pulse 70   Resp 18   Ht 5' 4" (1.626 m)   Wt 80.3 kg (177 lb)   SpO2 95%   BMI 30.38 kg/m²   Body mass index is 30.38 kg/m².    Review of Systems   Respiratory: Positive for dyspnea on extertion.    All other systems reviewed and are negative.    Objective:      Physical Exam   Constitutional: " He is oriented to person, place, and time. He appears well-developed and well-nourished.   HENT:   Head: Normocephalic and atraumatic.   Mouth/Throat: Oropharynx is clear and moist.   Neck: Normal range of motion. Neck supple.   Cardiovascular: Normal rate.   Pulmonary/Chest: Effort normal and breath sounds normal.   Abdominal: Soft.   Musculoskeletal: He exhibits no edema.   Neurological: He is alert and oriented to person, place, and time.   Skin: Skin is warm and dry.   Psychiatric: He has a normal mood and affect.     Personal Diagnostic Review  FEV1 60% of predicted.    ABG pH 7.407.  PCO2 48 mmHg.  PO2 74 mmHg as O2 95% this is performed on room air.  Results for orders placed or performed in visit on 01/09/20   Spirometry with/without bronchodilator   Result Value Ref Range    Pre FVC 2.03 (L) 2.07 - 3.69 L    Pre FEV1 1.33 (L) 1.52 - 2.93 L    Pre FEV1 FVC 65.80 64.14 - 90.77 %    Pre FEF 25 75 0.73 0.63 - 2.95 L/s    Pre PEF 4.07 (L) 4.36 - 8.82 L/s    Pre  7.43 sec    FVC Ref 2.88     FVC LLN 2.07     FVC Pre Ref 70.5 %    FEV1 Ref 2.23     FEV1 LLN 1.52     FEV1 Pre Ref 59.9 %    FEV1 FVC Ref 77     FEV1 FVC LLN 64     FEV1 FVC Pre Ref 84.9 %    FEF 25 75 Ref 1.79     FEF 25 75 LLN 0.63     FEF 25 75 Pre Ref 40.6 %    PEF Ref 6.59     PEF LLN 4.36     PEF Pre Ref 61.8 %    MVV Ref 98     MVV LLN 84        Results for orders placed during the hospital encounter of 01/09/20   X-Ray Chest PA And Lateral    Narrative EXAMINATION:  XR CHEST PA AND LATERAL    CLINICAL HISTORY:  SOB; Chronic obstructive pulmonary disease, unspecified    TECHNIQUE:  PA and lateral views of the chest were performed.    COMPARISON:  05/20/2019.    FINDINGS:  There is stable cardiomegaly and aortic uncoiling.  Calcified lymph nodes in the AP window.  Mild coarsening of the lung markings bilaterally and minor bibasilar linear scarring or subsegmental atelectasis.  No acute consolidation or pleural effusion.  Multilevel  thoracic spondylosis.      Impression As above.      Electronically signed by: RAFAEL Quevedo MD  Date:    01/09/2020  Time:    08:22         Assessment:       1. Pre-op chest exam    2. Chronic obstructive pulmonary disease, unspecified COPD type    3. Chronic diastolic congestive heart failure    4. GUMARO (obstructive sleep apnea)        Outpatient Encounter Medications as of 1/9/2020   Medication Sig Dispense Refill    albuterol (PROVENTIL) 2.5 mg /3 mL (0.083 %) nebulizer solution Take 3 mLs (2.5 mg total) by nebulization every 6 (six) hours as needed for Wheezing. Rescue 360 mL 11    allopurinol (ZYLOPRIM) 300 MG tablet Take 300 mg by mouth once daily.      baclofen (LIORESAL) 10 MG tablet       bisoprolol (ZEBETA) 10 MG tablet bisoprolol fumarate 10 mg tablet      budesonide-formoterol 160-4.5 mcg (SYMBICORT) 160-4.5 mcg/actuation HFAA Inhale 2 puffs into the lungs 2 (two) times daily. 3 Inhaler 3    bumetanide (BUMEX) 1 MG tablet bumetanide 1 mg tablet      cholecalciferol, vitamin D3, (VITAMIN D3) 2,000 unit Cap 1 capsule once daily.       ELIQUIS 5 mg Tab TAKE 1 TABLET BY MOUTH TWICE DAILY 60 tablet 6    HYDROcodone-acetaminophen (NORCO)  mg per tablet Take 1 tablet by mouth 2 (two) times daily.      irbesartan (AVAPRO) 150 MG tablet TAKE 1 TABLET BY MOUTH IN THE MORNING FOR BLOOD PRESSURE 90 tablet 1    lubiprostone (AMITIZA) 24 MCG Cap Take 24 mcg by mouth 2 (two) times daily with meals.      multivitamin capsule Take 1 capsule by mouth once daily.      OXYGEN-AIR DELIVERY SYSTEMS MISC by [facility].(Non-Drug; Combo Route) route.      pantoprazole (PROTONIX) 40 MG tablet Take 40 mg by mouth once daily.      polysaccharide iron complex (EZFE 200) 200 mg iron Cap EZFE 200 200 mg iron capsule   One po q day      polysaccharide iron complex (EZFE 200) 200 mg iron Cap Take 200 mg by mouth Daily.      simvastatin (ZOCOR) 40 MG tablet Take 1 tablet (40 mg total) by mouth every evening. 1 tablet  Oral Every day 90 tablet 4    spironolactone (ALDACTONE) 25 MG tablet Take 25 mg by mouth every other day.       tiotropium (SPIRIVA) 18 mcg inhalation capsule Inhale 1 capsule (18 mcg total) into the lungs once daily. 90 capsule 3    [DISCONTINUED] diphth,pertus,acell,,tetanus (BOOSTRIX TDAP) 2.5-8-5 Lf-mcg-Lf/0.5mL Syrg injection Boostrix Tdap 2.5 Lf unit-8 mcg-5 Lf/0.5 mL intramuscular syringe      [DISCONTINUED] FLUZONE HIGH-DOSE 2019-20, PF, 180 mcg/0.5 mL Syrg PHARMACIST ADMINISTERED IMMUNIZATION ADMINISTERED AT TIME OF DISPENSING  0    [DISCONTINUED] gabapentin (NEURONTIN) 300 MG capsule Take 1 capsule (300 mg total) by mouth 2 (two) times daily. 1 capsule Oral Three times a day (Patient taking differently: Take 400 mg by mouth 3 (three) times daily. 1 capsule Oral Three times a day) 60 capsule 5    [DISCONTINUED] hepatitis A and B vaccine, PF, (TWINRIX, PF,) 720 DIAMANTE unit- 20 mcg/mL Syrg suspension Twinrix (PF) 720 DIAMANTE unit-20 mcg/mL intramuscular syringe      [DISCONTINUED] multivitamin (THERA) tablet Take 1 tablet by mouth.      [DISCONTINUED] potassium chloride SA (K-DUR,KLOR-CON) 20 MEQ tablet Take 1 tablet (20 mEq total) by mouth once daily. (Patient taking differently: Take 20 mEq by mouth 2 (two) times daily. ) 30 tablet 3    [DISCONTINUED] risperiDONE (RISPERDAL) 0.25 MG Tab risperidone 0.25 mg tablet      [DISCONTINUED] theophylline (KEESHA-24) 200 mg 24 hr capsule Take 200 mg by mouth once daily. (Patient not taking: Reported on 12/18/2019) 90 capsule 3    [DISCONTINUED] zoster vaccine live, PF, (ZOSTAVAX, PF,) 19,400 unit/0.65 mL injection Zostavax (PF) 19,400 unit/0.65 mL subcutaneous suspension       No facility-administered encounter medications on file as of 1/9/2020.      No orders of the defined types were placed in this encounter.    Plan:   From a pulmonary standpoint, patient at a moderate risk due to multiple comorbidities.  History of CHF exacerbations and fluid overload.   He has COPD as well as obstructive sleep apnea.  He needs cardiac preop risk assessment.  If surgery is deemed acceptable  with surgeon and anesthesia, recommendation is to perform in a hospital setting, not an outpatient surgical center.  Patient to bring BiPAP with him to use immediate postoperative.  No sign of exacerbation at this time.     Problem List Items Addressed This Visit        Pulmonary    Chronic obstructive lung disease       Cardiac/Vascular    Chronic diastolic congestive heart failure    Overview     Overview:   Last Assessment & Plan:   Ambulatory referral to CARDIOLOGY - HEART FAILURE  Preload and afterload reduction. Furosemide 40mg po daily + Metolazone 2.5 mg PO daily.   BYSTOLIC, AVAPRO, COREG  Daily weighing. WEIGHT GOAL   - 170  LBS  Dietary salt restriction.  Alcohol and tobacco avoidance.            Other    GUMARO (obstructive sleep apnea)    Overview     BIPAP followed with [place] [facility]           Other Visit Diagnoses     Pre-op chest exam    -  Primary      Patients with known or suspected obstructive sleep apnea should be monitored in the postanesthesia care unit, with particular attention to oxygenation and ventilation.Continuous pulse oximetry should be monitored until the patient is able to maintain adequate oxygenation on room air when left unstimulated.  When not contraindicated by surgical considerations, postoperative patients with GUMARO should be cared for in the lateral or semi-upright position, rather than the supine position.    Attempt to minimize the use of systemic opioids with patients with GUMARO.  Patient will bring CPAP with them to use in the postoperative period      In patients with GUMARO, disturbance in sleep architecture is greatest on postoperative night 1; however, breathing disturbances are greatest on postoperative night 3 and may not normalize for several more nights. Patients using CPAP therapy should be instructed to consistently use CPAP during this  period whenever sleep is likely, including the daytime.

## 2020-01-09 NOTE — LETTER
January 9, 2020      Bob Ziegler MD  [address]  [place] [address]           [facility]  [address]  [place] [address]  Phone: [phone]  Fax: [phone]          Patient: Rea Vail   MR Number: [id number]   YOB: 1950   Date of Visit: 1/9/2020       Dear Dr. Bob Ziegler:    Thank you for referring Rea Vail to me for evaluation. Attached you will find relevant portions of my assessment and plan of care.    If you have questions, please do not hesitate to call me. I look forward to following Rea Vail along with you.    Sincerely,    Elizabeth Lejeune, ASHLEY    Enclosure  CC:  No Recipients    If you would like to receive this communication electronically, please contact [email or web address] or [phone] to request more information on [employer] access.    For providers and/or their staff who would like to refer a patient to [facility], please contact us through our one-stop-shop provider referral line, [facility], at [phone].    If you feel you have received this communication in error or would no longer like to receive these types of communications, please e-mail [email or web address]

## 2020-01-10 LAB
BRPFT: ABNORMAL
FEF 25 75 LLN: 0.63
FEF 25 75 PRE REF: 40.6 %
FEF 25 75 REF: 1.79
FEV1 FVC LLN: 64
FEV1 FVC PRE REF: 84.9 %
FEV1 FVC REF: 77
FEV1 LLN: 1.52
FEV1 PRE REF: 59.9 %
FEV1 REF: 2.23
FVC LLN: 2.07
FVC PRE REF: 70.5 %
FVC REF: 2.88
PEF LLN: 4.36
PEF PRE REF: 61.8 %
PEF REF: 6.59
PRE FEF 25 75: 0.73 L/S (ref 0.63–2.95)
PRE FET 100: 7.43 SEC
PRE FEV1 FVC: 65.8 % (ref 64.14–90.77)
PRE FEV1: 1.33 L (ref 1.52–2.93)
PRE FVC: 2.03 L (ref 2.07–3.69)
PRE PEF: 4.07 L/S (ref 4.36–8.82)

## 2020-05-06 ENCOUNTER — TELEPHONE (OUTPATIENT)
Dept: PULMONOLOGY | Facility: CLINIC | Age: 70
End: 2020-05-06

## 2020-05-06 DIAGNOSIS — J44.9 CHRONIC OBSTRUCTIVE PULMONARY DISEASE, UNSPECIFIED COPD TYPE: Primary | ICD-10-CM

## 2020-05-06 NOTE — TELEPHONE ENCOUNTER
----- Message from Pretty Eddy sent at 5/6/2020  3:52 PM CDT -----  Contact: Patient's niece-  Stephenie  Please call patient's niece concerning getting a nebulizer for the patient. Please call at . [phone]

## 2020-05-07 ENCOUNTER — TELEPHONE (OUTPATIENT)
Dept: PULMONOLOGY | Facility: CLINIC | Age: 70
End: 2020-05-07

## 2020-05-07 NOTE — TELEPHONE ENCOUNTER
----- Message from Franca Jefferson sent at 5/7/2020  3:40 PM CDT -----  Contact: pt kerline - Ms Casiano  Type:  Patient Returning Call    Who Called: pt   Who Left Message for Patient:SYL LARKIN  Does the patient know what this is regarding?:  Would the patient rather a call back or a response via [facility]? Call back  Best Call Back Number: [phone]   Additional Information:

## 2020-05-11 ENCOUNTER — LAB VISIT (OUTPATIENT)
Dept: LAB | Facility: HOSPITAL | Age: 70
End: 2020-05-11
Attending: PSYCHIATRY & NEUROLOGY
Payer: MEDICARE

## 2020-05-11 ENCOUNTER — HOSPITAL ENCOUNTER (OUTPATIENT)
Dept: RADIOLOGY | Facility: HOSPITAL | Age: 70
Discharge: HOME | End: 2020-05-11
Attending: INTERNAL MEDICINE
Payer: MEDICARE

## 2020-05-11 DIAGNOSIS — I63.9 CEREBROVASCULAR ACCIDENT (CVA), UNSPECIFIED MECHANISM: ICD-10-CM

## 2020-05-11 DIAGNOSIS — I50.32 CHRONIC DIASTOLIC CONGESTIVE HEART FAILURE: ICD-10-CM

## 2020-05-11 DIAGNOSIS — J44.89 ASTHMA WITH COPD: ICD-10-CM

## 2020-05-11 LAB
ALBUMIN SERPL BCP-MCNC: 2.8 G/DL (ref 3.5–5.2)
ALP SERPL-CCNC: 134 U/L (ref 55–135)
ALT SERPL W/O P-5'-P-CCNC: 10 U/L (ref 10–44)
ANION GAP SERPL CALC-SCNC: 10 MMOL/L (ref 8–16)
AST SERPL-CCNC: 17 U/L (ref 10–40)
BASOPHILS # BLD AUTO: 0.02 K/UL (ref 0–0.2)
BASOPHILS NFR BLD: 0.2 % (ref 0–1.9)
BILIRUB SERPL-MCNC: 0.4 MG/DL (ref 0.1–1)
BUN SERPL-MCNC: 15 MG/DL (ref 8–23)
CALCIUM SERPL-MCNC: 9.3 MG/DL (ref 8.7–10.5)
CHLORIDE SERPL-SCNC: 98 MMOL/L (ref 95–110)
CHOLEST SERPL-MCNC: 152 MG/DL (ref 120–199)
CHOLEST/HDLC SERPL: 4 {RATIO} (ref 2–5)
CO2 SERPL-SCNC: 32 MMOL/L (ref 23–29)
CREAT SERPL-MCNC: 1.5 MG/DL (ref 0.5–1.4)
DIFFERENTIAL METHOD: ABNORMAL
EOSINOPHIL # BLD AUTO: 0.3 K/UL (ref 0–0.5)
EOSINOPHIL NFR BLD: 3.8 % (ref 0–8)
ERYTHROCYTE [DISTWIDTH] IN BLOOD BY AUTOMATED COUNT: 15.4 % (ref 11.5–14.5)
EST. GFR  (AFRICAN AMERICAN): 53.4 ML/MIN/1.73 M^2
EST. GFR  (NON AFRICAN AMERICAN): 46.2 ML/MIN/1.73 M^2
GLUCOSE SERPL-MCNC: 115 MG/DL (ref 70–110)
HCT VFR BLD AUTO: 35 % (ref 40–54)
HDLC SERPL-MCNC: 38 MG/DL (ref 40–75)
HDLC SERPL: 25 % (ref 20–50)
HGB BLD-MCNC: 9.9 G/DL (ref 14–18)
IMM GRANULOCYTES # BLD AUTO: 0.03 K/UL (ref 0–0.04)
IMM GRANULOCYTES NFR BLD AUTO: 0.4 % (ref 0–0.5)
LDLC SERPL CALC-MCNC: 92 MG/DL (ref 63–159)
LYMPHOCYTES # BLD AUTO: 0.8 K/UL (ref 1–4.8)
LYMPHOCYTES NFR BLD: 9.3 % (ref 18–48)
MCH RBC QN AUTO: 27.1 PG (ref 27–31)
MCHC RBC AUTO-ENTMCNC: 28.3 G/DL (ref 32–36)
MCV RBC AUTO: 96 FL (ref 82–98)
MONOCYTES # BLD AUTO: 0.6 K/UL (ref 0.3–1)
MONOCYTES NFR BLD: 7 % (ref 4–15)
NEUTROPHILS # BLD AUTO: 6.5 K/UL (ref 1.8–7.7)
NEUTROPHILS NFR BLD: 79.3 % (ref 38–73)
NONHDLC SERPL-MCNC: 114 MG/DL
NRBC BLD-RTO: 0 /100 WBC
PLATELET # BLD AUTO: 219 K/UL (ref 150–350)
PMV BLD AUTO: 11.5 FL (ref 9.2–12.9)
POTASSIUM SERPL-SCNC: 3.8 MMOL/L (ref 3.5–5.1)
PROT SERPL-MCNC: 6.8 G/DL (ref 6–8.4)
RBC # BLD AUTO: 3.65 M/UL (ref 4.6–6.2)
SODIUM SERPL-SCNC: 140 MMOL/L (ref 136–145)
TRIGL SERPL-MCNC: 110 MG/DL (ref 30–150)
WBC # BLD AUTO: 8.17 K/UL (ref 3.9–12.7)

## 2020-05-11 PROCEDURE — 85025 COMPLETE CBC W/AUTO DIFF WBC: CPT

## 2020-05-11 PROCEDURE — 71046 X-RAY EXAM CHEST 2 VIEWS: CPT | Mod: 26,,, | Performed by: RADIOLOGY

## 2020-05-11 PROCEDURE — 71046 XR CHEST PA AND LATERAL: ICD-10-PCS | Mod: 26,,, | Performed by: RADIOLOGY

## 2020-05-11 PROCEDURE — 80053 COMPREHEN METABOLIC PANEL: CPT

## 2020-05-11 PROCEDURE — 80061 LIPID PANEL: CPT

## 2020-05-11 PROCEDURE — 71046 X-RAY EXAM CHEST 2 VIEWS: CPT | Mod: TC

## 2020-05-11 PROCEDURE — 80171 DRUG SCREEN QUANT GABAPENTIN: CPT

## 2020-05-11 PROCEDURE — 36415 COLL VENOUS BLD VENIPUNCTURE: CPT

## 2020-05-13 LAB — GABAPENTIN SERPLBLD-MCNC: 4.1 MCG/ML (ref 2–20)

## 2020-05-19 ENCOUNTER — OFFICE VISIT (OUTPATIENT)
Dept: PULMONOLOGY | Facility: CLINIC | Age: 70
End: 2020-05-19
Attending: INTERNAL MEDICINE
Payer: MEDICARE

## 2020-05-19 ENCOUNTER — PATIENT MESSAGE (OUTPATIENT)
Dept: PULMONOLOGY | Facility: CLINIC | Age: 70
End: 2020-05-19

## 2020-05-19 DIAGNOSIS — I50.32 CHRONIC DIASTOLIC CONGESTIVE HEART FAILURE: ICD-10-CM

## 2020-05-19 DIAGNOSIS — J44.1 COPD EXACERBATION: Primary | ICD-10-CM

## 2020-05-19 DIAGNOSIS — G47.33 OSA TREATED WITH BIPAP: ICD-10-CM

## 2020-05-19 DIAGNOSIS — R05.9 COUGH: ICD-10-CM

## 2020-05-19 PROCEDURE — 99215 PR OFFICE/OUTPT VISIT, EST, LEVL V, 40-54 MIN: ICD-10-PCS | Mod: 95,,, | Performed by: INTERNAL MEDICINE

## 2020-05-19 PROCEDURE — 99215 OFFICE O/P EST HI 40 MIN: CPT | Mod: 95,,, | Performed by: INTERNAL MEDICINE

## 2020-05-19 RX ORDER — AZITHROMYCIN 250 MG/1
TABLET, FILM COATED ORAL
Qty: 6 TABLET | Refills: 0 | Status: SHIPPED | OUTPATIENT
Start: 2020-05-19 | End: 2020-11-19

## 2020-05-19 RX ORDER — METHYLPREDNISOLONE 4 MG/1
TABLET ORAL
Qty: 1 PACKAGE | Refills: 0 | Status: SHIPPED | OUTPATIENT
Start: 2020-05-19 | End: 2020-05-22

## 2020-05-19 RX ORDER — GUAIFENESIN/DEXTROMETHORPHAN 100-10MG/5
5 SYRUP ORAL EVERY 6 HOURS PRN
Qty: 236 ML | Refills: 5 | Status: SHIPPED | OUTPATIENT
Start: 2020-05-19 | End: 2020-05-29

## 2020-05-19 NOTE — PROGRESS NOTES
Subjective:      The patient location is:  [address] [place] [id number]    The chief complaint leading to consultation is: exacerbation of chronic obstructive pulmonary disease and Obstructive Sleep Apnea - problems with mask fit  Visit type: Virtual visit with synchronous audio and video     Each patient to whom he or she provides medical services by telemedicine is:  (1) informed of the relationship between the physician and patient and the respective role of any other health care provider with respect to management of the patient; and (2) notified that he or she may decline to receive medical services by telemedicine and may withdraw from such care at any time.    Notes: Needs to go to [facility] and have mask refitted, download suggests large leak or leak in tubing       Patient ID: Rea Vail is a 69 y.o. male.    Chief Complaint:  wheezing for a few weeks    HPI     Obstructive Sleep Apnea on Continuous Positive Airway Pressure:  Patient is using CPAP as prescribed and benefiting from therapy. Patient has complaints of leak in system    COPD   Associated symptoms include wheezing, congestion, coughing and fatigue. Pertinent negatives include no abdominal pain, chest pain, fever, nausea, rash or weakness.    Increased weight gain.  COPD  He presents for evaluation and treatment of COPD. The patient is currently having symptoms / an exacerbation. Current symptoms include chronic dyspnea, cough productive of white sputum in small amounts and wheezing. Symptoms have been present since several weeks ago and have been gradually worsening. He denies chills and fever. Associated symptoms include dizziness, poor exercise tolerance and shortness of breath.  This episode appears to have been triggered by possible worsening of CHF. Treatments tried for the current exacerbation: albuterol inhaler and albuterol nebulizer. The patient has been having similar episodes for approximately 10 years. He uses 2 pillows  at night. Patient currently not asked. The patient is having no constitutional symptoms, denying fever, chills, anorexia, or weight loss. The patient has been hospitalized for this condition before. He has a history of 48 pack years. The patient is experiencing exercise intolerance (difficulty walking 10 feet on flat ground).     Dyspnea  Patient complains of shortness of breath. Symptoms occur after more than one flight stairs, with more than one block walking. Symptoms began 7 months ago, gradually improving since. Associated symptoms include  dyspnea on exertion, post nasal drip and shortness of breath. He denies chest pain, located left chest. He does not have had recent travel. Weight has been stable. Symptoms are exacerbated by moderate activity. Symptoms are alleviated by rest.     Past Medical History:   Diagnosis Date    Anemia 2013    Asthma     Atrial fibrillation     CHF (congestive heart failure)     COPD (chronic obstructive pulmonary disease) 2012    Diverticular disease     Gout 2012    Hyperlipidemia     Hypertension     GUMARO (obstructive sleep apnea) 4/27/2018    Other and unspecified hyperlipidemia     Stroke      No past surgical history on file.  Review of patient's allergies indicates:   Allergen Reactions    Cephalexin Anaphylaxis    Ferumoxytol Anaphylaxis       Current Outpatient Medications on File Prior to Visit   Medication Sig Dispense Refill    albuterol (PROVENTIL) 2.5 mg /3 mL (0.083 %) nebulizer solution Take 3 mLs (2.5 mg total) by nebulization every 6 (six) hours as needed for Wheezing. Rescue 360 mL 11    allopurinol (ZYLOPRIM) 300 MG tablet Take 300 mg by mouth once daily.      baclofen (LIORESAL) 10 MG tablet       bisoprolol (ZEBETA) 10 MG tablet bisoprolol fumarate 10 mg tablet      budesonide-formoterol 160-4.5 mcg (SYMBICORT) 160-4.5 mcg/actuation HFAA Inhale 2 puffs into the lungs 2 (two) times daily. 3 Inhaler 3    bumetanide (BUMEX) 1 MG tablet  bumetanide 1 mg tablet      cholecalciferol, vitamin D3, (VITAMIN D3) 2,000 unit Cap 1 capsule once daily.       ELIQUIS 5 mg Tab TAKE 1 TABLET BY MOUTH TWICE DAILY 60 tablet 6    HYDROcodone-acetaminophen (NORCO)  mg per tablet Take 1 tablet by mouth 2 (two) times daily.      irbesartan (AVAPRO) 150 MG tablet TAKE 1 TABLET BY MOUTH IN THE MORNING FOR BLOOD PRESSURE 90 tablet 1    lubiprostone (AMITIZA) 24 MCG Cap Take 24 mcg by mouth 2 (two) times daily with meals.      multivitamin capsule Take 1 capsule by mouth once daily.      OXYGEN-AIR DELIVERY SYSTEMS MISC by [facility].(Non-Drug; Combo Route) route.      pantoprazole (PROTONIX) 40 MG tablet Take 40 mg by mouth once daily.      polysaccharide iron complex (EZFE 200) 200 mg iron Cap EZFE 200 200 mg iron capsule   One po q day      polysaccharide iron complex (EZFE 200) 200 mg iron Cap Take 200 mg by mouth Daily.      simvastatin (ZOCOR) 40 MG tablet Take 1 tablet (40 mg total) by mouth every evening. 1 tablet Oral Every day 90 tablet 4    spironolactone (ALDACTONE) 25 MG tablet Take 25 mg by mouth every other day.       tiotropium (SPIRIVA) 18 mcg inhalation capsule Inhale 1 capsule (18 mcg total) into the lungs once daily. 90 capsule 3     No current facility-administered medications on file prior to visit.      Social History     Socioeconomic History    Marital status: Single     Spouse name: Not on file    Number of children: Not on file    Years of education: Not on file    Highest education level: Not on file   Occupational History    Not on file   Social Needs    Financial resource strain: Not on file    Food insecurity:     Worry: Not on file     Inability: Not on file    Transportation needs:     Medical: Not on file     Non-medical: Not on file   Tobacco Use    Smoking status: Former Smoker     Packs/day: 1.00     Years: 48.00     Pack years: 48.00     Types: Cigarettes     Start date: 1/1/1958     Last attempt to quit:  2006     Years since quittin.8    Smokeless tobacco: Never Used   Substance and Sexual Activity    Alcohol use: No    Drug use: No    Sexual activity: Not on file   Lifestyle    Physical activity:     Days per week: Not on file     Minutes per session: Not on file    Stress: Not on file   Relationships    Social connections:     Talks on phone: Not on file     Gets together: Not on file     Attends [employer] witness service: Not on file     Active member of club or organization: Not on file     Attends meetings of clubs or organizations: Not on file     Relationship status: Not on file   Other Topics Concern    Not on file   Social History Narrative    Not on file     @FAM@  Review of Systems   Constitutional: Positive for fatigue. Negative for fever.   HENT: Positive for postnasal drip, rhinorrhea and congestion.    Eyes: Negative for redness and itching.   Respiratory: Positive for apnea, cough, sputum production, shortness of breath, dyspnea on extertion, use of rescue inhaler and Paroxysmal Nocturnal Dyspnea.    Cardiovascular: Positive for palpitations and leg swelling. Negative for chest pain.   Genitourinary: Negative for difficulty urinating and hematuria.   Endocrine: Negative for cold intolerance and heat intolerance.    Musculoskeletal: Positive for arthralgias.   Skin: Negative for rash.   Gastrointestinal: Negative for nausea and abdominal pain.   Neurological: Negative for dizziness, syncope, weakness and light-headedness.   Hematological: Negative for adenopathy. Does not bruise/bleed easily.   Psychiatric/Behavioral: Positive for sleep disturbance. The patient is not nervous/anxious.        Objective:        There were no vitals taken for this visit.    The patient has been recording vital signs at home and the following data was reviewed to make a evaluation and management decision:  Review of data:  No flowsheet data found.  No flowsheet data found.  Wt Readings from Last 1 Encounters:  "  01/09/20 80.3 kg (177 lb)     Ht Readings from Last 1 Encounters:   01/09/20 5' 4" (1.626 m)       Constitutional: Patient is oriented to person, place and time.  He appears well developed and well nourished.  Neurologic: He is alert and orientated.  Psychiatric: He has appropriate mood and affect. His behavior during the interview as normal. Judgement and thought content - normal.      Personal Diagnostic Review  Chest x-ray: stable   X-Ray Chest PA And Lateral  Narrative: EXAMINATION:  XR CHEST PA AND LATERAL    CLINICAL HISTORY:  SOB; Chronic obstructive pulmonary disease, unspecified    TECHNIQUE:  PA and lateral views of the chest were performed.    COMPARISON:  01/09/2020 and 12/03/2018    FINDINGS:  Cardiac silhouette and mediastinal contours are unchanged.  Lungs demonstrate no acute opacity.  Similar diffuse interstitial lung coarsening and bibasilar atelectasis/scarring remains.  No large pleural effusion.  Osseous structures are intact.  Impression: Similar appearance of the chest without acute process.    Electronically signed by: Dell Kwong MD  Date:    05/11/2020  Time:    08:45      Office Spirometry Results:     No flowsheet data found.  Pulmonary Studies Review 1/9/2020   SpO2 95   Ordering Provider -   Interpreting Provider -   Performing nurse/tech/RT -   Diagnosis -   Height 64.000   Weight 2832   BMI (Calculated) 30.4   Predicted Distance 323.72   Patient Race -   6MWT Status -   Patient Reported -   Was O2 used? -   Delivery Method -   6MW Distance walked (feet) -   Distance walked (meters) -   Did patient stop? -   Type of assistive device(s) used? -   Is extra documentation required for this patient? -   Oxygen Saturation -   Supplemental Oxygen -   Heart Rate -   Blood Pressure -   Srinath Dyspnea Rating  -   Oxygen Saturation -   Supplemental Oxygen -   Heart Rate -   Blood Pressure -   Srinath Dyspnea Rating  -   Recovery Time (seconds) -   Oxygen Saturation -   Supplemental Oxygen - "   Heart Rate -   Blood Pressure -   Srinath Dyspnea Rating  -   Is procedure ready for interpretation? -   Did the patient stop or pause? -   Total Time Walked (Calculated) -   Total Laps Walked -   Final Partial Lap Distance (feet) -   Total Distance Feet (Calculated) -   Total Distance Meters (Calculated) -   Predicted Distance Meters (Calculated) 424.16   Percentage of Predicted (Calculated) -   Peak VO2 (Calculated) -   Mets -   Has The Patient Had a Previous Six Minute Walk Test? -   Oxygen Qualification? -   Oxygen Saturation -   Supplemental Oxygen -   Heart Rate -   Blood Pressure -   Srinath Dyspnea Rating  -   Oxygen Saturation -   Supplemental Oxygen -   Heart Rate -   Blood Pressure -   Srinath Dyspnea Rating  -   Recovery Time (seconds) -   Oxygen Saturation -   Supplemental Oxygen -   Heart Rate -   Blood Pressure -   Srinath Dyspnea Rating  -     Echo Complete3/2/2019  [facility]  Component Name Value Ref Range   AV regurgitation pressure 1/2 time 636.00 ms   PISA AR VN Nyquist 271.00 cm/s   Ao peak wilmer 305.00 cm/s   AV peak gradient 37 mmHg   AV mean gradient 23.00 mmHg   Ao VTI 54.90 cm   Aortic Valve Area by Continuity of VTI 1.55 cm2   IVS 1.34 (A) 0.6 - 1.1 cm   LVIDD 4.33 3.5 - 6 cm   LVIDS 2.84 2.1 - 4 cm   LVOT diameter 2.10 cm   FM LV CVOD LVOT 2.10 cm   LVOT peak VTI 22.80 cm   PW 1.24 (A) 0.6 - 1.1 cm   FS 34 28 - 44 %   Interventricular Septum/Left Ventricular PWD by 2D 1.08     LVOT area 3.46 cm2   LA size 4.60 cm   Left Atrium to Aortic Root Ratio 1.64     MV stenosis pressure 1/2 time 52.00 ms   MV stenosis pressure 1/2 time 52.00 ms   MV Peak E Wilmer 143.00 cm/s   Echo RV Mid 2.92 cm   PW 0.57 0.6 - 1.1 cm   LV mass 207.57 g   Sinus 2.8 cm   Ascending aorta 3.5 cm   AV valve area 1.44 cm2   MV valve area p 1/2 method 4.23 cm2   LVOT stroke volume 78.93 cm3   TV rest pulmonary artery pressure 53 mmHg   Echo EF Estimated 60 %    Mitral Valve Pressure Half-time 52 ms   Tapse 2.4 cm   Other Result Information   This result has an attachment that is not available.   Result Narrative   There is mild left ventricular hypertrophy with preserved function with   estimated left ventricular ejection fraction of 60-65%.  The right ventricle is mildly enlarged with preserved function.  There is mild aortic insufficiency.  There is moderate aortic stenosis.    The aortic valve is calcified.  There is mild to moderate mitral regurgitation.  The pericardium is within normal limits.             Assessment:            COPD exacerbation  -     methylPREDNISolone (MEDROL DOSEPACK) 4 mg tablet; use as directed  Dispense: 1 Package; Refill: 0  -     azithromycin (ZITHROMAX Z-XIOMARA) 250 MG tablet; 500 mg on day 1 (two tablets) followed by 250 mg once daily on days 2-5  Dispense: 6 tablet; Refill: 0  -     NEBULIZER KIT (SUPPLIES) FOR HOME USE    Chronic diastolic congestive heart failure    Cough  -     dextromethorphan-guaifenesin  mg/5 ml (ROBITUSSIN-DM)  mg/5 mL liquid; Take 5 mLs by mouth every 6 (six) hours as needed (for cough).  Dispense: 236 mL; Refill: 5    GUMARO treated with BiPAP  -     CPAP/BIPAP SUPPLIES          Outpatient Encounter Medications as of 5/19/2020   Medication Sig Dispense Refill    albuterol (PROVENTIL) 2.5 mg /3 mL (0.083 %) nebulizer solution Take 3 mLs (2.5 mg total) by nebulization every 6 (six) hours as needed for Wheezing. Rescue 360 mL 11    allopurinol (ZYLOPRIM) 300 MG tablet Take 300 mg by mouth once daily.      azithromycin (ZITHROMAX Z-XIOMARA) 250 MG tablet 500 mg on day 1 (two tablets) followed by 250 mg once daily on days 2-5 6 tablet 0    baclofen (LIORESAL) 10 MG tablet       bisoprolol (ZEBETA) 10 MG tablet bisoprolol fumarate 10 mg tablet      budesonide-formoterol 160-4.5 mcg (SYMBICORT) 160-4.5 mcg/actuation HFAA Inhale 2 puffs into the lungs 2 (two) times daily. 3 Inhaler 3    bumetanide (BUMEX) 1 MG  tablet bumetanide 1 mg tablet      cholecalciferol, vitamin D3, (VITAMIN D3) 2,000 unit Cap 1 capsule once daily.       dextromethorphan-guaifenesin  mg/5 ml (ROBITUSSIN-DM)  mg/5 mL liquid Take 5 mLs by mouth every 6 (six) hours as needed (for cough). 236 mL 5    ELIQUIS 5 mg Tab TAKE 1 TABLET BY MOUTH TWICE DAILY 60 tablet 6    HYDROcodone-acetaminophen (NORCO)  mg per tablet Take 1 tablet by mouth 2 (two) times daily.      irbesartan (AVAPRO) 150 MG tablet TAKE 1 TABLET BY MOUTH IN THE MORNING FOR BLOOD PRESSURE 90 tablet 1    lubiprostone (AMITIZA) 24 MCG Cap Take 24 mcg by mouth 2 (two) times daily with meals.      methylPREDNISolone (MEDROL DOSEPACK) 4 mg tablet use as directed 1 Package 0    multivitamin capsule Take 1 capsule by mouth once daily.      OXYGEN-AIR DELIVERY SYSTEMS MISC by [facility].(Non-Drug; Combo Route) route.      pantoprazole (PROTONIX) 40 MG tablet Take 40 mg by mouth once daily.      polysaccharide iron complex (EZFE 200) 200 mg iron Cap EZFE 200 200 mg iron capsule   One po q day      polysaccharide iron complex (EZFE 200) 200 mg iron Cap Take 200 mg by mouth Daily.      simvastatin (ZOCOR) 40 MG tablet Take 1 tablet (40 mg total) by mouth every evening. 1 tablet Oral Every day 90 tablet 4    spironolactone (ALDACTONE) 25 MG tablet Take 25 mg by mouth every other day.       tiotropium (SPIRIVA) 18 mcg inhalation capsule Inhale 1 capsule (18 mcg total) into the lungs once daily. 90 capsule 3     No facility-administered encounter medications on file as of 2020.      Plan:       Requested Prescriptions     Signed Prescriptions Disp Refills    methylPREDNISolone (MEDROL DOSEPACK) 4 mg tablet 1 Package 0     Sig: use as directed    azithromycin (ZITHROMAX Z-XIOMARA) 250 MG tablet 6 tablet 0     Si mg on day 1 (two tablets) followed by 250 mg once daily on days 2-5    dextromethorphan-guaifenesin  mg/5 ml (ROBITUSSIN-DM)  mg/5 mL liquid 236  mL 5     Sig: Take 5 mLs by mouth every 6 (six) hours as needed (for cough).     Problem List Items Addressed This Visit     Chronic diastolic congestive heart failure    Overview     Overview:   Last Assessment & Plan:   Ambulatory referral to CARDIOLOGY - HEART FAILURE  Preload and afterload reduction. Furosemide 40mg po daily + Metolazone 2.5 mg PO daily.   BYSTOLIC, AVAPRO, COREG  Daily weighing. WEIGHT GOAL   - 170  LBS  Dietary salt restriction.  Alcohol and tobacco avoidance.           Other Visit Diagnoses     COPD exacerbation    -  Primary    Relevant Medications    methylPREDNISolone (MEDROL DOSEPACK) 4 mg tablet    azithromycin (ZITHROMAX Z-XIOMARA) 250 MG tablet    Other Relevant Orders    NEBULIZER KIT (SUPPLIES) FOR HOME USE    Cough        Relevant Medications    dextromethorphan-guaifenesin  mg/5 ml (ROBITUSSIN-DM)  mg/5 mL liquid    GUMARO treated with BiPAP        Relevant Orders    CPAP/BIPAP SUPPLIES             Follow up in about 6 months (around 11/19/2020) for CPAP download - review on day of visit.    MEDICAL DECISION MAKING: Moderate to high complexity.  Overall, the multiple problems listed are of moderate to high severity that may impact quality of life and activities of daily living. Side effects of medications, treatment plan as well as options and alternatives reviewed and discussed with patient. There was counseling of patient concerning these issues.    Total time spent in counseling and coordination of care - 40  minutes over 50% of time was used in discussion of prognosis, risks, benefits of treatment, instructions and compliance with regimen . Discussion with other physicians or health care providers (DME, NP, pharmacy, respiratory therapy) occurred.     This service was not originating from a related E/M service provided within the previous 7 days nor will  to an E/M service or procedure within the next 24 hours or my soonest available appointment.  Prevailing  standard of care was able to be met in this visit.

## 2020-05-19 NOTE — LETTER
May 19, 2020      Health Management Services           [facility]  [address] [place] [id number]  Phone: [phone]  Fax: [phone]          Patient: Rea Vail   MR Number: [id number]   YOB: 1950   Date of Visit: 5/19/2020           Thank you for referring Rea Vail to me for evaluation. Attached you will find relevant portions of my assessment and plan of care.    If you have questions, please do not hesitate to call me. I look forward to following Rea Vail along with you.    Sincerely,    Bob Ziegler MD    Enclosure  CC:  No Recipients    IIf you would like to receive this communication electronically, please contact [email or web address] or [phone] to request [employer] access.    [employer] Link is a tool which provides read-only access to select patient information with whom you have a relationship. Its easy to use and provides real time access to review your patients record including encounter summaries, notes, results, and demographic information.    If you feel you have received this communication in error or would no longer like to receive these types of communications, please e-mail [email or web address]

## 2020-05-20 DIAGNOSIS — R20.8 DYSESTHESIA: ICD-10-CM

## 2020-05-20 DIAGNOSIS — G47.34 NOCTURNAL HYPOXEMIA: ICD-10-CM

## 2020-05-20 DIAGNOSIS — J96.12 CHRONIC RESPIRATORY FAILURE WITH HYPOXIA AND HYPERCAPNIA: ICD-10-CM

## 2020-05-20 DIAGNOSIS — J96.11 CHRONIC RESPIRATORY FAILURE WITH HYPOXIA AND HYPERCAPNIA: ICD-10-CM

## 2020-05-20 DIAGNOSIS — G47.33 OSA TREATED WITH BIPAP: Primary | ICD-10-CM

## 2020-05-20 DIAGNOSIS — G47.33 OSA (OBSTRUCTIVE SLEEP APNEA): ICD-10-CM

## 2020-05-21 DIAGNOSIS — J44.1 COPD EXACERBATION: ICD-10-CM

## 2020-05-22 RX ORDER — METHYLPREDNISOLONE 4 MG/1
TABLET ORAL
Qty: 1 PACKAGE | Refills: 0 | Status: SHIPPED | OUTPATIENT
Start: 2020-05-22 | End: 2020-11-19

## 2020-07-09 NOTE — HPI
67 year old male who  has a past medical history of Anemia (2013); Asthma; CHF (congestive heart failure); COPD (chronic obstructive pulmonary disease); COPD (chronic obstructive pulmonary disease) (2012); Diverticular disease; Gout (2012); Hyperlipidemia; Hypertension; Other and unspecified hyperlipidemia; and Stroke admitted with acute hypoxemic respiratory failure secondary to decompensated heart failure / Afib with RVR.    Labor & Delivery Progress Note





- Subjective


Subjective: comfortable





- Objective


Vital signs reviewed and normal: yes


General: NAD


Uterine fundus: non tender


SVE: 9


Dilation: anterior lip


Effacement: 90%


Station: 0


FHT: category 1, variability present


[place] contractions every: 3-4


Procedures: AROM


AROM: clear fluid


Plan: continue plan of care


-: 





1. Term sIUP labor


-9/90/0 (1000) from 5/90/-1


-cousin with 2 children with trisomy 21-cfDNA negative


-bedside US shows vertex baby


-CTX q3-4min on monitor, will IV hydrate


-FHT: Cat I with acels


-frothy discharge present- positive for BV


   -will need tx post partum


-Pt IS GBS neg per lab report


-Epidrual received


-Continue monitoring, check q1 hrs

## 2020-10-08 ENCOUNTER — HOSPITAL ENCOUNTER (OUTPATIENT)
Dept: RADIOLOGY | Facility: HOSPITAL | Age: 70
Discharge: HOME | End: 2020-10-08
Attending: PSYCHIATRY & NEUROLOGY
Payer: MEDICARE

## 2020-10-08 DIAGNOSIS — I63.011 CEREBROVASCULAR ACCIDENT (CVA) DUE TO THROMBOSIS OF RIGHT VERTEBRAL ARTERY: ICD-10-CM

## 2020-10-08 PROCEDURE — 70551 MRI BRAIN STEM W/O DYE: CPT | Mod: TC

## 2020-10-19 ENCOUNTER — PATIENT MESSAGE (OUTPATIENT)
Dept: PULMONOLOGY | Facility: CLINIC | Age: 70
End: 2020-10-19

## 2020-11-16 ENCOUNTER — TELEPHONE (OUTPATIENT)
Dept: PULMONOLOGY | Facility: CLINIC | Age: 70
End: 2020-11-16

## 2020-11-19 ENCOUNTER — OFFICE VISIT (OUTPATIENT)
Dept: PULMONOLOGY | Facility: CLINIC | Age: 70
End: 2020-11-19
Payer: MEDICARE

## 2020-11-19 VITALS
BODY MASS INDEX: 32.43 KG/M2 | RESPIRATION RATE: 20 BRPM | HEART RATE: 94 BPM | HEIGHT: 64 IN | DIASTOLIC BLOOD PRESSURE: 80 MMHG | SYSTOLIC BLOOD PRESSURE: 132 MMHG | OXYGEN SATURATION: 97 % | WEIGHT: 189.94 LBS

## 2020-11-19 DIAGNOSIS — G47.33 OSA TREATED WITH BIPAP: Primary | Chronic | ICD-10-CM

## 2020-11-19 DIAGNOSIS — J44.9 COPD, SEVERE: ICD-10-CM

## 2020-11-19 DIAGNOSIS — I50.32 CHRONIC DIASTOLIC CONGESTIVE HEART FAILURE: ICD-10-CM

## 2020-11-19 DIAGNOSIS — I27.20 PULMONARY HYPERTENSION: ICD-10-CM

## 2020-11-19 PROCEDURE — 99214 OFFICE O/P EST MOD 30 MIN: CPT | Mod: PBBFAC | Performed by: NURSE PRACTITIONER

## 2020-11-19 PROCEDURE — 99214 OFFICE O/P EST MOD 30 MIN: CPT | Mod: S$PBB,,, | Performed by: NURSE PRACTITIONER

## 2020-11-19 PROCEDURE — 99999 PR PBB SHADOW E&M-EST. PATIENT-LVL IV: ICD-10-PCS | Mod: PBBFAC,,, | Performed by: NURSE PRACTITIONER

## 2020-11-19 PROCEDURE — 99999 PR PBB SHADOW E&M-EST. PATIENT-LVL IV: CPT | Mod: PBBFAC,,, | Performed by: NURSE PRACTITIONER

## 2020-11-19 PROCEDURE — 99214 PR OFFICE/OUTPT VISIT, EST, LEVL IV, 30-39 MIN: ICD-10-PCS | Mod: S$PBB,,, | Performed by: NURSE PRACTITIONER

## 2020-11-19 RX ORDER — LACTULOSE 10 G/15ML
SOLUTION ORAL
COMMUNITY
Start: 2020-10-24 | End: 2023-10-02

## 2020-11-19 NOTE — PROGRESS NOTES
Subjective:      Patient ID: Rea Vail is a 70 y.o. male.    Chief Complaint: Sleep Apnea, COPD, and Pulmonary Hypertension      HPI: Rea Vail is here with his niece,Ana Vail, for follow up for GUMARO and BIPAP complaince assessment.   He is on BIPAP of BIPAP IPAP 13 cm EPAP 8 cm  which is optimally controlling sleep apnea with apneic index (AHI) 6.5 events an hour.   He is  compliant with BIPAP use. Complaince download today reveals 97% of days with greater than 4 hours of device use.   Patient reports benefit from BIPAP use and denies snoring and excessive daytime sleepiness.  Patient reports no complaints    Full face mask   HME: [facility]       COPD   Stable on Symbicort, Spiriva, albuterol nebs.   Occasional cough.  No wheezing feels well controlled on current regimen    Previous Report Reviewed: lab reports and office notes     Past Medical History: The following portions of the patient's history were reviewed and updated as appropriate:   He  has no past surgical history on file.  His family history includes Stroke in his cousin.  He  reports that he quit smoking about 14 years ago. His smoking use included cigarettes. He started smoking about 62 years ago. He has a 48.00 pack-year smoking history. He has never used smokeless tobacco. He reports that he does not drink alcohol or use drugs.  He has a current medication list which includes the following prescription(s): albuterol, baclofen, bisoprolol, eliquis, hydrocodone-acetaminophen, lactulose 10 gram/15 ml, multivitamin, oxygen-air delivery systems, simvastatin, spiriva with handihaler, spironolactone, budesonide-formoterol 160-4.5 mcg, bumetanide, and irbesartan.  He is allergic to cephalexin and ferumoxytol..    The following portions of the patient's history were reviewed and updated as appropriate: allergies, current medications, past family history, past medical history, past social history, past surgical history and problem  "list.    Review of Systems   Constitutional: Negative for fever, chills, weight loss, weight gain, activity change, appetite change, fatigue and night sweats.   HENT: Negative for postnasal drip, rhinorrhea, sinus pressure, voice change and congestion.    Eyes: Negative for redness and itching.   Respiratory: Negative for snoring, cough, sputum production, chest tightness, shortness of breath, wheezing, orthopnea, asthma nighttime symptoms, dyspnea on extertion, use of rescue inhaler and somnolence.    Cardiovascular: Negative.  Negative for chest pain, palpitations and leg swelling.   Genitourinary: Negative for difficulty urinating and hematuria.   Endocrine: Negative for cold intolerance and heat intolerance.    Musculoskeletal: Negative for arthralgias, gait problem, joint swelling and myalgias.   Skin: Negative.    Gastrointestinal: Negative for nausea, vomiting, abdominal pain and acid reflux.   Neurological: Negative for dizziness, weakness, light-headedness and headaches.   Hematological: Negative for adenopathy. No excessive bruising.   All other systems reviewed and are negative.     Objective:   /80   Pulse 94   Resp 20   Ht 5' 4" (1.626 m)   Wt 86.1 kg (189 lb 14.8 oz)   SpO2 97%   BMI 32.60 kg/m²   Physical Exam  Vitals signs and nursing note reviewed.   Constitutional:       General: He is not in acute distress.     Appearance: He is well-developed. He is obese. He is not ill-appearing or toxic-appearing.   HENT:      Head: Normocephalic and atraumatic.      Right Ear: External ear normal.      Left Ear: External ear normal.      Nose: Nose normal.      Mouth/Throat:      Pharynx: No oropharyngeal exudate.   Eyes:      Conjunctiva/sclera: Conjunctivae normal.   Neck:      Musculoskeletal: Normal range of motion and neck supple.   Cardiovascular:      Rate and Rhythm: Normal rate and regular rhythm.      Heart sounds: Normal heart sounds.   Pulmonary:      Effort: Pulmonary effort is normal. "      Breath sounds: Normal breath sounds.   Abdominal:      Palpations: Abdomen is soft.   Skin:     General: Skin is warm and dry.   Neurological:      Mental Status: He is alert and oriented to person, place, and time.   Psychiatric:         Behavior: Behavior normal. Behavior is cooperative.         Thought Content: Thought content normal.         Judgment: Judgment normal.       Personal Diagnostic Review  BIPAP download  BIPAP IPAP 13 cm EPAP 8 cm     Assessment:     1. GUMARO treated with BiPAP    2. Pulmonary hypertension    3. COPD, severe    4. Chronic diastolic congestive heart failure        Orders Placed This Encounter   Procedures    CPAP/BIPAP SUPPLIES     Benefits and compliant  90 day supply. 4 refills  HME: [facility]     Order Specific Question:   Type of mask (//), 1 per 90 days:     Answer:   FFM     Order Specific Question:   Headgear?     Answer:   Yes     Order Specific Question:   Tubing (/), 1 per 90 days:     Answer:   Yes     Order Specific Question:   Humidifier chamber (), 1 per 180 days:     Answer:   Yes     Order Specific Question:   Chin strap (), 1 per 180 days:     Answer:   Yes     Order Specific Question:   Are disposable filters needed?     Answer:   Yes     Order Specific Question:    Disposable Filter, 6 per 90 days     Answer:   Yes     Order Specific Question:   Cushions/Pillows (// ):     Answer:   Yes     Order Specific Question:   Length of need (1-99 months):     Answer:   99    X-Ray Chest PA And Lateral     Standing Status:   Future     Standing Expiration Date:   11/19/2021     Order Specific Question:   May the Radiologist modify the order per protocol to meet the clinical needs of the patient?     Answer:   Yes     Plan:     Problem List Items Addressed This Visit     Pulmonary hypertension    Relevant Orders    X-Ray Chest PA And Lateral    GUMARO treated with BiPAP - Primary (Chronic)     Benefits and compliant with  BIPAP IPAP 13 cm EPAP 8 cm    AHI 6.5  Full face mask  HME: [facility]            Relevant Orders    CPAP/BIPAP SUPPLIES    COPD, severe     Assessment:  COPD stable.   Plan: SYMBICORT, PROVENTIL, PROVENTIL HFA.   Current treatment plan is effective, no change in therapy.   Chest x-ray follow-up in 6 months as scheduled with Dr. Don         Relevant Orders    X-Ray Chest PA And Lateral    Chronic diastolic congestive heart failure     Managed by Cardiology         Relevant Orders    X-Ray Chest PA And Lateral         ([facility]  Reviewed therapeutic goals for positive airway pressure therapy BIPAP  Ideal is usage 100% of nights for 6 - 8 hours per night. Minimum usage is 70% of night for at least 4 hours per night used.     Follow up in about 6 months (around 5/19/2021) for COPD/pul htn/michael on bipap review cxr w/dr don.

## 2020-11-20 PROBLEM — G47.33 OSA TREATED WITH BIPAP: Chronic | Status: ACTIVE | Noted: 2018-04-27

## 2020-11-20 NOTE — ASSESSMENT & PLAN NOTE
Assessment:  COPD stable.   Plan: SYMBICORT, PROVENTIL, PROVENTIL HFA.   Current treatment plan is effective, no change in therapy.   Chest x-ray follow-up in 6 months as scheduled with Dr. Ziegler

## 2020-11-21 ENCOUNTER — HOSPITAL ENCOUNTER (OUTPATIENT)
Dept: RADIOLOGY | Facility: HOSPITAL | Age: 70
Discharge: HOME | End: 2020-11-21
Attending: NURSE PRACTITIONER
Payer: MEDICARE

## 2020-11-21 DIAGNOSIS — J44.9 COPD, SEVERE: ICD-10-CM

## 2020-11-21 DIAGNOSIS — I50.32 CHRONIC DIASTOLIC CONGESTIVE HEART FAILURE: ICD-10-CM

## 2020-11-21 DIAGNOSIS — I27.20 PULMONARY HYPERTENSION: ICD-10-CM

## 2020-11-21 PROCEDURE — 71046 X-RAY EXAM CHEST 2 VIEWS: CPT | Mod: 26,,, | Performed by: RADIOLOGY

## 2020-11-21 PROCEDURE — 71046 X-RAY EXAM CHEST 2 VIEWS: CPT | Mod: TC

## 2020-11-21 PROCEDURE — 71046 XR CHEST PA AND LATERAL: ICD-10-PCS | Mod: 26,,, | Performed by: RADIOLOGY

## 2020-11-30 ENCOUNTER — PATIENT MESSAGE (OUTPATIENT)
Dept: PULMONOLOGY | Facility: CLINIC | Age: 70
End: 2020-11-30

## 2020-11-30 DIAGNOSIS — G47.34 NOCTURNAL HYPOXEMIA: ICD-10-CM

## 2020-11-30 DIAGNOSIS — G47.33 OSA TREATED WITH BIPAP: Primary | ICD-10-CM

## 2020-12-01 ENCOUNTER — PATIENT MESSAGE (OUTPATIENT)
Dept: PULMONOLOGY | Facility: CLINIC | Age: 70
End: 2020-12-01

## 2020-12-01 NOTE — TELEPHONE ENCOUNTER
Will need to perform an overnight O2 saturation test while you are on bi-level positive airlway pressure and breathing room air.  I have ordered this test and my office will be calling you to set it up

## 2020-12-02 ENCOUNTER — TELEPHONE (OUTPATIENT)
Dept: PULMONOLOGY | Facility: CLINIC | Age: 70
End: 2020-12-02

## 2020-12-02 NOTE — TELEPHONE ENCOUNTER
----- Message from Gopal Weller sent at 12/2/2020 10:01 AM CST -----  Pt would like return call regarding scheduling overnight O2 saturation.   Please juan back at [phone].   Md Juan M

## 2020-12-06 ENCOUNTER — PATIENT MESSAGE (OUTPATIENT)
Dept: PULMONOLOGY | Facility: CLINIC | Age: 70
End: 2020-12-06

## 2020-12-07 ENCOUNTER — CLINICAL SUPPORT (OUTPATIENT)
Dept: PULMONOLOGY | Facility: CLINIC | Age: 70
End: 2020-12-07
Payer: MEDICARE

## 2020-12-07 DIAGNOSIS — G47.34 NOCTURNAL HYPOXEMIA: ICD-10-CM

## 2020-12-07 DIAGNOSIS — G47.33 OSA TREATED WITH BIPAP: ICD-10-CM

## 2020-12-07 PROCEDURE — 94762 N-INVAS EAR/PLS OXIMTRY CONT: CPT | Mod: PBBFAC

## 2020-12-08 NOTE — PROCEDURES
[facility]  [address] * [place] [address]  Telephone: [phone]  Test date: 20 Start: 20 19:31:11 Rea Vail  Doctor: Dr. Ziegler End: 20 04:54:15 [id number]  Oximetry: Summary Report  Comments: Bipap  & Room Air  Recording time: 09:23:04 Highest pulse: 98 Highest SpO2: 100%  Excluded samplin:18:44 Lowest pulse: 48 Lowest SpO2: 72%  Total valid samplin:: Mean pulse: 71 Mean SpO2: 92.7%  1 S.D.: 6.7 1 S.D.: 3.2  Time with SpO2<90: 0:51:16, 9.4%  Time with SpO2<80: 0:02:32, 0.5%  Time with SpO2<70: 0:00:00, 0.0%  Time with SpO2<60: 0:00:00, 0.0%  Time with SpO2<89: 0:28:32, 5.2%  Time with SpO2 =>90: 8:13:04, 90.6%  Time with SpO2=>80 & <90: 0:48:44, 9.0%  Time with SpO2=>70 & <80: 0:02:32, 0.5%  Time with SpO2=>60 & <70: 0:00:00, 0.0%  The longest continuous time with saturation <=88 was 00:01:52, which started at  20 03:17:03.  A desaturation event was defined as a decrease of saturation by 4 or more.  9 events were excluded due to artifact.  There were 15 desaturation events over 3 minutes duration.  There were 151 desaturation events of less than 3 minutes duration during which:  The mean high was 96.3%. The mean low was 90.6%.  The number of these events that were:  > 0 & <10 seconds: 2 > 0 seconds: 151  =>10 & <20 seconds: 35 =>10 seconds: 149  =>20 & <30 seconds: 55 =>20 seconds: 114  =>30 & <40 seconds: 12 =>30 seconds: 59  =>40 & <50 seconds: 12 =>40 seconds: 47  =>50 & <60 seconds: 6 =>50 seconds: 35  =>60 seconds: 29 =>60 seconds: 29  The mean length of desaturation events that were >=10 sec & <=3 mins was: 40.9 sec.  Desaturation event index (events >=10 sec per sampled hour): 16.4  Desaturation event index (events >= 0 sec per sampled hour): 16.6    Overnight Oxygen Saturation Study:    INTERPRETATION:  Conditions of Test: Room air stable outpatient on BiPAP .  Interpretation of results of overnight oxygen saturation study. This  was a technically  adequate study. Overnight oxygen saturation study is abnormal with O2 saturation less than 88%.   Time with SpO2<89: 0:28:32, 5.2%of the study period. Lowest oxygen saturation recorded was 72% .  Clinical correlation suggested.  Bob Ziegler MD    Medicare Criteria Comments:   Overnight Oximetry test results suggest the patient does fall under Medicare Group 1 Criteria and would be eligible for oxygen prescription.   (Arterial oxygen saturation at or below 88% for at least 5 minutes taken during sleep on stable outpatient)    Details about Medicare Group Criteria coverage can be found at http://www.cms.hhs.gov/manuals/downloads/

## 2020-12-09 ENCOUNTER — PATIENT MESSAGE (OUTPATIENT)
Dept: PULMONOLOGY | Facility: CLINIC | Age: 70
End: 2020-12-09

## 2020-12-09 DIAGNOSIS — G47.34 NOCTURNAL HYPOXEMIA: Primary | ICD-10-CM

## 2020-12-09 DIAGNOSIS — G47.33 OSA TREATED WITH BIPAP: ICD-10-CM

## 2021-01-05 ENCOUNTER — PATIENT MESSAGE (OUTPATIENT)
Dept: PULMONOLOGY | Facility: CLINIC | Age: 71
End: 2021-01-05

## 2021-01-05 DIAGNOSIS — J44.89 ASTHMA WITH COPD: ICD-10-CM

## 2021-01-05 RX ORDER — BUDESONIDE AND FORMOTEROL FUMARATE DIHYDRATE 160; 4.5 UG/1; UG/1
2 AEROSOL RESPIRATORY (INHALATION) 2 TIMES DAILY
Qty: 3 INHALER | Refills: 3 | Status: SHIPPED | OUTPATIENT
Start: 2021-01-05 | End: 2021-02-10 | Stop reason: SDUPTHER

## 2021-02-10 ENCOUNTER — OFFICE VISIT (OUTPATIENT)
Dept: PULMONOLOGY | Facility: CLINIC | Age: 71
End: 2021-02-10
Payer: MEDICARE

## 2021-02-10 VITALS
DIASTOLIC BLOOD PRESSURE: 64 MMHG | HEIGHT: 67 IN | RESPIRATION RATE: 18 BRPM | BODY MASS INDEX: 27.86 KG/M2 | SYSTOLIC BLOOD PRESSURE: 122 MMHG | OXYGEN SATURATION: 93 % | WEIGHT: 177.5 LBS | HEART RATE: 80 BPM

## 2021-02-10 DIAGNOSIS — J44.9 COPD, SEVERE: ICD-10-CM

## 2021-02-10 DIAGNOSIS — R93.89 ABNORMAL CXR: ICD-10-CM

## 2021-02-10 DIAGNOSIS — J96.11 CHRONIC RESPIRATORY FAILURE WITH HYPOXIA AND HYPERCAPNIA: ICD-10-CM

## 2021-02-10 DIAGNOSIS — J96.12 CHRONIC RESPIRATORY FAILURE WITH HYPOXIA AND HYPERCAPNIA: ICD-10-CM

## 2021-02-10 DIAGNOSIS — G47.33 OSA TREATED WITH BIPAP: Primary | Chronic | ICD-10-CM

## 2021-02-10 DIAGNOSIS — J44.89 ASTHMA WITH COPD: ICD-10-CM

## 2021-02-10 PROCEDURE — 99215 OFFICE O/P EST HI 40 MIN: CPT | Mod: S$PBB,,, | Performed by: INTERNAL MEDICINE

## 2021-02-10 PROCEDURE — 99215 PR OFFICE/OUTPT VISIT, EST, LEVL V, 40-54 MIN: ICD-10-PCS | Mod: S$PBB,,, | Performed by: INTERNAL MEDICINE

## 2021-02-10 PROCEDURE — 99215 OFFICE O/P EST HI 40 MIN: CPT | Mod: PBBFAC | Performed by: INTERNAL MEDICINE

## 2021-02-10 PROCEDURE — 99999 PR PBB SHADOW E&M-EST. PATIENT-LVL V: CPT | Mod: PBBFAC,,, | Performed by: INTERNAL MEDICINE

## 2021-02-10 PROCEDURE — 99999 PR PBB SHADOW E&M-EST. PATIENT-LVL V: ICD-10-PCS | Mod: PBBFAC,,, | Performed by: INTERNAL MEDICINE

## 2021-02-10 RX ORDER — ALBUTEROL SULFATE 0.83 MG/ML
2.5 SOLUTION RESPIRATORY (INHALATION)
Qty: 300 ML | Refills: 11 | Status: SHIPPED | OUTPATIENT
Start: 2021-02-10 | End: 2021-09-27 | Stop reason: SDUPTHER

## 2021-02-10 RX ORDER — METOPROLOL SUCCINATE 100 MG/1
100 TABLET, EXTENDED RELEASE ORAL
COMMUNITY
Start: 2021-01-28 | End: 2022-01-28

## 2021-02-10 RX ORDER — TIOTROPIUM BROMIDE 18 UG/1
18 CAPSULE ORAL; RESPIRATORY (INHALATION) DAILY
Qty: 90 CAPSULE | Refills: 3 | Status: SHIPPED | OUTPATIENT
Start: 2021-02-10 | End: 2021-09-27 | Stop reason: SDUPTHER

## 2021-02-10 RX ORDER — BUDESONIDE AND FORMOTEROL FUMARATE DIHYDRATE 160; 4.5 UG/1; UG/1
2 AEROSOL RESPIRATORY (INHALATION) 2 TIMES DAILY
Qty: 3 INHALER | Refills: 3 | Status: SHIPPED | OUTPATIENT
Start: 2021-02-10 | End: 2021-09-27 | Stop reason: SDUPTHER

## 2021-02-10 RX ORDER — RISPERIDONE 0.5 MG/1
TABLET ORAL
COMMUNITY
Start: 2021-01-28

## 2021-03-31 ENCOUNTER — HOSPITAL ENCOUNTER (OUTPATIENT)
Dept: RADIOLOGY | Facility: HOSPITAL | Age: 71
Discharge: HOME | End: 2021-03-31
Attending: INTERNAL MEDICINE
Payer: MEDICARE

## 2021-03-31 ENCOUNTER — OFFICE VISIT (OUTPATIENT)
Dept: PULMONOLOGY | Facility: CLINIC | Age: 71
End: 2021-03-31
Payer: MEDICARE

## 2021-03-31 VITALS
SYSTOLIC BLOOD PRESSURE: 126 MMHG | BODY MASS INDEX: 27.09 KG/M2 | DIASTOLIC BLOOD PRESSURE: 74 MMHG | HEART RATE: 74 BPM | RESPIRATION RATE: 16 BRPM | HEIGHT: 67 IN | WEIGHT: 172.63 LBS | OXYGEN SATURATION: 96 %

## 2021-03-31 DIAGNOSIS — G47.33 OSA TREATED WITH BIPAP: Primary | ICD-10-CM

## 2021-03-31 DIAGNOSIS — R09.02 EXERCISE HYPOXEMIA: ICD-10-CM

## 2021-03-31 DIAGNOSIS — R93.89 ABNORMAL CXR: ICD-10-CM

## 2021-03-31 DIAGNOSIS — J44.89 ASTHMA WITH COPD: ICD-10-CM

## 2021-03-31 DIAGNOSIS — J98.11 ATELECTASIS OF RIGHT LUNG: ICD-10-CM

## 2021-03-31 PROCEDURE — 71046 X-RAY EXAM CHEST 2 VIEWS: CPT | Mod: TC

## 2021-03-31 PROCEDURE — 71046 XR CHEST PA AND LATERAL: ICD-10-PCS | Mod: 26,,, | Performed by: RADIOLOGY

## 2021-03-31 PROCEDURE — 71046 X-RAY EXAM CHEST 2 VIEWS: CPT | Mod: 26,,, | Performed by: RADIOLOGY

## 2021-03-31 PROCEDURE — 99214 PR OFFICE/OUTPT VISIT, EST, LEVL IV, 30-39 MIN: ICD-10-PCS | Mod: S$PBB,,, | Performed by: INTERNAL MEDICINE

## 2021-03-31 PROCEDURE — 99999 PR PBB SHADOW E&M-EST. PATIENT-LVL V: ICD-10-PCS | Mod: PBBFAC,,, | Performed by: INTERNAL MEDICINE

## 2021-03-31 PROCEDURE — 99215 OFFICE O/P EST HI 40 MIN: CPT | Mod: PBBFAC,25 | Performed by: INTERNAL MEDICINE

## 2021-03-31 PROCEDURE — 99214 OFFICE O/P EST MOD 30 MIN: CPT | Mod: S$PBB,,, | Performed by: INTERNAL MEDICINE

## 2021-03-31 PROCEDURE — 99999 PR PBB SHADOW E&M-EST. PATIENT-LVL V: CPT | Mod: PBBFAC,,, | Performed by: INTERNAL MEDICINE

## 2021-06-02 ENCOUNTER — LAB VISIT (OUTPATIENT)
Dept: LAB | Facility: HOSPITAL | Age: 71
End: 2021-06-02
Attending: PSYCHIATRY & NEUROLOGY
Payer: MEDICARE

## 2021-06-02 DIAGNOSIS — I63.9 CEREBROVASCULAR ACCIDENT (CVA), UNSPECIFIED MECHANISM: ICD-10-CM

## 2021-06-02 DIAGNOSIS — K90.49 MALABSORPTION DUE TO INTOLERANCE, NOT ELSEWHERE CLASSIFIED: ICD-10-CM

## 2021-06-02 LAB
BASOPHILS # BLD AUTO: 0.02 K/UL (ref 0–0.2)
BASOPHILS NFR BLD: 0.4 % (ref 0–1.9)
DIFFERENTIAL METHOD: ABNORMAL
EOSINOPHIL # BLD AUTO: 0.3 K/UL (ref 0–0.5)
EOSINOPHIL NFR BLD: 5.5 % (ref 0–8)
ERYTHROCYTE [DISTWIDTH] IN BLOOD BY AUTOMATED COUNT: 14.3 % (ref 11.5–14.5)
HCT VFR BLD AUTO: 37.8 % (ref 40–54)
HGB BLD-MCNC: 11.1 G/DL (ref 14–18)
IMM GRANULOCYTES # BLD AUTO: 0.02 K/UL (ref 0–0.04)
IMM GRANULOCYTES NFR BLD AUTO: 0.4 % (ref 0–0.5)
LYMPHOCYTES # BLD AUTO: 0.6 K/UL (ref 1–4.8)
LYMPHOCYTES NFR BLD: 11.2 % (ref 18–48)
MCH RBC QN AUTO: 26.6 PG (ref 27–31)
MCHC RBC AUTO-ENTMCNC: 29.4 G/DL (ref 32–36)
MCV RBC AUTO: 90 FL (ref 82–98)
MONOCYTES # BLD AUTO: 0.5 K/UL (ref 0.3–1)
MONOCYTES NFR BLD: 9.5 % (ref 4–15)
NEUTROPHILS # BLD AUTO: 3.6 K/UL (ref 1.8–7.7)
NEUTROPHILS NFR BLD: 73 % (ref 38–73)
NRBC BLD-RTO: 0 /100 WBC
PLATELET # BLD AUTO: 225 K/UL (ref 150–450)
PMV BLD AUTO: 10.8 FL (ref 9.2–12.9)
RBC # BLD AUTO: 4.18 M/UL (ref 4.6–6.2)
WBC # BLD AUTO: 4.93 K/UL (ref 3.9–12.7)

## 2021-06-02 PROCEDURE — 80171 DRUG SCREEN QUANT GABAPENTIN: CPT | Performed by: PSYCHIATRY & NEUROLOGY

## 2021-06-02 PROCEDURE — 80053 COMPREHEN METABOLIC PANEL: CPT | Performed by: PSYCHIATRY & NEUROLOGY

## 2021-06-02 PROCEDURE — 85025 COMPLETE CBC W/AUTO DIFF WBC: CPT | Performed by: PSYCHIATRY & NEUROLOGY

## 2021-06-02 PROCEDURE — 80061 LIPID PANEL: CPT | Performed by: PSYCHIATRY & NEUROLOGY

## 2021-06-02 PROCEDURE — 36415 COLL VENOUS BLD VENIPUNCTURE: CPT | Performed by: PSYCHIATRY & NEUROLOGY

## 2021-06-03 LAB
ALBUMIN SERPL BCP-MCNC: 2.9 G/DL (ref 3.5–5.2)
ALP SERPL-CCNC: 99 U/L (ref 55–135)
ALT SERPL W/O P-5'-P-CCNC: 15 U/L (ref 10–44)
ANION GAP SERPL CALC-SCNC: 9 MMOL/L (ref 8–16)
AST SERPL-CCNC: 23 U/L (ref 10–40)
BILIRUB SERPL-MCNC: 0.3 MG/DL (ref 0.1–1)
BUN SERPL-MCNC: 31 MG/DL (ref 8–23)
CALCIUM SERPL-MCNC: 9.4 MG/DL (ref 8.7–10.5)
CHLORIDE SERPL-SCNC: 101 MMOL/L (ref 95–110)
CHOLEST SERPL-MCNC: 147 MG/DL (ref 120–199)
CHOLEST/HDLC SERPL: 3.8 {RATIO} (ref 2–5)
CO2 SERPL-SCNC: 30 MMOL/L (ref 23–29)
CREAT SERPL-MCNC: 1.5 MG/DL (ref 0.5–1.4)
CREAT SERPL-MCNC: 1.5 MG/DL (ref 0.5–1.4)
EST. GFR  (AFRICAN AMERICAN): 53 ML/MIN/1.73 M^2
EST. GFR  (AFRICAN AMERICAN): 53 ML/MIN/1.73 M^2
EST. GFR  (NON AFRICAN AMERICAN): 45.9 ML/MIN/1.73 M^2
EST. GFR  (NON AFRICAN AMERICAN): 45.9 ML/MIN/1.73 M^2
GLUCOSE SERPL-MCNC: 85 MG/DL (ref 70–110)
HDLC SERPL-MCNC: 39 MG/DL (ref 40–75)
HDLC SERPL: 26.5 % (ref 20–50)
LDLC SERPL CALC-MCNC: 94 MG/DL (ref 63–159)
NONHDLC SERPL-MCNC: 108 MG/DL
POTASSIUM SERPL-SCNC: 4.6 MMOL/L (ref 3.5–5.1)
PROT SERPL-MCNC: 7 G/DL (ref 6–8.4)
SODIUM SERPL-SCNC: 140 MMOL/L (ref 136–145)
TRIGL SERPL-MCNC: 70 MG/DL (ref 30–150)

## 2021-06-05 LAB — GABAPENTIN SERPLBLD-MCNC: 17.7 MCG/ML (ref 2–20)

## 2021-06-28 ENCOUNTER — TELEPHONE (OUTPATIENT)
Dept: PULMONOLOGY | Facility: CLINIC | Age: 71
End: 2021-06-28

## 2021-06-28 NOTE — PROCEDURES
"https://gastro.org/practice-guidance/gi-patient-center/topic/hemorrhoids/?hilite=%[email or web address]%2C%27bleeding%27\">   Rectal Bleeding    Rectal bleeding is when blood passes out of the opening between the buttocks (anus). People with rectal bleeding may notice bright red blood in their underwear or in the toilet after having a bowel movement. They may also have blood mixed with their stool (feces), or dark red or black stools.  Rectal bleeding is usually a sign that something is wrong. Many things can cause rectal bleeding, including:  · Diverticulosis. This is a condition in which pockets or sacs project from the bowel.  · Hemorrhoids. These are blood vessels around the anus or inside the rectum that are larger than normal.  · Anal fissures. This is a tear in the anus.  · Proctitis and colitis. These are conditions in which the rectum, colon, or anus become inflamed.  · Polyps. These are growths that can be cancerous (malignant) or noncancerous (benign).  · Infections of the intestines.  · Fistulas. These are abnormal openings in the rectum and anus.  · Rectal prolapse. This is when a part of the rectum sticks out from the anus.  Follow these instructions at home:  Pay attention to any changes in your symptoms. Take these actions to help reduce bleeding and discomfort:  Medicines  · Take over-the-counter and prescription medicines only as told by your health care provider.  · Ask your health care provider about changing or stopping your regular medicines or supplements. This is especially important if you are taking blood thinners. Medicines that thin the blood can make rectal bleeding worse.  Managing constipation  Your condition may cause constipation. To prevent or treat constipation, or to help make your stools soft, you may need to:  · Drink enough fluid to keep your urine pale yellow.  · Take over-the-counter or prescription medicines.  · Eat foods that are high in fiber, such as beans, whole grains, and fresh " See ABG Results  Results for ISIDORO ASHER (MRN [id number]) as of 1/23/2020 09:24   Ref. Range 1/9/2020 08:32   POC PH Latest Ref Range: 7.35 - 7.45  7.407   POC PCO2 Latest Ref Range: 35 - 45 mmHg 48.4 (H)   POC PO2 Latest Ref Range: 80 - 100 mmHg 74 (L)   POC BE Latest Ref Range: -2 to 2 mmol/L 6   POC HCO3 Latest Ref Range: 24 - 28 mmol/L 30.5 (H)   POC SATURATED O2 Latest Ref Range: 95 - 100 % 95   Sample Unknown ARTERIAL   DelSys Unknown Room Air   Allens Test Unknown Pass   Site Unknown RR       Interpretation:  Arterial blood gases demonstrate hypercarbia and hypoxemia.  Bob Ziegler MD  Pulmonary / Critical Care Medicine       fruits and vegetables. Ask your health care provider if you need a supplement to give you more fiber.  · Limit foods that are high in fat and processed sugars, such as fried or sweet foods.    General instructions  · Try not to strain when having a bowel movement.  · Try taking a warm bath. This may help to soothe any pain in your rectum.  · Keep all follow-up visits as told by your health care provider. This is important.  Contact a health care provider if you:  · Have pain or tenderness in your abdomen.  · Have a fever.  · Have weakness.  · Have nausea.  · Cannot have a bowel movement.  Get help right away if you have:  · New or increased rectal bleeding.  · Black or dark red stools.  · Vomit with blood or something that looks like coffee grounds.  · A fainting episode.  · Severe pain in your rectum.  Summary  · Rectal bleeding is usually a sign that something is wrong. This condition should be evaluated by a health care provider.  · Eat a diet that is high in fiber. This will help keep your stools soft, making it easier to pass stools without straining.  · Medicines that thin the blood can make rectal bleeding worse.  · Get help right away if you have new or increased rectal bleeding, black or dark red stools, blood in your vomit, an episode of fainting, or severe pain in your rectum.  This information is not intended to replace advice given to you by your health care provider. Make sure you discuss any questions you have with your health care provider.  Document Revised: 11/18/2020 Document Reviewed: 11/18/2020  [employer]vier Patient Education © 2021 [employer]

## 2021-08-17 ENCOUNTER — PATIENT MESSAGE (OUTPATIENT)
Dept: PULMONOLOGY | Facility: CLINIC | Age: 71
End: 2021-08-17

## 2021-09-27 ENCOUNTER — OFFICE VISIT (OUTPATIENT)
Dept: PULMONOLOGY | Facility: CLINIC | Age: 71
End: 2021-09-27
Payer: MEDICARE

## 2021-09-27 ENCOUNTER — HOSPITAL ENCOUNTER (OUTPATIENT)
Dept: RADIOLOGY | Facility: HOSPITAL | Age: 71
Discharge: HOME | End: 2021-09-27
Attending: INTERNAL MEDICINE
Payer: MEDICARE

## 2021-09-27 VITALS
HEIGHT: 66 IN | WEIGHT: 159.5 LBS | DIASTOLIC BLOOD PRESSURE: 85 MMHG | OXYGEN SATURATION: 97 % | BODY MASS INDEX: 25.63 KG/M2 | HEART RATE: 71 BPM | SYSTOLIC BLOOD PRESSURE: 142 MMHG | RESPIRATION RATE: 18 BRPM

## 2021-09-27 DIAGNOSIS — J44.89 ASTHMA WITH COPD: ICD-10-CM

## 2021-09-27 DIAGNOSIS — I10 ESSENTIAL HYPERTENSION: ICD-10-CM

## 2021-09-27 DIAGNOSIS — G47.33 OSA TREATED WITH BIPAP: Chronic | ICD-10-CM

## 2021-09-27 DIAGNOSIS — J44.9 COPD, SEVERE: Primary | ICD-10-CM

## 2021-09-27 DIAGNOSIS — R93.89 ABNORMAL CXR: ICD-10-CM

## 2021-09-27 DIAGNOSIS — J98.11 ATELECTASIS OF RIGHT LUNG: ICD-10-CM

## 2021-09-27 DIAGNOSIS — I27.20 PULMONARY HYPERTENSION: ICD-10-CM

## 2021-09-27 PROCEDURE — 71048 X-RAY EXAM CHEST 4+ VIEWS: CPT | Mod: TC

## 2021-09-27 PROCEDURE — 99214 OFFICE O/P EST MOD 30 MIN: CPT | Mod: S$PBB,,, | Performed by: INTERNAL MEDICINE

## 2021-09-27 PROCEDURE — 99214 PR OFFICE/OUTPT VISIT, EST, LEVL IV, 30-39 MIN: ICD-10-PCS | Mod: S$PBB,,, | Performed by: INTERNAL MEDICINE

## 2021-09-27 PROCEDURE — 71048 XR CHEST 4 OR MORE VIEW: ICD-10-PCS | Mod: 26,,, | Performed by: RADIOLOGY

## 2021-09-27 PROCEDURE — 71048 X-RAY EXAM CHEST 4+ VIEWS: CPT | Mod: 26,,, | Performed by: RADIOLOGY

## 2021-09-27 PROCEDURE — 99999 PR PBB SHADOW E&M-EST. PATIENT-LVL IV: ICD-10-PCS | Mod: PBBFAC,,, | Performed by: INTERNAL MEDICINE

## 2021-09-27 PROCEDURE — 99214 OFFICE O/P EST MOD 30 MIN: CPT | Mod: PBBFAC | Performed by: INTERNAL MEDICINE

## 2021-09-27 PROCEDURE — 99999 PR PBB SHADOW E&M-EST. PATIENT-LVL IV: CPT | Mod: PBBFAC,,, | Performed by: INTERNAL MEDICINE

## 2021-09-27 RX ORDER — TORSEMIDE 20 MG/1
20 TABLET ORAL
COMMUNITY
Start: 2021-07-15 | End: 2024-02-01

## 2021-09-27 RX ORDER — TIOTROPIUM BROMIDE 18 UG/1
18 CAPSULE ORAL; RESPIRATORY (INHALATION) DAILY
Qty: 90 CAPSULE | Refills: 3 | Status: SHIPPED | OUTPATIENT
Start: 2021-09-27 | End: 2022-09-27 | Stop reason: SDUPTHER

## 2021-09-27 RX ORDER — BUDESONIDE AND FORMOTEROL FUMARATE DIHYDRATE 160; 4.5 UG/1; UG/1
2 AEROSOL RESPIRATORY (INHALATION) 2 TIMES DAILY
Qty: 3 INHALER | Refills: 3 | Status: SHIPPED | OUTPATIENT
Start: 2021-09-27 | End: 2022-09-27 | Stop reason: SDUPTHER

## 2021-09-27 RX ORDER — ALBUTEROL SULFATE 0.83 MG/ML
2.5 SOLUTION RESPIRATORY (INHALATION)
Qty: 300 ML | Refills: 11 | Status: SHIPPED | OUTPATIENT
Start: 2021-09-27 | End: 2022-01-15

## 2022-01-03 ENCOUNTER — LAB VISIT (OUTPATIENT)
Dept: LAB | Facility: HOSPITAL | Age: 72
End: 2022-01-03
Attending: PSYCHIATRY & NEUROLOGY
Payer: MEDICARE

## 2022-01-03 DIAGNOSIS — I63.9 CEREBROVASCULAR ACCIDENT (CVA), UNSPECIFIED MECHANISM: ICD-10-CM

## 2022-01-03 PROCEDURE — 85025 COMPLETE CBC W/AUTO DIFF WBC: CPT | Performed by: PSYCHIATRY & NEUROLOGY

## 2022-01-03 PROCEDURE — 80061 LIPID PANEL: CPT | Performed by: PSYCHIATRY & NEUROLOGY

## 2022-01-03 PROCEDURE — 80171 DRUG SCREEN QUANT GABAPENTIN: CPT | Performed by: PSYCHIATRY & NEUROLOGY

## 2022-01-03 PROCEDURE — 80053 COMPREHEN METABOLIC PANEL: CPT | Performed by: PSYCHIATRY & NEUROLOGY

## 2022-01-03 PROCEDURE — 36415 COLL VENOUS BLD VENIPUNCTURE: CPT | Performed by: PSYCHIATRY & NEUROLOGY

## 2022-01-04 LAB
ALBUMIN SERPL BCP-MCNC: 2.9 G/DL (ref 3.5–5.2)
ALP SERPL-CCNC: 89 U/L (ref 55–135)
ALT SERPL W/O P-5'-P-CCNC: 11 U/L (ref 10–44)
ANION GAP SERPL CALC-SCNC: 13 MMOL/L (ref 8–16)
AST SERPL-CCNC: 21 U/L (ref 10–40)
BASOPHILS # BLD AUTO: 0.02 K/UL (ref 0–0.2)
BASOPHILS NFR BLD: 0.5 % (ref 0–1.9)
BILIRUB SERPL-MCNC: 0.5 MG/DL (ref 0.1–1)
BUN SERPL-MCNC: 21 MG/DL (ref 8–23)
CALCIUM SERPL-MCNC: 8.9 MG/DL (ref 8.7–10.5)
CHLORIDE SERPL-SCNC: 99 MMOL/L (ref 95–110)
CHOLEST SERPL-MCNC: 134 MG/DL (ref 120–199)
CHOLEST/HDLC SERPL: 4.1 {RATIO} (ref 2–5)
CO2 SERPL-SCNC: 28 MMOL/L (ref 23–29)
CREAT SERPL-MCNC: 1.5 MG/DL (ref 0.5–1.4)
CREAT SERPL-MCNC: 1.5 MG/DL (ref 0.5–1.4)
DIFFERENTIAL METHOD: ABNORMAL
EOSINOPHIL # BLD AUTO: 0.1 K/UL (ref 0–0.5)
EOSINOPHIL NFR BLD: 3 % (ref 0–8)
ERYTHROCYTE [DISTWIDTH] IN BLOOD BY AUTOMATED COUNT: 14.4 % (ref 11.5–14.5)
EST. GFR  (AFRICAN AMERICAN): 52.6 ML/MIN/1.73 M^2
EST. GFR  (AFRICAN AMERICAN): 52.6 ML/MIN/1.73 M^2
EST. GFR  (NON AFRICAN AMERICAN): 45.5 ML/MIN/1.73 M^2
EST. GFR  (NON AFRICAN AMERICAN): 45.5 ML/MIN/1.73 M^2
GLUCOSE SERPL-MCNC: 153 MG/DL (ref 70–110)
HCT VFR BLD AUTO: 35.1 % (ref 40–54)
HDLC SERPL-MCNC: 33 MG/DL (ref 40–75)
HDLC SERPL: 24.6 % (ref 20–50)
HGB BLD-MCNC: 10.7 G/DL (ref 14–18)
IMM GRANULOCYTES # BLD AUTO: 0.01 K/UL (ref 0–0.04)
IMM GRANULOCYTES NFR BLD AUTO: 0.2 % (ref 0–0.5)
LDLC SERPL CALC-MCNC: 85.8 MG/DL (ref 63–159)
LYMPHOCYTES # BLD AUTO: 0.4 K/UL (ref 1–4.8)
LYMPHOCYTES NFR BLD: 10.9 % (ref 18–48)
MCH RBC QN AUTO: 28.3 PG (ref 27–31)
MCHC RBC AUTO-ENTMCNC: 30.5 G/DL (ref 32–36)
MCV RBC AUTO: 93 FL (ref 82–98)
MONOCYTES # BLD AUTO: 0.4 K/UL (ref 0.3–1)
MONOCYTES NFR BLD: 10.4 % (ref 4–15)
NEUTROPHILS # BLD AUTO: 3 K/UL (ref 1.8–7.7)
NEUTROPHILS NFR BLD: 75 % (ref 38–73)
NONHDLC SERPL-MCNC: 101 MG/DL
NRBC BLD-RTO: 0 /100 WBC
PLATELET # BLD AUTO: 237 K/UL (ref 150–450)
PMV BLD AUTO: 12 FL (ref 9.2–12.9)
POTASSIUM SERPL-SCNC: 4.2 MMOL/L (ref 3.5–5.1)
PROT SERPL-MCNC: 6.5 G/DL (ref 6–8.4)
RBC # BLD AUTO: 3.78 M/UL (ref 4.6–6.2)
SODIUM SERPL-SCNC: 140 MMOL/L (ref 136–145)
TRIGL SERPL-MCNC: 76 MG/DL (ref 30–150)
WBC # BLD AUTO: 4.04 K/UL (ref 3.9–12.7)

## 2022-01-07 LAB — GABAPENTIN SERPLBLD-MCNC: 6.5 MCG/ML (ref 2–20)

## 2022-01-20 PROBLEM — J42 CHRONIC BRONCHITIS: Status: ACTIVE | Noted: 2020-10-05

## 2022-01-20 PROBLEM — B37.81 THRUSH OF MOUTH AND ESOPHAGUS: Status: ACTIVE | Noted: 2021-06-28

## 2022-01-20 PROBLEM — D46.4 REFRACTORY ANEMIA: Status: ACTIVE | Noted: 2021-02-02

## 2022-01-20 PROBLEM — Z78.9 HEALTH CARE HOME, ACTIVE CARE COORDINATION: Status: ACTIVE | Noted: 2021-01-28

## 2022-01-20 PROBLEM — I50.32 HYPERTENSIVE HEART AND KIDNEY DISEASE WITH CHRONIC DIASTOLIC CONGESTIVE HEART FAILURE AND STAGE 3 CHRONIC KIDNEY DISEASE: Status: ACTIVE | Noted: 2020-08-11

## 2022-01-20 PROBLEM — I69.359 HEMIPLEGIA, DOMINANT SIDE S/P CVA (CEREBROVASCULAR ACCIDENT): Status: ACTIVE | Noted: 2020-08-11

## 2022-01-20 PROBLEM — K11.21 ACUTE PAROTITIS: Status: ACTIVE | Noted: 2021-06-25

## 2022-01-20 PROBLEM — F05 SUNDOWNING: Status: ACTIVE | Noted: 2021-01-27

## 2022-01-20 PROBLEM — H90.3 BILATERAL SENSORINEURAL HEARING LOSS: Status: ACTIVE | Noted: 2021-01-11

## 2022-01-20 PROBLEM — I13.10 HYPERTENSIVE HEART AND CHRONIC KIDNEY DISEASE WITHOUT HEART FAILURE, WITH STAGE 1 THROUGH STAGE 4 CHRONIC KIDNEY DISEASE, OR UNSPECIFIED CHRONIC KIDNEY DISEASE: Status: ACTIVE | Noted: 2020-10-05

## 2022-01-20 PROBLEM — B95.61 MSSA BACTEREMIA: Status: ACTIVE | Noted: 2021-06-28

## 2022-01-20 PROBLEM — R78.81 MSSA BACTEREMIA: Status: ACTIVE | Noted: 2021-06-28

## 2022-01-20 PROBLEM — N18.32 STAGE 3B CHRONIC KIDNEY DISEASE: Status: ACTIVE | Noted: 2020-10-05

## 2022-01-20 PROBLEM — I13.0 HYPERTENSIVE HEART AND KIDNEY DISEASE WITH CHRONIC DIASTOLIC CONGESTIVE HEART FAILURE AND STAGE 3 CHRONIC KIDNEY DISEASE: Status: ACTIVE | Noted: 2020-08-11

## 2022-01-20 PROBLEM — R07.81 PLEURITIC CHEST PAIN: Status: ACTIVE | Noted: 2021-01-25

## 2022-01-20 PROBLEM — N18.30 HYPERTENSIVE HEART AND KIDNEY DISEASE WITH CHRONIC DIASTOLIC CONGESTIVE HEART FAILURE AND STAGE 3 CHRONIC KIDNEY DISEASE: Status: ACTIVE | Noted: 2020-08-11

## 2022-01-20 PROBLEM — B37.0 THRUSH OF MOUTH AND ESOPHAGUS: Status: ACTIVE | Noted: 2021-06-28

## 2022-07-01 ENCOUNTER — LAB VISIT (OUTPATIENT)
Dept: LAB | Facility: HOSPITAL | Age: 72
End: 2022-07-01
Attending: PSYCHIATRY & NEUROLOGY
Payer: MEDICARE

## 2022-07-01 DIAGNOSIS — I63.9 CEREBROVASCULAR ACCIDENT (CVA), UNSPECIFIED MECHANISM: ICD-10-CM

## 2022-07-01 LAB
ALBUMIN SERPL BCP-MCNC: 3.2 G/DL (ref 3.5–5.2)
ALP SERPL-CCNC: 91 U/L (ref 55–135)
ALT SERPL W/O P-5'-P-CCNC: 10 U/L (ref 10–44)
ANION GAP SERPL CALC-SCNC: 12 MMOL/L (ref 8–16)
AST SERPL-CCNC: 22 U/L (ref 10–40)
BASOPHILS # BLD AUTO: 0.02 K/UL (ref 0–0.2)
BASOPHILS NFR BLD: 0.4 % (ref 0–1.9)
BILIRUB SERPL-MCNC: 0.5 MG/DL (ref 0.1–1)
BUN SERPL-MCNC: 33 MG/DL (ref 8–23)
CALCIUM SERPL-MCNC: 9.1 MG/DL (ref 8.7–10.5)
CHLORIDE SERPL-SCNC: 97 MMOL/L (ref 95–110)
CHOLEST SERPL-MCNC: 134 MG/DL (ref 120–199)
CHOLEST/HDLC SERPL: 3.4 {RATIO} (ref 2–5)
CO2 SERPL-SCNC: 27 MMOL/L (ref 23–29)
CREAT SERPL-MCNC: 1.7 MG/DL (ref 0.5–1.4)
DIFFERENTIAL METHOD: ABNORMAL
EOSINOPHIL # BLD AUTO: 0.1 K/UL (ref 0–0.5)
EOSINOPHIL NFR BLD: 3.1 % (ref 0–8)
ERYTHROCYTE [DISTWIDTH] IN BLOOD BY AUTOMATED COUNT: 15.5 % (ref 11.5–14.5)
EST. GFR  (AFRICAN AMERICAN): 45.2 ML/MIN/1.73 M^2
EST. GFR  (NON AFRICAN AMERICAN): 39.1 ML/MIN/1.73 M^2
GLUCOSE SERPL-MCNC: 123 MG/DL (ref 70–110)
HCT VFR BLD AUTO: 34.3 % (ref 40–54)
HDLC SERPL-MCNC: 39 MG/DL (ref 40–75)
HDLC SERPL: 29.1 % (ref 20–50)
HGB BLD-MCNC: 10.2 G/DL (ref 14–18)
IMM GRANULOCYTES # BLD AUTO: 0.01 K/UL (ref 0–0.04)
IMM GRANULOCYTES NFR BLD AUTO: 0.2 % (ref 0–0.5)
LDLC SERPL CALC-MCNC: 88.8 MG/DL (ref 63–159)
LYMPHOCYTES # BLD AUTO: 0.6 K/UL (ref 1–4.8)
LYMPHOCYTES NFR BLD: 12.4 % (ref 18–48)
MCH RBC QN AUTO: 27.9 PG (ref 27–31)
MCHC RBC AUTO-ENTMCNC: 29.7 G/DL (ref 32–36)
MCV RBC AUTO: 94 FL (ref 82–98)
MONOCYTES # BLD AUTO: 0.4 K/UL (ref 0.3–1)
MONOCYTES NFR BLD: 8.8 % (ref 4–15)
NEUTROPHILS # BLD AUTO: 3.4 K/UL (ref 1.8–7.7)
NEUTROPHILS NFR BLD: 75.1 % (ref 38–73)
NONHDLC SERPL-MCNC: 95 MG/DL
NRBC BLD-RTO: 0 /100 WBC
PLATELET # BLD AUTO: 165 K/UL (ref 150–450)
PMV BLD AUTO: 12.4 FL (ref 9.2–12.9)
POTASSIUM SERPL-SCNC: 3.9 MMOL/L (ref 3.5–5.1)
PROT SERPL-MCNC: 6.4 G/DL (ref 6–8.4)
RBC # BLD AUTO: 3.66 M/UL (ref 4.6–6.2)
SODIUM SERPL-SCNC: 136 MMOL/L (ref 136–145)
TRIGL SERPL-MCNC: 31 MG/DL (ref 30–150)
WBC # BLD AUTO: 4.53 K/UL (ref 3.9–12.7)

## 2022-07-01 PROCEDURE — 85025 COMPLETE CBC W/AUTO DIFF WBC: CPT | Performed by: PSYCHIATRY & NEUROLOGY

## 2022-07-01 PROCEDURE — 80171 DRUG SCREEN QUANT GABAPENTIN: CPT | Performed by: PSYCHIATRY & NEUROLOGY

## 2022-07-01 PROCEDURE — 80053 COMPREHEN METABOLIC PANEL: CPT | Performed by: PSYCHIATRY & NEUROLOGY

## 2022-07-01 PROCEDURE — 80061 LIPID PANEL: CPT | Performed by: PSYCHIATRY & NEUROLOGY

## 2022-07-01 PROCEDURE — 36415 COLL VENOUS BLD VENIPUNCTURE: CPT | Performed by: PSYCHIATRY & NEUROLOGY

## 2022-07-05 LAB — GABAPENTIN SERPLBLD-MCNC: 15.6 MCG/ML (ref 2–20)

## 2022-07-18 ENCOUNTER — PATIENT MESSAGE (OUTPATIENT)
Dept: PULMONOLOGY | Facility: CLINIC | Age: 72
End: 2022-07-18
Payer: COMMERCIAL

## 2022-07-18 DIAGNOSIS — J44.89 ASTHMA WITH COPD: Primary | ICD-10-CM

## 2022-09-27 ENCOUNTER — HOSPITAL ENCOUNTER (OUTPATIENT)
Dept: RADIOLOGY | Facility: HOSPITAL | Age: 72
Discharge: HOME | End: 2022-09-27
Attending: INTERNAL MEDICINE
Payer: MEDICARE

## 2022-09-27 ENCOUNTER — OFFICE VISIT (OUTPATIENT)
Dept: PULMONOLOGY | Facility: CLINIC | Age: 72
End: 2022-09-27
Payer: MEDICARE

## 2022-09-27 VITALS
RESPIRATION RATE: 16 BRPM | HEIGHT: 66 IN | WEIGHT: 169.06 LBS | DIASTOLIC BLOOD PRESSURE: 64 MMHG | HEART RATE: 71 BPM | OXYGEN SATURATION: 97 % | BODY MASS INDEX: 27.17 KG/M2 | SYSTOLIC BLOOD PRESSURE: 112 MMHG

## 2022-09-27 DIAGNOSIS — R09.02 EXERCISE HYPOXEMIA: ICD-10-CM

## 2022-09-27 DIAGNOSIS — J44.9 COPD, SEVERE: ICD-10-CM

## 2022-09-27 DIAGNOSIS — J96.11 CHRONIC RESPIRATORY FAILURE WITH HYPOXIA AND HYPERCAPNIA: Primary | ICD-10-CM

## 2022-09-27 DIAGNOSIS — J96.12 CHRONIC RESPIRATORY FAILURE WITH HYPOXIA AND HYPERCAPNIA: Primary | ICD-10-CM

## 2022-09-27 DIAGNOSIS — J44.89 ASTHMA WITH COPD: ICD-10-CM

## 2022-09-27 DIAGNOSIS — I27.20 PULMONARY HYPERTENSION: ICD-10-CM

## 2022-09-27 DIAGNOSIS — G47.33 OSA (OBSTRUCTIVE SLEEP APNEA): ICD-10-CM

## 2022-09-27 PROCEDURE — 99213 OFFICE O/P EST LOW 20 MIN: CPT | Mod: PBBFAC,25 | Performed by: INTERNAL MEDICINE

## 2022-09-27 PROCEDURE — 99214 OFFICE O/P EST MOD 30 MIN: CPT | Mod: S$PBB,,, | Performed by: INTERNAL MEDICINE

## 2022-09-27 PROCEDURE — 71046 X-RAY EXAM CHEST 2 VIEWS: CPT | Mod: TC

## 2022-09-27 PROCEDURE — 99999 PR PBB SHADOW E&M-EST. PATIENT-LVL III: CPT | Mod: PBBFAC,,, | Performed by: INTERNAL MEDICINE

## 2022-09-27 PROCEDURE — 71046 XR CHEST PA AND LATERAL: ICD-10-PCS | Mod: 26,,, | Performed by: RADIOLOGY

## 2022-09-27 PROCEDURE — 99999 PR PBB SHADOW E&M-EST. PATIENT-LVL III: ICD-10-PCS | Mod: PBBFAC,,, | Performed by: INTERNAL MEDICINE

## 2022-09-27 PROCEDURE — 71046 X-RAY EXAM CHEST 2 VIEWS: CPT | Mod: 26,,, | Performed by: RADIOLOGY

## 2022-09-27 PROCEDURE — 99214 PR OFFICE/OUTPT VISIT, EST, LEVL IV, 30-39 MIN: ICD-10-PCS | Mod: S$PBB,,, | Performed by: INTERNAL MEDICINE

## 2022-09-27 RX ORDER — TIOTROPIUM BROMIDE 18 UG/1
18 CAPSULE ORAL; RESPIRATORY (INHALATION) DAILY
Qty: 90 CAPSULE | Refills: 3 | Status: SHIPPED | OUTPATIENT
Start: 2022-09-27 | End: 2023-09-08

## 2022-09-27 RX ORDER — ALBUTEROL SULFATE 0.83 MG/ML
2.5 SOLUTION RESPIRATORY (INHALATION)
Qty: 300 ML | Refills: 11 | Status: SHIPPED | OUTPATIENT
Start: 2022-09-27 | End: 2023-01-30

## 2022-09-27 RX ORDER — ALLOPURINOL 300 MG/1
1 TABLET ORAL DAILY
COMMUNITY
Start: 2022-07-11 | End: 2023-10-02

## 2022-09-27 RX ORDER — METOPROLOL SUCCINATE 100 MG/1
100 TABLET, EXTENDED RELEASE ORAL DAILY
COMMUNITY
Start: 2022-08-02

## 2022-09-27 RX ORDER — BUDESONIDE AND FORMOTEROL FUMARATE DIHYDRATE 160; 4.5 UG/1; UG/1
2 AEROSOL RESPIRATORY (INHALATION) 2 TIMES DAILY
Qty: 30.6 G | Refills: 11 | Status: SHIPPED | OUTPATIENT
Start: 2022-09-27 | End: 2023-05-23 | Stop reason: SDUPTHER

## 2022-09-27 RX ORDER — SACUBITRIL AND VALSARTAN 24; 26 MG/1; MG/1
1 TABLET, FILM COATED ORAL 2 TIMES DAILY
COMMUNITY
Start: 2022-09-26

## 2022-09-27 NOTE — PROGRESS NOTES
Subjective:     Patient ID: Rea Vail is a 72 y.o. male.    Chief Complaint:      HPI   COPD  He presents for evaluation and treatment of COPD. The patient is not currently having symptoms / an exacerbation. Current symptoms include chronic dyspnea, cough productive of white sputum in small amounts and wheezing. Symptoms have been present since several weeks ago and have been gradually worsening. He denies chills and fever. Associated symptoms include dizziness, poor exercise tolerance and shortness of breath.  This episode appears to have been triggered by possible worsening of CHF. Treatments tried for the current exacerbation: albuterol inhaler and albuterol nebulizer. The patient has been having similar episodes for approximately 10 years. He uses 2 pillows at night. Patient currently not asked. The patient is having no constitutional symptoms, denying fever, chills, anorexia, or weight loss. The patient has been hospitalized for this condition before. He has a history of 48 pack years. The patient is experiencing exercise intolerance (difficulty walking 10 feet on flat ground).     Dyspnea  Patient complains of shortness of breath. Symptoms occur after more than one flight stairs, with more than one block walking. Symptoms began 7 months ago, gradually improving since. Associated symptoms include  dyspnea on exertion, post nasal drip and shortness of breath. He denies chest pain, located left chest. He does not have had recent travel. Weight has been stable. Symptoms are exacerbated by moderate activity. Symptoms are alleviated by rest.     Obstructive Sleep Apnea:   follow up for GUMARO and BIPAP complaince assessment.  Compliance ReportCompliance   He is on BIPAP of BIPAP IPAP 13 cm EPAP 8 cm    He is  compliant with BIPAP use.   Patient reports benefit from BIPAP use and denies snoring and excessive daytime sleepiness.  Patient reports no complaints    Full face mask     Past Medical History:   Diagnosis  Date    Anemia 2013    Asthma     Atrial fibrillation     CHF (congestive heart failure)     COPD (chronic obstructive pulmonary disease) 2012    Diverticular disease     Gout 2012    Hyperlipidemia     Hypertension     GUMARO (obstructive sleep apnea) 4/27/2018    Other and unspecified hyperlipidemia     Stroke      No past surgical history on file.  Review of patient's allergies indicates:   Allergen Reactions    Cephalexin Anaphylaxis    Ferumoxytol Anaphylaxis    Sulfamethoxazole-trimethoprim      Current Outpatient Medications on File Prior to Visit   Medication Sig Dispense Refill    allopurinoL (ZYLOPRIM) 300 MG tablet Take 1 tablet by mouth once daily.      ascorbic acid, vitamin C, (VITAMIN C) 1000 MG tablet Take 1,000 mg by mouth once daily.      azelastine (ASTELIN) 137 mcg (0.1 %) nasal spray 1 spray by Nasal route daily as needed.      cefTRIAXone (ROCEPHIN) 10 gram injection Inject 2 g into the muscle once daily.      ELIQUIS 5 mg Tab TAKE 1 TABLET BY MOUTH TWICE DAILY 60 tablet 6    ENTRESTO 24-26 mg per tablet Take 1 tablet by mouth 2 (two) times daily.      ferrous sulfate (IRON ORAL) Take 200 mg by mouth 2 (two) times a day.      gabapentin (NEURONTIN) 300 MG capsule Take 1 capsule (300 mg total) by mouth 2 (two) times daily. 60 capsule 6    HYDROcodone-acetaminophen (NORCO)  mg per tablet Take 1 tablet by mouth 2 (two) times daily. Take 1 tablet in am and 1/2 tablet in pm      lactulose 10 gram/15 ml (CHRONULAC) 10 gram/15 mL (15 mL) solution       metoprolol succinate (TOPROL-XL) 100 MG 24 hr tablet Take 100 mg by mouth once daily.      MOVANTIK 25 mg tablet Take 25 mg by mouth once.      multivitamin capsule Take 1 capsule by mouth once daily.      OXYGEN-AIR DELIVERY SYSTEMS MISC by Misc.(Non-Drug; Combo Route) route.      risperiDONE (RISPERDAL) 0.5 MG Tab TAKE 1 TABLET BY MOUTH NIGHTLY AS NEEDED (AGITATION).      simvastatin (ZOCOR) 40 MG tablet Take 1 tablet (40 mg total) by mouth every  evening. 1 tablet Oral Every day 90 tablet 4    vitamin D (VITAMIN D3) 1000 units Tab Take 1,000 Units by mouth 2 (two) times a day.      [DISCONTINUED] albuterol (PROVENTIL) 2.5 mg /3 mL (0.083 %) nebulizer solution TAKE 3 MLS (2.5 MG TOTAL) BY NEBULIZATION EVERY 6 TO 8 HOURS AS NEEDED FOR WHEEZING OR SHORTNESS OF BREATH. RESCUE 300 mL 11    [DISCONTINUED] budesonide-formoterol 160-4.5 mcg (SYMBICORT) 160-4.5 mcg/actuation HFAA Inhale 2 puffs into the lungs 2 (two) times daily. 3 Inhaler 3    [DISCONTINUED] tiotropium (SPIRIVA WITH HANDIHALER) 18 mcg inhalation capsule Inhale 1 capsule (18 mcg total) into the lungs once daily. Controller 90 capsule 3    torsemide (DEMADEX) 20 MG Tab Take 20 mg by mouth.       No current facility-administered medications on file prior to visit.     Social History     Socioeconomic History    Marital status: Single   Tobacco Use    Smoking status: Former     Packs/day: 1.00     Years: 48.00     Pack years: 48.00     Types: Cigarettes     Start date: 1958     Quit date: 2006     Years since quittin.2    Smokeless tobacco: Never   Substance and Sexual Activity    Alcohol use: No    Drug use: No     Family History   Problem Relation Age of Onset    Stroke Cousin        Review of Systems   Constitutional:  Positive for fatigue. Negative for fever.   HENT:  Positive for congestion. Negative for rhinorrhea.    Eyes:  Negative for redness and itching.   Respiratory:  Positive for apnea, cough, sputum production, shortness of breath, dyspnea on extertion, use of rescue inhaler and Paroxysmal Nocturnal Dyspnea.    Cardiovascular:  Negative for chest pain, palpitations and leg swelling.   Genitourinary:  Negative for difficulty urinating and hematuria.   Endocrine:  Negative for cold intolerance and heat intolerance.    Musculoskeletal:  Positive for arthralgias.   Skin:  Negative for rash.   Gastrointestinal:  Negative for nausea and abdominal pain.   Neurological:  Negative for  "dizziness, syncope, weakness and light-headedness.   Hematological:  Negative for adenopathy. Does not bruise/bleed easily.   Psychiatric/Behavioral:  Positive for sleep disturbance. The patient is not nervous/anxious.      Objective:      /64   Pulse 71   Resp 16   Ht 5' 6" (1.676 m)   Wt 76.7 kg (169 lb 1.5 oz)   SpO2 97%   BMI 27.29 kg/m²   Physical Exam  Vitals and nursing note reviewed.   Constitutional:       Appearance: He is well-developed.   HENT:      Head: Normocephalic and atraumatic.      Nose: Congestion present.      Mouth/Throat:      Mouth: Mucous membranes are moist.      Pharynx: No oropharyngeal exudate.   Eyes:      Conjunctiva/sclera: Conjunctivae normal.      Pupils: Pupils are equal, round, and reactive to light.   Neck:      Thyroid: No thyromegaly.      Vascular: No JVD.      Trachea: No tracheal deviation.   Cardiovascular:      Rate and Rhythm: Normal rate. Rhythm irregular.      Heart sounds: Normal heart sounds. No murmur heard.  Pulmonary:      Effort: Pulmonary effort is normal.      Breath sounds: Examination of the right-lower field reveals wheezing. Examination of the left-lower field reveals wheezing. Decreased breath sounds and wheezing present. No rhonchi or rales.   Abdominal:      General: Bowel sounds are normal.      Palpations: Abdomen is soft.   Musculoskeletal:         General: No tenderness. Normal range of motion.      Cervical back: Neck supple.      Right lower leg: Edema present.      Left lower leg: Edema present.   Lymphadenopathy:      Cervical: No cervical adenopathy.   Skin:     General: Skin is warm and dry.   Neurological:      Mental Status: He is alert and oriented to person, place, and time.     Personal Diagnostic Review  Chest x-ray: stable   X-Ray Chest PA And Lateral  Narrative: EXAMINATION:  XR CHEST PA AND LATERAL    CLINICAL HISTORY:  SOB; Chronic obstructive pulmonary disease, unspecified    TECHNIQUE:  PA and lateral views of the chest " were performed.    COMPARISON:  09/27/2021    FINDINGS:  There are some linear stranding changes seen within the bilateral lower lung zones that are stable when compared to the prior exam and most consistent with scarring.  No infiltrates or effusions.  The heart is enlarged.  There is tortuosity of the descending thoracic aorta.  Impression: 1.  No acute cardiopulmonary process.    Electronically signed by: Oleg Mahoney DO  Date:    09/27/2022  Time:    10:49      Office Spirometry Results:     No flowsheet data found.  Pulmonary Studies Review 9/27/2022   SpO2 97   Ordering Provider -   Interpreting Provider -   Performing nurse/tech/RT -   Diagnosis -   Height 66   Weight 2705.49   BMI (Calculated) 27.3   Predicted Distance 320.29   Patient Race -   6MWT Status -   Patient Reported -   Was O2 used? -   Delivery Method -   6MW Distance walked (feet) -   Distance walked (meters) -   Did patient stop? -   Type of assistive device(s) used? -   Is extra documentation required for this patient? -   Oxygen Saturation -   Supplemental Oxygen -   Heart Rate -   Blood Pressure -   Srinath Dyspnea Rating  -   Oxygen Saturation -   Supplemental Oxygen -   Heart Rate -   Blood Pressure -   Srinath Dyspnea Rating  -   Recovery Time (seconds) -   Oxygen Saturation -   Supplemental Oxygen -   Heart Rate -   Blood Pressure -   Srinath Dyspnea Rating  -   Is procedure ready for interpretation? -   Did the patient stop or pause? -   Total Time Walked (Calculated) -   Total Laps Walked -   Final Partial Lap Distance (feet) -   Total Distance Feet (Calculated) -   Total Distance Meters (Calculated) -   Predicted Distance Meters (Calculated) 453.26   Percentage of Predicted (Calculated) -   Peak VO2 (Calculated) -   Mets -   Has The Patient Had a Previous Six Minute Walk Test? -   Oxygen Qualification? -   Oxygen Saturation -   Supplemental Oxygen -   Heart Rate -   Blood Pressure -   Srinath Dyspnea Rating  -   Oxygen Saturation -    Supplemental Oxygen -   Heart Rate -   Blood Pressure -   Srinath Dyspnea Rating  -   Recovery Time (seconds) -   Oxygen Saturation -   Supplemental Oxygen -   Heart Rate -   Blood Pressure -   Srinath Dyspnea Rating  -         Assessment:            Chronic respiratory failure with hypoxia and hypercapnia  -     Six Minute Walk Test to qualify for Home Oxygen; Future    Asthma with COPD  -     NEBULIZER KIT (SUPPLIES) FOR HOME USE  -     NEBULIZER FOR HOME USE  -     albuterol (PROVENTIL) 2.5 mg /3 mL (0.083 %) nebulizer solution; Take 3 mLs (2.5 mg total) by nebulization every 6 to 8 hours as needed for Wheezing or Shortness of Breath. Rescue  Dispense: 300 mL; Refill: 11  -     budesonide-formoterol 160-4.5 mcg (SYMBICORT) 160-4.5 mcg/actuation HFAA; Inhale 2 puffs into the lungs 2 (two) times daily.  Dispense: 30.6 g; Refill: 11  -     tiotropium (SPIRIVA WITH HANDIHALER) 18 mcg inhalation capsule; Inhale 1 capsule (18 mcg total) into the lungs once daily. Controller  Dispense: 90 capsule; Refill: 3  -     X-Ray Chest PA And Lateral; Future; Expected date: 09/27/2022    COPD, severe  -     albuterol (PROVENTIL) 2.5 mg /3 mL (0.083 %) nebulizer solution; Take 3 mLs (2.5 mg total) by nebulization every 6 to 8 hours as needed for Wheezing or Shortness of Breath. Rescue  Dispense: 300 mL; Refill: 11  -     budesonide-formoterol 160-4.5 mcg (SYMBICORT) 160-4.5 mcg/actuation HFAA; Inhale 2 puffs into the lungs 2 (two) times daily.  Dispense: 30.6 g; Refill: 11  -     tiotropium (SPIRIVA WITH HANDIHALER) 18 mcg inhalation capsule; Inhale 1 capsule (18 mcg total) into the lungs once daily. Controller  Dispense: 90 capsule; Refill: 3    GUMARO (obstructive sleep apnea)  -     CPAP/BIPAP SUPPLIES    Exercise hypoxemia  -     Six Minute Walk Test to qualify for Home Oxygen; Future        Outpatient Encounter Medications as of 9/27/2022   Medication Sig Dispense Refill    allopurinoL (ZYLOPRIM) 300 MG tablet Take 1 tablet by mouth  once daily.      ascorbic acid, vitamin C, (VITAMIN C) 1000 MG tablet Take 1,000 mg by mouth once daily.      azelastine (ASTELIN) 137 mcg (0.1 %) nasal spray 1 spray by Nasal route daily as needed.      cefTRIAXone (ROCEPHIN) 10 gram injection Inject 2 g into the muscle once daily.      ELIQUIS 5 mg Tab TAKE 1 TABLET BY MOUTH TWICE DAILY 60 tablet 6    ENTRESTO 24-26 mg per tablet Take 1 tablet by mouth 2 (two) times daily.      ferrous sulfate (IRON ORAL) Take 200 mg by mouth 2 (two) times a day.      gabapentin (NEURONTIN) 300 MG capsule Take 1 capsule (300 mg total) by mouth 2 (two) times daily. 60 capsule 6    HYDROcodone-acetaminophen (NORCO)  mg per tablet Take 1 tablet by mouth 2 (two) times daily. Take 1 tablet in am and 1/2 tablet in pm      lactulose 10 gram/15 ml (CHRONULAC) 10 gram/15 mL (15 mL) solution       metoprolol succinate (TOPROL-XL) 100 MG 24 hr tablet Take 100 mg by mouth once daily.      MOVANTIK 25 mg tablet Take 25 mg by mouth once.      multivitamin capsule Take 1 capsule by mouth once daily.      OXYGEN-AIR DELIVERY SYSTEMS MISC by [facility].(Non-Drug; Combo Route) route.      risperiDONE (RISPERDAL) 0.5 MG Tab TAKE 1 TABLET BY MOUTH NIGHTLY AS NEEDED (AGITATION).      simvastatin (ZOCOR) 40 MG tablet Take 1 tablet (40 mg total) by mouth every evening. 1 tablet Oral Every day 90 tablet 4    vitamin D (VITAMIN D3) 1000 units Tab Take 1,000 Units by mouth 2 (two) times a day.      [DISCONTINUED] albuterol (PROVENTIL) 2.5 mg /3 mL (0.083 %) nebulizer solution TAKE 3 MLS (2.5 MG TOTAL) BY NEBULIZATION EVERY 6 TO 8 HOURS AS NEEDED FOR WHEEZING OR SHORTNESS OF BREATH. RESCUE 300 mL 11    [DISCONTINUED] budesonide-formoterol 160-4.5 mcg (SYMBICORT) 160-4.5 mcg/actuation HFAA Inhale 2 puffs into the lungs 2 (two) times daily. 3 Inhaler 3    [DISCONTINUED] tiotropium (SPIRIVA WITH HANDIHALER) 18 mcg inhalation capsule Inhale 1 capsule (18 mcg total) into the lungs once daily. Controller 90  capsule 3    albuterol (PROVENTIL) 2.5 mg /3 mL (0.083 %) nebulizer solution Take 3 mLs (2.5 mg total) by nebulization every 6 to 8 hours as needed for Wheezing or Shortness of Breath. Rescue 300 mL 11    budesonide-formoterol 160-4.5 mcg (SYMBICORT) 160-4.5 mcg/actuation HFAA Inhale 2 puffs into the lungs 2 (two) times daily. 30.6 g 11    tiotropium (SPIRIVA WITH HANDIHALER) 18 mcg inhalation capsule Inhale 1 capsule (18 mcg total) into the lungs once daily. Controller 90 capsule 3    torsemide (DEMADEX) 20 MG Tab Take 20 mg by mouth.       No facility-administered encounter medications on file as of 9/27/2022.     Plan:       Requested Prescriptions     Signed Prescriptions Disp Refills    albuterol (PROVENTIL) 2.5 mg /3 mL (0.083 %) nebulizer solution 300 mL 11     Sig: Take 3 mLs (2.5 mg total) by nebulization every 6 to 8 hours as needed for Wheezing or Shortness of Breath. Rescue    budesonide-formoterol 160-4.5 mcg (SYMBICORT) 160-4.5 mcg/actuation HFAA 30.6 g 11     Sig: Inhale 2 puffs into the lungs 2 (two) times daily.    tiotropium (SPIRIVA WITH HANDIHALER) 18 mcg inhalation capsule 90 capsule 3     Sig: Inhale 1 capsule (18 mcg total) into the lungs once daily. Controller     Problem List Items Addressed This Visit       Chronic respiratory failure with hypoxia and hypercapnia - Primary    Overview     MULTIFACTORIAL: CVA, CHF, SEVERE COPD, CHRONIC NARCOTIC USE           Relevant Orders    Six Minute Walk Test to qualify for Home Oxygen    COPD, severe    Overview     Overview:   Last Assessment & Plan:   COPD   Plan: SYMBICORT, PROVENTIL, PROVENTIL HFA.   Oxygen         Relevant Medications    albuterol (PROVENTIL) 2.5 mg /3 mL (0.083 %) nebulizer solution    budesonide-formoterol 160-4.5 mcg (SYMBICORT) 160-4.5 mcg/actuation HFAA    tiotropium (SPIRIVA WITH HANDIHALER) 18 mcg inhalation capsule     Other Visit Diagnoses       Asthma with COPD        Relevant Medications    albuterol (PROVENTIL) 2.5 mg  /3 mL (0.083 %) nebulizer solution    budesonide-formoterol 160-4.5 mcg (SYMBICORT) 160-4.5 mcg/actuation HFAA    tiotropium (SPIRIVA WITH HANDIHALER) 18 mcg inhalation capsule    Other Relevant Orders    NEBULIZER KIT (SUPPLIES) FOR HOME USE    NEBULIZER FOR HOME USE    X-Ray Chest PA And Lateral    GUMARO (obstructive sleep apnea)        Relevant Orders    CPAP/BIPAP SUPPLIES    Exercise hypoxemia        Relevant Orders    Six Minute Walk Test to qualify for Home Oxygen             Follow up in about 1 year (around 9/27/2023) for Review progress, Review CXR - on return, 6 min walk - on return.    MEDICAL DECISION MAKING: Moderate to high complexity.  Overall, the multiple problems listed are of moderate to high severity that may impact quality of life and activities of daily living. Side effects of medications, treatment plan as well as options and alternatives reviewed and discussed with patient. There was counseling of patient concerning these issues.    Total time spent in counseling and coordination of care - 30  minutes of total time spent on the encounter, which includes face to face time and non-face to face time preparing to see the patient (eg, review of tests), Obtaining and/or reviewing separately obtained history, Documenting clinical information in the electronic or other health record, Independently interpreting results (not separately reported) and communicating results to the patient/family/caregiver, or Care coordination (not separately reported).    Time was used in discussion of prognosis, risks, benefits of treatment, instructions and compliance with regimen . Discussion with other physicians and/or health care providers - home health or for use of durable medical equipment (oxygen, nebulizers, CPAP, BiPAP) occurred.

## 2023-01-01 NOTE — PROGRESS NOTES
[facility]  Cardiology  Progress Note    Patient Name: Rea Vail  MRN: [id number]  Admission Date: 2/7/2018  Hospital Length of Stay: 3 days  Code Status: Full Code   Attending Physician: Kai Escamilla MD   Primary Care Physician: Estiven Oseguera MD  Expected Discharge Date:   Principal Problem:Acute on chronic diastolic congestive heart failure    Subjective:   HPI:  Mr. Vail is a 67 year old male patient with a PMHx of COPD, HTN, diastolic CHF, and previous CVA who was sent to [facility] ED from pulmonary clinic due to decompensated CHF. Patient complained of worsening SOB over the past week. Associated symptoms included lower extremity edema, orthopnea, abdominal bloating, and non-productive cough. Patient denied any associated fever, chills, chest pain, or chest tightness. Initial workup in ED revealed BNP of 380 and hypoxemia and hypercarbia. CXR showed interstitial pulmonary edema. EKG showed new onset afib with RVR and patient subsequently admitted for further evaluation and treatment. Cardiology consulted to assist with management. Patient seen and examined today, resting in bed. Feels better since admission, SOB improving. Remains chest pain free. No other complaints. HR in 80's at time of exam. States he had a cold about 2 weeks ago with wheezing and coughing that was treated with antibiotics and steroids. He  reports compliance with his medications and tries to be mindful of his salt intake. Chart reviewed. Troponin x 3 negative. 2D echo pending.        Hospital Course:   2/9/18-Patient seen and examined today, resting in bed. States he feels better SOB improved. Remains in afib, HR still elevated. Labs reviewed. Creatinine 1.1.     2/10/18-Patient seen and examined today, lying in bed. Continues to feel better. SOB much improved. No chest pain. HR controlled with med adjustments. Labs reviewed, creatinine stable.        Review of Systems   Constitution: Negative.   HENT:  Negative.    Eyes: Negative.    Cardiovascular: Positive for leg swelling (improved).   Respiratory: Positive for shortness of breath (improved).    Endocrine: Negative.    Hematologic/Lymphatic: Negative.    Skin: Negative.    Musculoskeletal: Negative.    Gastrointestinal: Negative.    Genitourinary: Negative.    Neurological: Negative.    Psychiatric/Behavioral: Negative.    Allergic/Immunologic: Negative.      Objective:     Vital Signs (Most Recent):  Temp: 97.9 °F (36.6 °C) (02/10/18 1112)  Pulse: 85 (02/10/18 1112)  Resp: 18 (02/10/18 1112)  BP: (!) 164/93 (02/10/18 1112)  SpO2: (!) 94 % (02/10/18 1112) Vital Signs (24h Range):  Temp:  [96.2 °F (35.7 °C)-98 °F (36.7 °C)] 97.9 °F (36.6 °C)  Pulse:  [74-92] 85  Resp:  [18-20] 18  SpO2:  [90 %-95 %] 94 %  BP: (123-164)/(67-93) 164/93     Weight: 87.9 kg (193 lb 12.7 oz)  Body mass index is 29.47 kg/m².     SpO2: (!) 94 %  O2 Device (Oxygen Therapy): room air      Intake/Output Summary (Last 24 hours) at 02/10/18 1118  Last data filed at 02/10/18 0300   Gross per 24 hour   Intake              483 ml   Output              825 ml   Net             -342 ml       Lines/Drains/Airways     Peripheral Intravenous Line                 Peripheral IV - Single Lumen 02/09/18 2100 Right Forearm less than 1 day                Physical Exam   Constitutional: He is oriented to person, place, and time. He appears well-developed and well-nourished. No distress.   HENT:   Head: Normocephalic and atraumatic.   Eyes: Pupils are equal, round, and reactive to light. Right eye exhibits no discharge. Left eye exhibits no discharge.   Neck: Neck supple. No JVD present. No thyromegaly present.   Cardiovascular: Normal rate, S1 normal, S2 normal and normal heart sounds.  An irregularly irregular rhythm present. PMI is not displaced.  Exam reveals no gallop, no S3, no S4 and no friction rub.    No murmur heard.  Pulmonary/Chest: Effort normal and breath sounds normal. No respiratory  EKG distress. He has no wheezes. He has no rales.   Abdominal: Soft. He exhibits no distension. There is no tenderness. There is no rebound.   Musculoskeletal: He exhibits edema (Ble (much improved)).   Neurological: He is alert and oriented to person, place, and time.   Skin: Skin is warm and dry. He is not diaphoretic. No erythema.   Psychiatric: He has a normal mood and affect. His behavior is normal. Thought content normal.   Nursing note and vitals reviewed.      Significant Labs:   BMP:   Recent Labs  Lab 02/09/18  0550 02/10/18  0627   * 141*    141   K 3.8 3.7   CL 93* 95   CO2 37* 36*   BUN 34* 33*   CREATININE 1.1 1.1   CALCIUM 9.5 9.2   , CMP   Recent Labs  Lab 02/09/18  0550 02/10/18  0627    141   K 3.8 3.7   CL 93* 95   CO2 37* 36*   * 141*   BUN 34* 33*   CREATININE 1.1 1.1   CALCIUM 9.5 9.2   ANIONGAP 10 10   ESTGFRAFRICA >60 >60   EGFRNONAA >60 >60   , Troponin No results for input(s): TROPONINI in the last 48 hours. and All pertinent lab results from the last 24 hours have been reviewed.    Significant Imaging: Echocardiogram:   2D echo with color flow doppler:   Results for orders placed or performed during the hospital encounter of 02/07/18   2D echo with color flow doppler   Result Value Ref Range    EF 55 55 - 65    Mitral Valve Regurgitation MILD     Diastolic Dysfunction No     Est. PA Systolic Pressure 47.94 (A)     Tricuspid Valve Regurgitation MILD      Assessment and Plan:   Patient who presents with SOB secondary to decompensated CHF and underlying COPD. Volume status improved, can switch to po Lasix. Continue other meds. New onset afib  Also noted, now rate controlled. Continue Eliquis for CVA prophylaxis. OP EP f/u.     * Acute on chronic diastolic congestive heart failure    -Decompensated, in setting of recent URI and atrial fibrillation with RVR  -Ok to switch to po Lasix  -Continue other meds  -Strict I's/O's  -Dietary restrictions discussed        Chronic  obstructive pulmonary disease with acute exacerbation    -Mgmt as per primary team        New onset a-fib    -HR controlled on Coreg  -Continue Eliquis 5 mg BID for CVA prophylaxis  -OP EP f/u to discuss additional management/DCCV  -Sleep study as underling GUMARO contributing to arrhythmia         Acute on chronic respiratory failure with hypoxia and hypercapnia    -Secondary to decompensated CHF and underlying COPD  -Respiratory status continues to improve  -Ok to switch to po Lasix  -Continue nebs        Dyslipidemia    -Continue statin        HTN (hypertension)    -Continue current meds            VTE Risk Mitigation         Ordered     apixaban tablet 5 mg  2 times daily     Route:  Oral        02/08/18 1139     Medium Risk of VTE  Once      02/07/18 2158     Place DANNA hose  Until discontinued      02/07/18 2158     Place sequential compression device  Until discontinued      02/07/18 2158     Place sequential compression device  Until discontinued      02/07/18 2158          Ashly Walton PA-C  Cardiology  [facility]    Chart reviewed. Dr. Hutchison examined patient and agrees with plan as outlined above.

## 2023-04-03 ENCOUNTER — PATIENT MESSAGE (OUTPATIENT)
Dept: PULMONOLOGY | Facility: CLINIC | Age: 73
End: 2023-04-03
Payer: COMMERCIAL

## 2023-04-03 DIAGNOSIS — J44.9 COPD, SEVERE: Primary | ICD-10-CM

## 2023-04-28 ENCOUNTER — PATIENT MESSAGE (OUTPATIENT)
Dept: PULMONOLOGY | Facility: CLINIC | Age: 73
End: 2023-04-28
Payer: COMMERCIAL

## 2023-05-01 ENCOUNTER — CLINICAL SUPPORT (OUTPATIENT)
Dept: PULMONOLOGY | Facility: CLINIC | Age: 73
End: 2023-05-01
Payer: MEDICARE

## 2023-05-01 ENCOUNTER — OFFICE VISIT (OUTPATIENT)
Dept: PULMONOLOGY | Facility: CLINIC | Age: 73
End: 2023-05-01
Payer: MEDICARE

## 2023-05-01 ENCOUNTER — HOSPITAL ENCOUNTER (OUTPATIENT)
Dept: RADIOLOGY | Facility: HOSPITAL | Age: 73
Discharge: HOME | End: 2023-05-01
Attending: INTERNAL MEDICINE
Payer: MEDICARE

## 2023-05-01 VITALS
DIASTOLIC BLOOD PRESSURE: 70 MMHG | RESPIRATION RATE: 21 BRPM | HEIGHT: 66 IN | HEART RATE: 64 BPM | OXYGEN SATURATION: 95 % | BODY MASS INDEX: 26.68 KG/M2 | WEIGHT: 166 LBS | SYSTOLIC BLOOD PRESSURE: 112 MMHG

## 2023-05-01 VITALS — BODY MASS INDEX: 26.68 KG/M2 | WEIGHT: 166 LBS | HEIGHT: 66 IN

## 2023-05-01 DIAGNOSIS — J96.12 CHRONIC RESPIRATORY FAILURE WITH HYPOXIA AND HYPERCAPNIA: ICD-10-CM

## 2023-05-01 DIAGNOSIS — J44.9 COPD, SEVERE: Primary | ICD-10-CM

## 2023-05-01 DIAGNOSIS — J44.9 COPD, SEVERE: ICD-10-CM

## 2023-05-01 DIAGNOSIS — G47.33 OSA TREATED WITH BIPAP: Chronic | ICD-10-CM

## 2023-05-01 DIAGNOSIS — G47.34 NOCTURNAL HYPOXEMIA: ICD-10-CM

## 2023-05-01 DIAGNOSIS — J96.11 CHRONIC RESPIRATORY FAILURE WITH HYPOXIA AND HYPERCAPNIA: ICD-10-CM

## 2023-05-01 LAB
ALLENS TEST: ABNORMAL
BRPFT: ABNORMAL
DELSYS: ABNORMAL
FEF 25 75 CHG: 43.6 %
FEF 25 75 LLN: 0.59
FEF 25 75 POST REF: 45.8 %
FEF 25 75 PRE REF: 31.9 %
FEF 25 75 REF: 1.77
FET100 CHG: -1.9 %
FEV1 CHG: 8.6 %
FEV1 FVC CHG: 7.9 %
FEV1 FVC LLN: 63
FEV1 FVC POST REF: 88.4 %
FEV1 FVC PRE REF: 81.9 %
FEV1 FVC REF: 77
FEV1 LLN: 1.55
FEV1 POST REF: 67.3 %
FEV1 PRE REF: 62 %
FEV1 REF: 2.3
FIO2: 21
FVC CHG: 0.6 %
FVC LLN: 2.14
FVC POST REF: 75.9 %
FVC PRE REF: 75.5 %
FVC REF: 3
HCO3 UR-SCNC: 29.6 MMOL/L (ref 24–28)
MODE: ABNORMAL
PCO2 BLDA: 53.4 MMHG (ref 35–45)
PEF CHG: -9.5 %
PEF LLN: 4.55
PEF POST REF: 63.9 %
PEF PRE REF: 70.6 %
PEF REF: 6.92
PH SMN: 7.35 [PH] (ref 7.35–7.45)
PO2 BLDA: 70 MMHG (ref 80–100)
POC BE: 4 MMOL/L
POC SATURATED O2: 92 % (ref 95–100)
POST FEF 25 75: 0.81 L/S (ref 0.59–2.95)
POST FET 100: 11.27 SEC
POST FEV1 FVC: 67.75 % (ref 62.92–90.32)
POST FEV1: 1.55 L (ref 1.55–3.05)
POST FVC: 2.28 L (ref 2.14–3.87)
POST PEF: 4.42 L/S (ref 4.55–9.29)
PRE FEF 25 75: 0.56 L/S (ref 0.59–2.95)
PRE FET 100: 11.5 SEC
PRE FEV1 FVC: 62.79 % (ref 62.92–90.32)
PRE FEV1: 1.42 L (ref 1.55–3.05)
PRE FVC: 2.27 L (ref 2.14–3.87)
PRE PEF: 4.88 L/S (ref 4.55–9.29)
SAMPLE: ABNORMAL
SITE: ABNORMAL

## 2023-05-01 PROCEDURE — 36600 WITHDRAWAL OF ARTERIAL BLOOD: CPT | Mod: PBBFAC

## 2023-05-01 PROCEDURE — 36600 WITHDRAWAL OF ARTERIAL BLOOD: CPT | Mod: 59,S$PBB,, | Performed by: INTERNAL MEDICINE

## 2023-05-01 PROCEDURE — 99999 PR PBB SHADOW E&M-EST. PATIENT-LVL V: CPT | Mod: PBBFAC,,, | Performed by: HOSPITALIST

## 2023-05-01 PROCEDURE — 94618 PULMONARY STRESS TESTING: CPT | Mod: PBBFAC

## 2023-05-01 PROCEDURE — 82803 BLOOD GASES ANY COMBINATION: CPT | Mod: PBBFAC

## 2023-05-01 PROCEDURE — 94060 EVALUATION OF WHEEZING: CPT | Mod: 26,S$PBB,, | Performed by: INTERNAL MEDICINE

## 2023-05-01 PROCEDURE — 99999 PR PBB SHADOW E&M-EST. PATIENT-LVL V: ICD-10-PCS | Mod: PBBFAC,,, | Performed by: HOSPITALIST

## 2023-05-01 PROCEDURE — 94060 PR EVAL OF BRONCHOSPASM: ICD-10-PCS | Mod: 26,S$PBB,, | Performed by: INTERNAL MEDICINE

## 2023-05-01 PROCEDURE — 99213 OFFICE O/P EST LOW 20 MIN: CPT | Mod: S$PBB,25,, | Performed by: HOSPITALIST

## 2023-05-01 PROCEDURE — 99213 PR OFFICE/OUTPT VISIT, EST, LEVL III, 20-29 MIN: ICD-10-PCS | Mod: S$PBB,25,, | Performed by: HOSPITALIST

## 2023-05-01 PROCEDURE — 71046 X-RAY EXAM CHEST 2 VIEWS: CPT | Mod: TC

## 2023-05-01 PROCEDURE — 99211 OFF/OP EST MAY X REQ PHY/QHP: CPT | Mod: PBBFAC,25

## 2023-05-01 PROCEDURE — 94060 EVALUATION OF WHEEZING: CPT | Mod: PBBFAC

## 2023-05-01 PROCEDURE — 99999 PR PBB SHADOW E&M-EST. PATIENT-LVL I: CPT | Mod: PBBFAC,,,

## 2023-05-01 PROCEDURE — 99215 OFFICE O/P EST HI 40 MIN: CPT | Mod: PBBFAC,25,27 | Performed by: HOSPITALIST

## 2023-05-01 PROCEDURE — 71046 X-RAY EXAM CHEST 2 VIEWS: CPT | Mod: 26,,, | Performed by: RADIOLOGY

## 2023-05-01 PROCEDURE — 71046 XR CHEST PA AND LATERAL: ICD-10-PCS | Mod: 26,,, | Performed by: RADIOLOGY

## 2023-05-01 PROCEDURE — 36600 PR WITHDRAWAL OF ARTERIAL BLOOD: ICD-10-PCS | Mod: 59,S$PBB,, | Performed by: INTERNAL MEDICINE

## 2023-05-01 PROCEDURE — 99999 PR PBB SHADOW E&M-EST. PATIENT-LVL I: ICD-10-PCS | Mod: PBBFAC,,,

## 2023-05-01 RX ORDER — MULTIVITAMIN
1 TABLET ORAL
COMMUNITY
End: 2024-02-01

## 2023-05-01 RX ORDER — METHYLPREDNISOLONE 4 MG/1
TABLET ORAL
COMMUNITY
Start: 2023-03-09 | End: 2023-10-02

## 2023-05-01 RX ORDER — HYDRALAZINE HYDROCHLORIDE 25 MG/1
25 TABLET, FILM COATED ORAL 3 TIMES DAILY
COMMUNITY
Start: 2023-04-28

## 2023-05-01 RX ORDER — AMLODIPINE BESYLATE 5 MG/1
TABLET ORAL
COMMUNITY
End: 2024-02-01

## 2023-05-01 NOTE — LETTER
June 8, 2023      [employer]  [address]  [address]  [place] [address]           [facility]  [address] [address]  Phone: [phone]  Fax: [phone]          Patient: Rea Vail   MR Number: [id number]   YOB: 1950   Date of Visit: 5/1/2023           Thank you for referring Rea Vail to me for evaluation. Attached you will find relevant portions of my assessment and plan of care.    If you have questions, please do not hesitate to call me. I look forward to following Rea Vail along with you.    Sincerely,    Rand Miranda PA-C    Enclosure  CC:  No Recipients    If you would like to receive this communication electronically, please contact [email or web address] or [phone] to request [employer] access.    [employer] is a tool which provides read-only access to select patient information with whom you have a relationship. It's easy to use and provides real time access to review your patients record including encounter summaries, notes, results, and demographic information.    If you feel you have received this communication in error or would no longer like to receive these types of communications, please e-mail [email or web address]

## 2023-05-01 NOTE — PROCEDURES
"[facility]  Six Minute Walk     SUMMARY     Ordering Provider: Rand LANZA   Interpreting Provider: Toney RUBIN  Performing nurse/tech/RT: Alonso Ann RRT  Diagnosis: COPD  Height: 5' 6" (167.6 cm)  Weight: 75.3 kg (166 lb 0.1 oz)  BMI (Calculated): 26.8   Patient Race:             Phase Oxygen Assessment Supplemental O2 Heart   Rate Blood Pressure Srinath Dyspnea Scale Rating   Resting 97 % Room Air 77 bpm 161/76 4   Exercise        Minute        1 97 % Room Air 100 bpm     2 97 % Room Air 108 bpm     3 97 % Room Air 99 bpm     4 97 % Room Air 94 bpm     5 97 % Room Air 100 bpm     6  97 % Room Air 98 bpm 166/84 7-8   Recovery        Minute        1 97 % Room Air 91 bpm     2 97 % Room Air 71 bpm     3 99 % Room Air 74 bpm     4 99 % Room Air 82 bpm 153/81 5-6     Six Minute Walk Summary  6MWT Status: completed without stopping  Patient Reported: No complaints     Interpretation:  Did the patient stop or pause?: No                                         Total Time Walked (Calculated): 360 seconds  Final Partial Lap Distance (feet): 100 feet  Total Distance Meters (Calculated): 274.32 meters  Predicted Distance Meters (Calculated): 455.72 meters  Percentage of Predicted (Calculated): 60.19  Peak VO2 (Calculated): 12.21  Mets: 3.49  Has The Patient Had a Previous Six Minute Walk Test?: Yes       Previous 6MWT Results  Has The Patient Had a Previous Six Minute Walk Test?: Yes  Date of Previous Test: 10/03/19  Total Time Walked: 360 seconds  Total Distance (meters): 365  Predicted Distance (meters): 426 meters  Percentage of Predicted: 85.7  Percent Change (Calculated): 0.25    Interpretation:  Total distance walked in six minutes is moderately reduced indicating a reduction in overall  functional capacity. Oxygen desaturation did not meet criteria for supplemental oxygen prescription.    Clinical correlation suggested.    [] Mild exercise-induced hypoxemia described as an arterial " oxygen saturation of 93-95% (or 3-4% less than at rest),  [] Moderate exercise-induced hypoxemia as 89-93%  [] Severe exercise induced hypoxemia as < 89% O2 saturation.  Medicare Criteria for oxygen prescription comments:   When arterial oxygen saturation is at or below 88% during exercise on room air (severe exercise induced hypoxemia) then the patient falls under Medicare Group 1 criteria for supplemental oxygen prescription.  Details about Medicare Group Criteria coverage can be found at http://www.cms.[facility].gov/manuals/downloads/   Bob Ziegler MD

## 2023-05-01 NOTE — ASSESSMENT & PLAN NOTE
- Presents for pre-op assessment for hernia repair  - No Pulmonary contraindications for surgery  - instructed to bring bipap to hospital  - prior inguinal hernia repair 2020 without issue  - regular follow up with Dr. Ziegler in September

## 2023-05-01 NOTE — PROCEDURES
ABG completed. See ABG Below.  Component      Latest Ref Rng & Units 5/1/2023   POC PH      7.35 - 7.45 7.352   POC PCO2      35 - 45 mmHg 53.4 (H)   POC PO2      80 - 100 mmHg 70 (L)   POC HCO3      24 - 28 mmol/L 29.6 (H)   POC BE      -2 to 2 mmol/L 4   POC SATURATED O2      95 - 100 % 92 (L)   Sample       ARTERIAL   Site       LR   Allens Test       Pass   DelSys       Room Air   Mode       SPONT   FiO2       21           Interpretation:    [x] Arterial blood gases on room air are abnormal demonstrating hypoxemia (pO2 < 80 mmHg) and hypercarbia (pCO2>45 mmHg)    The table below summarizes the main interpretations of the relationship between the arterial blood gases, pH, pCO2 and HCO-3    []    I

## 2023-05-01 NOTE — PROGRESS NOTES
Subjective:      Patient ID: Rea Vail is a 72 y.o. male.    Chief Complaint:     72 year old male with history of CHF, COPD, dyspnea, GUMARO (BiPAP 13/8) followed by Dr. Ziegler who presents to Pulmonary Clinic for pre-op evaluation for a left hernia repair 5/10/23 with Dr. Lauren.     Last seen by Dr. Ziegler 9/2022    Pertinent Work Up:  CXR 5/2023- Stable CXR compared to prior, no acute developments, no effusions  Spirometry 5/2023- Mild obstruction, improved FVC and FEV1 compared to prior test, no response to bronchodilators  ABG 5/2023- Compensated hypercapnia   6MW 5/1/23- Walked 274 meters (60% predicted), SpO2 remained 97% or higher    Pulmonary Interventions:   Albuterol nebulized- uses morning and night   Symbicort 160 - 2 puffs in the afternoon  Spiriva - once a day  Bipap- every night    PRE OPERATIVE PULMONARY RISK ASSESSMENT:      Surgeon: Dr. Lauren  Procedure: Open left inguinal hernia repair w/ mesh  Procedure Date: 5/10/23  Anesthesia: General    ARISCAT Score: 3     0 to 25 points: Low risk: 1.6% pulmonary complication rate   26 to 44 points: Intermediate risk: 13.3% pulmonary complication rate   45 to 123 points: High risk: 42.1% pulmonary complication rate          [place] Post-Operative Respiratory Risk Score: 0.6 % Risk of mechanical ventilation for >48 hrs after surgery, or unplanned intubation ?30 days of surgery        Risks and possible pulmonary complications of surgery include risk of respiratory failure requiring mechanical ventilation, post operative pneumonia, post operative atelectasis, deep venous thrombosis, pulmonary embolism and death.     Their physiology is not prohibitive and there is no absolute contraindication to proposed surgery and/or anesthesia.  Would recommend albuterol 2.5 mg and Solu-Medrol 60 mg on-call to the OR as a pretreatment to maximize lung function prior to surgery.    Review of Systems   All other systems reviewed and are negative.  Objective:     Physical  Exam   Constitutional: He is oriented to person, place, and time. He appears well-developed and well-nourished. He is not obese.   Cardiovascular: Normal rate and regular rhythm.   Pulmonary/Chest: Normal expansion and breath sounds normal.   Clear breath sounds, no wheezing, no breathlessness   Musculoskeletal:         General: No edema.   Neurological: He is alert and oriented to person, place, and time.   Skin: Skin is warm and dry.   Psychiatric: He has a normal mood and affect.   Personal Diagnostic Review  As Above  No flowsheet data found.     Procedures  Elyse Reina, BRANDON (Respiratory Therapist)   Respiratory Therapy  Procedure Orders   1. PULM - Arterial Blood Gases--in addition to PFT only [[id number]] ordered by Bob Ziegler MD at 23 1418      ABG completed. See ABG Below.  Component      Latest Ref Rng & Units 2023   POC PH      7.35 - 7.45 7.352   POC PCO2      35 - 45 mmHg 53.4 (H)   POC PO2      80 - 100 mmHg 70 (L)   POC HCO3      24 - 28 mmol/L 29.6 (H)   POC BE      -2 to 2 mmol/L 4   POC SATURATED O2      95 - 100 % 92 (L)   Sample       ARTERIAL   Site       LR   Allens Test       Pass   DelSys       Room Air   Mode       SPONT   FiO2       21               Interpretation:     [x] Arterial blood gases on room air are abnormal demonstrating hypoxemia (pO2 < 80 mmHg) and hypercarbia (pCO2>45 mmHg)           [facility] [address] * [place] [address]  Telephone: [phone]  Test date: 20 Start: 20 19:31:11 Rea Vail  Doctor: Dr. Ziegler End: 20 04:54:15 [id number]  Oximetry: Summary Report  Comments: Bipap  & Room Air  Recording time: 09:23:04 Highest pulse: 98 Highest SpO2: 100%  Excluded samplin:18:44 Lowest pulse: 48 Lowest SpO2: 72%  Total valid samplin::20 Mean pulse: 71 Mean SpO2: 92.7%  1 S.D.: 6.7 1 S.D.: 3.2  Time with SpO2<90: 0:51:16, 9.4%  Time with SpO2<80: 0:02:32, 0.5%  Time with SpO2<70: 0:00:00,  0.0%  Time with SpO2<60: 0:00:00, 0.0%  Time with SpO2<89: 0:28:32, 5.2%  Time with SpO2 =>90: 8:13:04, 90.6%  Time with SpO2=>80 & <90: 0:48:44, 9.0%  Time with SpO2=>70 & <80: 0:02:32, 0.5%  Time with SpO2=>60 & <70: 0:00:00, 0.0%  The longest continuous time with saturation <=88 was 00:01:52, which started at  12/08/20 03:17:03.  A desaturation event was defined as a decrease of saturation by 4 or more.  9 events were excluded due to artifact.  There were 15 desaturation events over 3 minutes duration.  There were 151 desaturation events of less than 3 minutes duration during which:  The mean high was 96.3%. The mean low was 90.6%.  The number of these events that were:  > 0 & <10 seconds: 2 > 0 seconds: 151  =>10 & <20 seconds: 35 =>10 seconds: 149  =>20 & <30 seconds: 55 =>20 seconds: 114  =>30 & <40 seconds: 12 =>30 seconds: 59  =>40 & <50 seconds: 12 =>40 seconds: 47  =>50 & <60 seconds: 6 =>50 seconds: 35  =>60 seconds: 29 =>60 seconds: 29  The mean length of desaturation events that were >=10 sec & <=3 mins was: 40.9 sec.  Desaturation event index (events >=10 sec per sampled hour): 16.4  Desaturation event index (events >= 0 sec per sampled hour): 16.6     Overnight Oxygen Saturation Study:     INTERPRETATION:  Conditions of Test: Room air stable outpatient on BiPAP .  Interpretation of results of overnight oxygen saturation study. This was a technically  adequate study. Overnight oxygen saturation study is abnormal with O2 saturation less than 88%.   Time with SpO2<89: 0:28:32, 5.2%of the study period. Lowest oxygen saturation recorded was 72% .  Clinical correlation suggested.  Bob Ziegler MD     Medicare Criteria Comments:   Overnight Oximetry test results suggest the patient does fall under Medicare Group 1 Criteria and would be eligible for oxygen prescription.   (Arterial oxygen saturation at or below 88% for at least 5 minutes taken during sleep on stable outpatient)     Details about Medicare  Group Criteria coverage can be found at http://www.cms.hhs.gov/manuals/downloads/     Assessment:     1. COPD, severe    2. Nocturnal hypoxemia    3. Chronic respiratory failure with hypoxia and hypercapnia    4. GUMARO treated with BiPAP         Outpatient Encounter Medications as of 5/1/2023   Medication Sig Dispense Refill    albuterol (PROVENTIL) 2.5 mg /3 mL (0.083 %) nebulizer solution TAKE 3 MLS BY NEBULIZATION EVERY 6 TO 8 HOURS AS NEEDED FOR WHEEZING OR SHORTNESS OF BREATH. RESCUE 300 mL 11    allopurinoL (ZYLOPRIM) 300 MG tablet Take 1 tablet by mouth once daily.      amLODIPine (NORVASC) 5 MG tablet amlodipine 5 mg tablet   TAKE 1 TABLET BY MOUTH ONCE DAILY      ascorbic acid, vitamin C, (VITAMIN C) 1000 MG tablet Take 1,000 mg by mouth once daily.      azelastine (ASTELIN) 137 mcg (0.1 %) nasal spray 1 spray by Nasal route daily as needed.      cefTRIAXone (ROCEPHIN) 10 gram injection Inject 2 g into the muscle once daily.      ELIQUIS 5 mg Tab TAKE 1 TABLET BY MOUTH TWICE DAILY 60 tablet 6    ENTRESTO 24-26 mg per tablet Take 1 tablet by mouth 2 (two) times daily.      ferrous sulfate (IRON ORAL) Take 200 mg by mouth 2 (two) times a day.      gabapentin (NEURONTIN) 300 MG capsule TAKE 1 CAPSULE BY MOUTH TWICE A DAY 60 capsule 6    hydrALAZINE (APRESOLINE) 25 MG tablet Take 25 mg by mouth 3 (three) times daily.      HYDROcodone-acetaminophen (NORCO)  mg per tablet Take 1 tablet by mouth 2 (two) times daily. Take 1 tablet in am and 1/2 tablet in pm      lactulose 10 gram/15 ml (CHRONULAC) 10 gram/15 mL (15 mL) solution       methylPREDNISolone (MEDROL DOSEPACK) 4 mg tablet TAKE 6 TABLETS ON DAY 1 AS DIRECTED ON PACKAGE AND DECREASE BY 1 TAB EACH DAY FOR A TOTAL OF 6 DAYS      metoprolol succinate (TOPROL-XL) 100 MG 24 hr tablet Take 100 mg by mouth once daily.      MOVANTIK 25 mg tablet Take 25 mg by mouth once.      multivitamin capsule Take 1 capsule by mouth once daily.      multivitamin with folic  acid 400 mcg Tab Take 1 tablet by mouth.      OXYGEN-AIR DELIVERY SYSTEMS MISC by [facility].(Non-Drug; Combo Route) route.      risperiDONE (RISPERDAL) 0.5 MG Tab TAKE 1 TABLET BY MOUTH NIGHTLY AS NEEDED (AGITATION).      simvastatin (ZOCOR) 40 MG tablet Take 1 tablet (40 mg total) by mouth every evening. 1 tablet Oral Every day 90 tablet 4    tiotropium (SPIRIVA WITH HANDIHALER) 18 mcg inhalation capsule Inhale 1 capsule (18 mcg total) into the lungs once daily. Controller 90 capsule 3    torsemide (DEMADEX) 20 MG Tab Take 20 mg by mouth.      vitamin D (VITAMIN D3) 1000 units Tab Take 1,000 Units by mouth 2 (two) times a day.      [DISCONTINUED] budesonide-formoterol 160-4.5 mcg (SYMBICORT) 160-4.5 mcg/actuation HFAA Inhale 2 puffs into the lungs 2 (two) times daily. 30.6 g 11     No facility-administered encounter medications on file as of 5/1/2023.         Plan:     Problem List Items Addressed This Visit       Chronic respiratory failure with hypoxia and hypercapnia    GUMARO treated with BiPAP (Chronic)     Continue BiPAP with supplemental oxygen          COPD, severe - Primary     - Presents for pre-op assessment for hernia repair  - No Pulmonary contraindications for surgery  - instructed to bring bipap to hospital  - prior inguinal hernia repair 2020 without issue  - regular follow up with Dr. Ziegler in September         Relevant Orders    Stress test, pulmonary (Completed)    Nocturnal hypoxemia     Patient continues to need oxygen at night with Continuous Positive Airway Pressure               Patients with known or suspected obstructive sleep apnea should be monitored in the postanesthesia care unit, with particular attention to oxygenation and ventilation.Continuous pulse oximetry should be monitored until the patient is able to maintain adequate oxygenation on room air when left unstimulated.  When not contraindicated by surgical considerations, postoperative patients with GUMARO should be cared for in the lateral  or semi-upright position, rather than the supine position.    Attempt to minimize the use of systemic opioids with patients with GUMARO.  Patient will bring CPAP with them to use in the postoperative period    In patients with GUMARO, disturbance in sleep architecture is greatest on postoperative night 1; however, breathing disturbances are greatest on postoperative night 3 and may not normalize for several more nights. Patients using CPAP therapy should be instructed to consistently use CPAP during this period whenever sleep is likely, including the daytime.    Plan discussed with patient and he expressed understanding, all questions answered. No pulmonary contraindications to surgery.

## 2023-05-02 ENCOUNTER — PATIENT MESSAGE (OUTPATIENT)
Dept: PULMONOLOGY | Facility: CLINIC | Age: 73
End: 2023-05-02
Payer: COMMERCIAL

## 2023-05-03 ENCOUNTER — LAB VISIT (OUTPATIENT)
Dept: LAB | Facility: HOSPITAL | Age: 73
End: 2023-05-03
Attending: PSYCHIATRY & NEUROLOGY
Payer: MEDICARE

## 2023-05-03 DIAGNOSIS — I63.9 CEREBROVASCULAR ACCIDENT (CVA), UNSPECIFIED MECHANISM: ICD-10-CM

## 2023-05-03 DIAGNOSIS — K90.49 MALABSORPTION DUE TO INTOLERANCE, NOT ELSEWHERE CLASSIFIED: ICD-10-CM

## 2023-05-03 LAB
ALBUMIN SERPL BCP-MCNC: 3.2 G/DL (ref 3.5–5.2)
ALBUMIN SERPL BCP-MCNC: 3.2 G/DL (ref 3.5–5.2)
ALP SERPL-CCNC: 103 U/L (ref 55–135)
ALP SERPL-CCNC: 103 U/L (ref 55–135)
ALT SERPL W/O P-5'-P-CCNC: 11 U/L (ref 10–44)
ALT SERPL W/O P-5'-P-CCNC: 11 U/L (ref 10–44)
ANION GAP SERPL CALC-SCNC: 12 MMOL/L (ref 8–16)
ANION GAP SERPL CALC-SCNC: 12 MMOL/L (ref 8–16)
AST SERPL-CCNC: 17 U/L (ref 10–40)
AST SERPL-CCNC: 17 U/L (ref 10–40)
BILIRUB SERPL-MCNC: 0.3 MG/DL (ref 0.1–1)
BILIRUB SERPL-MCNC: 0.3 MG/DL (ref 0.1–1)
BILIRUB UR QL STRIP: NEGATIVE
BUN SERPL-MCNC: 27 MG/DL (ref 8–23)
BUN SERPL-MCNC: 27 MG/DL (ref 8–23)
CALCIUM SERPL-MCNC: 9.5 MG/DL (ref 8.7–10.5)
CALCIUM SERPL-MCNC: 9.5 MG/DL (ref 8.7–10.5)
CHLORIDE SERPL-SCNC: 103 MMOL/L (ref 95–110)
CHLORIDE SERPL-SCNC: 103 MMOL/L (ref 95–110)
CHOLEST SERPL-MCNC: 135 MG/DL (ref 120–199)
CHOLEST SERPL-MCNC: 135 MG/DL (ref 120–199)
CHOLEST/HDLC SERPL: 3.6 {RATIO} (ref 2–5)
CHOLEST/HDLC SERPL: 3.6 {RATIO} (ref 2–5)
CLARITY UR REFRACT.AUTO: CLEAR
CO2 SERPL-SCNC: 27 MMOL/L (ref 23–29)
CO2 SERPL-SCNC: 27 MMOL/L (ref 23–29)
COLOR UR AUTO: YELLOW
CREAT SERPL-MCNC: 1.7 MG/DL (ref 0.5–1.4)
CREAT SERPL-MCNC: 1.7 MG/DL (ref 0.5–1.4)
EST. GFR  (NO RACE VARIABLE): 41.8 ML/MIN/1.73 M^2
EST. GFR  (NO RACE VARIABLE): 41.8 ML/MIN/1.73 M^2
GLUCOSE SERPL-MCNC: 118 MG/DL (ref 70–110)
GLUCOSE SERPL-MCNC: 118 MG/DL (ref 70–110)
GLUCOSE UR QL STRIP: NEGATIVE
HDLC SERPL-MCNC: 38 MG/DL (ref 40–75)
HDLC SERPL-MCNC: 38 MG/DL (ref 40–75)
HDLC SERPL: 28.1 % (ref 20–50)
HDLC SERPL: 28.1 % (ref 20–50)
HGB UR QL STRIP: NEGATIVE
INR PPP: 1.1 (ref 0.8–1.2)
KETONES UR QL STRIP: NEGATIVE
LDLC SERPL CALC-MCNC: 82.6 MG/DL (ref 63–159)
LDLC SERPL CALC-MCNC: 82.6 MG/DL (ref 63–159)
LEUKOCYTE ESTERASE UR QL STRIP: NEGATIVE
NITRITE UR QL STRIP: NEGATIVE
NONHDLC SERPL-MCNC: 97 MG/DL
NONHDLC SERPL-MCNC: 97 MG/DL
PH UR STRIP: 5 [PH] (ref 5–8)
POTASSIUM SERPL-SCNC: 4.1 MMOL/L (ref 3.5–5.1)
POTASSIUM SERPL-SCNC: 4.1 MMOL/L (ref 3.5–5.1)
PROT SERPL-MCNC: 6.9 G/DL (ref 6–8.4)
PROT SERPL-MCNC: 6.9 G/DL (ref 6–8.4)
PROT UR QL STRIP: NEGATIVE
PROTHROMBIN TIME: 11.4 SEC (ref 9–12.5)
SODIUM SERPL-SCNC: 142 MMOL/L (ref 136–145)
SODIUM SERPL-SCNC: 142 MMOL/L (ref 136–145)
SP GR UR STRIP: 1.01 (ref 1–1.03)
TRIGL SERPL-MCNC: 72 MG/DL (ref 30–150)
TRIGL SERPL-MCNC: 72 MG/DL (ref 30–150)
URN SPEC COLLECT METH UR: NORMAL

## 2023-05-03 PROCEDURE — 85025 COMPLETE CBC W/AUTO DIFF WBC: CPT | Performed by: PSYCHIATRY & NEUROLOGY

## 2023-05-03 PROCEDURE — 81003 URINALYSIS AUTO W/O SCOPE: CPT | Performed by: PSYCHIATRY & NEUROLOGY

## 2023-05-03 PROCEDURE — 80053 COMPREHEN METABOLIC PANEL: CPT | Performed by: PSYCHIATRY & NEUROLOGY

## 2023-05-03 PROCEDURE — 80061 LIPID PANEL: CPT | Performed by: PSYCHIATRY & NEUROLOGY

## 2023-05-03 PROCEDURE — 80171 DRUG SCREEN QUANT GABAPENTIN: CPT | Performed by: PSYCHIATRY & NEUROLOGY

## 2023-05-03 PROCEDURE — 85610 PROTHROMBIN TIME: CPT | Performed by: PSYCHIATRY & NEUROLOGY

## 2023-05-04 LAB
BASOPHILS # BLD AUTO: 0.02 K/UL (ref 0–0.2)
BASOPHILS # BLD AUTO: 0.02 K/UL (ref 0–0.2)
BASOPHILS NFR BLD: 0.5 % (ref 0–1.9)
BASOPHILS NFR BLD: 0.5 % (ref 0–1.9)
DIFFERENTIAL METHOD: ABNORMAL
DIFFERENTIAL METHOD: ABNORMAL
EOSINOPHIL # BLD AUTO: 0.3 K/UL (ref 0–0.5)
EOSINOPHIL # BLD AUTO: 0.3 K/UL (ref 0–0.5)
EOSINOPHIL NFR BLD: 6.4 % (ref 0–8)
EOSINOPHIL NFR BLD: 6.4 % (ref 0–8)
ERYTHROCYTE [DISTWIDTH] IN BLOOD BY AUTOMATED COUNT: 13.2 % (ref 11.5–14.5)
ERYTHROCYTE [DISTWIDTH] IN BLOOD BY AUTOMATED COUNT: 13.2 % (ref 11.5–14.5)
HCT VFR BLD AUTO: 38.4 % (ref 40–54)
HCT VFR BLD AUTO: 38.4 % (ref 40–54)
HGB BLD-MCNC: 11.3 G/DL (ref 14–18)
HGB BLD-MCNC: 11.3 G/DL (ref 14–18)
IMM GRANULOCYTES # BLD AUTO: 0.01 K/UL (ref 0–0.04)
IMM GRANULOCYTES # BLD AUTO: 0.01 K/UL (ref 0–0.04)
IMM GRANULOCYTES NFR BLD AUTO: 0.2 % (ref 0–0.5)
IMM GRANULOCYTES NFR BLD AUTO: 0.2 % (ref 0–0.5)
LYMPHOCYTES # BLD AUTO: 0.6 K/UL (ref 1–4.8)
LYMPHOCYTES # BLD AUTO: 0.6 K/UL (ref 1–4.8)
LYMPHOCYTES NFR BLD: 14.9 % (ref 18–48)
LYMPHOCYTES NFR BLD: 14.9 % (ref 18–48)
MCH RBC QN AUTO: 27.3 PG (ref 27–31)
MCH RBC QN AUTO: 27.3 PG (ref 27–31)
MCHC RBC AUTO-ENTMCNC: 29.4 G/DL (ref 32–36)
MCHC RBC AUTO-ENTMCNC: 29.4 G/DL (ref 32–36)
MCV RBC AUTO: 93 FL (ref 82–98)
MCV RBC AUTO: 93 FL (ref 82–98)
MONOCYTES # BLD AUTO: 0.4 K/UL (ref 0.3–1)
MONOCYTES # BLD AUTO: 0.4 K/UL (ref 0.3–1)
MONOCYTES NFR BLD: 9 % (ref 4–15)
MONOCYTES NFR BLD: 9 % (ref 4–15)
NEUTROPHILS # BLD AUTO: 2.9 K/UL (ref 1.8–7.7)
NEUTROPHILS # BLD AUTO: 2.9 K/UL (ref 1.8–7.7)
NEUTROPHILS NFR BLD: 69 % (ref 38–73)
NEUTROPHILS NFR BLD: 69 % (ref 38–73)
NRBC BLD-RTO: 0 /100 WBC
NRBC BLD-RTO: 0 /100 WBC
PLATELET # BLD AUTO: 183 K/UL (ref 150–450)
PLATELET # BLD AUTO: 183 K/UL (ref 150–450)
PMV BLD AUTO: 11.6 FL (ref 9.2–12.9)
PMV BLD AUTO: 11.6 FL (ref 9.2–12.9)
RBC # BLD AUTO: 4.14 M/UL (ref 4.6–6.2)
RBC # BLD AUTO: 4.14 M/UL (ref 4.6–6.2)
WBC # BLD AUTO: 4.23 K/UL (ref 3.9–12.7)
WBC # BLD AUTO: 4.23 K/UL (ref 3.9–12.7)

## 2023-05-04 PROCEDURE — 94618 PULMONARY STRESS TESTING: CPT | Mod: 26,S$PBB,, | Performed by: INTERNAL MEDICINE

## 2023-05-04 PROCEDURE — 94618 PULMONARY STRESS TESTING: ICD-10-PCS | Mod: 26,S$PBB,, | Performed by: INTERNAL MEDICINE

## 2023-05-05 LAB
GABAPENTIN SERPLBLD-MCNC: 8.4 MCG/ML (ref 2–20)
GABAPENTIN SERPLBLD-MCNC: 8.4 MCG/ML (ref 2–20)

## 2023-05-23 DIAGNOSIS — J44.9 COPD, SEVERE: ICD-10-CM

## 2023-05-23 DIAGNOSIS — J44.89 ASTHMA WITH COPD: ICD-10-CM

## 2023-05-23 RX ORDER — BUDESONIDE AND FORMOTEROL FUMARATE DIHYDRATE 160; 4.5 UG/1; UG/1
2 AEROSOL RESPIRATORY (INHALATION) 2 TIMES DAILY
Qty: 30.6 G | Refills: 11 | Status: SHIPPED | OUTPATIENT
Start: 2023-05-23 | End: 2024-02-01 | Stop reason: SDUPTHER

## 2023-07-26 ENCOUNTER — TELEPHONE (OUTPATIENT)
Dept: PULMONOLOGY | Facility: CLINIC | Age: 73
End: 2023-07-26
Payer: COMMERCIAL

## 2023-09-08 DIAGNOSIS — J44.89 ASTHMA WITH COPD: ICD-10-CM

## 2023-09-08 DIAGNOSIS — J44.9 COPD, SEVERE: ICD-10-CM

## 2023-09-08 RX ORDER — TIOTROPIUM BROMIDE 18 UG/1
18 CAPSULE ORAL; RESPIRATORY (INHALATION) DAILY
Qty: 90 CAPSULE | Refills: 3 | Status: SHIPPED | OUTPATIENT
Start: 2023-09-08 | End: 2024-02-01 | Stop reason: SDUPTHER

## 2023-09-15 ENCOUNTER — TELEPHONE (OUTPATIENT)
Dept: PULMONOLOGY | Facility: CLINIC | Age: 73
End: 2023-09-15
Payer: MEDICARE

## 2023-09-15 NOTE — TELEPHONE ENCOUNTER
Spoke with relative they were unable to confirm appt at the time will call back later today to let us know if the appt will need to be rescheduled or if they will keep the appt.

## 2023-10-02 ENCOUNTER — HOSPITAL ENCOUNTER (OUTPATIENT)
Dept: RADIOLOGY | Facility: HOSPITAL | Age: 73
Discharge: HOME | End: 2023-10-02
Attending: INTERNAL MEDICINE
Payer: MEDICARE

## 2023-10-02 ENCOUNTER — OFFICE VISIT (OUTPATIENT)
Dept: PULMONOLOGY | Facility: CLINIC | Age: 73
End: 2023-10-02
Payer: MEDICARE

## 2023-10-02 ENCOUNTER — CLINICAL SUPPORT (OUTPATIENT)
Dept: PULMONOLOGY | Facility: CLINIC | Age: 73
End: 2023-10-02
Attending: INTERNAL MEDICINE
Payer: MEDICARE

## 2023-10-02 VITALS
DIASTOLIC BLOOD PRESSURE: 81 MMHG | SYSTOLIC BLOOD PRESSURE: 136 MMHG | BODY MASS INDEX: 26.54 KG/M2 | WEIGHT: 165.13 LBS | WEIGHT: 168 LBS | HEIGHT: 66 IN | HEIGHT: 66 IN | BODY MASS INDEX: 27 KG/M2 | OXYGEN SATURATION: 97 % | RESPIRATION RATE: 20 BRPM | HEART RATE: 70 BPM

## 2023-10-02 DIAGNOSIS — I50.32 CHRONIC DIASTOLIC CONGESTIVE HEART FAILURE: ICD-10-CM

## 2023-10-02 DIAGNOSIS — J44.9 COPD, SEVERE: Primary | ICD-10-CM

## 2023-10-02 DIAGNOSIS — J96.12 CHRONIC RESPIRATORY FAILURE WITH HYPOXIA AND HYPERCAPNIA: ICD-10-CM

## 2023-10-02 DIAGNOSIS — G47.33 OSA TREATED WITH BIPAP: Chronic | ICD-10-CM

## 2023-10-02 DIAGNOSIS — J96.11 CHRONIC RESPIRATORY FAILURE WITH HYPOXIA AND HYPERCAPNIA: ICD-10-CM

## 2023-10-02 DIAGNOSIS — N18.32 STAGE 3B CHRONIC KIDNEY DISEASE: ICD-10-CM

## 2023-10-02 DIAGNOSIS — J44.89 ASTHMA WITH COPD: ICD-10-CM

## 2023-10-02 DIAGNOSIS — R09.02 EXERCISE HYPOXEMIA: ICD-10-CM

## 2023-10-02 DIAGNOSIS — I10 PRIMARY HYPERTENSION: ICD-10-CM

## 2023-10-02 DIAGNOSIS — I48.19 PERSISTENT ATRIAL FIBRILLATION: ICD-10-CM

## 2023-10-02 PROCEDURE — 99214 OFFICE O/P EST MOD 30 MIN: CPT | Mod: S$PBB,25,, | Performed by: PHYSICIAN ASSISTANT

## 2023-10-02 PROCEDURE — 71046 XR CHEST PA AND LATERAL: ICD-10-PCS | Mod: 26,,, | Performed by: RADIOLOGY

## 2023-10-02 PROCEDURE — 99999 PR PBB SHADOW E&M-EST. PATIENT-LVL IV: ICD-10-PCS | Mod: PBBFAC,,, | Performed by: PHYSICIAN ASSISTANT

## 2023-10-02 PROCEDURE — 99999 PR PBB SHADOW E&M-EST. PATIENT-LVL IV: CPT | Mod: PBBFAC,,, | Performed by: PHYSICIAN ASSISTANT

## 2023-10-02 PROCEDURE — 71046 X-RAY EXAM CHEST 2 VIEWS: CPT | Mod: 26,,, | Performed by: RADIOLOGY

## 2023-10-02 PROCEDURE — 94618 PULMONARY STRESS TESTING: CPT | Mod: PBBFAC

## 2023-10-02 PROCEDURE — 71046 X-RAY EXAM CHEST 2 VIEWS: CPT | Mod: TC

## 2023-10-02 PROCEDURE — 99211 OFF/OP EST MAY X REQ PHY/QHP: CPT | Mod: PBBFAC,25

## 2023-10-02 PROCEDURE — 99999 PR PBB SHADOW E&M-EST. PATIENT-LVL I: CPT | Mod: PBBFAC,,,

## 2023-10-02 PROCEDURE — 99999 PR PBB SHADOW E&M-EST. PATIENT-LVL I: ICD-10-PCS | Mod: PBBFAC,,,

## 2023-10-02 PROCEDURE — 99214 OFFICE O/P EST MOD 30 MIN: CPT | Mod: PBBFAC,25,27 | Performed by: PHYSICIAN ASSISTANT

## 2023-10-02 PROCEDURE — 99214 PR OFFICE/OUTPT VISIT, EST, LEVL IV, 30-39 MIN: ICD-10-PCS | Mod: S$PBB,25,, | Performed by: PHYSICIAN ASSISTANT

## 2023-10-02 RX ORDER — AZELASTINE HYDROCHLORIDE 0.5 MG/ML
SOLUTION/ DROPS OPHTHALMIC
COMMUNITY
Start: 2023-08-30 | End: 2024-02-01

## 2023-10-02 RX ORDER — GABAPENTIN 400 MG/1
CAPSULE ORAL
COMMUNITY

## 2023-10-02 NOTE — PROCEDURES
"[facility] - Pulmonary Function  Six Minute Walk     SUMMARY     Ordering Provider: Toney RUBIN   Interpreting Provider: Toney RUBIN  Performing nurse/tech/RT: Alonso Ann RRT  Diagnosis:  (Chronic resp failure)  Height: 5' 6" (167.6 cm)  Weight: 74.9 kg (165 lb 2 oz)  BMI (Calculated): 26.7   Patient Race:             Phase Oxygen Assessment Supplemental O2 Heart   Rate Blood Pressure Srinath Dyspnea Scale Rating   Resting 96 % Room Air 81 bpm 136/81 3   Exercise        Minute        1 96 % Room Air 116 bpm     2 95 % Room Air 113 bpm     3 95 % Room Air 122 bpm     4 94 % Room Air 136 bpm     5 94 % Room Air 131 bpm     6  94 % Room Air 139 bpm 173/86 5-6   Recovery        Minute        1 95 % Room Air 70 bpm     2 99 % Room Air 71 bpm     3 99 % Room Air 68 bpm     4 99 % Room Air 71 bpm 153/87 4     Six Minute Walk Summary  6MWT Status: completed without stopping  Patient Reported: Leg pain     Interpretation:  Did the patient stop or pause?: No                                         Total Time Walked (Calculated): 360 seconds  Final Partial Lap Distance (feet): 150 feet  Total Distance Meters (Calculated): 289.56 meters  Predicted Distance Meters (Calculated): 451.41 meters  Percentage of Predicted (Calculated): 64.15  Peak VO2 (Calculated): 12.67  Mets: 3.62  Has The Patient Had a Previous Six Minute Walk Test?: Yes       Previous 6MWT Results  Has The Patient Had a Previous Six Minute Walk Test?: Yes  Date of Previous Test: 05/01/23  Total Time Walked: 360 seconds  Total Distance (meters): 274.3  Predicted Distance (meters): 455.7 meters  Percentage of Predicted: 60.1  Percent Change (Calculated): -0.06      Interpretation:  Total distance walked in six minutes is mildly reduced indicating a reduction in overall  functional capacity. The patient did not meet criteria for supplemental oxygen prescription.  Clinical correlation suggested.  [] Mild exercise-induced hypoxemia described as an arterial oxygen " saturation of 93-95% (with a fall of 3-4% with exercise),   [] Moderate exercise-induced hypoxemia as a fall in oxygen saturation to  89-93% (with a fall of 3-4 % with exercise)  [] Severe exercise induced hypoxemia as < 89% O2 saturation (88% and below).  Medicare Criteria for Oxygen prescription comments: When arterial oxygen saturation is at or below 88% during exercise (severe exercise induced hypoxemia) then the patient falls under   Details about Medicare Group Criteria coverage can be found at http://www.cms.[facility].gov/manuals/downloads/     Bob Ziegler MD

## 2023-10-02 NOTE — ASSESSMENT & PLAN NOTE
Stable and controlled on spiriva ans symbicort daily  No significant desaturations requiring oxygen on walk today  He reports up to date on flu vaccine, recommend rsv vaccine

## 2023-10-02 NOTE — PROGRESS NOTES
Subjective:       Patient ID: Rea Vail is a 73 y.o. male.    Chief Complaint: COPD      10/2/2023  Here for COPD follow up  Last seen 5/2023 for pre-op clearance, hernia repair, no complications  Follows with Dr. Ziegler for COPD  New patient to me  Doing well since last visit, no exacerbations or hospitalizations  Feels COPD is well controlled on symbicort and spiriva daily  Uses SAUNDRA neb prn, last used one day last week  No signs of exacerbation today  He is using BIPAP nightly, followed by [employer]  He is here with sitter and has his daughter on the phone who is assisting as historian    COPD Questionnaire  How often do you cough?: A little of the time  How often do you have phlegm (mucus) in your chest?: Never  How often does your chest feel tight?: Never  When you walk up a hill or one flight of stairs, how often are you breathless?: All of the time  How often are you limited doing any activities at home?: Never  How often are you confident leaving the house despite your lung condition?: All of the time  How often do you sleep soundly?: All of the time  How often do you have energy?: Almost never  Total score: 11        9/27/2022    11:01 AM   EPWORTH SLEEPINESS SCALE   Sitting and reading 1   Watching TV 0   Sitting, inactive in a public place (e.g. a theatre or a meeting) 0   As a passenger in a car for an hour without a break 1   Lying down to rest in the afternoon when circumstances permit 2   Sitting and talking to someone 2   Sitting quietly after a lunch without alcohol 1   In a car, while stopped for a few minutes in traffic 0   Total score 7       Immunization History   Administered Date(s) Administered    COVID-19, MRNA, LN-S, PF (Pfizer) (Gray Cap) 09/06/2022    COVID-19, MRNA, LN-S, PF (Pfizer) (Purple Cap) 01/13/2021, 02/03/2021, 09/28/2021    COVID-19, mRNA, LNP-S, bivalent booster, PF (PFIZER OMICRON) 12/27/2022    Hepatitis A / Hepatitis B 08/29/2007, 06/07/2016, 08/28/2016     Influenza - High Dose - PF (65 years and older) 11/19/2014, 09/17/2015, 08/28/2016, 11/21/2017, 08/22/2018, 08/26/2019, 09/03/2020    Influenza A (H1N1) 2009 Monovalent - IM 01/13/2010    Pneumococcal Conjugate - 13 Valent 06/07/2016    Pneumococcal Polysaccharide - 23 Valent 11/19/2014, 06/20/2015    Tdap 06/07/2016    Zoster 09/17/2015      Tobacco Use: Medium Risk (10/2/2023)    Patient History     Smoking Tobacco Use: Former     Smokeless Tobacco Use: Never     Passive Exposure: Not on file      Past Medical History:   Diagnosis Date    Anemia 2013    Asthma     Atrial fibrillation     CHF (congestive heart failure)     COPD (chronic obstructive pulmonary disease) 2012    Diverticular disease     Gout 2012    Hyperlipidemia     Hypertension     GUMARO (obstructive sleep apnea) 4/27/2018    Other and unspecified hyperlipidemia     Stroke       Current Outpatient Medications on File Prior to Visit   Medication Sig Dispense Refill    albuterol (PROVENTIL) 2.5 mg /3 mL (0.083 %) nebulizer solution TAKE 3 MLS BY NEBULIZATION EVERY 6 TO 8 HOURS AS NEEDED FOR WHEEZING OR SHORTNESS OF BREATH. RESCUE 300 mL 11    amLODIPine (NORVASC) 5 MG tablet amlodipine 5 mg tablet   TAKE 1 TABLET BY MOUTH ONCE DAILY      ascorbic acid, vitamin C, (VITAMIN C) 1000 MG tablet Take 1,000 mg by mouth once daily.      azelastine (ASTELIN) 137 mcg (0.1 %) nasal spray 1 spray by Nasal route daily as needed.      azelastine (OPTIVAR) 0.05 % ophthalmic solution Place into the left eye.      budesonide-formoterol 160-4.5 mcg (SYMBICORT) 160-4.5 mcg/actuation HFAA Inhale 2 puffs into the lungs 2 (two) times daily. 30.6 g 11    ELIQUIS 5 mg Tab TAKE 1 TABLET BY MOUTH TWICE DAILY 60 tablet 6    ENTRESTO 24-26 mg per tablet Take 1 tablet by mouth 2 (two) times daily.      ferrous sulfate (IRON ORAL) Take 200 mg by mouth 2 (two) times a day.      gabapentin (NEURONTIN) 400 MG capsule       hydrALAZINE (APRESOLINE) 25 MG tablet Take 25 mg by mouth 3  (three) times daily.      metoprolol succinate (TOPROL-XL) 100 MG 24 hr tablet Take 100 mg by mouth once daily.      MOVANTIK 25 mg tablet Take 25 mg by mouth once.      multivitamin capsule Take 1 capsule by mouth once daily.      multivitamin with folic acid 400 mcg Tab Take 1 tablet by mouth.      OXYGEN-AIR DELIVERY SYSTEMS MISC by [facility]Non-Drug; Combo Route) route.      risperiDONE (RISPERDAL) 0.5 MG Tab TAKE 1 TABLET BY MOUTH NIGHTLY AS NEEDED (AGITATION).      simvastatin (ZOCOR) 40 MG tablet Take 1 tablet (40 mg total) by mouth every evening. 1 tablet Oral Every day 90 tablet 4    SPIRIVA WITH HANDIHALER 18 mcg inhalation capsule INHALE 1 CAPSULE (18 MCG TOTAL) INTO THE LUNGS ONCE DAILY. CONTROLLER 90 capsule 3    torsemide (DEMADEX) 20 MG Tab Take 20 mg by mouth.      vitamin D (VITAMIN D3) 1000 units Tab Take 1,000 Units by mouth 2 (two) times a day.      [DISCONTINUED] allopurinoL (ZYLOPRIM) 300 MG tablet Take 1 tablet by mouth once daily.      [DISCONTINUED] cefTRIAXone (ROCEPHIN) 10 gram injection Inject 2 g into the muscle once daily.      [DISCONTINUED] gabapentin (NEURONTIN) 300 MG capsule TAKE 1 CAPSULE BY MOUTH TWICE A DAY 60 capsule 6    [DISCONTINUED] HYDROcodone-acetaminophen (NORCO)  mg per tablet Take 1 tablet by mouth 2 (two) times daily. Take 1 tablet in am and 1/2 tablet in pm      [DISCONTINUED] lactulose 10 gram/15 ml (CHRONULAC) 10 gram/15 mL (15 mL) solution       [DISCONTINUED] methylPREDNISolone (MEDROL DOSEPACK) 4 mg tablet TAKE 6 TABLETS ON DAY 1 AS DIRECTED ON PACKAGE AND DECREASE BY 1 TAB EACH DAY FOR A TOTAL OF 6 DAYS       No current facility-administered medications on file prior to visit.        Review of Systems   Constitutional:  Negative for fever, weight loss and appetite change.   HENT:  Negative for rhinorrhea, sinus pressure and trouble swallowing.    Respiratory:  Negative for cough, sputum production, choking, chest tightness, shortness of breath, wheezing and  "dyspnea on extertion.    Cardiovascular:  Negative for chest pain and leg swelling.   Musculoskeletal:  Negative for joint swelling.   Gastrointestinal:  Negative for vomiting.   Neurological:  Negative for dizziness and headaches.   All other systems reviewed and are negative.      Objective:       Vitals:    10/02/23 1134   BP: 136/81   Pulse: 70   Resp: 20   SpO2: 97%   Weight: 76.2 kg (168 lb)   Height: 5' 6" (1.676 m)       Physical Exam   Constitutional: He is oriented to person, place, and time. He appears well-developed and well-nourished. No distress.   HENT:   Head: Normocephalic.   Mouth/Throat: Oropharynx is clear and moist.   Cardiovascular: Normal rate and regular rhythm.   Pulmonary/Chest: Effort normal. No respiratory distress. He has no wheezes. He has no rhonchi. He has no rales.   Musculoskeletal:         General: No edema.      Cervical back: Normal range of motion and neck supple.   Neurological: He is alert and oriented to person, place, and time.   Skin: Skin is warm and dry.   Psychiatric: He has a normal mood and affect.   Vitals reviewed.    Personal Diagnostic Review    X-Ray Chest PA And Lateral  Narrative: EXAM:  XR CHEST PA AND LATERAL    CLINICAL HISTORY: Chronic obstructive pulmonary disease    COMPARISON: 09/27/2022    FINDINGS: This examination consists of frontal and lateral views of the chest.  There is moderate cardiomegaly.  There is a mild amount of scarring in the base of both lungs.  There is no evidence of an acute pulmonary process.  There is no pneumothorax or pleural effusion.  There is diffuse bony demineralization.  There are mild degenerative changes in the thoracic spine.  Impression: 1.  There is a mild amount of scarring in the base of both lungs.  There is no evidence of an acute pulmonary process.  2.  There is moderate cardiomegaly.  3.  Osteopenia versus osteoporosis  4.  There are mild degenerative changes in the thoracic spine.    Finalized on: 10/2/2023 11:17 " AM By:  Homero Mirza MD  [facility]# [id number]      2023-10-02 11:19:[id number]    [facility]    Cxr reviewed, no acute pulmonary processes noted      Phase Oxygen Assessment Supplemental O2 Heart   Rate Blood Pressure Srinath Dyspnea Scale Rating   Resting 96 % Room Air 81 bpm 136/81 3   Exercise             Minute             1 96 % Room Air 116 bpm       2 95 % Room Air 113 bpm       3 95 % Room Air 122 bpm       4 94 % Room Air 136 bpm       5 94 % Room Air 131 bpm       6  94 % Room Air 139 bpm 173/86 5-6   Recovery             Minute             1 95 % Room Air 70 bpm       2 99 % Room Air 71 bpm       3 99 % Room Air 68 bpm       4 99 % Room Air 71 bpm 153/87 4      Six Minute Walk Summary  6MWT Status: completed without stopping  Patient Reported: Leg pain          Interpretation:  Did the patient stop or pause?: No  Total Time Walked (Calculated): 360 seconds  Final Partial Lap Distance (feet): 150 feet  Total Distance Meters (Calculated): 289.56 meters  Predicted Distance Meters (Calculated): 451.41 meters  Percentage of Predicted (Calculated): 64.15  Peak VO2 (Calculated): 12.67  Mets: 3.62  Has The Patient Had a Previous Six Minute Walk Test?: Yes     Previous 6MWT Results  Has The Patient Had a Previous Six Minute Walk Test?: Yes  Date of Previous Test: 05/01/23  Total Time Walked: 360 seconds  Total Distance (meters): 274.3  Predicted Distance (meters): 455.7 meters  Percentage of Predicted: 60.1  Percent Change (Calculated): -0.06    Spirometry 5/2023: Spirometry shows a low FEF 25-75 suggesting mild obstruction may be present. Spirometry remains unimproved following bronchodilator.     Assessment/Plan:       Problem List Items Addressed This Visit          Pulmonary    Chronic respiratory failure with hypoxia and hypercapnia     Compliant with BIPAP  Followed by [facility]         COPD, severe - Primary     Stable and controlled on spiriva ans symbicort daily  No significant desaturations requiring oxygen on  walk today  He reports up to date on flu vaccine, recommend rsv vaccine              Cardiac/Vascular    HTN (hypertension)     Stable, continue current medication management          Atrial fibrillation     Rate controlled, F/u regularly with cardiology           Chronic diastolic congestive heart failure     Stable, managed by cardiology             Renal/    Stage 3b chronic kidney disease     F/u regularly with PCP              Other    GUMARO treated with BiPAP (Chronic)     BIPAP followed by [facility]            Follow up in about 6 months (around 4/2/2024) for follow up with Dr. Ziegler, COPD.    Discussed diagnosis, its evaluation, treatment and usual course. All questions answered.    Patient verbalized understanding of plan and left in no acute distress    Thank you for the courtesy of participating in the care of this patient    Charity Jones PA-C  [facility]

## 2023-10-05 PROCEDURE — 94618 PULMONARY STRESS TESTING: CPT | Mod: 26,S$PBB,, | Performed by: INTERNAL MEDICINE

## 2023-10-05 PROCEDURE — 94618 PULMONARY STRESS TESTING: ICD-10-PCS | Mod: 26,S$PBB,, | Performed by: INTERNAL MEDICINE

## 2023-11-03 ENCOUNTER — LAB VISIT (OUTPATIENT)
Dept: LAB | Facility: HOSPITAL | Age: 73
End: 2023-11-03
Attending: PSYCHIATRY & NEUROLOGY
Payer: MEDICARE

## 2023-11-03 DIAGNOSIS — Z86.73 OLD CEREBROVASCULAR ACCIDENT (CVA) WITHOUT LATE EFFECT: ICD-10-CM

## 2023-11-03 DIAGNOSIS — I67.2 CEREBRAL ATHEROSCLEROSIS: ICD-10-CM

## 2023-11-03 DIAGNOSIS — I63.9 CEREBROVASCULAR ACCIDENT (CVA), UNSPECIFIED MECHANISM: ICD-10-CM

## 2023-11-03 LAB
ALBUMIN SERPL BCP-MCNC: 3.1 G/DL (ref 3.5–5.2)
ALP SERPL-CCNC: 92 U/L (ref 55–135)
ALT SERPL W/O P-5'-P-CCNC: 20 U/L (ref 10–44)
ANION GAP SERPL CALC-SCNC: 11 MMOL/L (ref 8–16)
AST SERPL-CCNC: 28 U/L (ref 10–40)
BASOPHILS # BLD AUTO: 0.02 K/UL (ref 0–0.2)
BASOPHILS NFR BLD: 0.5 % (ref 0–1.9)
BILIRUB SERPL-MCNC: 0.3 MG/DL (ref 0.1–1)
BUN SERPL-MCNC: 36 MG/DL (ref 8–23)
CALCIUM SERPL-MCNC: 9.2 MG/DL (ref 8.7–10.5)
CHLORIDE SERPL-SCNC: 100 MMOL/L (ref 95–110)
CHOLEST SERPL-MCNC: 110 MG/DL (ref 120–199)
CHOLEST/HDLC SERPL: 2.9 {RATIO} (ref 2–5)
CO2 SERPL-SCNC: 29 MMOL/L (ref 23–29)
CREAT SERPL-MCNC: 1.8 MG/DL (ref 0.5–1.4)
DIFFERENTIAL METHOD: ABNORMAL
EOSINOPHIL # BLD AUTO: 0.2 K/UL (ref 0–0.5)
EOSINOPHIL NFR BLD: 4.1 % (ref 0–8)
ERYTHROCYTE [DISTWIDTH] IN BLOOD BY AUTOMATED COUNT: 13.2 % (ref 11.5–14.5)
EST. GFR  (NO RACE VARIABLE): 38.8 ML/MIN/1.73 M^2
GLUCOSE SERPL-MCNC: 147 MG/DL (ref 70–110)
HCT VFR BLD AUTO: 34.6 % (ref 40–54)
HDLC SERPL-MCNC: 38 MG/DL (ref 40–75)
HDLC SERPL: 34.5 % (ref 20–50)
HGB BLD-MCNC: 9.9 G/DL (ref 14–18)
IMM GRANULOCYTES # BLD AUTO: 0.01 K/UL (ref 0–0.04)
IMM GRANULOCYTES NFR BLD AUTO: 0.2 % (ref 0–0.5)
LDLC SERPL CALC-MCNC: 63.2 MG/DL (ref 63–159)
LYMPHOCYTES # BLD AUTO: 0.5 K/UL (ref 1–4.8)
LYMPHOCYTES NFR BLD: 10.3 % (ref 18–48)
MCH RBC QN AUTO: 27.1 PG (ref 27–31)
MCHC RBC AUTO-ENTMCNC: 28.6 G/DL (ref 32–36)
MCV RBC AUTO: 95 FL (ref 82–98)
MONOCYTES # BLD AUTO: 0.4 K/UL (ref 0.3–1)
MONOCYTES NFR BLD: 8 % (ref 4–15)
NEUTROPHILS # BLD AUTO: 3.4 K/UL (ref 1.8–7.7)
NEUTROPHILS NFR BLD: 76.9 % (ref 38–73)
NONHDLC SERPL-MCNC: 72 MG/DL
NRBC BLD-RTO: 0 /100 WBC
PLATELET # BLD AUTO: 146 K/UL (ref 150–450)
PMV BLD AUTO: 11.9 FL (ref 9.2–12.9)
POTASSIUM SERPL-SCNC: 4 MMOL/L (ref 3.5–5.1)
PROT SERPL-MCNC: 6.8 G/DL (ref 6–8.4)
RBC # BLD AUTO: 3.65 M/UL (ref 4.6–6.2)
SODIUM SERPL-SCNC: 140 MMOL/L (ref 136–145)
TRIGL SERPL-MCNC: 44 MG/DL (ref 30–150)
WBC # BLD AUTO: 4.37 K/UL (ref 3.9–12.7)

## 2023-11-03 PROCEDURE — 85025 COMPLETE CBC W/AUTO DIFF WBC: CPT | Performed by: PSYCHIATRY & NEUROLOGY

## 2023-11-03 PROCEDURE — 80053 COMPREHEN METABOLIC PANEL: CPT | Performed by: PSYCHIATRY & NEUROLOGY

## 2023-11-03 PROCEDURE — 80171 DRUG SCREEN QUANT GABAPENTIN: CPT | Performed by: PSYCHIATRY & NEUROLOGY

## 2023-11-03 PROCEDURE — 80061 LIPID PANEL: CPT | Performed by: PSYCHIATRY & NEUROLOGY

## 2023-11-04 LAB — GABAPENTIN SERPLBLD-MCNC: 5 MCG/ML (ref 2–20)

## 2024-02-01 ENCOUNTER — OFFICE VISIT (OUTPATIENT)
Dept: PULMONOLOGY | Facility: CLINIC | Age: 74
End: 2024-02-01
Payer: MEDICARE

## 2024-02-01 ENCOUNTER — HOSPITAL ENCOUNTER (OUTPATIENT)
Dept: RADIOLOGY | Facility: HOSPITAL | Age: 74
Discharge: HOME | End: 2024-02-01
Attending: INTERNAL MEDICINE
Payer: MEDICARE

## 2024-02-01 VITALS
OXYGEN SATURATION: 92 % | HEIGHT: 66 IN | HEART RATE: 99 BPM | RESPIRATION RATE: 16 BRPM | DIASTOLIC BLOOD PRESSURE: 80 MMHG | SYSTOLIC BLOOD PRESSURE: 110 MMHG | BODY MASS INDEX: 27.92 KG/M2 | WEIGHT: 173.75 LBS

## 2024-02-01 DIAGNOSIS — J44.1 COPD EXACERBATION: ICD-10-CM

## 2024-02-01 DIAGNOSIS — J44.89 ASTHMA WITH COPD: ICD-10-CM

## 2024-02-01 DIAGNOSIS — J44.9 COPD, SEVERE: ICD-10-CM

## 2024-02-01 DIAGNOSIS — J96.12 CHRONIC RESPIRATORY FAILURE WITH HYPOXIA AND HYPERCAPNIA: Primary | ICD-10-CM

## 2024-02-01 DIAGNOSIS — J96.11 CHRONIC RESPIRATORY FAILURE WITH HYPOXIA AND HYPERCAPNIA: Primary | ICD-10-CM

## 2024-02-01 DIAGNOSIS — G47.33 OSA TREATED WITH BIPAP: Chronic | ICD-10-CM

## 2024-02-01 DIAGNOSIS — J44.9 COPD, SEVERE: Primary | ICD-10-CM

## 2024-02-01 PROCEDURE — 99214 OFFICE O/P EST MOD 30 MIN: CPT | Mod: PBBFAC,25 | Performed by: INTERNAL MEDICINE

## 2024-02-01 PROCEDURE — 71046 X-RAY EXAM CHEST 2 VIEWS: CPT | Mod: 26,,, | Performed by: RADIOLOGY

## 2024-02-01 PROCEDURE — 99999 PR PBB SHADOW E&M-EST. PATIENT-LVL IV: CPT | Mod: PBBFAC,,, | Performed by: INTERNAL MEDICINE

## 2024-02-01 PROCEDURE — 99214 OFFICE O/P EST MOD 30 MIN: CPT | Mod: S$PBB,,, | Performed by: INTERNAL MEDICINE

## 2024-02-01 PROCEDURE — 71046 X-RAY EXAM CHEST 2 VIEWS: CPT | Mod: TC

## 2024-02-01 RX ORDER — PROMETHAZINE HYDROCHLORIDE AND DEXTROMETHORPHAN HYDROBROMIDE 6.25; 15 MG/5ML; MG/5ML
5 SYRUP ORAL 4 TIMES DAILY PRN
Qty: 240 ML | Refills: 5 | Status: SHIPPED | OUTPATIENT
Start: 2024-02-01 | End: 2024-02-02 | Stop reason: SDUPTHER

## 2024-02-01 RX ORDER — PREDNISONE 20 MG/1
TABLET ORAL
Qty: 20 TABLET | Refills: 0 | Status: SHIPPED | OUTPATIENT
Start: 2024-02-01 | End: 2024-02-02 | Stop reason: SDUPTHER

## 2024-02-01 RX ORDER — ALBUTEROL SULFATE 0.83 MG/ML
2.5 SOLUTION RESPIRATORY (INHALATION)
Qty: 300 ML | Refills: 11 | Status: SHIPPED | OUTPATIENT
Start: 2024-02-01 | End: 2024-02-02 | Stop reason: SDUPTHER

## 2024-02-01 RX ORDER — BUDESONIDE AND FORMOTEROL FUMARATE DIHYDRATE 160; 4.5 UG/1; UG/1
2 AEROSOL RESPIRATORY (INHALATION) 2 TIMES DAILY
Qty: 30.6 G | Refills: 11 | Status: SHIPPED | OUTPATIENT
Start: 2024-02-01 | End: 2024-02-02 | Stop reason: SDUPTHER

## 2024-02-01 RX ORDER — DOXYCYCLINE 100 MG/1
100 CAPSULE ORAL 2 TIMES DAILY
Qty: 20 CAPSULE | Refills: 0 | Status: SHIPPED | OUTPATIENT
Start: 2024-02-01 | End: 2024-02-02 | Stop reason: SDUPTHER

## 2024-02-01 RX ORDER — TIOTROPIUM BROMIDE 18 UG/1
18 CAPSULE ORAL; RESPIRATORY (INHALATION) DAILY
Qty: 90 CAPSULE | Refills: 3 | Status: SHIPPED | OUTPATIENT
Start: 2024-02-01 | End: 2024-02-02 | Stop reason: SDUPTHER

## 2024-02-01 NOTE — PROGRESS NOTES
Subjective:     Patient ID: Rea Vail is a 73 y.o. male.    Chief Complaint:  shortness of breath , cough and chest congestion     HPI   COPD  He presents for evaluation and treatment of COPD. The patient is currently having symptoms / an exacerbation. Current symptoms include chronic dyspnea, cough productive of white sputum in small amounts and wheezing. Symptoms have been present since several weeks ago and have been gradually worsening. He denies chills and fever. Associated symptoms include dizziness, poor exercise tolerance and shortness of breath.  This episode appears to have been triggered by possible worsening of CHF. Treatments tried for the current exacerbation: albuterol inhaler and albuterol nebulizer. The patient has been having similar episodes for approximately 10 years. He uses 2 pillows at night. Patient currently not asked. The patient is having no constitutional symptoms, denying fever, chills, anorexia, or weight loss. The patient has been hospitalized for this condition before. He has a history of 48 pack years. The patient is experiencing exercise intolerance (difficulty walking 10 feet on flat ground).     Dyspnea  Patient complains of shortness of breath. Symptoms occur after more than one flight stairs, with more than one block walking. Symptoms began 7 months ago, gradually improving since. Associated symptoms include  dyspnea on exertion, post nasal drip and shortness of breath. He denies chest pain, located left chest. He does not have had recent travel. Weight has been stable. Symptoms are exacerbated by moderate activity. Symptoms are alleviated by rest.     Obstructive Sleep Apnea:   follow up for GUMARO and BIPAP complaince assessment.  Compliance ReportCompliance   He is on BIPAP of BIPAP IPAP 13 cm EPAP 8 cm    He is  compliant with BIPAP use.   Patient reports benefit from BIPAP use and denies snoring and excessive daytime sleepiness.  Patient reports no complaints    Full  face mask     Past Medical History:   Diagnosis Date    Anemia 2013    Asthma     Atrial fibrillation     CHF (congestive heart failure)     COPD (chronic obstructive pulmonary disease) 2012    Diverticular disease     Gout 2012    Hyperlipidemia     Hypertension     GUMARO (obstructive sleep apnea) 4/27/2018    Other and unspecified hyperlipidemia     Stroke      History reviewed. No pertinent surgical history.  Review of patient's allergies indicates:   Allergen Reactions    Cephalexin Anaphylaxis    Ferumoxytol Anaphylaxis    Sulfamethoxazole-trimethoprim      Current Outpatient Medications on File Prior to Visit   Medication Sig Dispense Refill    ascorbic acid, vitamin C, (VITAMIN C) 1000 MG tablet Take 1,000 mg by mouth once daily.      azelastine (ASTELIN) 137 mcg (0.1 %) nasal spray 1 spray by Nasal route daily as needed.      ELIQUIS 5 mg Tab TAKE 1 TABLET BY MOUTH TWICE DAILY 60 tablet 6    ENTRESTO 24-26 mg per tablet Take 1 tablet by mouth 2 (two) times daily.      ferrous sulfate (IRON ORAL) Take 200 mg by mouth 2 (two) times a day.      gabapentin (NEURONTIN) 400 MG capsule       hydrALAZINE (APRESOLINE) 25 MG tablet Take 25 mg by mouth 3 (three) times daily.      metoprolol succinate (TOPROL-XL) 100 MG 24 hr tablet Take 100 mg by mouth once daily.      multivitamin capsule Take 1 capsule by mouth once daily.      OXYGEN-AIR DELIVERY SYSTEMS MISC by [facility].(Non-Drug; Combo Route) route.      risperiDONE (RISPERDAL) 0.5 MG Tab TAKE 1 TABLET BY MOUTH NIGHTLY AS NEEDED (AGITATION).      simvastatin (ZOCOR) 40 MG tablet Take 1 tablet (40 mg total) by mouth every evening. 1 tablet Oral Every day 90 tablet 4    tamsulosin (FLOMAX) 0.4 mg Cap Take 0.4 mg by mouth every morning.      torsemide (DEMADEX) 20 MG Tab Take 20 mg by mouth.      vitamin D (VITAMIN D3) 1000 units Tab Take 1,000 Units by mouth 2 (two) times a day.      [DISCONTINUED] albuterol (PROVENTIL) 2.5 mg /3 mL (0.083 %) nebulizer solution TAKE 3 MLS  BY NEBULIZATION EVERY 6 TO 8 HOURS AS NEEDED FOR WHEEZING OR SHORTNESS OF BREATH. RESCUE 300 mL 11    [DISCONTINUED] amLODIPine (NORVASC) 5 MG tablet amlodipine 5 mg tablet   TAKE 1 TABLET BY MOUTH ONCE DAILY      [DISCONTINUED] azelastine (OPTIVAR) 0.05 % ophthalmic solution Place into the left eye.      [DISCONTINUED] budesonide-formoterol 160-4.5 mcg (SYMBICORT) 160-4.5 mcg/actuation HFAA Inhale 2 puffs into the lungs 2 (two) times daily. 30.6 g 11    [DISCONTINUED] gabapentin (NEURONTIN) 300 MG capsule TAKE 1 CAPSULE BY MOUTH TWICE A  capsule 2    [DISCONTINUED] MOVANTIK 25 mg tablet Take 25 mg by mouth once.      [DISCONTINUED] multivitamin with folic acid 400 mcg Tab Take 1 tablet by mouth.      [DISCONTINUED] SPIRIVA WITH HANDIHALER 18 mcg inhalation capsule INHALE 1 CAPSULE (18 MCG TOTAL) INTO THE LUNGS ONCE DAILY. CONTROLLER 90 capsule 3     No current facility-administered medications on file prior to visit.     Social History     Socioeconomic History    Marital status: Single   Tobacco Use    Smoking status: Former     Current packs/day: 0.00     Average packs/day: 1 pack/day for 48.5 years (48.5 ttl pk-yrs)     Types: Cigarettes     Start date: 1958     Quit date: 2006     Years since quittin.5    Smokeless tobacco: Never   Substance and Sexual Activity    Alcohol use: No    Drug use: No     Family History   Problem Relation Age of Onset    Stroke Cousin        Review of Systems   Constitutional:  Positive for fatigue. Negative for fever.   HENT:  Positive for congestion. Negative for rhinorrhea.    Eyes:  Negative for redness and itching.   Respiratory:  Positive for apnea, cough, sputum production, shortness of breath, dyspnea on extertion, use of rescue inhaler and Paroxysmal Nocturnal Dyspnea.    Cardiovascular:  Negative for chest pain, palpitations and leg swelling.   Genitourinary:  Negative for difficulty urinating and hematuria.   Endocrine:  Negative for cold intolerance  "and heat intolerance.    Musculoskeletal:  Positive for arthralgias.   Skin:  Negative for rash.   Gastrointestinal:  Negative for nausea and abdominal pain.   Neurological:  Negative for dizziness, syncope, weakness and light-headedness.   Hematological:  Negative for adenopathy. Does not bruise/bleed easily.   Psychiatric/Behavioral:  Positive for sleep disturbance. The patient is not nervous/anxious.        Objective:      /80   Pulse 99   Resp 16   Ht 5' 6" (1.676 m)   Wt 78.8 kg (173 lb 11.6 oz)   SpO2 (!) 92%   BMI 28.04 kg/m²   Physical Exam  Vitals and nursing note reviewed.   Constitutional:       Appearance: He is well-developed.   HENT:      Head: Normocephalic and atraumatic.      Nose: Congestion present.      Mouth/Throat:      Mouth: Mucous membranes are moist.      Pharynx: No oropharyngeal exudate.   Eyes:      Conjunctiva/sclera: Conjunctivae normal.      Pupils: Pupils are equal, round, and reactive to light.   Neck:      Thyroid: No thyromegaly.      Vascular: No JVD.      Trachea: No tracheal deviation.   Cardiovascular:      Rate and Rhythm: Normal rate. Rhythm irregular.      Heart sounds: Normal heart sounds. No murmur heard.  Pulmonary:      Effort: Pulmonary effort is normal.      Breath sounds: Examination of the right-lower field reveals wheezing. Examination of the left-lower field reveals wheezing. Decreased breath sounds and wheezing present. No rhonchi or rales.   Abdominal:      General: Bowel sounds are normal.      Palpations: Abdomen is soft.   Musculoskeletal:         General: No tenderness. Normal range of motion.      Cervical back: Neck supple.      Right lower leg: Edema present.      Left lower leg: Edema present.   Lymphadenopathy:      Cervical: No cervical adenopathy.   Skin:     General: Skin is warm and dry.   Neurological:      Mental Status: He is alert and oriented to person, place, and time.       Personal Diagnostic Review  Chest x-ray: stable   X-Ray " "Chest PA And Lateral  Narrative: EXAM:   XR CHEST PA AND LATERAL    CLINICAL HISTORY: Chronic dyspnea    COMPARISON: 10/02/2023.    TECHNIQUE: PA and lateral views    FINDINGS:  Reticular interstitial opacities in the bilateral lower lobes.  Bandlike scarring in the bilateral lung bases.  Aortic atherosclerosis.  Cardiomegaly. No pneumothorax.  The cardiac silhouette size and contour is within normal limits.  Impression: Bilateral lower lobe reticular interstitial infiltrates.    Finalized on: 2/1/2024 3:50 PM By:  BRENT Hernandes MD, MD  [facility]# [id number]      2024-02-01 15:52:[id number]    [facility]      Office Spirometry Results:         2/1/2024     3:34 PM 10/2/2023    11:34 AM 10/2/2023    11:29 AM 5/3/2023     3:16 PM 5/1/2023    12:52 PM 5/1/2023    11:30 AM 9/27/2022    10:53 AM   Pulmonary Function Tests   SpO2 92 % 97 %    95 % 97 %   Ordering Provider   Toney LANZA     Performing nurse/tech/RT   Alonso Marissa RRT  Alonso Marissa RRT     Diagnosis     COPD     Height 5' 6" (1.676 m) 5' 6" (1.676 m) 5' 6" (1.676 m)  5' 6" (1.676 m) 5' 6" (1.676 m) 5' 6" (1.676 m)   Weight 78.8 kg (173 lb 11.6 oz) 76.2 kg (168 lb) 74.9 kg (165 lb 2 oz) 76.2 kg (168 lb) 75.3 kg (166 lb 0.1 oz) 75.3 kg (166 lb 0.1 oz) 76.7 kg (169 lb 1.5 oz)   BMI (Calculated) 28.1 27.1 26.7  26.8 26.8 27.3   Patient Race          6MWT Status   completed without stopping  completed without stopping     Patient Reported   Leg pain  No complaints     Was O2 used?   No  No     6MW Distance walked (feet)   950 feet  900 feet     Distance walked (meters)   289.56 meters  274.32 meters     Did patient stop?   No  No     Type of assistive device(s) used?   no assistive devices  no assistive devices     Oxygen Saturation   96 %  97 %     Supplemental Oxygen   Room Air  Room Air     Heart Rate   81 bpm  77 bpm     Blood Pressure   136/81  161/76     Srinath Dyspnea Rating    moderate  somewhat heavy     Oxygen Saturation   94 %  " "97 %     Supplemental Oxygen   Room Air  Room Air     Heart Rate   139 bpm  98 bpm     Blood Pressure   173/86  166/84     Srinath Dyspnea Rating    heavy  very heavy     Recovery Time (seconds)   240 seconds  240 seconds     Oxygen Saturation   99 %  99 %     Supplemental Oxygen   Room Air  Room Air     Heart Rate   71 bpm  82 bpm     Blood Pressure   153/87  153/81     Srinath Dyspnea Rating    somewhat heavy  heavy     Is procedure ready for interpretation?   Yes  Yes     Oxygen Qualification?   No  No           2/1/2024     3:34 PM   Pulmonary Studies Review   SpO2 92 %   Height 5' 6" (1.676 m)   Weight 78.8 kg (173 lb 11.6 oz)   BMI (Calculated) 28.1   Predicted Distance 308.86   Predicted Distance Meters (Calculated) 444.54 meters         Assessment:            Chronic respiratory failure with hypoxia and hypercapnia    COPD, severe  -     albuterol (PROVENTIL) 2.5 mg /3 mL (0.083 %) nebulizer solution; Take 3 mLs (2.5 mg total) by nebulization every 4 to 6 hours as needed for Wheezing or Shortness of Breath. Rescue  Dispense: 300 mL; Refill: 11  -     budesonide-formoterol 160-4.5 mcg (SYMBICORT) 160-4.5 mcg/actuation HFAA; Inhale 2 puffs into the lungs 2 (two) times daily.  Dispense: 30.6 g; Refill: 11  -     tiotropium (SPIRIVA WITH HANDIHALER) 18 mcg inhalation capsule; Inhale 1 capsule (18 mcg total) into the lungs once daily. Controller  Dispense: 90 capsule; Refill: 3  -     Spirometry with/without bronchodilator; Future; Expected date: 08/03/2024    GUMARO treated with BiPAP    Asthma with COPD  -     albuterol (PROVENTIL) 2.5 mg /3 mL (0.083 %) nebulizer solution; Take 3 mLs (2.5 mg total) by nebulization every 4 to 6 hours as needed for Wheezing or Shortness of Breath. Rescue  Dispense: 300 mL; Refill: 11  -     budesonide-formoterol 160-4.5 mcg (SYMBICORT) 160-4.5 mcg/actuation HFAA; Inhale 2 puffs into the lungs 2 (two) times daily.  Dispense: 30.6 g; Refill: 11  -     tiotropium (SPIRIVA WITH HANDIHALER) " 18 mcg inhalation capsule; Inhale 1 capsule (18 mcg total) into the lungs once daily. Controller  Dispense: 90 capsule; Refill: 3    COPD exacerbation  -     predniSONE (DELTASONE) 20 MG tablet; Prednisone 60 mg/ day for 3 days, 40 mg/day for 3 days,20 mg/ day for 3 days, (1/2 tablet )10 mg a day for 3 days.  Dispense: 20 tablet; Refill: 0  -     doxycycline (MONODOX) 100 MG capsule; Take 1 capsule (100 mg total) by mouth 2 (two) times daily.  Dispense: 20 capsule; Refill: 0  -     promethazine-dextromethorphan (PROMETHAZINE-DM) 6.25-15 mg/5 mL Syrp; Take 5 mLs by mouth 4 (four) times daily as needed (cough).  Dispense: 240 mL; Refill: 5          Outpatient Encounter Medications as of 2/1/2024   Medication Sig Dispense Refill    albuterol (PROVENTIL) 2.5 mg /3 mL (0.083 %) nebulizer solution Take 3 mLs (2.5 mg total) by nebulization every 4 to 6 hours as needed for Wheezing or Shortness of Breath. Rescue 300 mL 11    ascorbic acid, vitamin C, (VITAMIN C) 1000 MG tablet Take 1,000 mg by mouth once daily.      azelastine (ASTELIN) 137 mcg (0.1 %) nasal spray 1 spray by Nasal route daily as needed.      budesonide-formoterol 160-4.5 mcg (SYMBICORT) 160-4.5 mcg/actuation HFAA Inhale 2 puffs into the lungs 2 (two) times daily. 30.6 g 11    doxycycline (MONODOX) 100 MG capsule Take 1 capsule (100 mg total) by mouth 2 (two) times daily. 20 capsule 0    ELIQUIS 5 mg Tab TAKE 1 TABLET BY MOUTH TWICE DAILY 60 tablet 6    ENTRESTO 24-26 mg per tablet Take 1 tablet by mouth 2 (two) times daily.      ferrous sulfate (IRON ORAL) Take 200 mg by mouth 2 (two) times a day.      gabapentin (NEURONTIN) 400 MG capsule       hydrALAZINE (APRESOLINE) 25 MG tablet Take 25 mg by mouth 3 (three) times daily.      metoprolol succinate (TOPROL-XL) 100 MG 24 hr tablet Take 100 mg by mouth once daily.      multivitamin capsule Take 1 capsule by mouth once daily.      OXYGEN-AIR DELIVERY SYSTEMS MISC by Misc.(Non-Drug; Combo Route) route.       predniSONE (DELTASONE) 20 MG tablet Prednisone 60 mg/ day for 3 days, 40 mg/day for 3 days,20 mg/ day for 3 days, (1/2 tablet )10 mg a day for 3 days. 20 tablet 0    promethazine-dextromethorphan (PROMETHAZINE-DM) 6.25-15 mg/5 mL Syrp Take 5 mLs by mouth 4 (four) times daily as needed (cough). 240 mL 5    risperiDONE (RISPERDAL) 0.5 MG Tab TAKE 1 TABLET BY MOUTH NIGHTLY AS NEEDED (AGITATION).      simvastatin (ZOCOR) 40 MG tablet Take 1 tablet (40 mg total) by mouth every evening. 1 tablet Oral Every day 90 tablet 4    tamsulosin (FLOMAX) 0.4 mg Cap Take 0.4 mg by mouth every morning.      tiotropium (SPIRIVA WITH HANDIHALER) 18 mcg inhalation capsule Inhale 1 capsule (18 mcg total) into the lungs once daily. Controller 90 capsule 3    torsemide (DEMADEX) 20 MG Tab Take 20 mg by mouth.      vitamin D (VITAMIN D3) 1000 units Tab Take 1,000 Units by mouth 2 (two) times a day.      [DISCONTINUED] albuterol (PROVENTIL) 2.5 mg /3 mL (0.083 %) nebulizer solution TAKE 3 MLS BY NEBULIZATION EVERY 6 TO 8 HOURS AS NEEDED FOR WHEEZING OR SHORTNESS OF BREATH. RESCUE 300 mL 11    [DISCONTINUED] amLODIPine (NORVASC) 5 MG tablet amlodipine 5 mg tablet   TAKE 1 TABLET BY MOUTH ONCE DAILY      [DISCONTINUED] azelastine (OPTIVAR) 0.05 % ophthalmic solution Place into the left eye.      [DISCONTINUED] budesonide-formoterol 160-4.5 mcg (SYMBICORT) 160-4.5 mcg/actuation HFAA Inhale 2 puffs into the lungs 2 (two) times daily. 30.6 g 11    [DISCONTINUED] gabapentin (NEURONTIN) 300 MG capsule TAKE 1 CAPSULE BY MOUTH TWICE A  capsule 2    [DISCONTINUED] MOVANTIK 25 mg tablet Take 25 mg by mouth once.      [DISCONTINUED] multivitamin with folic acid 400 mcg Tab Take 1 tablet by mouth.      [DISCONTINUED] SPIRIVA WITH HANDIHALER 18 mcg inhalation capsule INHALE 1 CAPSULE (18 MCG TOTAL) INTO THE LUNGS ONCE DAILY. CONTROLLER 90 capsule 3     No facility-administered encounter medications on file as of 2/1/2024.     Plan:       Requested  Prescriptions     Signed Prescriptions Disp Refills    predniSONE (DELTASONE) 20 MG tablet 20 tablet 0     Sig: Prednisone 60 mg/ day for 3 days, 40 mg/day for 3 days,20 mg/ day for 3 days, (1/2 tablet )10 mg a day for 3 days.    albuterol (PROVENTIL) 2.5 mg /3 mL (0.083 %) nebulizer solution 300 mL 11     Sig: Take 3 mLs (2.5 mg total) by nebulization every 4 to 6 hours as needed for Wheezing or Shortness of Breath. Rescue    budesonide-formoterol 160-4.5 mcg (SYMBICORT) 160-4.5 mcg/actuation HFAA 30.6 g 11     Sig: Inhale 2 puffs into the lungs 2 (two) times daily.    tiotropium (SPIRIVA WITH HANDIHALER) 18 mcg inhalation capsule 90 capsule 3     Sig: Inhale 1 capsule (18 mcg total) into the lungs once daily. Controller    doxycycline (MONODOX) 100 MG capsule 20 capsule 0     Sig: Take 1 capsule (100 mg total) by mouth 2 (two) times daily.    promethazine-dextromethorphan (PROMETHAZINE-DM) 6.25-15 mg/5 mL Syrp 240 mL 5     Sig: Take 5 mLs by mouth 4 (four) times daily as needed (cough).     Problem List Items Addressed This Visit       Chronic respiratory failure with hypoxia and hypercapnia - Primary    Overview     MULTIFACTORIAL: CVA, CHF, SEVERE COPD, CHRONIC NARCOTIC USE           GUMARO treated with BiPAP (Chronic)    Overview     BIPAP followed with [place] sleep [facility]         COPD, severe    Overview     Overview:   Last Assessment & Plan:   COPD   Plan: SYMBICORT, PROVENTIL, PROVENTIL HFA.   Oxygen         Relevant Medications    predniSONE (DELTASONE) 20 MG tablet    albuterol (PROVENTIL) 2.5 mg /3 mL (0.083 %) nebulizer solution    budesonide-formoterol 160-4.5 mcg (SYMBICORT) 160-4.5 mcg/actuation HFAA    tiotropium (SPIRIVA WITH HANDIHALER) 18 mcg inhalation capsule    doxycycline (MONODOX) 100 MG capsule    promethazine-dextromethorphan (PROMETHAZINE-DM) 6.25-15 mg/5 mL Syrp    Other Relevant Orders    Spirometry with/without bronchodilator     Other Visit Diagnoses       Asthma with COPD         Relevant Medications    albuterol (PROVENTIL) 2.5 mg /3 mL (0.083 %) nebulizer solution    budesonide-formoterol 160-4.5 mcg (SYMBICORT) 160-4.5 mcg/actuation HFAA    tiotropium (SPIRIVA WITH HANDIHALER) 18 mcg inhalation capsule    COPD exacerbation        Relevant Medications    predniSONE (DELTASONE) 20 MG tablet    doxycycline (MONODOX) 100 MG capsule    promethazine-dextromethorphan (PROMETHAZINE-DM) 6.25-15 mg/5 mL Syrp             Follow up in about 6 months (around 8/1/2024) for Review progress, Follow up with NP, orly - on return.    MEDICAL DECISION MAKING: Moderate to high complexity.  Overall, the multiple problems listed are of moderate to high severity that may impact quality of life and activities of daily living. Side effects of medications, treatment plan as well as options and alternatives reviewed and discussed with patient. There was counseling of patient concerning these issues.    Total time spent in counseling and coordination of care - 30  minutes of total time spent on the encounter, which includes face to face time and non-face to face time preparing to see the patient (eg, review of tests), Obtaining and/or reviewing separately obtained history, Documenting clinical information in the electronic or other health record, Independently interpreting results (not separately reported) and communicating results to the patient/family/caregiver, or Care coordination (not separately reported).    Time was used in discussion of prognosis, risks, benefits of treatment, instructions and compliance with regimen . Discussion with other physicians and/or health care providers - home health or for use of durable medical equipment (oxygen, nebulizers, CPAP, BiPAP) occurred.

## 2024-02-02 ENCOUNTER — PATIENT MESSAGE (OUTPATIENT)
Dept: PULMONOLOGY | Facility: CLINIC | Age: 74
End: 2024-02-02
Payer: MEDICARE

## 2024-02-02 RX ORDER — TIOTROPIUM BROMIDE 18 UG/1
18 CAPSULE ORAL; RESPIRATORY (INHALATION) DAILY
Qty: 90 CAPSULE | Refills: 3 | Status: SHIPPED | OUTPATIENT
Start: 2024-02-02

## 2024-02-02 RX ORDER — BUDESONIDE AND FORMOTEROL FUMARATE DIHYDRATE 160; 4.5 UG/1; UG/1
2 AEROSOL RESPIRATORY (INHALATION) 2 TIMES DAILY
Qty: 30.6 G | Refills: 11 | Status: SHIPPED | OUTPATIENT
Start: 2024-02-02 | End: 2025-02-01

## 2024-02-02 RX ORDER — PREDNISONE 20 MG/1
TABLET ORAL
Qty: 20 TABLET | Refills: 0 | Status: SHIPPED | OUTPATIENT
Start: 2024-02-02

## 2024-02-02 RX ORDER — DOXYCYCLINE 100 MG/1
100 CAPSULE ORAL 2 TIMES DAILY
Qty: 20 CAPSULE | Refills: 0 | Status: SHIPPED | OUTPATIENT
Start: 2024-02-02

## 2024-02-02 RX ORDER — ALBUTEROL SULFATE 0.83 MG/ML
2.5 SOLUTION RESPIRATORY (INHALATION)
Qty: 300 ML | Refills: 11 | Status: SHIPPED | OUTPATIENT
Start: 2024-02-02 | End: 2024-04-02 | Stop reason: SDUPTHER

## 2024-02-02 RX ORDER — PROMETHAZINE HYDROCHLORIDE AND DEXTROMETHORPHAN HYDROBROMIDE 6.25; 15 MG/5ML; MG/5ML
5 SYRUP ORAL 4 TIMES DAILY PRN
Qty: 240 ML | Refills: 5 | Status: SHIPPED | OUTPATIENT
Start: 2024-02-02 | End: 2024-04-02 | Stop reason: SDUPTHER

## 2024-04-02 ENCOUNTER — OFFICE VISIT (OUTPATIENT)
Dept: PULMONOLOGY | Facility: CLINIC | Age: 74
End: 2024-04-02
Payer: MEDICARE

## 2024-04-02 VITALS
SYSTOLIC BLOOD PRESSURE: 130 MMHG | HEART RATE: 67 BPM | BODY MASS INDEX: 27.62 KG/M2 | DIASTOLIC BLOOD PRESSURE: 76 MMHG | RESPIRATION RATE: 17 BRPM | OXYGEN SATURATION: 92 % | HEIGHT: 66 IN | WEIGHT: 171.88 LBS

## 2024-04-02 DIAGNOSIS — J44.89 ASTHMA WITH COPD: ICD-10-CM

## 2024-04-02 DIAGNOSIS — J44.1 COPD EXACERBATION: ICD-10-CM

## 2024-04-02 DIAGNOSIS — J44.9 COPD, SEVERE: ICD-10-CM

## 2024-04-02 PROCEDURE — 99215 OFFICE O/P EST HI 40 MIN: CPT | Mod: PBBFAC | Performed by: INTERNAL MEDICINE

## 2024-04-02 PROCEDURE — 99999 PR PBB SHADOW E&M-EST. PATIENT-LVL V: CPT | Mod: PBBFAC,,, | Performed by: INTERNAL MEDICINE

## 2024-04-02 PROCEDURE — 99214 OFFICE O/P EST MOD 30 MIN: CPT | Mod: 25,S$PBB,, | Performed by: INTERNAL MEDICINE

## 2024-04-02 RX ORDER — ALBUTEROL SULFATE 0.83 MG/ML
2.5 SOLUTION RESPIRATORY (INHALATION)
Qty: 300 ML | Refills: 11 | Status: SHIPPED | OUTPATIENT
Start: 2024-04-02

## 2024-04-02 RX ORDER — PROMETHAZINE HYDROCHLORIDE AND DEXTROMETHORPHAN HYDROBROMIDE 6.25; 15 MG/5ML; MG/5ML
5 SYRUP ORAL 4 TIMES DAILY PRN
Qty: 240 ML | Refills: 5 | Status: SHIPPED | OUTPATIENT
Start: 2024-04-02

## 2024-04-02 NOTE — PROGRESS NOTES
Subjective:     Patient ID: Rea Vail is a 73 y.o. male.    Chief Complaint:  shortness of breath , cough and chest congestion     HPI     Since last seen was placed on diuretic , on oxygen intermittently . Needs adapter for BiPAP machine    COPD  He presents for evaluation and treatment of COPD. The patient is currently having symptoms / an exacerbation. Current symptoms include chronic dyspnea, cough productive of white sputum in small amounts and wheezing. Symptoms have been present since several weeks ago and have been gradually worsening. He denies chills and fever. Associated symptoms include dizziness, poor exercise tolerance and shortness of breath.  This episode appears to have been triggered by possible worsening of CHF. Treatments tried for the current exacerbation: albuterol inhaler and albuterol nebulizer. The patient has been having similar episodes for approximately 10 years. He uses 2 pillows at night. Patient currently not asked. The patient is having no constitutional symptoms, denying fever, chills, anorexia, or weight loss. The patient has been hospitalized for this condition before. He has a history of 48 pack years. The patient is experiencing exercise intolerance (difficulty walking 10 feet on flat ground).     Dyspnea  Patient complains of shortness of breath. Symptoms occur after more than one flight stairs, with more than one block walking. Symptoms began 7 months ago, gradually improving since. Associated symptoms include  dyspnea on exertion, post nasal drip and shortness of breath. He denies chest pain, located left chest. He does not have had recent travel. Weight has been stable. Symptoms are exacerbated by moderate activity. Symptoms are alleviated by rest.     Obstructive Sleep Apnea:   follow up for GUMARO and BIPAP complaince assessment.  BiPAP machine is not working, needs oxygen adapter  Compliance ReportCompliance   He is on BIPAP of BIPAP IPAP 13 cm EPAP 8 cm    He is   "compliant with BIPAP use.   Patient reports benefit from BIPAP use and denies snoring and excessive daytime sleepiness.  Patient reports no complaints    Full face mask   PAP compliance data download reviewed and reveals 60% use > 4 hours per 24 hours within 30 night dataset, report does show mod to lg mask leaks , states he has not had pap supplies in " weeks "  Patient verbalizes benefit from PAP therapy and wishes to continue use q HS.     Obstructive sleep apnea syndrome (Primary Dx);   Nocturnal hypoxemia;   Hypersomnia;   Snoring;   Paroxysmal atrial fibrillation (CMS/HCC) (HCC);   Chronic diastolic congestive heart failure (CMS/HCC) (HCC);   Primary hypertension;   Chronic bronchitis, unspecified chronic bronchitis type (HCC);   History of CVA (cerebrovascular accident) without residual defic     Followed by [facility] for DURABLE MEDICAL EQUIPMENT  [facility]  Address: [address], [place] [address]  [phone]  Fax [phone]  [facility]      Past Medical History:   Diagnosis Date    Anemia 2013    Asthma     Atrial fibrillation     CHF (congestive heart failure)     COPD (chronic obstructive pulmonary disease) 2012    Diverticular disease     Gout 2012    Hyperlipidemia     Hypertension     GUMARO (obstructive sleep apnea) 4/27/2018    Other and unspecified hyperlipidemia     Stroke      History reviewed. No pertinent surgical history.  Review of patient's allergies indicates:   Allergen Reactions    Cephalexin Anaphylaxis    Ferumoxytol Anaphylaxis    Sulfamethoxazole-trimethoprim      Current Outpatient Medications on File Prior to Visit   Medication Sig Dispense Refill    ascorbic acid, vitamin C, (VITAMIN C) 1000 MG tablet Take 1,000 mg by mouth once daily.      azelastine (ASTELIN) 137 mcg (0.1 %) nasal spray 1 spray by Nasal route daily as needed.      budesonide-formoterol 160-4.5 mcg (SYMBICORT) 160-4.5 mcg/actuation HFAA " Inhale 2 puffs into the lungs 2 (two) times daily. 30.6 g 11    doxycycline (MONODOX) 100 MG capsule Take 1 capsule (100 mg total) by mouth 2 (two) times daily. 20 capsule 0    ELIQUIS 5 mg Tab TAKE 1 TABLET BY MOUTH TWICE DAILY 60 tablet 6    ENTRESTO 24-26 mg per tablet Take 1 tablet by mouth 2 (two) times daily.      ferrous sulfate (IRON ORAL) Take 200 mg by mouth 2 (two) times a day.      gabapentin (NEURONTIN) 400 MG capsule       hydrALAZINE (APRESOLINE) 25 MG tablet Take 25 mg by mouth 3 (three) times daily.      metoprolol succinate (TOPROL-XL) 100 MG 24 hr tablet Take 100 mg by mouth once daily.      multivitamin capsule Take 1 capsule by mouth once daily.      OXYGEN-AIR DELIVERY SYSTEMS MISC by [facility]Non-Drug; Combo Route) route.      predniSONE (DELTASONE) 20 MG tablet Prednisone 60 mg/ day for 3 days, 40 mg/day for 3 days,20 mg/ day for 3 days, (1/2 tablet )10 mg a day for 3 days. 20 tablet 0    risperiDONE (RISPERDAL) 0.5 MG Tab TAKE 1 TABLET BY MOUTH NIGHTLY AS NEEDED (AGITATION).      simvastatin (ZOCOR) 40 MG tablet Take 1 tablet (40 mg total) by mouth every evening. 1 tablet Oral Every day 90 tablet 4    tamsulosin (FLOMAX) 0.4 mg Cap Take 0.4 mg by mouth every morning.      tiotropium (SPIRIVA WITH HANDIHALER) 18 mcg inhalation capsule Inhale 1 capsule (18 mcg total) into the lungs once daily. Controller 90 capsule 3    torsemide (DEMADEX) 20 MG Tab Take 20 mg by mouth.      vitamin D (VITAMIN D3) 1000 units Tab Take 1,000 Units by mouth 2 (two) times a day.      [DISCONTINUED] albuterol (PROVENTIL) 2.5 mg /3 mL (0.083 %) nebulizer solution Take 3 mLs (2.5 mg total) by nebulization every 4 to 6 hours as needed for Wheezing or Shortness of Breath. Rescue 300 mL 11    [DISCONTINUED] promethazine-dextromethorphan (PROMETHAZINE-DM) 6.25-15 mg/5 mL Syrp Take 5 mLs by mouth 4 (four) times daily as needed (cough). 240 mL 5     No current facility-administered medications on file prior to visit.  "    Social History     Socioeconomic History    Marital status: Single   Tobacco Use    Smoking status: Former     Current packs/day: 0.00     Average packs/day: 1 pack/day for 48.5 years (48.5 ttl pk-yrs)     Types: Cigarettes     Start date: 1958     Quit date: 2006     Years since quittin.7    Smokeless tobacco: Never   Substance and Sexual Activity    Alcohol use: No    Drug use: No     Family History   Problem Relation Age of Onset    Stroke Cousin        Review of Systems   Constitutional:  Positive for fatigue. Negative for fever.   HENT:  Positive for congestion. Negative for rhinorrhea.    Eyes:  Negative for redness and itching.   Respiratory:  Positive for apnea, cough, sputum production, shortness of breath, dyspnea on extertion, use of rescue inhaler and Paroxysmal Nocturnal Dyspnea.    Cardiovascular:  Negative for chest pain, palpitations and leg swelling.   Genitourinary:  Negative for difficulty urinating and hematuria.   Endocrine:  Negative for cold intolerance and heat intolerance.    Musculoskeletal:  Positive for arthralgias.   Skin:  Negative for rash.   Gastrointestinal:  Negative for nausea and abdominal pain.   Neurological:  Negative for dizziness, syncope, weakness and light-headedness.   Hematological:  Negative for adenopathy. Does not bruise/bleed easily.   Psychiatric/Behavioral:  Positive for sleep disturbance. The patient is not nervous/anxious.        Objective:      /76   Pulse 67   Resp 17   Ht 5' 6" (1.676 m)   Wt 78 kg (171 lb 13.6 oz)   SpO2 (!) 92%   BMI 27.74 kg/m²   Physical Exam  Vitals and nursing note reviewed.   Constitutional:       Appearance: He is well-developed.   HENT:      Head: Normocephalic and atraumatic.      Nose: Congestion present.      Mouth/Throat:      Mouth: Mucous membranes are moist.      Pharynx: No oropharyngeal exudate.   Eyes:      Conjunctiva/sclera: Conjunctivae normal.      Pupils: Pupils are equal, round, and reactive " to light.   Neck:      Thyroid: No thyromegaly.      Vascular: No JVD.      Trachea: No tracheal deviation.   Cardiovascular:      Rate and Rhythm: Normal rate. Rhythm irregular.      Heart sounds: Normal heart sounds. No murmur heard.  Pulmonary:      Effort: Pulmonary effort is normal.      Breath sounds: Examination of the right-lower field reveals wheezing. Examination of the left-lower field reveals wheezing. Decreased breath sounds and wheezing present. No rhonchi or rales.   Abdominal:      General: Bowel sounds are normal.      Palpations: Abdomen is soft.   Musculoskeletal:         General: No tenderness. Normal range of motion.      Cervical back: Neck supple.      Right lower leg: Edema present.      Left lower leg: Edema present.   Lymphadenopathy:      Cervical: No cervical adenopathy.   Skin:     General: Skin is warm and dry.   Neurological:      Mental Status: He is alert and oriented to person, place, and time.       Personal Diagnostic Review  Chest x-ray: stable   X-Ray Chest PA And Lateral  Narrative: EXAM:   XR CHEST PA AND LATERAL    CLINICAL HISTORY: Chronic dyspnea    COMPARISON: 10/02/2023.    TECHNIQUE: PA and lateral views    FINDINGS:  Reticular interstitial opacities in the bilateral lower lobes.  Bandlike scarring in the bilateral lung bases.  Aortic atherosclerosis.  Cardiomegaly. No pneumothorax.  The cardiac silhouette size and contour is within normal limits.  Impression: Bilateral lower lobe reticular interstitial infiltrates.    Finalized on: 2/1/2024 3:50 PM By:  BRENT Hernandes MD, MD  [facility]# [id number]      2024-02-01 15:52:[id number]    [facility]      Office Spirometry Results:         4/2/2024    11:30 AM 2/1/2024     3:34 PM 10/2/2023    11:34 AM 10/2/2023    11:29 AM 5/3/2023     3:16 PM 5/1/2023    12:52 PM 5/1/2023    11:30 AM   Pulmonary Function Tests   SpO2 92 % 92 % 97 %    95 %   Ordering Provider    Toney LANZA    Performing nurse/tech/RT    Alonso Gupta  "Marissa RRT    Diagnosis      COPD    Height 5' 6" (1.676 m) 5' 6" (1.676 m) 5' 6" (1.676 m) 5' 6" (1.676 m)  5' 6" (1.676 m) 5' 6" (1.676 m)   Weight 78 kg (171 lb 13.6 oz) 78.8 kg (173 lb 11.6 oz) 76.2 kg (168 lb) 74.9 kg (165 lb 2 oz) 76.2 kg (168 lb) 75.3 kg (166 lb 0.1 oz) 75.3 kg (166 lb 0.1 oz)   BMI (Calculated) 27.8 28.1 27.1 26.7  26.8 26.8   Patient Race          6MWT Status    completed without stopping  completed without stopping    Patient Reported    Leg pain  No complaints    Was O2 used?    No  No    6MW Distance walked (feet)    950 feet  900 feet    Distance walked (meters)    289.56 meters  274.32 meters    Did patient stop?    No  No    Type of assistive device(s) used?    no assistive devices  no assistive devices    Oxygen Saturation    96 %  97 %    Supplemental Oxygen    Room Air  Room Air    Heart Rate    81 bpm  77 bpm    Blood Pressure    136/81  161/76    Srinath Dyspnea Rating     moderate  somewhat heavy    Oxygen Saturation    94 %  97 %    Supplemental Oxygen    Room Air  Room Air    Heart Rate    139 bpm  98 bpm    Blood Pressure    173/86  166/84    Srinath Dyspnea Rating     heavy  very heavy    Recovery Time (seconds)    240 seconds  240 seconds    Oxygen Saturation    99 %  99 %    Supplemental Oxygen    Room Air  Room Air    Heart Rate    71 bpm  82 bpm    Blood Pressure    153/87  153/81    Srinath Dyspnea Rating     somewhat heavy  heavy    Is procedure ready for interpretation?    Yes  Yes    Oxygen Qualification?    No  No          4/2/2024    11:30 AM   Pulmonary Studies Review   SpO2 92 %   Height 5' 6" (1.676 m)   Weight 78 kg (171 lb 13.6 oz)   BMI (Calculated) 27.8   Predicted Distance 310.54   Predicted Distance Meters (Calculated) 446.04 meters         Assessment:            COPD, severe  -     albuterol (PROVENTIL) 2.5 mg /3 mL (0.083 %) nebulizer solution; Take 3 mLs (2.5 mg total) by nebulization every 4 to 6 hours as needed for Wheezing or " Shortness of Breath. Rescue  Dispense: 300 mL; Refill: 11  -     Six Minute Walk Test to qualify for Home Oxygen; Future  -     X-Ray Chest 4 Or More View; Future; Expected date: 04/02/2024    Asthma with COPD  -     HME - OTHER  -     albuterol (PROVENTIL) 2.5 mg /3 mL (0.083 %) nebulizer solution; Take 3 mLs (2.5 mg total) by nebulization every 4 to 6 hours as needed for Wheezing or Shortness of Breath. Rescue  Dispense: 300 mL; Refill: 11  -     budesonide-glycopyr-formoterol 160-9-4.8 mcg/actuation HFAA; Inhale 2 puffs into the lungs 2 (two) times daily. Wash out mouth after use.  Dispense: 10.7 g; Refill: 11    COPD exacerbation  -     promethazine-dextromethorphan (PROMETHAZINE-DM) 6.25-15 mg/5 mL Syrp; Take 5 mLs by mouth 4 (four) times daily as needed (cough).  Dispense: 240 mL; Refill: 5          Outpatient Encounter Medications as of 4/2/2024   Medication Sig Dispense Refill    albuterol (PROVENTIL) 2.5 mg /3 mL (0.083 %) nebulizer solution Take 3 mLs (2.5 mg total) by nebulization every 4 to 6 hours as needed for Wheezing or Shortness of Breath. Rescue 300 mL 11    ascorbic acid, vitamin C, (VITAMIN C) 1000 MG tablet Take 1,000 mg by mouth once daily.      azelastine (ASTELIN) 137 mcg (0.1 %) nasal spray 1 spray by Nasal route daily as needed.      budesonide-formoterol 160-4.5 mcg (SYMBICORT) 160-4.5 mcg/actuation HFAA Inhale 2 puffs into the lungs 2 (two) times daily. 30.6 g 11    budesonide-glycopyr-formoterol 160-9-4.8 mcg/actuation HFAA Inhale 2 puffs into the lungs 2 (two) times daily. Wash out mouth after use. 10.7 g 11    doxycycline (MONODOX) 100 MG capsule Take 1 capsule (100 mg total) by mouth 2 (two) times daily. 20 capsule 0    ELIQUIS 5 mg Tab TAKE 1 TABLET BY MOUTH TWICE DAILY 60 tablet 6    ENTRESTO 24-26 mg per tablet Take 1 tablet by mouth 2 (two) times daily.      ferrous sulfate (IRON ORAL) Take 200 mg by mouth 2 (two) times a day.      gabapentin (NEURONTIN) 400 MG capsule        hydrALAZINE (APRESOLINE) 25 MG tablet Take 25 mg by mouth 3 (three) times daily.      metoprolol succinate (TOPROL-XL) 100 MG 24 hr tablet Take 100 mg by mouth once daily.      multivitamin capsule Take 1 capsule by mouth once daily.      OXYGEN-AIR DELIVERY SYSTEMS MISC by [facility].(Non-Drug; Combo Route) route.      predniSONE (DELTASONE) 20 MG tablet Prednisone 60 mg/ day for 3 days, 40 mg/day for 3 days,20 mg/ day for 3 days, (1/2 tablet )10 mg a day for 3 days. 20 tablet 0    promethazine-dextromethorphan (PROMETHAZINE-DM) 6.25-15 mg/5 mL Syrp Take 5 mLs by mouth 4 (four) times daily as needed (cough). 240 mL 5    risperiDONE (RISPERDAL) 0.5 MG Tab TAKE 1 TABLET BY MOUTH NIGHTLY AS NEEDED (AGITATION).      simvastatin (ZOCOR) 40 MG tablet Take 1 tablet (40 mg total) by mouth every evening. 1 tablet Oral Every day 90 tablet 4    tamsulosin (FLOMAX) 0.4 mg Cap Take 0.4 mg by mouth every morning.      tiotropium (SPIRIVA WITH HANDIHALER) 18 mcg inhalation capsule Inhale 1 capsule (18 mcg total) into the lungs once daily. Controller 90 capsule 3    torsemide (DEMADEX) 20 MG Tab Take 20 mg by mouth.      vitamin D (VITAMIN D3) 1000 units Tab Take 1,000 Units by mouth 2 (two) times a day.      [DISCONTINUED] albuterol (PROVENTIL) 2.5 mg /3 mL (0.083 %) nebulizer solution Take 3 mLs (2.5 mg total) by nebulization every 4 to 6 hours as needed for Wheezing or Shortness of Breath. Rescue 300 mL 11    [DISCONTINUED] promethazine-dextromethorphan (PROMETHAZINE-DM) 6.25-15 mg/5 mL Syrp Take 5 mLs by mouth 4 (four) times daily as needed (cough). 240 mL 5     No facility-administered encounter medications on file as of 4/2/2024.     Plan:       Requested Prescriptions     Signed Prescriptions Disp Refills    albuterol (PROVENTIL) 2.5 mg /3 mL (0.083 %) nebulizer solution 300 mL 11     Sig: Take 3 mLs (2.5 mg total) by nebulization every 4 to 6 hours as needed for Wheezing or Shortness of Breath. Rescue     budesonide-glycopyr-formoterol 160-9-4.8 mcg/actuation HFAA 10.7 g 11     Sig: Inhale 2 puffs into the lungs 2 (two) times daily. Wash out mouth after use.    promethazine-dextromethorphan (PROMETHAZINE-DM) 6.25-15 mg/5 mL Syrp 240 mL 5     Sig: Take 5 mLs by mouth 4 (four) times daily as needed (cough).     Problem List Items Addressed This Visit       COPD, severe    Overview     Overview:   Last Assessment & Plan:   COPD   Plan: SYMBICORT, PROVENTIL, PROVENTIL HFA.   Oxygen         Relevant Medications    albuterol (PROVENTIL) 2.5 mg /3 mL (0.083 %) nebulizer solution    promethazine-dextromethorphan (PROMETHAZINE-DM) 6.25-15 mg/5 mL Syrp    Other Relevant Orders    Six Minute Walk Test to qualify for Home Oxygen    X-Ray Chest 4 Or More View     Other Visit Diagnoses       Asthma with COPD        Relevant Medications    albuterol (PROVENTIL) 2.5 mg /3 mL (0.083 %) nebulizer solution    budesonide-glycopyr-formoterol 160-9-4.8 mcg/actuation HFAA    Other Relevant Orders    HME - OTHER    COPD exacerbation        Relevant Medications    promethazine-dextromethorphan (PROMETHAZINE-DM) 6.25-15 mg/5 mL Syrp             Follow up in about 6 months (around 10/2/2024) for Review progress, 6 min walk - on return, CXR on return.    MEDICAL DECISION MAKING: Moderate to high complexity.  Overall, the multiple problems listed are of moderate to high severity that may impact quality of life and activities of daily living. Side effects of medications, treatment plan as well as options and alternatives reviewed and discussed with patient. There was counseling of patient concerning these issues.    Total time spent in counseling and coordination of care - 30  minutes of total time spent on the encounter, which includes face to face time and non-face to face time preparing to see the patient (eg, review of tests), Obtaining and/or reviewing separately obtained history, Documenting clinical information in the electronic or other  health record, Independently interpreting results (not separately reported) and communicating results to the patient/family/caregiver, or Care coordination (not separately reported).    Time was used in discussion of prognosis, risks, benefits of treatment, instructions and compliance with regimen . Discussion with other physicians and/or health care providers - home health or for use of durable medical equipment (oxygen, nebulizers, CPAP, BiPAP) occurred.

## 2024-04-08 ENCOUNTER — PATIENT MESSAGE (OUTPATIENT)
Dept: PULMONOLOGY | Facility: CLINIC | Age: 74
End: 2024-04-08
Payer: MEDICARE

## 2024-05-13 ENCOUNTER — LAB VISIT (OUTPATIENT)
Dept: LAB | Facility: HOSPITAL | Age: 74
End: 2024-05-13
Attending: PSYCHIATRY & NEUROLOGY
Payer: MEDICARE

## 2024-05-13 DIAGNOSIS — I67.2 CEREBRAL ATHEROSCLEROSIS: ICD-10-CM

## 2024-05-13 DIAGNOSIS — Z86.73 OLD CEREBROVASCULAR ACCIDENT (CVA) WITHOUT LATE EFFECT: ICD-10-CM

## 2024-05-13 DIAGNOSIS — I63.9 CEREBROVASCULAR ACCIDENT (CVA), UNSPECIFIED MECHANISM: ICD-10-CM

## 2024-05-13 DIAGNOSIS — K90.49 MALABSORPTION DUE TO INTOLERANCE, NOT ELSEWHERE CLASSIFIED: ICD-10-CM

## 2024-05-13 LAB
ALBUMIN SERPL BCP-MCNC: 2.7 G/DL (ref 3.5–5.2)
ALP SERPL-CCNC: 81 U/L (ref 55–135)
ALT SERPL W/O P-5'-P-CCNC: 9 U/L (ref 10–44)
ANION GAP SERPL CALC-SCNC: 13 MMOL/L (ref 8–16)
AST SERPL-CCNC: 19 U/L (ref 10–40)
BASOPHILS # BLD AUTO: 0.02 K/UL (ref 0–0.2)
BASOPHILS NFR BLD: 0.4 % (ref 0–1.9)
BILIRUB SERPL-MCNC: 0.5 MG/DL (ref 0.1–1)
BUN SERPL-MCNC: 20 MG/DL (ref 8–23)
CALCIUM SERPL-MCNC: 9.5 MG/DL (ref 8.7–10.5)
CHLORIDE SERPL-SCNC: 99 MMOL/L (ref 95–110)
CHOLEST SERPL-MCNC: 126 MG/DL (ref 120–199)
CHOLEST/HDLC SERPL: 3.3 {RATIO} (ref 2–5)
CO2 SERPL-SCNC: 30 MMOL/L (ref 23–29)
CREAT SERPL-MCNC: 1.6 MG/DL (ref 0.5–1.4)
DIFFERENTIAL METHOD BLD: ABNORMAL
EOSINOPHIL # BLD AUTO: 0.1 K/UL (ref 0–0.5)
EOSINOPHIL NFR BLD: 2 % (ref 0–8)
ERYTHROCYTE [DISTWIDTH] IN BLOOD BY AUTOMATED COUNT: 14.6 % (ref 11.5–14.5)
EST. GFR  (NO RACE VARIABLE): 44.7 ML/MIN/1.73 M^2
GLUCOSE SERPL-MCNC: 111 MG/DL (ref 70–110)
HCT VFR BLD AUTO: 34.7 % (ref 40–54)
HDLC SERPL-MCNC: 38 MG/DL (ref 40–75)
HDLC SERPL: 30.2 % (ref 20–50)
HGB BLD-MCNC: 10.4 G/DL (ref 14–18)
IMM GRANULOCYTES # BLD AUTO: 0.01 K/UL (ref 0–0.04)
IMM GRANULOCYTES NFR BLD AUTO: 0.2 % (ref 0–0.5)
LDLC SERPL CALC-MCNC: 77.2 MG/DL (ref 63–159)
LYMPHOCYTES # BLD AUTO: 0.6 K/UL (ref 1–4.8)
LYMPHOCYTES NFR BLD: 11.4 % (ref 18–48)
MCH RBC QN AUTO: 27 PG (ref 27–31)
MCHC RBC AUTO-ENTMCNC: 30 G/DL (ref 32–36)
MCV RBC AUTO: 90 FL (ref 82–98)
MONOCYTES # BLD AUTO: 0.5 K/UL (ref 0.3–1)
MONOCYTES NFR BLD: 8.3 % (ref 4–15)
NEUTROPHILS # BLD AUTO: 4.3 K/UL (ref 1.8–7.7)
NEUTROPHILS NFR BLD: 77.7 % (ref 38–73)
NONHDLC SERPL-MCNC: 88 MG/DL
NRBC BLD-RTO: 0 /100 WBC
PLATELET # BLD AUTO: 213 K/UL (ref 150–450)
PMV BLD AUTO: 11.5 FL (ref 9.2–12.9)
POTASSIUM SERPL-SCNC: 3.9 MMOL/L (ref 3.5–5.1)
PROT SERPL-MCNC: 6.8 G/DL (ref 6–8.4)
RBC # BLD AUTO: 3.85 M/UL (ref 4.6–6.2)
SODIUM SERPL-SCNC: 142 MMOL/L (ref 136–145)
TRIGL SERPL-MCNC: 54 MG/DL (ref 30–150)
WBC # BLD AUTO: 5.52 K/UL (ref 3.9–12.7)

## 2024-05-13 PROCEDURE — 80061 LIPID PANEL: CPT | Performed by: PSYCHIATRY & NEUROLOGY

## 2024-05-13 PROCEDURE — 36415 COLL VENOUS BLD VENIPUNCTURE: CPT | Performed by: PSYCHIATRY & NEUROLOGY

## 2024-05-13 PROCEDURE — 80053 COMPREHEN METABOLIC PANEL: CPT | Performed by: PSYCHIATRY & NEUROLOGY

## 2024-05-13 PROCEDURE — 85025 COMPLETE CBC W/AUTO DIFF WBC: CPT | Performed by: PSYCHIATRY & NEUROLOGY

## 2024-05-14 LAB — GABAPENTIN SERPLBLD-MCNC: <0.5 MCG/ML (ref 2–20)

## 2024-07-25 ENCOUNTER — PATIENT MESSAGE (OUTPATIENT)
Dept: PULMONOLOGY | Facility: CLINIC | Age: 74
End: 2024-07-25
Payer: MEDICARE

## 2024-09-27 ENCOUNTER — PATIENT MESSAGE (OUTPATIENT)
Dept: PULMONOLOGY | Facility: CLINIC | Age: 74
End: 2024-09-27
Payer: COMMERCIAL

## 2024-10-02 ENCOUNTER — HOSPITAL ENCOUNTER (OUTPATIENT)
Dept: RADIOLOGY | Facility: HOSPITAL | Age: 74
Discharge: HOME | End: 2024-10-02
Attending: INTERNAL MEDICINE
Payer: MEDICARE

## 2024-10-02 ENCOUNTER — OFFICE VISIT (OUTPATIENT)
Dept: PULMONOLOGY | Facility: CLINIC | Age: 74
End: 2024-10-02
Attending: INTERNAL MEDICINE
Payer: MEDICARE

## 2024-10-02 VITALS
BODY MASS INDEX: 27.46 KG/M2 | DIASTOLIC BLOOD PRESSURE: 80 MMHG | SYSTOLIC BLOOD PRESSURE: 130 MMHG | RESPIRATION RATE: 18 BRPM | HEIGHT: 66 IN | HEART RATE: 74 BPM | OXYGEN SATURATION: 97 % | WEIGHT: 170.88 LBS

## 2024-10-02 VITALS — HEIGHT: 66 IN | WEIGHT: 170.88 LBS | BODY MASS INDEX: 27.46 KG/M2

## 2024-10-02 DIAGNOSIS — J44.9 COPD, SEVERE: ICD-10-CM

## 2024-10-02 DIAGNOSIS — G47.33 OSA ON CPAP: Primary | ICD-10-CM

## 2024-10-02 DIAGNOSIS — J44.89 ASTHMA WITH COPD: ICD-10-CM

## 2024-10-02 DIAGNOSIS — G47.34 NOCTURNAL HYPOXEMIA: ICD-10-CM

## 2024-10-02 DIAGNOSIS — I48.91 ATRIAL FIBRILLATION, UNSPECIFIED TYPE: ICD-10-CM

## 2024-10-02 DIAGNOSIS — I50.33 ACUTE ON CHRONIC DIASTOLIC CONGESTIVE HEART FAILURE: ICD-10-CM

## 2024-10-02 PROCEDURE — 99211 OFF/OP EST MAY X REQ PHY/QHP: CPT | Mod: PBBFAC,25

## 2024-10-02 PROCEDURE — 99999 PR PBB SHADOW E&M-EST. PATIENT-LVL I: CPT | Mod: PBBFAC,,,

## 2024-10-02 PROCEDURE — 71048 X-RAY EXAM CHEST 4+ VIEWS: CPT | Mod: TC

## 2024-10-02 PROCEDURE — 99214 OFFICE O/P EST MOD 30 MIN: CPT | Mod: PBBFAC,25,27 | Performed by: INTERNAL MEDICINE

## 2024-10-02 PROCEDURE — 94618 PULMONARY STRESS TESTING: CPT | Mod: PBBFAC

## 2024-10-02 PROCEDURE — 71048 X-RAY EXAM CHEST 4+ VIEWS: CPT | Mod: 26,,, | Performed by: RADIOLOGY

## 2024-10-02 PROCEDURE — 99999 PR PBB SHADOW E&M-EST. PATIENT-LVL IV: CPT | Mod: PBBFAC,,, | Performed by: INTERNAL MEDICINE

## 2024-10-02 RX ORDER — ALBUTEROL SULFATE 0.83 MG/ML
2.5 SOLUTION RESPIRATORY (INHALATION)
Qty: 360 ML | Refills: 11 | Status: SHIPPED | OUTPATIENT
Start: 2024-10-02

## 2024-10-02 NOTE — PROCEDURES
"[facility] - Pulmonary Function  Six Minute Walk     SUMMARY     Ordering Provider: Dr. Ziegler   Interpreting Provider: Dr. Ziegler  Performing nurse/tech/RT: JOSE JUAN Rodriguez RRT  Diagnosis: COPD  Height: 5' 6" (167.6 cm)  Weight: 77.5 kg (170 lb 13.7 oz)  BMI (Calculated): 27.6                Phase Oxygen Assessment Supplemental O2 Heart   Rate Blood Pressure Srinath Dyspnea Scale Rating   Resting 97 % Room Air 81 bpm 116/61 5-6   Exercise        Minute        1 94 % Room Air 104 bpm     2 93 % Room Air 107 bpm     3 94 % Room Air 109 bpm     4 93 % Room Air 111 bpm     5 92 % Room Air 111 bpm     6  93 % Room Air 108 bpm 125/67 7-8   Recovery        Minute        1 96 % Room Air 88 bpm     2 97 % Room Air 85 bpm     3 97 % Room Air 88 bpm     4 97 % Room Air 90 bpm 125/76 5-6     Six Minute Walk Summary  6MWT Status: completed without stopping  Patient Reported: Dyspnea, Leg/hip pain     Interpretation:  Did the patient stop or pause?: No                                         Total Time Walked (Calculated): 360 seconds  Final Partial Lap Distance (feet): 0 feet  Total Distance Meters (Calculated): 182.88 meters  Predicted Distance Meters (Calculated): 441.81 meters  Percentage of Predicted (Calculated): 41.39  Peak VO2 (Calculated): 9.47  Mets: 2.71  Has The Patient Had a Previous Six Minute Walk Test?: Yes       Previous 6MWT Results  Has The Patient Had a Previous Six Minute Walk Test?: Yes  Date of Previous Test: 10/02/23  Total Time Walked: 360 seconds  Total Distance (meters): 289.56  Predicted Distance (meters): 451.41 meters  Percentage of Predicted: 64.15  Percent Change (Calculated): 0.37    Interpretation:  Total distance walked in six minutes is moderately reduced indicating a reduction in overall  functional capacity. Oxygen desaturation did not meet criteria for supplemental oxygen prescription.    Clinical correlation suggested.      [x] Moderate exercise-induced hypoxemia as 89-93%    Medicare Criteria for " oxygen prescription comments:   When arterial oxygen saturation is at or below 88% during exercise on room air (severe exercise induced hypoxemia) then the patient falls under Medicare Group 1 criteria for supplemental oxygen prescription.  Details about Medicare Group Criteria coverage can be found at http://www.cms.hhs.gov/manuals/downloads/   Bob Ziegler MD

## 2024-10-02 NOTE — PROGRESS NOTES
Subjective:     Patient ID: Rea Vail is a 74 y.o. male.    Chief Complaint:      HPI  He   presents for evaluation and treatment of left sided weakness after being discharged from the hospital  2  days ago. Since discharge symptoms have unchanged course since that time. Patient denies chest pain or recurrence of weakness. Respiratory: no cough or shortness of breath; Cardiovascular: no chest pain or palpitations.  Patient currently is not on home oxygen therapy..    MEDICAL RECORDS FROM THE HOSPITAL REVIEWED:  Discharge Summaries  - documented in this encounter   Edin Alvarado PA - 10/01/2024 2:58 PM CDT  Formatting of this note is different from the original.  Admit Date 9/30/2024  Discharge Date 10/1/2024  Location [facility]-5200/1  Treatment Team Primary Care Physician: Hosea Ramon MD  Discharging Physician: MYLA Laguerre  Treatment Team:  Consulting Physician: [facility]  Hospitalist: 6a-6p [facility] Day Team #02  Hospitalist: 6p-6a [facility] Pm #[id number]  Occupational Therapist: Eva Michaels OT  Admitting Provider: Scott Mas MD      Reason for Hospitalization  Left sided weakness, concern with acute CVA    * Acute left-sided weakness  73 y/o m, history of CVA, atrial fibrillation, HLD, HTN presenting with acute left-sided weakness from PCP office, history of chronic facial droop and residual left sided weakness from previous CVA per family. Patient was not a candidate for tPA due to being out of therapeutic window and Eliquis use. 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 Otherwise, no evidence of significant stenosis, arterial occlusion, or acute vascular injury, MRI brain No evidence of acute infarct. Extensive white matter signal may reflect chronic microangiopathy, though obscured by motion. Susceptibility within the right thalamus suggestive of remote hemorrhage. Vascular consulted for concern with pseudoaneurysm  of left distal ICA, vascular report Carotid duplex u/s no evidence of pseudoaneurysm but there is focal dilation of the distal left ICA. There is no thrombus or calcification within this area and I do not think It is symptomatic. No vascular intervention needed at this time with follow up in office in 3 months for repeat carotid. PT/OT evaluated patient, left sided weakness is baseline per patient/brother at bedside, will continue HH and outpatient therapy as needed    Atrial fibrillation (HCC)  Rate controlled on Toprol XL and Eliquis for anticoagulant    Chronic systolic CHF (congestive heart failure) (HCC)  Echo EF 55-65%, no acute exacerbation, will continue Toprol XL, Enteresto and Demadex at discharge    Hypertensive heart and kidney disease with chronic diastolic congestive heart failure and stage 3 chronic kidney disease (HCC)  Will resume home Toprol Xl, Hydralazine and Demadex at discharge.    Dyslipidemia  LDL 88, will resume home statin, Zocor 40 mg daily    GUMARO (obstructive sleep apnea)  home machine brought during hospital admission      Final Medication List      ACTIVE    albuterol 2.5 mg /3 mL (0.083 %) nebulizer solution  2.5 mg, Nebulization, 2 times daily PRN    apixaban 5 mg Tab  Commonly known as: Eliquis  5 mg, Oral, 2 times daily    ascorbic acid (vitamin C) 1000 MG tablet  Commonly known as: VITAMIN C  1,000 mg, Oral, Daily    Breztri Aerosphere 160-9-4.8 mcg/actuation Hfaa  Generic drug: budesonide-glycopyr-formoterol  2 puffs, Inhalation, 2 times daily    cholecalciferol (vitamin D3) 25 mcg (1,000 unit) capsule  1,000 Units, Oral, 2 times daily    Entresto 24-26 mg tablet  Generic drug: sacubitriL-valsartan  1 tablet, Oral, 2 times daily    EZFE 200 200 mg iron Cap  Generic drug: polysaccharide iron complex  1 capsule, Oral, 2 times daily    gabapentin 400 mg capsule  Commonly known as: NEURONTIN  400 mg, Oral, Daily    hydrALAZINE 25 mg tablet  Commonly known as: APRESOLINE  25 mg, Oral,  3 times daily    metoprolol succinate  mg 24 hr tablet  Commonly known as: TOPROL-XL  100 mg, Oral, Every evening    multivitamin with folic acid 400 mcg Tab  Commonly known as: THERAGRAN  1 tablet, Oral, Daily    risperiDONE 0.5 mg tablet  Commonly known as: RisperDAL  0.5 mg, Oral, Every morning    simvastatin 40 mg tablet  Commonly known as: ZOCOR  40 mg, Oral, Daily    tamsulosin 0.4 mg Cap  Commonly known as: FLOMAX  0.4 mg, Oral, Every morning    torsemide 20 mg tablet  Commonly known as: DEMADEX  TAKE 1 TABLET BY MOUTH EVERY MORNING AND 1 TAB BY MOUTH AT 2 PM    turmeric (bulk) 95 % Powd  1,000 mg, Oral, Daily          Significant Medication Changes:  As above      Condition: Stable, per my exam    Disposition: Home    Time Spent on Discharge: Less than 30 minutes.    Quality: See chart for full details.    Scheduled Appts    Follow up with Hosea Ramon MD  Follow up with PCP (Hosea Ramon MD) in 1 week for hospital follow up  Phone: [phone]  Where: [address] [address]  Follow up with Tony Chung MD  3 months hospital follow up for repeat carotid, focal dilation of distal left ICA  Phone: [phone]  Where: [address] [facility], [place] [address], [place] [address]    Monday Oct 14, 2024  Established Patient with Moy Sequeira MD at 10:30 AM    Where: [facility] ([facility])    Wednesday Cameron 15, 2025  [facility] Us 30 with [facility] US 6 at 10:00 AM    Where: [facility] [place] ([facility])  Follow-Up with Tony Chung MD at 10:30 AM (Arrive by 10:15 AM)    Where: [facility] ([facility])    Tuesday Jan 28, 2025  Established Patient with Moy Sequeira MD at 10:15 AM    Where: [facility] ([facility])    [facility]  [facility]  [address] [place] [address], [address]  [place] [id number] [phone] [facility] [place]  [facility]  [address] [facility], [address]  [place]  [phone] [facility]  [facility]  [address]  [address]  [place] [id number] [phone]        Diet    Discharge Diet  Patient Type: Adult - Diet  Diet-Adult Home Diet Type: Return to previous diet    Activity    Discharge Activity  Instructions: As tolerated    Other    Call MD  Call MD for: Worsening symptoms  Discharge Condition  Condition: Stable      Review of medical records from hospital. Medical records from hospital were reviewed during office visit - these included but were not limited to review of radiographic studies and /or radiologists reports, laboratory studies, discharge summaries, procedure notes, consultations and other transcribed notes.    Obstructive Sleep Apnea :   follow up for GUMARO and BIPAP complaince assessment.  Compliance ReportCompliance   He is on BIPAP of BIPAP IPAP 13 cm EPAP 8 cm    He is  compliant with BIPAP use.   Patient reports benefit from BIPAP use and denies snoring and excessive daytime sleepiness.  Patient reports no complaints    Full face mask     Past Medical History:   Diagnosis Date    Anemia 2013    Asthma     Atrial fibrillation     CHF (congestive heart failure)     COPD (chronic obstructive pulmonary disease) 2012    Diverticular disease     Gout 2012    Hyperlipidemia     Hypertension     GUMARO (obstructive sleep apnea) 4/27/2018    Other and unspecified hyperlipidemia     Stroke      History reviewed. No pertinent surgical history.  Review of patient's allergies indicates:   Allergen Reactions    Cephalexin Anaphylaxis    Ferumoxytol Anaphylaxis    Sulfamethoxazole-trimethoprim      Current Outpatient Medications on File Prior to Visit   Medication Sig Dispense Refill    ascorbic acid, vitamin C, (VITAMIN C) 1000 MG tablet Take 1,000 mg by mouth  once daily.      azelastine (ASTELIN) 137 mcg (0.1 %) nasal spray 1 spray by Nasal route daily as needed.      ENTRESTO 24-26 mg per tablet Take 1 tablet by mouth 2 (two) times daily.      ferrous sulfate (IRON ORAL) Take 200 mg by mouth 2 (two) times a day.      gabapentin (NEURONTIN) 400 MG capsule Take 1 capsule (400 mg total) by mouth every evening. 90 capsule 5    hydrALAZINE (APRESOLINE) 25 MG tablet Take 25 mg by mouth 3 (three) times daily.      methylPREDNISolone (MEDROL DOSEPACK) 4 mg tablet       metoprolol succinate (TOPROL-XL) 100 MG 24 hr tablet Take 100 mg by mouth once daily.      multivitamin capsule Take 1 capsule by mouth once daily.      OXYGEN-AIR DELIVERY SYSTEMS MISC by [facility].(Non-Drug; Combo Route) route.      promethazine-dextromethorphan (PROMETHAZINE-DM) 6.25-15 mg/5 mL Syrp Take 5 mLs by mouth 4 (four) times daily as needed (cough). 240 mL 5    risperiDONE (RISPERDAL) 0.5 MG Tab TAKE 1 TABLET BY MOUTH NIGHTLY AS NEEDED (AGITATION).      simvastatin (ZOCOR) 40 MG tablet Take 1 tablet (40 mg total) by mouth every evening. 1 tablet Oral Every day 90 tablet 4    tamsulosin (FLOMAX) 0.4 mg Cap Take 0.4 mg by mouth every morning.      vitamin D (VITAMIN D3) 1000 units Tab Take 1,000 Units by mouth 2 (two) times a day.      [DISCONTINUED] albuterol (PROVENTIL) 2.5 mg /3 mL (0.083 %) nebulizer solution Take 3 mLs (2.5 mg total) by nebulization every 4 to 6 hours as needed for Wheezing or Shortness of Breath. Rescue 300 mL 11    [DISCONTINUED] budesonide-glycopyr-formoterol 160-9-4.8 mcg/actuation HFAA Inhale 2 puffs into the lungs 2 (two) times daily. Wash out mouth after use. 10.7 g 11    [DISCONTINUED] ELIQUIS 5 mg Tab TAKE 1 TABLET BY MOUTH TWICE DAILY 60 tablet 6    torsemide (DEMADEX) 20 MG Tab Take 20 mg by mouth.      [DISCONTINUED] doxycycline (MONODOX) 100 MG capsule Take 1 capsule (100 mg total) by mouth 2 (two) times daily. (Patient not taking: Reported on 10/2/2024) 20 capsule 0     [DISCONTINUED] predniSONE (DELTASONE) 20 MG tablet Prednisone 60 mg/ day for 3 days, 40 mg/day for 3 days,20 mg/ day for 3 days, (1/2 tablet )10 mg a day for 3 days. (Patient not taking: Reported on 10/2/2024) 20 tablet 0     Current Facility-Administered Medications on File Prior to Visit   Medication Dose Route Frequency Provider Last Rate Last Admin    [DISCONTINUED] GENERIC EXTERNAL MEDICATION     Provider, Generic External Data        [DISCONTINUED] GENERIC EXTERNAL MEDICATION     Provider, Generic External Data         Social History     Socioeconomic History    Marital status: Single   Tobacco Use    Smoking status: Former     Current packs/day: 0.00     Average packs/day: 1 pack/day for 48.5 years (48.5 ttl pk-yrs)     Types: Cigarettes     Start date: 1958     Quit date: 2006     Years since quittin.2    Smokeless tobacco: Never   Substance and Sexual Activity    Alcohol use: No    Drug use: No     Social Drivers of Health     Financial Resource Strain: Low Risk  (2022)    [facility] and Affiliates, [facility] and Its Subsidiaries and Affiliates    Overall Financial Resource Strain (CARDIA)     Difficulty of Paying Living Expenses: Not hard at all   Food Insecurity: No Food Insecurity (2022)    [facility], [facility] and Its Subsidiaries and Affiliates    Hunger Vital Sign     Worried About Running Out of Food in the Last Year: Never true     Ran Out of Food in the Last Year: Never true   Transportation Needs: No Transportation Needs (2022)    Received from [facility] and Its Subsidiaries and Affiliates, [facility] and Its Subsidiaries and Affiliates    KIERSTEN Ferguson  Transportation     Lack of Transportation (Medical): No     Lack of Transportation (Non-Medical): No   Physical Activity: Insufficiently Active (8/13/2022)    [facility], [facility] and [facility]    Exercise Vital Sign     Days of Exercise per Week: 2 days     Minutes of Exercise per Session: 30 min   Stress: No Stress Concern Present (8/13/2022)    Received from [facility] and Affiliates, [facility]    [facility] - Occupational Stress Questionnaire     Feeling of Stress : Only a little   Housing Stability: Low Risk  (8/13/2022)    [facility], [facility] and [facility]    Housing Stability Vital Sign     Unable to Pay for Housing in the Last Year: No     Number of Places Lived in the Last Year: 1     Unstable Housing in the Last Year: No     Family History   Problem Relation Name Age of Onset    Stroke Cousin         Review of Systems   Constitutional:  Positive for fatigue. Negative for fever.   HENT:  Positive for postnasal drip, rhinorrhea and congestion.    Eyes:  Negative for redness and itching.   Respiratory:  Positive for cough, sputum production, shortness of breath, dyspnea on extertion, use of rescue inhaler and Paroxysmal Nocturnal Dyspnea.    Cardiovascular:  Negative for chest pain, palpitations and leg swelling.   Genitourinary:  Negative for difficulty urinating and hematuria.   Endocrine:  Negative for cold intolerance and heat intolerance.    Skin:  Negative for rash.   Gastrointestinal:  Negative for nausea and abdominal pain.   Neurological:  " Negative for dizziness, syncope, weakness and light-headedness.   Hematological:  Negative for adenopathy. Does not bruise/bleed easily.   Psychiatric/Behavioral:  Negative for sleep disturbance. The patient is not nervous/anxious.      Objective:      /80   Pulse 74   Resp 18   Ht 5' 6" (1.676 m)   Wt 77.5 kg (170 lb 13.7 oz)   SpO2 97%   BMI 27.58 kg/m²   Physical Exam  Vitals and nursing note reviewed.   Constitutional:       Appearance: He is well-developed. He is ill-appearing.   HENT:      Head: Normocephalic and atraumatic.      Nose: Nose normal.   Eyes:      Conjunctiva/sclera: Conjunctivae normal.      Pupils: Pupils are equal, round, and reactive to light.   Neck:      Thyroid: No thyromegaly.      Vascular: No JVD.      Trachea: No tracheal deviation.   Cardiovascular:      Rate and Rhythm: Normal rate. Rhythm irregular.      Heart sounds: No murmur heard.  Pulmonary:      Breath sounds: Examination of the right-lower field reveals wheezing. Examination of the left-lower field reveals wheezing. Decreased breath sounds and wheezing present. No rhonchi or rales.   Abdominal:      General: Bowel sounds are normal.      Palpations: Abdomen is soft.   Musculoskeletal:         General: No tenderness. Normal range of motion.      Cervical back: Neck supple.   Lymphadenopathy:      Cervical: No cervical adenopathy.   Skin:     General: Skin is warm and dry.   Neurological:      Mental Status: He is alert and oriented to person, place, and time.     Personal Diagnostic Review  Chest x-ray: stable        10/2/2024    11:13 AM   Pulmonary Studies Review   SpO2 97 %   Height 5' 6" (1.676 m)   Weight 77.5 kg (170 lb 13.7 oz)   BMI (Calculated) 27.6   Predicted Distance 304.72   Predicted Distance Meters (Calculated) 441.81 meters       X-Ray Chest 4 Or More View  Narrative: EXAM: XR CHEST 4 OR MORE VIEW    CLINICAL HISTORY: COPD    FINDINGS:  Comparison chest 09/25/2024.    4 views were " "obtained.    Atherosclerosis of thoracic aorta.  Stable heart size.  There are bands of discoid atelectasis at the lung bases bilaterally which appears similar to previous exam.  No acute infiltrates.  No lung nodules.  Impression:  Stable chest with discoid atelectasis at the lung bases.    Finalized on: 10/2/2024 9:50 AM By:  Masood Siddiqi MD  [facility]# [id number]      2024-10-02 09:52:42.169    [facility]      Office Spirometry Results:         10/2/2024    11:13 AM 10/2/2024     9:40 AM 5/22/2024     9:32 AM 4/2/2024    11:30 AM 2/1/2024     3:34 PM 10/2/2023    11:34 AM 10/2/2023    11:29 AM   Pulmonary Function Tests   SpO2 97 %   92 % 92 % 97 %    Ordering Provider  Dr. Toney Ziegler MD   Performing nurse/tech/RT  C. Credeur, RRT     Alonso Ann RRT   Diagnosis  COPD     --   Height 5' 6" (1.676 m) 5' 6" (1.676 m)  5' 6" (1.676 m) 5' 6" (1.676 m) 5' 6" (1.676 m) 5' 6" (1.676 m)   Weight 77.5 kg (170 lb 13.7 oz) 77.5 kg (170 lb 13.7 oz) 72 kg (158 lb 12.8 oz) 78 kg (171 lb 13.6 oz) 78.8 kg (173 lb 11.6 oz) 76.2 kg (168 lb) 74.9 kg (165 lb 2 oz)   BMI (Calculated) 27.6 27.6  27.8 28.1 27.1 26.7   Patient Race          6MWT Status  completed without stopping     completed without stopping   Patient Reported  Dyspnea;Leg/hip pain     Leg pain   Was O2 used?  No     No   6MW Distance walked (feet)  600 feet     950 feet   Distance walked (meters)  182.88 meters     289.56 meters   Did patient stop?  No     No   Type of assistive device(s) used?  a cane     no assistive devices   Oxygen Saturation  97 %     96 %   Supplemental Oxygen  Room Air     Room Air   Heart Rate  81 bpm     81 bpm   Blood Pressure  116/61     136/81   Srinath Dyspnea Rating   heavy     moderate   Oxygen Saturation  93 %     94 %   Supplemental Oxygen  Room Air     Room Air   Heart Rate  108 bpm     139 bpm   Blood Pressure  125/67     173/86   Srinath Dyspnea Rating   very heavy     heavy   Recovery Time (seconds)  240 seconds     240 seconds " "  Oxygen Saturation  97 %     99 %   Supplemental Oxygen  Room Air     Room Air   Heart Rate  90 bpm     71 bpm   Blood Pressure  125/76     153/87   Srinath Dyspnea Rating   heavy     somewhat heavy   Is procedure ready for interpretation?  Yes     Yes   Oxygen Qualification?  No     No         10/2/2024    11:13 AM   Pulmonary Studies Review   SpO2 97 %   Height 5' 6" (1.676 m)   Weight 77.5 kg (170 lb 13.7 oz)   BMI (Calculated) 27.6   Predicted Distance 304.72   Predicted Distance Meters (Calculated) 441.81 meters           No results found for this or any previous visit (from the past 2 weeks).    Assessment:            GUMARO on CPAP  -     CPAP/BIPAP SUPPLIES    COPD, severe  -     albuterol (PROVENTIL) 2.5 mg /3 mL (0.083 %) nebulizer solution; Take 3 mLs (2.5 mg total) by nebulization every 4 to 6 hours as needed for Wheezing or Shortness of Breath. Rescue  Dispense: 360 mL; Refill: 11    Asthma with COPD  -     albuterol (PROVENTIL) 2.5 mg /3 mL (0.083 %) nebulizer solution; Take 3 mLs (2.5 mg total) by nebulization every 4 to 6 hours as needed for Wheezing or Shortness of Breath. Rescue  Dispense: 360 mL; Refill: 11  -     budesonide-glycopyr-formoterol 160-9-4.8 mcg/actuation HFAA; Inhale 2 puffs into the lungs 2 (two) times daily. Wash out mouth after use.  Dispense: 10.7 g; Refill: 11  -     Spirometry with/without bronchodilator; Future; Expected date: 04/04/2025    Atrial fibrillation, unspecified type  -     apixaban (ELIQUIS) 5 mg Tab; Take 1 tablet (5 mg total) by mouth 2 (two) times daily.  Dispense: 60 tablet; Refill: 6    Acute on chronic diastolic congestive heart failure  -     apixaban (ELIQUIS) 5 mg Tab; Take 1 tablet (5 mg total) by mouth 2 (two) times daily.  Dispense: 60 tablet; Refill: 6          Outpatient Encounter Medications as of 10/2/2024   Medication Sig Dispense Refill    ascorbic acid, vitamin C, (VITAMIN C) 1000 MG tablet Take 1,000 mg by mouth once daily.      azelastine (ASTELIN) " 137 mcg (0.1 %) nasal spray 1 spray by Nasal route daily as needed.      ENTRESTO 24-26 mg per tablet Take 1 tablet by mouth 2 (two) times daily.      ferrous sulfate (IRON ORAL) Take 200 mg by mouth 2 (two) times a day.      gabapentin (NEURONTIN) 400 MG capsule Take 1 capsule (400 mg total) by mouth every evening. 90 capsule 5    hydrALAZINE (APRESOLINE) 25 MG tablet Take 25 mg by mouth 3 (three) times daily.      methylPREDNISolone (MEDROL DOSEPACK) 4 mg tablet       metoprolol succinate (TOPROL-XL) 100 MG 24 hr tablet Take 100 mg by mouth once daily.      multivitamin capsule Take 1 capsule by mouth once daily.      OXYGEN-AIR DELIVERY SYSTEMS MISC by [facility].(Non-Drug; Combo Route) route.      promethazine-dextromethorphan (PROMETHAZINE-DM) 6.25-15 mg/5 mL Syrp Take 5 mLs by mouth 4 (four) times daily as needed (cough). 240 mL 5    risperiDONE (RISPERDAL) 0.5 MG Tab TAKE 1 TABLET BY MOUTH NIGHTLY AS NEEDED (AGITATION).      simvastatin (ZOCOR) 40 MG tablet Take 1 tablet (40 mg total) by mouth every evening. 1 tablet Oral Every day 90 tablet 4    tamsulosin (FLOMAX) 0.4 mg Cap Take 0.4 mg by mouth every morning.      vitamin D (VITAMIN D3) 1000 units Tab Take 1,000 Units by mouth 2 (two) times a day.      [DISCONTINUED] albuterol (PROVENTIL) 2.5 mg /3 mL (0.083 %) nebulizer solution Take 3 mLs (2.5 mg total) by nebulization every 4 to 6 hours as needed for Wheezing or Shortness of Breath. Rescue 300 mL 11    [DISCONTINUED] budesonide-glycopyr-formoterol 160-9-4.8 mcg/actuation HFAA Inhale 2 puffs into the lungs 2 (two) times daily. Wash out mouth after use. 10.7 g 11    [DISCONTINUED] ELIQUIS 5 mg Tab TAKE 1 TABLET BY MOUTH TWICE DAILY 60 tablet 6    albuterol (PROVENTIL) 2.5 mg /3 mL (0.083 %) nebulizer solution Take 3 mLs (2.5 mg total) by nebulization every 4 to 6 hours as needed for Wheezing or Shortness of Breath. Rescue 360 mL 11    apixaban (ELIQUIS) 5 mg Tab Take 1 tablet (5 mg total) by mouth 2 (two) times  daily. 60 tablet 6    budesonide-glycopyr-formoterol 160-9-4.8 mcg/actuation HFAA Inhale 2 puffs into the lungs 2 (two) times daily. Wash out mouth after use. 10.7 g 11    torsemide (DEMADEX) 20 MG Tab Take 20 mg by mouth.      [DISCONTINUED] doxycycline (MONODOX) 100 MG capsule Take 1 capsule (100 mg total) by mouth 2 (two) times daily. (Patient not taking: Reported on 10/2/2024) 20 capsule 0    [DISCONTINUED] predniSONE (DELTASONE) 20 MG tablet Prednisone 60 mg/ day for 3 days, 40 mg/day for 3 days,20 mg/ day for 3 days, (1/2 tablet )10 mg a day for 3 days. (Patient not taking: Reported on 10/2/2024) 20 tablet 0     Facility-Administered Encounter Medications as of 10/2/2024   Medication Dose Route Frequency Provider Last Rate Last Admin    [DISCONTINUED] albuterol nebulizer solution  2.5 mg Inhalation Q6H PRN Provider, Generic External Data        [DISCONTINUED] aspirin EC tablet  81 mg Oral  Provider, Generic External Data        [DISCONTINUED] atorvastatin tablet  40 mg Oral  Provider, Generic External Data        [DISCONTINUED] enoxaparin injection  40 mg Subcutaneous  Provider, Generic External Data        [DISCONTINUED] GENERIC EXTERNAL MEDICATION     Provider, Generic External Data        [DISCONTINUED] GENERIC EXTERNAL MEDICATION     Provider, Generic External Data         Plan:       Requested Prescriptions     Signed Prescriptions Disp Refills    albuterol (PROVENTIL) 2.5 mg /3 mL (0.083 %) nebulizer solution 360 mL 11     Sig: Take 3 mLs (2.5 mg total) by nebulization every 4 to 6 hours as needed for Wheezing or Shortness of Breath. Rescue    budesonide-glycopyr-formoterol 160-9-4.8 mcg/actuation HFAA 10.7 g 11     Sig: Inhale 2 puffs into the lungs 2 (two) times daily. Wash out mouth after use.    apixaban (ELIQUIS) 5 mg Tab 60 tablet 6     Sig: Take 1 tablet (5 mg total) by mouth 2 (two) times daily.     Problem List Items Addressed This Visit       Atrial fibrillation    Overview     Overview:   Last  Assessment & Plan:   Continue cardiology follow up.   Rate controlled.  Continue Eliquis 5mg PO BID.         Relevant Medications    apixaban (ELIQUIS) 5 mg Tab    CHF (congestive heart failure)    Relevant Medications    apixaban (ELIQUIS) 5 mg Tab    COPD, severe    Overview     Overview:   Last Assessment & Plan:   COPD   Plan: SYMBICORT, PROVENTIL, PROVENTIL HFA.   Oxygen         Relevant Medications    albuterol (PROVENTIL) 2.5 mg /3 mL (0.083 %) nebulizer solution     Other Visit Diagnoses       GUMARO on CPAP    -  Primary    Relevant Orders    CPAP/BIPAP SUPPLIES    Asthma with COPD        Relevant Medications    albuterol (PROVENTIL) 2.5 mg /3 mL (0.083 %) nebulizer solution    budesonide-glycopyr-formoterol 160-9-4.8 mcg/actuation HFAA    Other Relevant Orders    Spirometry with/without bronchodilator               Follow up in about 6 months (around 4/2/2025) for Review progress, orly - on return, Follow up with NP.    MEDICAL DECISION MAKING: Moderate to high complexity.  Overall, the multiple problems listed are of moderate to high severity that may impact quality of life and activities of daily living. Side effects of medications, treatment plan as well as options and alternatives reviewed and discussed with patient. There was counseling of patient concerning these issues.    Total time spent in counseling and coordination of care - 42  minutes of total time spent on the encounter, which includes face to face time and non-face to face time preparing to see the patient (eg, review of tests), Obtaining and/or reviewing separately obtained history, Documenting clinical information in the electronic or other health record, Independently interpreting results (not separately reported) and communicating results to the patient/family/caregiver, or Care coordination (not separately reported).    Time was used in discussion of prognosis, risks, benefits of treatment, instructions and compliance with regimen .  Discussion with other physicians and/or health care providers - home health or for use of durable medical equipment (oxygen, nebulizers, CPAP, BiPAP) occurred.

## 2024-11-25 ENCOUNTER — LAB VISIT (OUTPATIENT)
Dept: LAB | Facility: HOSPITAL | Age: 74
End: 2024-11-25
Attending: PSYCHIATRY & NEUROLOGY
Payer: MEDICARE

## 2024-11-25 DIAGNOSIS — I63.9 CEREBROVASCULAR ACCIDENT (CVA), UNSPECIFIED MECHANISM: ICD-10-CM

## 2024-11-25 LAB
ALBUMIN SERPL BCP-MCNC: 3 G/DL (ref 3.5–5.2)
ALP SERPL-CCNC: 96 U/L (ref 40–150)
ALT SERPL W/O P-5'-P-CCNC: 9 U/L (ref 10–44)
ANION GAP SERPL CALC-SCNC: 11 MMOL/L (ref 8–16)
ANION GAP SERPL CALC-SCNC: 11 MMOL/L (ref 8–16)
AST SERPL-CCNC: 17 U/L (ref 10–40)
BASOPHILS # BLD AUTO: 0.02 K/UL (ref 0–0.2)
BASOPHILS NFR BLD: 0.4 % (ref 0–1.9)
BILIRUB SERPL-MCNC: 0.4 MG/DL (ref 0.1–1)
BUN SERPL-MCNC: 41 MG/DL (ref 8–23)
BUN SERPL-MCNC: 41 MG/DL (ref 8–23)
CALCIUM SERPL-MCNC: 9.5 MG/DL (ref 8.7–10.5)
CALCIUM SERPL-MCNC: 9.5 MG/DL (ref 8.7–10.5)
CHLORIDE SERPL-SCNC: 98 MMOL/L (ref 95–110)
CHLORIDE SERPL-SCNC: 98 MMOL/L (ref 95–110)
CHOLEST SERPL-MCNC: 140 MG/DL (ref 120–199)
CHOLEST/HDLC SERPL: 3.5 {RATIO} (ref 2–5)
CO2 SERPL-SCNC: 32 MMOL/L (ref 23–29)
CO2 SERPL-SCNC: 32 MMOL/L (ref 23–29)
CREAT SERPL-MCNC: 1.8 MG/DL (ref 0.5–1.4)
CREAT SERPL-MCNC: 1.8 MG/DL (ref 0.5–1.4)
DIFFERENTIAL METHOD BLD: ABNORMAL
EOSINOPHIL # BLD AUTO: 0.2 K/UL (ref 0–0.5)
EOSINOPHIL NFR BLD: 3.9 % (ref 0–8)
ERYTHROCYTE [DISTWIDTH] IN BLOOD BY AUTOMATED COUNT: 14 % (ref 11.5–14.5)
EST. GFR  (NO RACE VARIABLE): 38.5 ML/MIN/1.73 M^2
EST. GFR  (NO RACE VARIABLE): 38.5 ML/MIN/1.73 M^2
GLUCOSE SERPL-MCNC: 118 MG/DL (ref 70–110)
GLUCOSE SERPL-MCNC: 118 MG/DL (ref 70–110)
HCT VFR BLD AUTO: 37.4 % (ref 40–54)
HDLC SERPL-MCNC: 40 MG/DL (ref 40–75)
HDLC SERPL: 28.6 % (ref 20–50)
HGB BLD-MCNC: 10.7 G/DL (ref 14–18)
IMM GRANULOCYTES # BLD AUTO: 0.02 K/UL (ref 0–0.04)
IMM GRANULOCYTES NFR BLD AUTO: 0.4 % (ref 0–0.5)
LDLC SERPL CALC-MCNC: 87 MG/DL (ref 63–159)
LYMPHOCYTES # BLD AUTO: 0.7 K/UL (ref 1–4.8)
LYMPHOCYTES NFR BLD: 13.7 % (ref 18–48)
MCH RBC QN AUTO: 26.2 PG (ref 27–31)
MCHC RBC AUTO-ENTMCNC: 28.6 G/DL (ref 32–36)
MCV RBC AUTO: 92 FL (ref 82–98)
MONOCYTES # BLD AUTO: 0.5 K/UL (ref 0.3–1)
MONOCYTES NFR BLD: 10.2 % (ref 4–15)
NEUTROPHILS # BLD AUTO: 3.4 K/UL (ref 1.8–7.7)
NEUTROPHILS NFR BLD: 71.4 % (ref 38–73)
NONHDLC SERPL-MCNC: 100 MG/DL
NRBC BLD-RTO: 0 /100 WBC
PLATELET # BLD AUTO: 191 K/UL (ref 150–450)
PMV BLD AUTO: 11.5 FL (ref 9.2–12.9)
POTASSIUM SERPL-SCNC: 4.3 MMOL/L (ref 3.5–5.1)
POTASSIUM SERPL-SCNC: 4.3 MMOL/L (ref 3.5–5.1)
PROT SERPL-MCNC: 7.1 G/DL (ref 6–8.4)
RBC # BLD AUTO: 4.08 M/UL (ref 4.6–6.2)
SODIUM SERPL-SCNC: 141 MMOL/L (ref 136–145)
SODIUM SERPL-SCNC: 141 MMOL/L (ref 136–145)
TRIGL SERPL-MCNC: 65 MG/DL (ref 30–150)
WBC # BLD AUTO: 4.82 K/UL (ref 3.9–12.7)

## 2024-11-25 PROCEDURE — 80171 DRUG SCREEN QUANT GABAPENTIN: CPT | Performed by: PSYCHIATRY & NEUROLOGY

## 2024-11-25 PROCEDURE — 80053 COMPREHEN METABOLIC PANEL: CPT | Performed by: PSYCHIATRY & NEUROLOGY

## 2024-11-25 PROCEDURE — 80061 LIPID PANEL: CPT | Performed by: PSYCHIATRY & NEUROLOGY

## 2024-11-25 PROCEDURE — 85025 COMPLETE CBC W/AUTO DIFF WBC: CPT | Performed by: PSYCHIATRY & NEUROLOGY

## 2024-11-26 LAB — GABAPENTIN SERPLBLD-MCNC: 4.6 MCG/ML (ref 2–20)

## 2025-04-28 ENCOUNTER — TELEPHONE (OUTPATIENT)
Dept: PULMONOLOGY | Facility: CLINIC | Age: 75
End: 2025-04-28
Payer: COMMERCIAL

## 2025-06-05 ENCOUNTER — TELEPHONE (OUTPATIENT)
Dept: PULMONOLOGY | Facility: CLINIC | Age: 75
End: 2025-06-05
Payer: COMMERCIAL

## 2025-06-15 ENCOUNTER — PATIENT MESSAGE (OUTPATIENT)
Dept: PULMONOLOGY | Facility: CLINIC | Age: 75
End: 2025-06-15
Payer: COMMERCIAL

## 2025-06-17 ENCOUNTER — PATIENT MESSAGE (OUTPATIENT)
Dept: PULMONOLOGY | Facility: CLINIC | Age: 75
End: 2025-06-17
Payer: COMMERCIAL

## 2025-06-18 ENCOUNTER — OFFICE VISIT (OUTPATIENT)
Dept: PULMONOLOGY | Facility: CLINIC | Age: 75
End: 2025-06-18
Payer: MEDICARE

## 2025-06-18 ENCOUNTER — CLINICAL SUPPORT (OUTPATIENT)
Dept: PULMONOLOGY | Facility: CLINIC | Age: 75
End: 2025-06-18
Payer: MEDICARE

## 2025-06-18 VITALS
SYSTOLIC BLOOD PRESSURE: 100 MMHG | RESPIRATION RATE: 19 BRPM | OXYGEN SATURATION: 97 % | BODY MASS INDEX: 24.48 KG/M2 | HEART RATE: 63 BPM | DIASTOLIC BLOOD PRESSURE: 80 MMHG | HEIGHT: 66 IN | WEIGHT: 152.31 LBS

## 2025-06-18 DIAGNOSIS — G47.33 OSA TREATED WITH BIPAP: Chronic | ICD-10-CM

## 2025-06-18 DIAGNOSIS — J44.89 ASTHMA WITH COPD: ICD-10-CM

## 2025-06-18 DIAGNOSIS — J44.89 ASTHMA WITH COPD: Primary | ICD-10-CM

## 2025-06-18 DIAGNOSIS — J41.0 SIMPLE CHRONIC BRONCHITIS: ICD-10-CM

## 2025-06-18 LAB
BRPFT: NORMAL
FEF 25 75 LLN: 0.98
FEF 25 75 PRE REF: 46.7 %
FEF 25 75 REF: 2.69
FEV1 FVC LLN: 62
FEV1 FVC PRE REF: 104.3 %
FEV1 FVC REF: 76
FEV1 LLN: 1.49
FEV1 PRE REF: 62.8 %
FEV1 REF: 2.24
FVC LLN: 2.08
FVC PRE REF: 60 %
FVC REF: 2.95
PEF LLN: 4.47
PEF PRE REF: 65.1 %
PEF REF: 6.84
PRE FEF 25 75: 1.26 L/S
PRE FET 100: 2.74 SEC
PRE FEV1 FVC: 79.6 %
PRE FEV1: 1.41 L
PRE FVC: 1.77 L
PRE PEF: 4.45 L/S

## 2025-06-18 PROCEDURE — 94010 BREATHING CAPACITY TEST: CPT | Mod: PBBFAC

## 2025-06-18 PROCEDURE — 99215 OFFICE O/P EST HI 40 MIN: CPT | Mod: PBBFAC | Performed by: HOSPITALIST

## 2025-06-18 PROCEDURE — 99999 PR PBB SHADOW E&M-EST. PATIENT-LVL V: CPT | Mod: PBBFAC,,, | Performed by: HOSPITALIST

## 2025-06-18 RX ORDER — LEVALBUTEROL INHALATION SOLUTION 1.25 MG/3ML
1 SOLUTION RESPIRATORY (INHALATION) EVERY 4 HOURS PRN
Qty: 180 ML | Refills: 5 | Status: SHIPPED | OUTPATIENT
Start: 2025-06-18 | End: 2026-06-18

## 2025-06-18 RX ORDER — LEVALBUTEROL TARTRATE 45 UG/1
1-2 AEROSOL, METERED ORAL EVERY 4 HOURS PRN
Qty: 15 G | Refills: 5 | Status: SHIPPED | OUTPATIENT
Start: 2025-06-18 | End: 2026-06-18

## 2025-06-18 RX ORDER — LEVALBUTEROL TARTRATE 45 UG/1
1-2 AEROSOL, METERED ORAL EVERY 4 HOURS PRN
Qty: 15 G | Refills: 0 | Status: SHIPPED | OUTPATIENT
Start: 2025-06-18 | End: 2025-06-18

## 2025-06-18 NOTE — ASSESSMENT & PLAN NOTE
- starting to struggle more with putting mask on  - reviewed report- more leak and decreased time usage over the past couple months  - they will try to help him put on at night

## 2025-06-18 NOTE — ASSESSMENT & PLAN NOTE
- Typed instructions for his pulmonary medications  - discussed indications for using as needed medication and risks of overusing  - discussed with family member to provide him with a fan to see with for better air flow and comfort with breathing

## 2025-06-18 NOTE — PROGRESS NOTES
"  Subjective:      Patient ID: Rea Vail is a 74 y.o. male.    Chief Complaint: COPD, GUMARO     Interval Hx 6/18/25:    Mr. Vail presents today for follow up COPD and sleep apnea. He was last seen 10/2024 by Dr. Ziegler- continued on Breztri.     Has been using nebulizer 4-5x /day as well as Breztri and before losing his albuterol pump was using this frequently as well. Per pt, he has no resp complaints. A family member did message the office last week that his Cardiologist suspecting he was over using the Albuterol which was resulting in decreased control of his Atrial fibrillation.    Primary trigger for using nebulizer is out of habit and routine, but also being out of breath when sitting outside in the heat.    Interim Testing:  - Spirometry 6/2025- Restrictive pattern with FVC 60%, no bd challenge, decline in fvc compared to 5/2023  - 6MW 10/2024- Resting sat 97%, range 92-97%, Srinath 5-8, total distance 182m, no stops, 100m less than 10/2023    Pulmonary Interventions:  - Albuterol neb  - Albuterol hfa- maybe lost  - Breztri    Review of Systems   Respiratory:  Positive for dyspnea on extertion. Negative for cough and wheezing.      Objective:     Physical Exam   Constitutional: He is oriented to person, place, and time. He appears well-developed and well-nourished. He is not obese.   Cardiovascular: Normal rate and regular rhythm.   Pulmonary/Chest: Normal expansion, effort normal and breath sounds normal.   Neurological: He is alert and oriented to person, place, and time.   Skin: Skin is warm and dry.     Personal Diagnostic Review  As Above      10/2/2024    11:13 AM 10/2/2024     9:40 AM 5/22/2024     9:32 AM 4/2/2024    11:30 AM 2/1/2024     3:34 PM 10/2/2023    11:34 AM 10/2/2023    11:29 AM   Pulmonary Function Tests   SpO2 97 %   92 % 92 % 97 %    Ordering Provider  Dr. Toney Ziegler MD   Performing nurse/tech/RT  C. Credeur, RRT     Alonso Ann RRT   Diagnosis  COPD     --    Height 5' 6" (1.676 " "m) 5' 6" (1.676 m)  5' 6" (1.676 m) 5' 6" (1.676 m) 5' 6" (1.676 m) 5' 6" (1.676 m)   Weight 77.5 kg (170 lb 13.7 oz) 77.5 kg (170 lb 13.7 oz) 72 kg (158 lb 12.8 oz) 78 kg (171 lb 13.6 oz) 78.8 kg (173 lb 11.6 oz) 76.2 kg (168 lb) 74.9 kg (165 lb 2 oz)   BMI (Calculated) 27.6 27.6  27.8 28.1 27.1 26.7   Patient Race          6MWT Status  completed without stopping     completed without stopping   Patient Reported  Dyspnea;Leg/hip pain     Leg pain    Was O2 used?  No     No   6MW Distance walked (feet)  600 feet     950 feet   Distance walked (meters)  182.88 meters     289.56 meters   Did patient stop?  No     No   Type of assistive device(s) used?  a cane     no assistive devices    Oxygen Saturation  97 %     96 %   Supplemental Oxygen  Room Air     Room Air    Heart Rate  81 bpm     81 bpm   Blood Pressure  116/61     136/81   Srinath Dyspnea Rating   heavy     moderate   Oxygen Saturation  93 %     94 %   Supplemental Oxygen  Room Air     Room Air    Heart Rate  108 bpm     139 bpm   Blood Pressure  125/67     173/86   Srinath Dyspnea Rating   very heavy     heavy   Recovery Time (seconds)  240 seconds     240 seconds   Oxygen Saturation  97 %     99 %   Supplemental Oxygen  Room Air     Room Air    Heart Rate  90 bpm     71 bpm   Blood Pressure  125/76     153/87   Srinath Dyspnea Rating   heavy     somewhat heavy   Is procedure ready for interpretation?  Yes     Yes   Oxygen Qualification?  No     No       Data saved with a previous flowsheet row definition        Assessment:     No diagnosis found.     Encounter Medications[1]  No orders of the defined types were placed in this encounter.      Plan:     Problem List Items Addressed This Visit       GUMARO treated with BiPAP (Chronic)    - starting to struggle more with putting mask on  - reviewed report- more leak and decreased time usage over the past couple months  - they will try to help him put on at night         Chronic bronchitis    - Typed " instructions for his pulmonary medications  - discussed indications for using as needed medication and risks of overusing  - discussed with family member to provide him with a fan to see with for better air flow and comfort with breathing          Other Visit Diagnoses         Asthma with COPD    -  Primary    Relevant Medications    levalbuterol (XOPENEX) 1.25 mg/3 mL nebulizer solution    budesonide-glycopyr-formoterol 160-9-4.8 mcg/actuation HFAA    Other Relevant Orders    X-Ray Chest PA And Lateral          Follow up in about 3 months with cxr or sooner as needed.         [1]   Outpatient Encounter Medications as of 6/18/2025   Medication Sig Dispense Refill    albuterol (PROVENTIL) 2.5 mg /3 mL (0.083 %) nebulizer solution Take 3 mLs (2.5 mg total) by nebulization every 4 to 6 hours as needed for Wheezing or Shortness of Breath. Rescue 360 mL 11    allopurinoL (ZYLOPRIM) 100 MG tablet Take 1 tablet by mouth every morning.      amLODIPine (NORVASC) 5 MG tablet Take 5 mg by mouth once daily.      apixaban (ELIQUIS) 5 mg Tab Take 1 tablet (5 mg total) by mouth 2 (two) times daily. 60 tablet 6    ascorbic acid, vitamin C, (VITAMIN C) 1000 MG tablet Take 1,000 mg by mouth once daily.      azelastine (ASTELIN) 137 mcg (0.1 %) nasal spray 1 spray by Nasal route daily as needed.      budesonide-glycopyr-formoterol 160-9-4.8 mcg/actuation HFAA Inhale 2 puffs into the lungs 2 (two) times daily. Wash out mouth after use. 10.7 g 11    ENTRESTO 24-26 mg per tablet Take 1 tablet by mouth 2 (two) times daily.      ferrous sulfate (IRON ORAL) Take 200 mg by mouth 2 (two) times a day.      gabapentin (NEURONTIN) 100 MG capsule Take 100 mg by mouth 2 (two) times daily. (Patient not taking: Reported on 2/5/2025)      gabapentin (NEURONTIN) 400 MG capsule Take 1 capsule (400 mg total) by mouth every evening. 90 capsule 5    hydrALAZINE (APRESOLINE) 25 MG tablet Take 25 mg by mouth 3 (three) times daily.      methylPREDNISolone  (MEDROL DOSEPACK) 4 mg tablet  (Patient not taking: Reported on 2/5/2025)      metoprolol succinate (TOPROL-XL) 100 MG 24 hr tablet Take 100 mg by mouth once daily.      multivitamin capsule Take 1 capsule by mouth once daily.      OXYGEN-AIR DELIVERY SYSTEMS MISC by [facility].(Non-Drug; Combo Route) route. (Patient not taking: Reported on 2/5/2025)      promethazine-dextromethorphan (PROMETHAZINE-DM) 6.25-15 mg/5 mL Syrp Take 5 mLs by mouth 4 (four) times daily as needed (cough). (Patient not taking: Reported on 2/5/2025) 240 mL 5    risperiDONE (RISPERDAL) 0.5 MG Tab TAKE 1 TABLET BY MOUTH NIGHTLY AS NEEDED (AGITATION).      sacubitriL-valsartan (ENTRESTO) 49-51 mg per tablet Take 1 tablet by mouth 2 (two) times daily.      simvastatin (ZOCOR) 40 MG tablet Take 1 tablet (40 mg total) by mouth every evening. 1 tablet Oral Every day 90 tablet 4    tamsulosin (FLOMAX) 0.4 mg Cap Take 0.4 mg by mouth every morning.      torsemide (DEMADEX) 20 MG Tab Take 20 mg by mouth.      vitamin D (VITAMIN D3) 1000 units Tab Take 1,000 Units by mouth 2 (two) times a day.       No facility-administered encounter medications on file as of 6/18/2025.

## 2025-06-18 NOTE — PATIENT INSTRUCTIONS
AM:   - Levalbuterol (xopenex) treatment  - Breztri- swish mouth out afterwards    Afternoon:   - Levalbuterol (xopenex) treatment- ONLY IF NEEDED (shortness of breath, wheezing, cough)    PM:  - Levalbuterol treatment- ONLY IF NEEDED (shortness of breath, wheezing, cough)  - Breztri- swish mouth out afterwards

## 2025-07-15 NOTE — ASSESSMENT & PLAN NOTE
Ambulatory referral to CARDIOLOGY - HEART FAILURE  Preload and afterload reduction. Furosemide 40mg po daily + Metolazone 2.5 mg PO daily.   BYSTOLIC, AVAPRO, COREG  Daily weighing. WEIGHT GOAL   - 170  LBS  Dietary salt restriction.  Alcohol and tobacco avoidance.  
BIPAP considered insufficient due to severity of his condition.  Continue AVAPS AE     Bring Sleep study report on next visit.       
COPD ROS: taking medications as instructed, no medication side effects noted, no significant ongoing wheezing or shortness of breath, using bronchodilator MDI less than twice a week.   New concerns: NONE .   Exam: appears well, vitals normal, no respiratory distress, acyanotic, normal RR.   Assessment:  COPD stable.   Plan: SYMBICORT, PROVENTIL, PROVENTIL HFA. Current treatment plan is effective, no change in therapy. CXR in 1 month.   
Continue TRIOLOGY NIPPV. AVAPS- AE   INCREASE  oxygen supplementation at 3 L / min.  
Detail Level: Detailed
Add 59572 Cpt? (Important Note: In 2017 The Use Of 75545 Is Being Tracked By Cms To Determine Future Global Period Reimbursement For Global Periods): no
Suture Removal Completed By (Optional): NIKOLE Archibald [facility]

## 2025-08-01 ENCOUNTER — LAB VISIT (OUTPATIENT)
Dept: LAB | Facility: HOSPITAL | Age: 75
End: 2025-08-01
Attending: PSYCHIATRY & NEUROLOGY
Payer: MEDICARE

## 2025-08-01 DIAGNOSIS — I67.2 CEREBRAL ATHEROSCLEROSIS: ICD-10-CM

## 2025-08-01 DIAGNOSIS — I63.9 CEREBROVASCULAR ACCIDENT (CVA), UNSPECIFIED MECHANISM: ICD-10-CM

## 2025-08-01 DIAGNOSIS — K90.49 MALABSORPTION DUE TO INTOLERANCE, NOT ELSEWHERE CLASSIFIED: ICD-10-CM

## 2025-08-01 LAB
ABSOLUTE EOSINOPHIL (OHS): 0.25 K/UL
ABSOLUTE MONOCYTE (OHS): 0.37 K/UL (ref 0.3–1)
ABSOLUTE NEUTROPHIL COUNT (OHS): 3.2 K/UL (ref 1.8–7.7)
ALBUMIN SERPL BCP-MCNC: 3 G/DL (ref 3.5–5.2)
ALP SERPL-CCNC: 99 UNIT/L (ref 40–150)
ALT SERPL W/O P-5'-P-CCNC: 12 UNIT/L (ref 0–55)
ANION GAP (OHS): 11 MMOL/L (ref 8–16)
AST SERPL-CCNC: 25 UNIT/L (ref 0–50)
BASOPHILS # BLD AUTO: 0.02 K/UL
BASOPHILS NFR BLD AUTO: 0.4 %
BILIRUB SERPL-MCNC: 0.3 MG/DL (ref 0.1–1)
BUN SERPL-MCNC: 62 MG/DL (ref 8–23)
CALCIUM SERPL-MCNC: 9.1 MG/DL (ref 8.7–10.5)
CHLORIDE SERPL-SCNC: 105 MMOL/L (ref 95–110)
CHOLEST SERPL-MCNC: 137 MG/DL (ref 120–199)
CHOLEST/HDLC SERPL: 3.4 {RATIO} (ref 2–5)
CO2 SERPL-SCNC: 23 MMOL/L (ref 23–29)
CREAT SERPL-MCNC: 2.3 MG/DL (ref 0.5–1.4)
ERYTHROCYTE [DISTWIDTH] IN BLOOD BY AUTOMATED COUNT: 16.5 % (ref 11.5–14.5)
GFR SERPLBLD CREATININE-BSD FMLA CKD-EPI: 29 ML/MIN/1.73/M2
GLUCOSE SERPL-MCNC: 89 MG/DL (ref 70–110)
HCT VFR BLD AUTO: 39 % (ref 40–54)
HDLC SERPL-MCNC: 40 MG/DL (ref 40–75)
HDLC SERPL: 29.2 % (ref 20–50)
HGB BLD-MCNC: 12.2 GM/DL (ref 14–18)
IMM GRANULOCYTES # BLD AUTO: 0.02 K/UL (ref 0–0.04)
IMM GRANULOCYTES NFR BLD AUTO: 0.4 % (ref 0–0.5)
LDLC SERPL CALC-MCNC: 88.2 MG/DL (ref 63–159)
LYMPHOCYTES # BLD AUTO: 0.93 K/UL (ref 1–4.8)
MCH RBC QN AUTO: 28.3 PG (ref 27–31)
MCHC RBC AUTO-ENTMCNC: 31.3 G/DL (ref 32–36)
MCV RBC AUTO: 91 FL (ref 82–98)
NONHDLC SERPL-MCNC: 97 MG/DL
NUCLEATED RBC (/100WBC) (OHS): 0 /100 WBC
PLATELET # BLD AUTO: 195 K/UL (ref 150–450)
PMV BLD AUTO: 10.8 FL (ref 9.2–12.9)
POTASSIUM SERPL-SCNC: 4 MMOL/L (ref 3.5–5.1)
PROT SERPL-MCNC: 7.1 GM/DL (ref 6–8.4)
RBC # BLD AUTO: 4.31 M/UL (ref 4.6–6.2)
RELATIVE EOSINOPHIL (OHS): 5.2 %
RELATIVE LYMPHOCYTE (OHS): 19.4 % (ref 18–48)
RELATIVE MONOCYTE (OHS): 7.7 % (ref 4–15)
RELATIVE NEUTROPHIL (OHS): 66.9 % (ref 38–73)
SODIUM SERPL-SCNC: 139 MMOL/L (ref 136–145)
TRIGL SERPL-MCNC: 44 MG/DL (ref 30–150)
WBC # BLD AUTO: 4.79 K/UL (ref 3.9–12.7)

## 2025-08-01 PROCEDURE — 83718 ASSAY OF LIPOPROTEIN: CPT

## 2025-08-01 PROCEDURE — 36415 COLL VENOUS BLD VENIPUNCTURE: CPT

## 2025-08-01 PROCEDURE — 85025 COMPLETE CBC W/AUTO DIFF WBC: CPT

## 2025-08-01 PROCEDURE — 82040 ASSAY OF SERUM ALBUMIN: CPT

## 2025-08-02 LAB — GABAPENTIN SERPLBLD-MCNC: 10.5 MCG/ML (ref 2–20)
